# Patient Record
Sex: MALE | Race: BLACK OR AFRICAN AMERICAN | NOT HISPANIC OR LATINO | Employment: OTHER | URBAN - METROPOLITAN AREA
[De-identification: names, ages, dates, MRNs, and addresses within clinical notes are randomized per-mention and may not be internally consistent; named-entity substitution may affect disease eponyms.]

---

## 2018-12-18 ENCOUNTER — HOSPITAL ENCOUNTER (INPATIENT)
Facility: HOSPITAL | Age: 68
LOS: 2 days | Discharge: HOME HEALTH | End: 2018-12-20
Attending: EMERGENCY MEDICINE | Admitting: FAMILY MEDICINE

## 2018-12-18 ENCOUNTER — APPOINTMENT (OUTPATIENT)
Dept: MRI IMAGING | Facility: HOSPITAL | Age: 68
End: 2018-12-18

## 2018-12-18 ENCOUNTER — APPOINTMENT (OUTPATIENT)
Dept: GENERAL RADIOLOGY | Facility: HOSPITAL | Age: 68
End: 2018-12-18

## 2018-12-18 DIAGNOSIS — Z74.09 IMPAIRED MOBILITY AND ADLS: ICD-10-CM

## 2018-12-18 DIAGNOSIS — Z78.9 IMPAIRED MOBILITY AND ADLS: ICD-10-CM

## 2018-12-18 DIAGNOSIS — I63.9 ACUTE ISCHEMIC STROKE (HCC): Primary | ICD-10-CM

## 2018-12-18 DIAGNOSIS — R41.841 COGNITIVE COMMUNICATION DEFICIT: ICD-10-CM

## 2018-12-18 DIAGNOSIS — I63.9 ACUTE ISCHEMIC STROKE (HCC): ICD-10-CM

## 2018-12-18 PROBLEM — F10.10 ALCOHOL ABUSE: Chronic | Status: ACTIVE | Noted: 2018-12-18

## 2018-12-18 PROBLEM — N28.9 RENAL INSUFFICIENCY: Status: ACTIVE | Noted: 2018-12-18

## 2018-12-18 PROBLEM — I10 HYPERTENSION: Chronic | Status: ACTIVE | Noted: 2018-12-18

## 2018-12-18 PROBLEM — M10.9 ACUTE GOUT OF RIGHT FOOT: Status: ACTIVE | Noted: 2018-12-18

## 2018-12-18 LAB
ALBUMIN SERPL-MCNC: 4.13 G/DL (ref 3.2–4.8)
ALBUMIN/GLOB SERPL: 1.4 G/DL (ref 1.5–2.5)
ALP SERPL-CCNC: 76 U/L (ref 25–100)
ALT SERPL W P-5'-P-CCNC: 22 U/L (ref 7–40)
ANION GAP SERPL CALCULATED.3IONS-SCNC: 7 MMOL/L (ref 3–11)
AST SERPL-CCNC: 24 U/L (ref 0–33)
BASOPHILS # BLD AUTO: 0.03 10*3/MM3 (ref 0–0.2)
BASOPHILS NFR BLD AUTO: 0.5 % (ref 0–1)
BILIRUB SERPL-MCNC: 0.2 MG/DL (ref 0.3–1.2)
BUN BLD-MCNC: 26 MG/DL (ref 9–23)
BUN/CREAT SERPL: 16.3 (ref 7–25)
CALCIUM SPEC-SCNC: 9.4 MG/DL (ref 8.7–10.4)
CHLORIDE SERPL-SCNC: 102 MMOL/L (ref 99–109)
CO2 SERPL-SCNC: 26 MMOL/L (ref 20–31)
CREAT BLD-MCNC: 1.6 MG/DL (ref 0.6–1.3)
DEPRECATED RDW RBC AUTO: 45.1 FL (ref 37–54)
EOSINOPHIL # BLD AUTO: 0.15 10*3/MM3 (ref 0–0.3)
EOSINOPHIL NFR BLD AUTO: 2.4 % (ref 0–3)
ERYTHROCYTE [DISTWIDTH] IN BLOOD BY AUTOMATED COUNT: 14.1 % (ref 11.3–14.5)
GFR SERPL CREATININE-BSD FRML MDRD: 44 ML/MIN/1.73
GFR SERPL CREATININE-BSD FRML MDRD: 53 ML/MIN/1.73
GLOBULIN UR ELPH-MCNC: 2.9 GM/DL
GLUCOSE BLD-MCNC: 143 MG/DL (ref 70–100)
HCT VFR BLD AUTO: 38.3 % (ref 38.9–50.9)
HGB BLD-MCNC: 12.3 G/DL (ref 13.1–17.5)
HOLD SPECIMEN: NORMAL
HOLD SPECIMEN: NORMAL
IMM GRANULOCYTES # BLD: 0.02 10*3/MM3 (ref 0–0.03)
IMM GRANULOCYTES NFR BLD: 0.3 % (ref 0–0.6)
LYMPHOCYTES # BLD AUTO: 1.29 10*3/MM3 (ref 0.6–4.8)
LYMPHOCYTES NFR BLD AUTO: 20.9 % (ref 24–44)
MAGNESIUM SERPL-MCNC: 2.1 MG/DL (ref 1.3–2.7)
MCH RBC QN AUTO: 28.1 PG (ref 27–31)
MCHC RBC AUTO-ENTMCNC: 32.1 G/DL (ref 32–36)
MCV RBC AUTO: 87.6 FL (ref 80–99)
MONOCYTES # BLD AUTO: 0.34 10*3/MM3 (ref 0–1)
MONOCYTES NFR BLD AUTO: 5.5 % (ref 0–12)
NEUTROPHILS # BLD AUTO: 4.37 10*3/MM3 (ref 1.5–8.3)
NEUTROPHILS NFR BLD AUTO: 70.7 % (ref 41–71)
PLATELET # BLD AUTO: 327 10*3/MM3 (ref 150–450)
PMV BLD AUTO: 10.6 FL (ref 6–12)
POTASSIUM BLD-SCNC: 4.4 MMOL/L (ref 3.5–5.5)
PROT SERPL-MCNC: 7 G/DL (ref 5.7–8.2)
RBC # BLD AUTO: 4.37 10*6/MM3 (ref 4.2–5.76)
SODIUM BLD-SCNC: 135 MMOL/L (ref 132–146)
TROPONIN I SERPL-MCNC: 0 NG/ML (ref 0–0.07)
URATE SERPL-MCNC: 8.6 MG/DL (ref 3.7–9.2)
WBC NRBC COR # BLD: 6.18 10*3/MM3 (ref 3.5–10.8)
WHOLE BLOOD HOLD SPECIMEN: NORMAL
WHOLE BLOOD HOLD SPECIMEN: NORMAL

## 2018-12-18 PROCEDURE — 80053 COMPREHEN METABOLIC PANEL: CPT | Performed by: EMERGENCY MEDICINE

## 2018-12-18 PROCEDURE — 84484 ASSAY OF TROPONIN QUANT: CPT

## 2018-12-18 PROCEDURE — 70547 MR ANGIOGRAPHY NECK W/O DYE: CPT

## 2018-12-18 PROCEDURE — 70551 MRI BRAIN STEM W/O DYE: CPT

## 2018-12-18 PROCEDURE — 93005 ELECTROCARDIOGRAM TRACING: CPT | Performed by: EMERGENCY MEDICINE

## 2018-12-18 PROCEDURE — 99285 EMERGENCY DEPT VISIT HI MDM: CPT

## 2018-12-18 PROCEDURE — 84550 ASSAY OF BLOOD/URIC ACID: CPT | Performed by: FAMILY MEDICINE

## 2018-12-18 PROCEDURE — 85025 COMPLETE CBC W/AUTO DIFF WBC: CPT | Performed by: EMERGENCY MEDICINE

## 2018-12-18 PROCEDURE — 83735 ASSAY OF MAGNESIUM: CPT | Performed by: EMERGENCY MEDICINE

## 2018-12-18 PROCEDURE — 71045 X-RAY EXAM CHEST 1 VIEW: CPT

## 2018-12-18 PROCEDURE — 70544 MR ANGIOGRAPHY HEAD W/O DYE: CPT

## 2018-12-18 PROCEDURE — 99223 1ST HOSP IP/OBS HIGH 75: CPT | Performed by: FAMILY MEDICINE

## 2018-12-18 RX ORDER — ASPIRIN 81 MG/1
81 TABLET, CHEWABLE ORAL DAILY
Status: DISCONTINUED | OUTPATIENT
Start: 2018-12-18 | End: 2018-12-20 | Stop reason: HOSPADM

## 2018-12-18 RX ORDER — AMLODIPINE BESYLATE 10 MG/1
10 TABLET ORAL DAILY
COMMUNITY
End: 2019-02-06 | Stop reason: SDUPTHER

## 2018-12-18 RX ORDER — ATORVASTATIN CALCIUM 40 MG/1
80 TABLET, FILM COATED ORAL NIGHTLY
Status: DISCONTINUED | OUTPATIENT
Start: 2018-12-18 | End: 2018-12-20 | Stop reason: HOSPADM

## 2018-12-18 RX ORDER — ASPIRIN 300 MG/1
300 SUPPOSITORY RECTAL DAILY
Status: DISCONTINUED | OUTPATIENT
Start: 2018-12-18 | End: 2018-12-20 | Stop reason: HOSPADM

## 2018-12-18 RX ORDER — DIPHENOXYLATE HYDROCHLORIDE AND ATROPINE SULFATE 2.5; .025 MG/1; MG/1
1 TABLET ORAL DAILY
Status: DISCONTINUED | OUTPATIENT
Start: 2018-12-18 | End: 2018-12-20 | Stop reason: HOSPADM

## 2018-12-18 RX ORDER — AMLODIPINE BESYLATE 10 MG/1
10 TABLET ORAL DAILY
Status: DISCONTINUED | OUTPATIENT
Start: 2018-12-18 | End: 2018-12-20 | Stop reason: HOSPADM

## 2018-12-18 RX ORDER — MECLIZINE HYDROCHLORIDE 25 MG/1
25 TABLET ORAL 4 TIMES DAILY PRN
COMMUNITY
End: 2020-02-24

## 2018-12-18 RX ORDER — BISACODYL 10 MG
10 SUPPOSITORY, RECTAL RECTAL DAILY PRN
Status: DISCONTINUED | OUTPATIENT
Start: 2018-12-18 | End: 2018-12-20 | Stop reason: HOSPADM

## 2018-12-18 RX ORDER — BISACODYL 5 MG/1
5 TABLET, DELAYED RELEASE ORAL DAILY PRN
Status: DISCONTINUED | OUTPATIENT
Start: 2018-12-18 | End: 2018-12-20 | Stop reason: HOSPADM

## 2018-12-18 RX ORDER — MIRTAZAPINE 15 MG/1
15 TABLET, FILM COATED ORAL NIGHTLY
COMMUNITY
End: 2019-02-06 | Stop reason: SDUPTHER

## 2018-12-18 RX ORDER — HYDROCHLOROTHIAZIDE 12.5 MG/1
12.5 CAPSULE, GELATIN COATED ORAL DAILY
COMMUNITY
End: 2018-12-20 | Stop reason: HOSPADM

## 2018-12-18 RX ORDER — SODIUM CHLORIDE 0.9 % (FLUSH) 0.9 %
10 SYRINGE (ML) INJECTION AS NEEDED
Status: DISCONTINUED | OUTPATIENT
Start: 2018-12-18 | End: 2018-12-20 | Stop reason: HOSPADM

## 2018-12-18 RX ORDER — SODIUM CHLORIDE 0.9 % (FLUSH) 0.9 %
3 SYRINGE (ML) INJECTION EVERY 12 HOURS SCHEDULED
Status: DISCONTINUED | OUTPATIENT
Start: 2018-12-18 | End: 2018-12-20 | Stop reason: HOSPADM

## 2018-12-18 RX ORDER — NICOTINE 21 MG/24HR
1 PATCH, TRANSDERMAL 24 HOURS TRANSDERMAL
Status: DISCONTINUED | OUTPATIENT
Start: 2018-12-18 | End: 2018-12-20 | Stop reason: HOSPADM

## 2018-12-18 RX ORDER — PREDNISONE 20 MG/1
40 TABLET ORAL
Status: DISCONTINUED | OUTPATIENT
Start: 2018-12-19 | End: 2018-12-20 | Stop reason: HOSPADM

## 2018-12-18 RX ORDER — SODIUM CHLORIDE 0.9 % (FLUSH) 0.9 %
3-10 SYRINGE (ML) INJECTION AS NEEDED
Status: DISCONTINUED | OUTPATIENT
Start: 2018-12-18 | End: 2018-12-20 | Stop reason: HOSPADM

## 2018-12-18 RX ORDER — UBIDECARENONE 75 MG
100 CAPSULE ORAL DAILY
Status: DISCONTINUED | OUTPATIENT
Start: 2018-12-18 | End: 2018-12-20 | Stop reason: HOSPADM

## 2018-12-18 RX ORDER — ACETAMINOPHEN 325 MG/1
650 TABLET ORAL EVERY 6 HOURS PRN
Status: DISCONTINUED | OUTPATIENT
Start: 2018-12-18 | End: 2018-12-20 | Stop reason: HOSPADM

## 2018-12-18 RX ADMIN — SODIUM CHLORIDE, PRESERVATIVE FREE 3 ML: 5 INJECTION INTRAVENOUS at 20:37

## 2018-12-18 NOTE — ED PROVIDER NOTES
Subjective   Eyal Solano is a 67 y.o.male who presents to the ED with his family with c/o neurologic symptoms with onset 9 days ago that has been waxing and waning. He states that he felt light-headed 9 days ago so he checked his blood pressure noting it to be elevated. He felt off balance and weak but waited to see his PCP until 7 days ago. He developed right sided weakness and has not been able to ambulate due to his weakness. He states that he has been seen multiple times in [place], [place] and at [facility]. He states that his right upper and lower extremity weakness is waxing and waning. Per his family, when his weakness is at its worse he is unable to lift his arm or leg. Today, he is able to lift his right arm but states his weakness has been worsening since they drove to [facility] ED. He denies any headache, numbness or visual changes. He states that his left knee causes him difficulties with ambulating and states this is a chronic issue. He has a h/o HTN, HLD but denies DM.        History provided by:  Patient  Neurologic Problem   The patient's primary symptoms include focal weakness, a loss of balance and weakness. This is a recurrent problem. The current episode started 1 to 4 weeks ago. The neurological problem developed gradually. The last time the patient was known to be well was 12/9/2018 1:25 PM.  The problem has been waxing and waning since onset. There was right-sided, upper extremity and lower extremity focality noted. Associated symptoms include dizziness. Past treatments include bed rest. The treatment provided no relief. There is no history of seizures. (HTN)       Review of Systems   Neurological: Positive for dizziness, focal weakness, weakness and loss of balance.       Past Medical History:   Diagnosis Date   • Conversion reaction    • Dizziness    • Hypertension        No Known Allergies    Past Surgical History:   Procedure Laterality Date   • NASAL SINUS SURGERY         Family History    Problem Relation Age of Onset   • Hyperlipidemia Mother    • Stroke Father        Social History     Socioeconomic History   • Marital status: Single     Spouse name: Not on file   • Number of children: Not on file   • Years of education: Not on file   • Highest education level: Not on file   Tobacco Use   • Smoking status: Never Smoker   • Smokeless tobacco: Current User     Types: Chew   Substance and Sexual Activity   • Alcohol use: Yes     Comment: 6 beers and 1/2 pint liquir daily, last drink 12/9/18   • Drug use: No   • Sexual activity: Defer         Objective   Physical Exam   Constitutional: He is oriented to person, place, and time. He appears well-developed and well-nourished. No distress.   HENT:   Head: Normocephalic and atraumatic.   Right Ear: External ear normal.   Left Ear: External ear normal.   Nose: Nose normal.   Eyes: Conjunctivae are normal. No scleral icterus.   Neck: Normal range of motion. Neck supple. Carotid bruit is not present.   Cardiovascular: Normal rate, regular rhythm and normal heart sounds. Exam reveals no friction rub.   No murmur heard.  Pulmonary/Chest: Effort normal and breath sounds normal. No respiratory distress. He has no wheezes. He has no rales.   Abdominal: Soft. Bowel sounds are normal. He exhibits no distension. There is no tenderness.   Musculoskeletal: He exhibits no edema or tenderness.   Neurological: He is alert and oriented to person, place, and time. No cranial nerve deficit. Coordination normal.   Slight right arm and leg weakness, they drift but don't hit bed.  Speech fluent and articulate.  No facial weakness or droop.   Skin: Skin is warm and dry. He is not diaphoretic.   Psychiatric: He has a normal mood and affect. His behavior is normal.   Nursing note and vitals reviewed.      Procedures         ED Course     NIHSS = 2.  Last normal several days ago.  I reviewed notes from [facility] where pt had negative CT scan and was diagnosed with conversion  disorder.  MRI positive for stroke.  Patient stable on serial rechecks.  Discussed exam findings, test results so far and concerns in detail at the bedside.  Discussed need for admission for further evaluation and treatment.                  MDM  Number of Diagnoses or Management Options  Acute ischemic stroke (CMS/HCC):      Amount and/or Complexity of Data Reviewed  Clinical lab tests: reviewed and ordered  Tests in the radiology section of CPT®: reviewed and ordered  Decide to obtain previous medical records or to obtain history from someone other than the patient: yes  Review and summarize past medical records: yes  Discuss the patient with other providers: yes  Independent visualization of images, tracings, or specimens: yes        Final diagnoses:   Acute ischemic stroke (CMS/[facility])       Documentation assistance provided by corie Chawla.  Information recorded by the scribe was done at my direction and has been verified and validated by me.     Chris Chawla  12/18/18 0753       Damián Uribe MD  12/18/18 5874

## 2018-12-18 NOTE — PLAN OF CARE
Problem: Patient Care Overview  Goal: Plan of Care Review  Outcome: Ongoing (interventions implemented as appropriate)   12/18/18 0145   Coping/Psychosocial   Plan of Care Reviewed With patient   Coping/Psychosocial   Patient Agreement with Plan of Care agrees   Plan of Care Review   Progress no change   OTHER   Outcome Summary Pt received from ED around 1700. Mild R sided weakness, lower extremity > upper extremity. Severe L knee pain r/t presumed gout flare up per hospitalist note. W/U in progress. Pt is alert and oriented, NIHSS: 5, see chart for details. VSS, no major complaints aside from L knee pain and NPO status. Patient failed RN bedside dysphagia screen d/t facial droop.      Goal: Individualization and Mutuality  Outcome: Ongoing (interventions implemented as appropriate)    Goal: Discharge Needs Assessment  Outcome: Ongoing (interventions implemented as appropriate)      Problem: Stroke (Ischemic) (Adult)  Goal: Signs and Symptoms of Listed Potential Problems Will be Absent, Minimized or Managed (Stroke)  Outcome: Ongoing (interventions implemented as appropriate)

## 2018-12-18 NOTE — H&P
[facility] Medicine Services  HISTORY AND PHYSICAL    Patient Name: Eyal Solano  : 1950  MRN: [id number]  Primary Care Physician: Provider, No Known  Date of admission: 2018      Subjective   Subjective     Chief Complaint:  Weakness and staggering      HPI:  Eyal Solano is a 67 y.o. male with PMH significant for gout and recently diagnosed HTN.  He has had a 9-10 day history of waxing and waning right sided symptoms.  He complains of staggering gait, weakness of his right arm and leg.  Family present in the room states that he has intermittent issues with word finding and pronunciation at times also.  The patient states that he knows what he wants to say but cant get the right words out.  The patient initially sought care because he was feeling the way described above and chest his blood pressure.  It was as high as 190's/100's.  On , he was placed on BP medication.  On 12/15 he went to [facility] and his BP medications were adjusted.  On  he returned because his motor symptoms were progressive.  He had a negative CT scan and was discharged to home.  Today the patient saw his PCP who recommended he be evaluated in the ER for possibility of stroke.    The patient also complains of right knee pain and swelling that began last night.  He does have a history of gout and was recently placed on HCTZ.     Here in the ER, MRI shows small infarcts left hemisphere (official report pending). Creatinine 1.6 (data deficit, baseline unknown).    Review of Systems   Constitutional: Positive for activity change.   HENT: Negative.    Eyes: Negative.  Negative for visual disturbance.   Respiratory: Negative.    Cardiovascular: Negative.  Negative for palpitations.   Gastrointestinal: Negative.    Endocrine: Negative.    Genitourinary: Negative.    Musculoskeletal: Positive for gait problem.   Skin: Negative.    Allergic/Immunologic: Negative.    Neurological: Positive for  facial asymmetry and weakness.   Hematological: Negative.    Psychiatric/Behavioral: Negative.           Otherwise 10-system ROS reviewed and is negative except as mentioned in the HPI.    Personal History     Past Medical History:   Diagnosis Date   • Conversion reaction    • Dizziness    • Hypertension        Past Surgical History:   Procedure Laterality Date   • NASAL SINUS SURGERY         Family History: family history includes Hyperlipidemia in his mother; Stroke in his father. Otherwise pertinent FHx was reviewed and unremarkable. Father stroke.  Mother HLD.    Social History:  reports that  has never smoked. His smokeless tobacco use includes chew. He reports that he drinks alcohol. He reports that he does not use drugs.  Social History     Social History Narrative   • Not on file       Medications:    Available home medication information reviewed.  Medications Prior to Admission   Medication Sig Dispense Refill Last Dose   • amLODIPine (NORVASC) 10 MG tablet Take 10 mg by mouth Daily.      • hydrochlorothiazide (MICROZIDE) 12.5 MG capsule Take 12.5 mg by mouth Daily.      • meclizine (ANTIVERT) 25 MG tablet Take 25 mg by mouth 4 (Four) Times a Day As Needed for dizziness.      • mirtazapine (REMERON) 15 MG tablet Take 15 mg by mouth Every Night.          No Known Allergies    Objective   Objective     Vital Signs:   Temp:  [98.4 °F (36.9 °C)] 98.4 °F (36.9 °C)  Heart Rate:  [76-80] 77  Resp:  [16] 16  BP: (145-177)/() 177/98   Total (NIH Stroke Scale): 5    Physical Exam   Constitutional: No acute distress, awake, alert  Eyes: PERRLA, sclerae anicteric, no conjunctival injection  HENT: NCAT, mucous membranes moist  Neck: Supple, no thyromegaly, no lymphadenopathy, trachea midline  Respiratory: Clear to auscultation bilaterally, nonlabored respirations   Cardiovascular: RRR, no murmurs, rubs, or gallops, palpable pedal pulses bilaterally  Gastrointestinal: Positive bowel sounds, soft, nontender,  nondistended  Musculoskeletal: Right knee with effusion, red, warm and tender. No bilateral ankle edema, no clubbing or cyanosis to extremities  Psychiatric: Appropriate affect, cooperative  Neurologic: Oriented x 3, LLE weaker than left, perhaps mild weakness in RUE but not marked.  Right facial droop, speech clear presently  Skin: No rashes      Results Reviewed:  I have personally reviewed current lab, radiology, and data and agree.    Results from last 7 days   Lab Units  12/18/18   1241   WBC 10*3/mm3  6.18   HEMOGLOBIN g/dL  12.3*   HEMATOCRIT %  38.3*   PLATELETS 10*3/mm3  327     Results from last 7 days   Lab Units  12/18/18   1242   SODIUM mmol/L  135   POTASSIUM mmol/L  4.4   CHLORIDE mmol/L  102   CO2 mmol/L  26.0   BUN mg/dL  26*   CREATININE mg/dL  1.60*   GLUCOSE mg/dL  143*   CALCIUM mg/dL  9.4   ALT (SGPT) U/L  22   AST (SGOT) U/L  24     Estimated Creatinine Clearance: 49.3 mL/min (A) (by C-G formula based on SCr of 1.6 mg/dL (H)).  Brief Urine Lab Results     None        No results found for: BNP  Imaging Results (last 24 hours)     Procedure Component Value Units Date/Time    XR Chest 1 View [[id number]] Collected:  12/18/18 1457     Updated:  12/18/18 1533    Narrative:       EXAMINATION: XR CHEST 1 VW- 12/18/2018     INDICATION: Weak/Dizzy/AMS triage protocol      COMPARISON: NONE     FINDINGS: The heart size is normal. The bronchovascular markings are  accentuated by a poor inspiratory effort and a portable technique. There  is no consolidation, mass or effusion.           Impression:       No active disease.     D:  12/18/2018  E:  12/18/2018     This report was finalized on 12/18/2018 3:31 PM by Dr. Joo Box MD.       MRI Brain Without Contrast [[id number]] Updated:  12/18/18 1418             Assessment/Plan   Assessment / Plan     Active Hospital Problems    Diagnosis Date Noted   • **Stroke (CMS/HCC) [I63.9] 12/18/2018   • Alcohol abuse [F10.10] 12/18/2018   • Hypertension [I10]  12/18/2018   • Acute gout of right knee [M10.9] 12/18/2018   • Renal insufficiency [N28.9] 12/18/2018     Mr. Solano is a 67 year old gentleman with HTN and left sided stroke (possibly embolic)    1.  Stroke:  -- MRA head and neck  -- ASA  --High intensity statin  --ECHO  --Neurology consultation  --PT/OT/ST/CM    2.  Acute gout right knee:  --red, hot, swollen tender knee in the setting of recent HCTZ initiation  --Will use prednisone to treat  --Avoid HCTZ and loop diuretics for this patient  --Checking uric acid level, may need allopurinol after acute flare has subsided.    3. Renal insufficiency, data deficit  -- Recheck in AM  -- avoid nephrotoxins    4.  HTN:  -- continue amlodipine for now  -- Will not treat aggressively until carotid imaging complete    5.  alcohol abuse:  -- Last drink 12/9/2018  -- Vitamins    DVT prophylaxis:  Mechanical    CODE STATUS:    There are no questions and answers to display.       INPATIENT status due to the need for care which can only be reasonably provided in an hospital setting such as possible need for aggressive/expedited ancillary services and/or consultation services, IV medications, close physician monitoring, and/or procedures.  In such, I feel patient’s risk for adverse outcomes and need for care warrant INPATIENT evaluation and predict the patient’s care encounter to likely last beyond 2 midnights.        Electronically signed by Lexi Rabago MD, 12/18/18, 5:40 PM.

## 2018-12-19 ENCOUNTER — APPOINTMENT (OUTPATIENT)
Dept: CARDIOLOGY | Facility: HOSPITAL | Age: 68
End: 2018-12-19
Attending: FAMILY MEDICINE

## 2018-12-19 PROBLEM — E78.5 DYSLIPIDEMIA: Status: ACTIVE | Noted: 2018-12-19

## 2018-12-19 PROBLEM — N17.9 AKI (ACUTE KIDNEY INJURY): Status: ACTIVE | Noted: 2018-12-19

## 2018-12-19 LAB
ANION GAP SERPL CALCULATED.3IONS-SCNC: 7 MMOL/L (ref 3–11)
ARTICHOKE IGE QN: 158 MG/DL (ref 0–130)
BH CV ECHO MEAS - AO ROOT AREA (BSA CORRECTED): 1.6
BH CV ECHO MEAS - AO ROOT AREA: 7.1 CM^2
BH CV ECHO MEAS - AO ROOT DIAM: 3 CM
BH CV ECHO MEAS - BSA(HAYCOCK): 1.9 M^2
BH CV ECHO MEAS - BSA: 1.9 M^2
BH CV ECHO MEAS - BZI_BMI: 24 KILOGRAMS/M^2
BH CV ECHO MEAS - BZI_METRIC_HEIGHT: 177.8 CM
BH CV ECHO MEAS - BZI_METRIC_WEIGHT: 75.8 KG
BH CV ECHO MEAS - EDV(CUBED): 69.3 ML
BH CV ECHO MEAS - EDV(TEICH): 74.5 ML
BH CV ECHO MEAS - EF(CUBED): 71.6 %
BH CV ECHO MEAS - EF(TEICH): 63.8 %
BH CV ECHO MEAS - ESV(CUBED): 19.6 ML
BH CV ECHO MEAS - ESV(TEICH): 27 ML
BH CV ECHO MEAS - FS: 34.3 %
BH CV ECHO MEAS - IVS/LVPW: 0.97
BH CV ECHO MEAS - IVSD: 1 CM
BH CV ECHO MEAS - LA DIMENSION: 3.5 CM
BH CV ECHO MEAS - LA/AO: 1.2
BH CV ECHO MEAS - LAD MAJOR: 4.1 CM
BH CV ECHO MEAS - LAT PEAK E' VEL: 9 CM/SEC
BH CV ECHO MEAS - LATERAL E/E' RATIO: 7.5
BH CV ECHO MEAS - LV MASS(C)D: 143.3 GRAMS
BH CV ECHO MEAS - LV MASS(C)DI: 74.1 GRAMS/M^2
BH CV ECHO MEAS - LVIDD: 4.1 CM
BH CV ECHO MEAS - LVIDS: 2.7 CM
BH CV ECHO MEAS - LVPWD: 1.1 CM
BH CV ECHO MEAS - MED PEAK E' VEL: 6 CM/SEC
BH CV ECHO MEAS - MEDIAL E/E' RATIO: 11.1
BH CV ECHO MEAS - MV A MAX VEL: 91.8 CM/SEC
BH CV ECHO MEAS - MV DEC SLOPE: 236.3 CM/SEC^2
BH CV ECHO MEAS - MV DEC TIME: 0.23 SEC
BH CV ECHO MEAS - MV E MAX VEL: 68.6 CM/SEC
BH CV ECHO MEAS - MV E/A: 0.75
BH CV ECHO MEAS - MV P1/2T MAX VEL: 63.8 CM/SEC
BH CV ECHO MEAS - MV P1/2T: 79.1 MSEC
BH CV ECHO MEAS - MVA P1/2T LCG: 3.4 CM^2
BH CV ECHO MEAS - MVA(P1/2T): 2.8 CM^2
BH CV ECHO MEAS - PA ACC SLOPE: 834.3 CM/SEC^2
BH CV ECHO MEAS - PA ACC TIME: 0.11 SEC
BH CV ECHO MEAS - PA PR(ACCEL): 30.3 MMHG
BH CV ECHO MEAS - RV MAX PG: 1.6 MMHG
BH CV ECHO MEAS - RV V1 MAX: 63.7 CM/SEC
BH CV ECHO MEAS - SI(CUBED): 25.7 ML/M^2
BH CV ECHO MEAS - SI(TEICH): 24.6 ML/M^2
BH CV ECHO MEAS - SV(CUBED): 49.6 ML
BH CV ECHO MEAS - SV(TEICH): 47.5 ML
BH CV ECHO MEAS - TAPSE (>1.6): 2.1 CM2
BH CV ECHO MEASUREMENTS AVERAGE E/E' RATIO: 9.15
BH CV XLRA - RV BASE: 3.1 CM
BH CV XLRA - RV LENGTH: 6.9 CM
BH CV XLRA - RV MID: 2.7 CM
BH CV XLRA - TDI S': 11.6 CM/SEC
BUN BLD-MCNC: 25 MG/DL (ref 9–23)
BUN/CREAT SERPL: 16.9 (ref 7–25)
CALCIUM SPEC-SCNC: 9.2 MG/DL (ref 8.7–10.4)
CHLORIDE SERPL-SCNC: 103 MMOL/L (ref 99–109)
CHOLEST SERPL-MCNC: 224 MG/DL (ref 0–200)
CO2 SERPL-SCNC: 26 MMOL/L (ref 20–31)
CREAT BLD-MCNC: 1.48 MG/DL (ref 0.6–1.3)
DEPRECATED RDW RBC AUTO: 45.8 FL (ref 37–54)
ERYTHROCYTE [DISTWIDTH] IN BLOOD BY AUTOMATED COUNT: 14.2 % (ref 11.3–14.5)
GFR SERPL CREATININE-BSD FRML MDRD: 58 ML/MIN/1.73
GLUCOSE BLD-MCNC: 105 MG/DL (ref 70–100)
HBA1C MFR BLD: 5.5 % (ref 4.8–5.6)
HCT VFR BLD AUTO: 37.2 % (ref 38.9–50.9)
HDLC SERPL-MCNC: 48 MG/DL (ref 40–60)
HGB BLD-MCNC: 11.8 G/DL (ref 13.1–17.5)
LEFT ATRIUM VOLUME INDEX: 19.1 ML/M^2
LEFT ATRIUM VOLUME: 37 ML
LV EF 2D ECHO EST: 65 %
MAXIMAL PREDICTED HEART RATE: 155 BPM
MCH RBC QN AUTO: 27.8 PG (ref 27–31)
MCHC RBC AUTO-ENTMCNC: 31.7 G/DL (ref 32–36)
MCV RBC AUTO: 87.5 FL (ref 80–99)
PLATELET # BLD AUTO: 334 10*3/MM3 (ref 150–450)
PMV BLD AUTO: 10.4 FL (ref 6–12)
POTASSIUM BLD-SCNC: 4.4 MMOL/L (ref 3.5–5.5)
RBC # BLD AUTO: 4.25 10*6/MM3 (ref 4.2–5.76)
SODIUM BLD-SCNC: 136 MMOL/L (ref 132–146)
STRESS TARGET HR: 132 BPM
TRIGL SERPL-MCNC: 124 MG/DL (ref 0–150)
WBC NRBC COR # BLD: 5.7 10*3/MM3 (ref 3.5–10.8)

## 2018-12-19 PROCEDURE — 99223 1ST HOSP IP/OBS HIGH 75: CPT | Performed by: PSYCHIATRY & NEUROLOGY

## 2018-12-19 PROCEDURE — 97535 SELF CARE MNGMENT TRAINING: CPT | Performed by: OCCUPATIONAL THERAPIST

## 2018-12-19 PROCEDURE — 83036 HEMOGLOBIN GLYCOSYLATED A1C: CPT | Performed by: FAMILY MEDICINE

## 2018-12-19 PROCEDURE — 85027 COMPLETE CBC AUTOMATED: CPT | Performed by: FAMILY MEDICINE

## 2018-12-19 PROCEDURE — 97162 PT EVAL MOD COMPLEX 30 MIN: CPT

## 2018-12-19 PROCEDURE — 93306 TTE W/DOPPLER COMPLETE: CPT

## 2018-12-19 PROCEDURE — 92523 SPEECH SOUND LANG COMPREHEN: CPT

## 2018-12-19 PROCEDURE — 63710000001 PREDNISONE PER 1 MG: Performed by: FAMILY MEDICINE

## 2018-12-19 PROCEDURE — 80048 BASIC METABOLIC PNL TOTAL CA: CPT | Performed by: FAMILY MEDICINE

## 2018-12-19 PROCEDURE — 97166 OT EVAL MOD COMPLEX 45 MIN: CPT | Performed by: OCCUPATIONAL THERAPIST

## 2018-12-19 PROCEDURE — 92610 EVALUATE SWALLOWING FUNCTION: CPT

## 2018-12-19 PROCEDURE — 99233 SBSQ HOSP IP/OBS HIGH 50: CPT | Performed by: FAMILY MEDICINE

## 2018-12-19 PROCEDURE — 80061 LIPID PANEL: CPT | Performed by: FAMILY MEDICINE

## 2018-12-19 PROCEDURE — 93306 TTE W/DOPPLER COMPLETE: CPT | Performed by: INTERNAL MEDICINE

## 2018-12-19 RX ORDER — THIAMINE MONONITRATE (VIT B1) 100 MG
100 TABLET ORAL DAILY
Status: DISCONTINUED | OUTPATIENT
Start: 2018-12-19 | End: 2018-12-20 | Stop reason: HOSPADM

## 2018-12-19 RX ORDER — COLCHICINE 0.6 MG/1
0.6 TABLET ORAL EVERY 12 HOURS SCHEDULED
Status: DISCONTINUED | OUTPATIENT
Start: 2018-12-19 | End: 2018-12-19

## 2018-12-19 RX ORDER — CLOPIDOGREL BISULFATE 75 MG/1
75 TABLET ORAL DAILY
Status: DISCONTINUED | OUTPATIENT
Start: 2018-12-19 | End: 2018-12-20 | Stop reason: HOSPADM

## 2018-12-19 RX ORDER — FOLIC ACID 1 MG/1
1 TABLET ORAL DAILY
Status: DISCONTINUED | OUTPATIENT
Start: 2018-12-19 | End: 2018-12-20 | Stop reason: HOSPADM

## 2018-12-19 RX ADMIN — CLOPIDOGREL BISULFATE 75 MG: 75 TABLET ORAL at 13:20

## 2018-12-19 RX ADMIN — FOLIC ACID 1 MG: 1 TABLET ORAL at 13:20

## 2018-12-19 RX ADMIN — ATORVASTATIN CALCIUM 80 MG: 40 TABLET, FILM COATED ORAL at 20:44

## 2018-12-19 RX ADMIN — AMLODIPINE BESYLATE 10 MG: 10 TABLET ORAL at 13:21

## 2018-12-19 RX ADMIN — COLCHICINE 0.6 MG: 0.6 TABLET, FILM COATED ORAL at 13:22

## 2018-12-19 RX ADMIN — ASPIRIN 81 MG 81 MG: 81 TABLET ORAL at 13:21

## 2018-12-19 RX ADMIN — Medication 1 TABLET: at 13:20

## 2018-12-19 RX ADMIN — PREDNISONE 40 MG: 20 TABLET ORAL at 13:21

## 2018-12-19 RX ADMIN — SODIUM CHLORIDE, PRESERVATIVE FREE 3 ML: 5 INJECTION INTRAVENOUS at 20:44

## 2018-12-19 RX ADMIN — Medication 100 MG: at 13:21

## 2018-12-19 RX ADMIN — VITAM B12 100 MCG: 100 TAB at 13:21

## 2018-12-19 RX ADMIN — SODIUM CHLORIDE, PRESERVATIVE FREE 3 ML: 5 INJECTION INTRAVENOUS at 13:22

## 2018-12-19 NOTE — PROGRESS NOTES
Discharge Planning Assessment  [facility]     Patient Name: Eyal Solano  MRN: [id number]  Today's Date: 12/19/2018    Admit Date: 12/18/2018    Discharge Needs Assessment     Row Name 12/19/18 1137       Living Environment    Lives With  significant other    Current Living Arrangements  home/apartment/condo    Primary Care Provided by  self    Provides Primary Care For  no one    Family Caregiver if Needed  significant other    Quality of Family Relationships  helpful;involved;supportive    Able to Return to Prior Arrangements  yes       Resource/Environmental Concerns    Resource/Environmental Concerns  none       Transition Planning    Patient/Family Anticipates Transition to  home with family;home with help/services;inpatient rehabilitation facility    Patient/Family Anticipated Services at Transition  ;rehabilitation services;outpatient care    Transportation Anticipated  family or friend will provide       Discharge Needs Assessment    Readmission Within the Last 30 Days  no previous admission in last 30 days    Concerns to be Addressed  discharge planning    Equipment Currently Used at Home  none Patient has a standard walker available    Equipment Needed After Discharge  -- TBD    Discharge Facility/Level of Care Needs  rehabilitation facility;outpatient therapy;home with home health        Discharge Plan     Row Name 12/19/18 1138       Plan    Plan  TBD    Patient/Family in Agreement with Plan  yes    Plan Comments  Met with patient and his s.o., Nikky, in the room to initiate discharge planning. Patient lives with Nikky in a home in [facility]. He is normally independent with ADLs and mobility. Patient still works. Patient would prefer to go home at discharge but is open to the recommendations of therapy and MDs. PT/OT/SLP consults are pending. Of note, patient does not have prescription drug coverage and has to pay for his medications OOP. Patient is also not followed by a PCP. He will try  to get the name of a PCP in [facility]. Family will provide his ride at discharge. CM will continue to follow.         Destination      No service coordination in this encounter.      Durable Medical Equipment      No service coordination in this encounter.      Dialysis/Infusion      No service coordination in this encounter.      Home Medical Care      No service coordination in this encounter.      Community Resources      No service coordination in this encounter.        Expected Discharge Date and Time     Expected Discharge Date Expected Discharge Time    Dec 22, 2018         Demographic Summary     Row Name 12/19/18 1135       General Information    Admission Type  inpatient    Referral Source  admission list    Reason for Consult  discharge planning    General Information Comments  Needs a PCP       Contact Information    Permission Granted to Share Info With  family/designee; s.o., Nikky Bentley; daughters, Gudelia De Leon or Melia Escalante        Functional Status     Row Name 12/19/18 1136       Functional Status    Usual Activity Tolerance  good       Functional Status, IADL    Medications  independent    Meal Preparation  independent    Housekeeping  independent    Laundry  independent    Shopping  independent       Employment/    Employment/ Comments  Confirmed with patient that he has medical coverage through Medicare A & B. He does not have prescription drug coverage.         Psychosocial    No documentation.       Abuse/Neglect    No documentation.       Legal    No documentation.       Substance Abuse    No documentation.       Patient Forms    No documentation.           Steffanie Meek

## 2018-12-19 NOTE — THERAPY EVALUATION
Acute Care - Physical Therapy Initial Evaluation  [facility]     Patient Name: Eyal Solano  : 1950  MRN: [id number]  Today's Date: 2018   Onset of Illness/Injury or Date of Surgery: 18  Date of Referral to PT: 18  Referring Physician: MD VANCE      Admit Date: 2018    Visit Dx:     ICD-10-CM ICD-9-CM   1. Acute ischemic stroke (CMS/HCC) I63.9 434.91   2. Cognitive communication deficit R41.841 799.52   3. Impaired mobility and ADLs Z74.09 799.89     Patient Active Problem List   Diagnosis   • Alcohol abuse   • Hypertension   • Acute gout of right knee   • Acute left frontal embolic stroke (CMS/HCC)   • SAMINA (acute kidney injury) (CMS/HCC)   • Dyslipidemia     Past Medical History:   Diagnosis Date   • Conversion reaction    • Dizziness    • Hypertension      Past Surgical History:   Procedure Laterality Date   • NASAL SINUS SURGERY          PT ASSESSMENT (last 12 hours)      Physical Therapy Evaluation     Row Name 18 0843          PT Evaluation Time/Intention    Subjective Information  complains of;pain R knee and L ankle pain, thinks its gout. Hungry.   -CD     Patient Effort  good  -CD     Symptoms Noted During/After Treatment  increased pain;fatigue  -CD     Comment  PT AWAITING SLP EVAL FOR SWALLOW.   -CD     Row Name 18 0843          General Information    Patient Profile Reviewed?  yes  -CD     Onset of Illness/Injury or Date of Surgery  18  -CD     Referring Physician  MD VANCE  -CD     Patient Observations  alert;cooperative;agree to therapy  -CD     General Observations of Patient  PT SUPINE UPON ARRIVAL ON RA.   -CD     Prior Level of Function  all household mobility;community mobility;ADL's  -CD     Equipment Currently Used at Home  none  -CD     Pertinent History of Current Functional Problem  PT TO ED WITH 9-10 DAY H/O WAXING/WANING R SIDED WEAKNESS, UNSTEADY GAIT, SPEECH CHANGES AND ELEVATED BP. WENT TO OSH WITH ELEVATED BP, CT NEG AND WAS D/C HOME  12/16. TO ED FROM PCP OFFICE ON 12/18 DUE TO CONTINUED SYMPTOMS. PT ALSO HAS NEW ONSET OF L KNEE PAIN AND R ANKLE PAIN WHICH MD IS CONTRIBUTING TO POSSIBLE GOUT. MRI REVEALED SMALL INFARCTS L HEMISPHERE.   -CD     Existing Precautions/Restrictions  fall PAINFUL L KNEE, R ANKLE, UNSTEADY GAIT.   -CD     Risks Reviewed  patient:;LOB;increased discomfort;change in vital signs  -CD     Benefits Reviewed  patient:;improve function;increase independence;increase strength;increase balance;increase knowledge;decrease pain  -CD     Row Name 12/19/18 0843          Relationship/Environment    Lives With  alone  -CD     Row Name 12/19/18 0843          Cognitive Assessment/Intervention- PT/OT    Orientation Status (Cognition)  oriented x 3  -CD     Follows Commands (Cognition)  follows one step commands;over 90% accuracy;verbal cues/prompting required  -CD     Safety Deficit (Cognitive)  awareness of need for assistance;insight into deficits/self awareness;safety precautions awareness;safety precautions follow-through/compliance  -CD     Row Name 12/19/18 0843          Safety Issues, Functional Mobility    Safety Issues Affecting Function (Mobility)  awareness of need for assistance;insight into deficits/self awareness;safety precaution awareness  -CD     Impairments Affecting Function (Mobility)  balance;pain;strength;endurance/activity tolerance;coordination  -CD     Row Name 12/19/18 0843          Bed Mobility Assessment/Treatment    Bed Mobility Assessment/Treatment  supine-sit  -CD     Supine-Sit [place] (Bed Mobility)  contact guard  -CD     Assistive Device (Bed Mobility)  bed rails  -CD     Comment (Bed Mobility)  INCREASED TIME AND EFFORT.   -CD     Row Name 12/19/18 0843          Transfer Assessment/Treatment    Transfer Assessment/Treatment  sit-stand transfer;stand-sit transfer  -CD     Comment (Transfers)  CUES FOR HAND PLACEMENT.   -CD     Sit-Stand [place] (Transfers)  moderate assist (50% patient  effort);verbal cues  -CD     Stand-Sit [place] (Transfers)  minimum assist (75% patient effort);verbal cues  -CD     Row Name 12/19/18 0843          Sit-Stand Transfer    Assistive Device (Sit-Stand Transfers)  walker, front-wheeled  -CD     Row Name 12/19/18 0843          Stand-Sit Transfer    Assistive Device (Stand-Sit Transfers)  walker, front-wheeled  -CD     Row Name 12/19/18 0843          Gait/Stairs Assessment/Training    Gait/Stairs Assessment/Training  gait/ambulation independence;gait/ambulation assistive device;distance ambulated  -CD     [place] Level (Gait)  maximum assist (25% patient effort)  -CD     Assistive Device (Gait)  walker, front-wheeled  -CD     Distance in Feet (Gait)  20  -CD     Deviations/Abnormal Patterns (Gait)  base of support, wide;paris decreased;stride length decreased  -CD     Bilateral Gait Deviations  forward flexed posture;heel strike decreased;knee buckling, bilateral;weight shift ability decreased C/O PAIN L KNEE, R ANKLE WITH GAIT.   -CD     Comment (Gait/Stairs)  RECOMMEND FOLLOWING WITH CHAIR NEXT VISIT  (Significant)   -CD     Row Name 12/19/18 0843          General ROM    GENERAL ROM COMMENTS  IMPAIRED R KNEE EXTENSION AND L ANKLE DF DUE TO PAIN, OTHERWISE WFL'S B LE.   -CD     Row Name 12/19/18 0843          MMT (Manual Muscle Testing)    General MMT Comments  GROSSLY 5/5 L LE, 4/5 R LE. PAIN AT R KNEE, L ANKLE.   -CD     Row Name 12/19/18 0843          Vision Assessment/Intervention    Vision Assessment Comment  DID NOT FORMALLY ASSESS.   -CD     Row Name 12/19/18 0843          Pain Scale: FACES Pre/Post-Treatment    Pain: FACES Scale, Pretreatment  2-->hurts little bit 6/10 WITH MOBILITY.   -CD     Pain: FACES Scale, Post-Treatment  2-->hurts little bit  -CD     Row Name 12/19/18 0843          Plan of Care Review    Plan of Care Reviewed With  patient  -CD     Row Name 12/19/18 0843          Physical Therapy Clinical Impression    Date of Referral to PT   12/18/18  -CD     PT Diagnosis (PT Clinical Impression)  IMPAIRED FUNCTIONAL MOBILITY.,   -CD     Patient/Family Goals Statement (PT Clinical Impression)  DID NOT STATE.   -CD     Criteria for Skilled Interventions Met (PT Clinical Impression)  yes  -CD     Rehab Potential (PT Clinical Summary)  good, to achieve stated therapy goals  -CD     Care Plan Review (PT)  evaluation/treatment results reviewed;care plan/treatment goals reviewed;risks/benefits reviewed;current/potential barriers reviewed;patient/other agree to care plan  -CD     Row Name 12/19/18 0843          Vital Signs    Pre Systolic BP Rehab  143  -CD     Pre Treatment Diastolic BP  88  -CD     Post Systolic BP Rehab  119  -CD     Post Treatment Diastolic BP  104  -CD     Pretreatment Heart Rate (beats/min)  76  -CD     Posttreatment Heart Rate (beats/min)  90  -CD     Row Name 12/19/18 0843          Physical Therapy Goals    Bed Mobility Goal Selection (PT)  bed mobility, PT goal 1  -CD     Transfer Goal Selection (PT)  transfer, PT goal 1  -CD     Gait Training Goal Selection (PT)  gait training, PT goal 1  -CD     Row Name 12/19/18 0843          Bed Mobility Goal 1 (PT)    Activity/Assistive Device (Bed Mobility Goal 1, PT)  bed mobility activities, all  -CD     [place] Level/Cues Needed (Bed Mobility Goal 1, PT)  independent  -CD     Time Frame (Bed Mobility Goal 1, PT)  long term goal (LTG);2 weeks  -CD     Row Name 12/19/18 0843          Transfer Goal 1 (PT)    Activity/Assistive Device (Transfer Goal 1, PT)  transfers, all;walker, rolling  -CD     [place] Level/Cues Needed (Transfer Goal 1, PT)  independent  -CD     Time Frame (Transfer Goal 1, PT)  long term goal (LTG);2 weeks  -CD     Row Name 12/19/18 0843          Gait Training Goal 1 (PT)    Activity/Assistive Device (Gait Training Goal 1, PT)  gait (walking locomotion);assistive device use;walker, rolling  -CD     [place] Level (Gait Training Goal 1, PT)  independent  -CD      Distance (Gait Goal 1, PT)  450  -CD     Time Frame (Gait Training Goal 1, PT)  long term goal (LTG);2 weeks  -CD     Row Name 12/19/18 0843          Positioning and Restraints    Pre-Treatment Position  in bed  -CD     Post Treatment Position  wheelchair  -CD     In Chair  with other staff GOING FOR A TEST.   -CD     Row Name 12/19/18 0843          Living Environment    Home Accessibility  tub/shower is not walk in  -CD       User Key  (r) = Recorded By, (t) = Taken By, (c) = Cosigned By    Initials Name Provider Type    CD Kym Ruiz PT Physical Therapist        Physical Therapy Education     Title: PT OT SLP Therapies (In Progress)     Topic: Physical Therapy (Done)     Point: Mobility training (Done)     Learning Progress Summary           Patient Acceptance, E, VU,NR by CD at 12/19/2018  4:24 PM    Comment:  BENEFITS OF OOB ACTIVITY, SAFETY WITH MOBILITY, PROGRESSION OF POC,                   Point: Home exercise program (Done)     Learning Progress Summary           Patient Acceptance, E, VU,NR by CD at 12/19/2018  4:24 PM    Comment:  BENEFITS OF OOB ACTIVITY, SAFETY WITH MOBILITY, PROGRESSION OF POC,                   Point: Body mechanics (Done)     Learning Progress Summary           Patient Acceptance, E, VU,NR by CD at 12/19/2018  4:24 PM    Comment:  BENEFITS OF OOB ACTIVITY, SAFETY WITH MOBILITY, PROGRESSION OF POC,                   Point: Precautions (Done)     Learning Progress Summary           Patient Acceptance, E, VU,NR by CD at 12/19/2018  4:24 PM    Comment:  BENEFITS OF OOB ACTIVITY, SAFETY WITH MOBILITY, PROGRESSION OF POC,                               User Key     Initials Effective Dates Name Provider Type Discipline     06/19/15 -  Kym Ruiz PT Physical Therapist PT              PT Recommendation and Plan  Anticipated Discharge Disposition (PT): inpatient rehabilitation facility  Planned Therapy Interventions (PT Eval): balance training, bed mobility training, gait training,  home exercise program, patient/family education, transfer training, strengthening  Therapy Frequency (PT Clinical Impression): daily  Outcome Summary/Treatment Plan (PT)  Anticipated Discharge Disposition (PT): inpatient rehabilitation facility  Plan of Care Reviewed With: patient  Outcome Summary: PT PRESENTS WITH EVOLVING SYMPTOM TO INCLUDE L SIDED WEAKNESS/ DECREASED COORDINATION, IMPAIRED BALANCE, R KNEE/L ANKLE PAIN AND DECLINE IN FUNCTIONAL MOBILITY. PT REQUIRED MAX ASSIST TO AMBULATE 20 FEET WITH R WALKER. RECOMMEND IRF AT D/C.   Outcome Measures     Row Name 12/19/18 0843             How much help from another person do you currently need...    Turning from your back to your side while in flat bed without using bedrails?  4  -CD      Moving from lying on back to sitting on the side of a flat bed without bedrails?  4  -CD      Moving to and from a bed to a chair (including a wheelchair)?  2  -CD      Standing up from a chair using your arms (e.g., wheelchair, bedside chair)?  3  -CD      Climbing 3-5 steps with a railing?  1  -CD      To walk in hospital room?  2  -CD      AM-PAC 6 Clicks Score  16  -CD         Modified [place] Scale    Modified [place] Scale  4 - Moderately severe disability.  Unable to walk without assistance, and unable to attend to own bodily needs without assistance.  -CD         Functional Assessment    Outcome Measure Options  AM-PAC 6 Clicks Basic Mobility (PT);Modified [place]  -CD        User Key  (r) = Recorded By, (t) = Taken By, (c) = Cosigned By    Initials Name Provider Type    Kym Lloyd, PT Physical Therapist         Time Calculation:   PT Charges     Row Name 12/19/18 1628             Time Calculation    Start Time  0843  -CD      PT Received On  12/19/18  -      PT Goal Re-Cert Due Date  12/29/18  -CD        User Key  (r) = Recorded By, (t) = Taken By, (c) = Cosigned By    Initials Name Provider Type    Kym Lloyd, PT Physical Therapist        Therapy Suggested  Charges     Code   Minutes Charges    None           Therapy Charges for Today     Code Description Service Date Service Provider Modifiers Qty    87241575399 HC PT EVAL MOD COMPLEXITY 4 12/19/2018 Kym Ruiz, PT GP 1          PT G-Codes  Outcome Measure Options: AM-PAC 6 Clicks Basic Mobility (PT), Modified [place]  AM-PAC 6 Clicks Score: 16  Modified [place] Scale: 4 - Moderately severe disability.  Unable to walk without assistance, and unable to attend to own bodily needs without assistance.      Kym Ruiz, PT  12/19/2018

## 2018-12-19 NOTE — PLAN OF CARE
Problem: Patient Care Overview  Goal: Plan of Care Review  Outcome: Ongoing (interventions implemented as appropriate)   12/19/18 1600   Coping/Psychosocial   Plan of Care Reviewed With patient;family   Coping/Psychosocial   Patient Agreement with Plan of Care agrees   OTHER   Outcome Summary Pt presents with decreased balance, decreased strength, decreased coordination and requires assist with ADLS and mobillity. Recommend IPR, pt desires DC home. If he returns home, recommend BSC, tub bench, RW, HHOT and 24/7 assist from SO.        Problem: Stroke (Ischemic) (Adult)  Goal: Signs and Symptoms of Listed Potential Problems Will be Absent, Minimized or Managed (Stroke)  Outcome: Ongoing (interventions implemented as appropriate)   12/19/18 1400   Goal/Outcome Evaluation   Problems Assessed (Stroke (Ischemic)) motor/sensory impairment   Problems Assessed (Stroke (Ischemic)) motor/sensory impairment

## 2018-12-19 NOTE — CONSULTS
Neurology    Referring Provider: Dr. Rabago    Reason for Consultation: stroke      Chief complaint: right hemiparesis and speech problems    Subjective .     History of present illness:  Mr. Solano is a pleasant 67-year-old male with a past medical history significant for hypertension and gout who is admitted to the hospitalist service for sudden onset weakness and mild speech issues.  He reports that his symptoms began about 10 days or so prior to presentation and were waxing and waning.  His symptoms involved weakness and clumsiness in the right arm and leg as well as a staggering gait.  Additionally he complained of some expressive difficulties stating that he had trouble getting the right words out.  On arrival to the outside hospital ER he was found to be quite hypertensive and has been on blood pressure medication recently which was adjusted about a week or so ago.  In the ER CT head was unremarkable.  He was transferred here for further evaluation and MRI brain showed 2 small infarcts in the left centrum semiovale.    Review of Systems: Positive for weakness and gait difficulty.Otherwise complete review of systems was discussed with the patient and found to be negative except for that mentioned in history of present illness.    History  Past Medical History:   Diagnosis Date   • Conversion reaction    • Dizziness    • Hypertension    ,   Past Surgical History:   Procedure Laterality Date   • NASAL SINUS SURGERY     ,   Family History   Problem Relation Age of Onset   • Hyperlipidemia Mother    • Stroke Father    ,   Social History     Tobacco Use   • Smoking status: Never Smoker   • Smokeless tobacco: Current User     Types: Chew   Substance Use Topics   • Alcohol use: Yes     Comment: 6 beers and 1/2 pint liquir daily, last drink 12/9/18   • Drug use: No   ,   Medications Prior to Admission   Medication Sig Dispense Refill Last Dose   • amLODIPine (NORVASC) 10 MG tablet Take 10 mg by mouth Daily.      •  "hydrochlorothiazide (MICROZIDE) 12.5 MG capsule Take 12.5 mg by mouth Daily.      • meclizine (ANTIVERT) 25 MG tablet Take 25 mg by mouth 4 (Four) Times a Day As Needed for dizziness.      • mirtazapine (REMERON) 15 MG tablet Take 15 mg by mouth Every Night.       and Allergies:  Patient has no known allergies.    Objective     Vital Signs   Blood pressure 143/88, pulse 82, temperature 98 °F (36.7 °C), temperature source Oral, resp. rate 16, height 177.8 cm (70\"), weight 75.8 kg (167 lb), SpO2 99 %.    Physical Exam:      Gen: Sitting in chair with eyes open. In NAD. Appears stated age   Eyes: PERRL, conjuntivae/lids normal   ENT: External canals normal bilaterally. Oropharynx normal.    Neck: Supple. No thyroid enlargement noted   Respiratory: CTA bilaterally. Respirations unlabored   CV: RRR, S1 and S2 nml. Radial pulses 2+ bilaterally.    Skin: No rashes noted on exposed skin. Normal turgor.   MSK: Normal bulk and tone.  Slightly decreased range of motion in the left knee due to pain from acute gout flare.  The left knee is also warm to touch     Neurologic:   Mental status: Awake, alert, oriented x4. Follows commands. Speech fluent.    CN: PERRL, EOM intact, sensation intact in upper/mid/lower face bilaterally, facial movements symmetric, hearing intact to finger rub bilaterally, palate elevates symmetrically, tongue movements and SCMs strong bilaterally    Motor: Mild weakness with right  compared to left.  Strength in the right upper extremity appears full.  Full strength in the left upper extremity.  Mild weakness in the left lower extremity due to pain and mild weakness in the right lower extremity about 4+/5    Reflexes: 2+ throughout   Sensory: Intact to LT throughout   Coordination: No dysmetria noted   Gait: Not tested        Results Reviewed:     Labs reviewed.  A1c 5.5,   MRI brain personally reviewed.  There are 2 punctate areas of restricted diffusion in the left centrum semiovale  MRA " head and neck reports reviewed  EKG report reviewed                 Assessment/Plan     1.  Acute ischemic stroke = 2 punctate areas of restricted diffusion seen on MRI in the left centrum semiovale.  Mechanism possibly due to small vessel disease versus intracranial atherosclerosis.  His MRA neck is significant for multifocal areas of intracranial vascular atherosclerotic disease.  MRA neck showed no hemodynamically significant stenosis of the cervical carotids.  Recommend Plavix in addition to aspirin and continue atorvastatin.  Rehabilitation eval    2.  Acute gouty arthritis = trial of colchicine 0.6 mg twice a day for 5 days.  He may be a good candidate for allopurinol once his acute flare has resolved.    3.  Hyperlipidemia = .  On atorvastatin    4.  Hypertension = continue meds.  Goal systolic BP less than 140    5.  Alcohol abuse = recommend cessation.  Recommend thiamine and folate supplementation        Antonia Lechuga MD  12/19/18  10:47 AM

## 2018-12-19 NOTE — PLAN OF CARE
Problem: Patient Care Overview  Goal: Plan of Care Review  Outcome: Ongoing (interventions implemented as appropriate)   12/19/18 3308   Coping/Psychosocial   Plan of Care Reviewed With patient   Plan of Care Review   Progress improving   OTHER   Outcome Summary Pt's NIH slightly improved, facial droop resolving, BP stable. Very anxious to hopefully be able to eat this morning       Problem: Stroke (Ischemic) (Adult)  Goal: Signs and Symptoms of Listed Potential Problems Will be Absent, Minimized or Managed (Stroke)  Outcome: Ongoing (interventions implemented as appropriate)

## 2018-12-19 NOTE — THERAPY EVALUATION
Acute Care - Occupational Therapy Initial Evaluation  [facility]     Patient Name: Eyal Solano  : 1950  MRN: [id number]  Today's Date: 2018  Onset of Illness/Injury or Date of Surgery: 18  Date of Referral to OT: 18  Referring Physician: Dr. Rabago    Admit Date: 2018       ICD-10-CM ICD-9-CM   1. Acute ischemic stroke (CMS/HCC) I63.9 434.91   2. Cognitive communication deficit R41.841 799.52   3. Impaired mobility and ADLs Z74.09 799.89     Patient Active Problem List   Diagnosis   • Alcohol abuse   • Hypertension   • Acute gout of right knee   • Acute left frontal embolic stroke (CMS/HCC)   • SAMINA (acute kidney injury) (CMS/HCC)   • Dyslipidemia     Past Medical History:   Diagnosis Date   • Conversion reaction    • Dizziness    • Hypertension      Past Surgical History:   Procedure Laterality Date   • NASAL SINUS SURGERY            OT ASSESSMENT FLOWSHEET (last 72 hours)      Occupational Therapy Evaluation     Row Name 18 1600                   OT Evaluation Time/Intention    Subjective Information  no complaints  -AR        Document Type  evaluation  -AR        Mode of Treatment  occupational therapy  -AR        Patient Effort  good  -AR        Symptoms Noted During/After Treatment  none  -AR           General Information    Patient Profile Reviewed?  yes  -AR        Onset of Illness/Injury or Date of Surgery  18  -AR        Referring Physician  Dr. Rabago  -AR        Patient Observations  alert;cooperative;agree to therapy  -AR        Patient/Family Observations  pt supine, family at bedside  -AR        Prior Level of Function  independent:;all household mobility;community mobility;gait;transfer;ADL's reports h/o recent fall  -AR        Equipment Currently Used at Home  walker, standard was not using AD PTA  -AR        Pertinent History of Current Functional Problem  Pt is a 67 yom who presented to ED with 9 day h/o right-sided weakness, unsteady gait and speech  changes. Pt went to OSH with elevated BP, CT (-) and Pt DC home. Pt presented from PCP office and imaging revealed small infarcts left hemisphere. Pt with new-onset left knee and right ankle pain.   -AR        Existing Precautions/Restrictions  fall painful L knee and R ankle  -AR        Equipment Issued to Patient  feeding equipment  -AR        Risks Reviewed  patient and family:;LOB;nausea/vomiting;dizziness;increased discomfort;change in vital signs;lines disloged  -AR        Benefits Reviewed  patient and family:;improve function;increase independence;increase strength;increase balance;decrease pain;decrease risk of DVT;increase knowledge  -AR        Barriers to Rehab  none identified  -AR           Relationship/Environment    Primary Source of Support/Comfort  friend  -AR        Lives With  significant other  -AR           Resource/Environmental Concerns    Current Living Arrangements  home/apartment/condo  -AR        Resource/Environmental Concerns  none  -AR           Cognitive Assessment/Interventions    Additional Documentation  Cognitive Assessment/Intervention (Group)  -AR           Cognitive Assessment/Intervention- PT/OT    Affect/Mental Status (Cognitive)  WNL  -AR        Orientation Status (Cognition)  oriented x 4  -AR        Follows Commands (Cognition)  WNL  -AR        Safety Deficit (Cognitive)  mild deficit;awareness of need for assistance;judgment;problem solving  -AR        Personal Safety Interventions  fall prevention program maintained  -AR           Safety Issues, Functional Mobility    Safety Issues Affecting Function (Mobility)  judgment;problem solving;insight into deficits/self awareness  -AR        Impairments Affecting Function (Mobility)  balance;pain;strength  -AR           Bed Mobility Assessment/Treatment    Bed Mobility Assessment/Treatment  scooting/bridging;supine-sit;sit-supine  -AR        Scooting/Bridging [place] (Bed Mobility)  contact guard;verbal cues  -AR         Supine-Sit [place] (Bed Mobility)  contact guard;verbal cues  -AR        Sit-Supine [place] (Bed Mobility)  contact guard;verbal cues  -AR        Assistive Device (Bed Mobility)  bed rails;head of bed elevated  -AR           Functional Mobility    Functional Mobility- Ind. Level  2 person assist required;moderate assist (50% patient effort)  -AR        Functional Mobility- Device  other (see comments) Formerly [facility], 1928–1983  -AR        Functional Mobility-Distance (Feet)  15  -AR           Transfer Assessment/Treatment    Transfer Assessment/Treatment  sit-stand transfer;stand-sit transfer  -AR           Sit-Stand Transfer    Sit-Stand [place] (Transfers)  moderate assist (50% patient effort);2 person assist;verbal cues  -AR        Assistive Device (Sit-Stand Transfers)  other (see comments) Formerly [facility], 1928–1983  -AR           Stand-Sit Transfer    Stand-Sit [place] (Transfers)  minimum assist (75% patient effort);2 person assist;verbal cues  -AR        Assistive Device (Stand-Sit Transfers)  other (see comments) Formerly [facility], 1928–1983  -AR           ADL Assessment/Intervention    35457 - OT Self Care/Mgmt Minutes  9  -AR        BADL Assessment/Intervention  lower body dressing;feeding  -AR           Lower Body Dressing Assessment/Training    Lower Body Dressing [place] Level  doff;don;socks;contact guard assist  -AR        Lower Body Dressing Position  edge of bed sitting  -AR           Self-Feeding Assessment/Training    Comment (Feeding)  Pt reports difficulty manipulating standard utensils. Issued good  utensil and adaptive cup, pt completed hand-to-mouth pattern with good  utensil with no issues  -AR           BADL Safety/Performance    Impairments, BADL Safety/Performance  balance;pain;range of motion;strength;coordination  -AR        Skilled BADL Treatment/Intervention  adaptive equipment training;BADL process/adaptation training;liu/one-hand technique  -AR        Progress in BADL Status  improvement noted  -AR            General ROM    GENERAL ROM COMMENTS  WFL BUE  -AR           MMT (Manual Muscle Testing)    General MMT Comments  RUE 3+/5, LUE 5/5  -AR           Motor Assessment/Interventions    Additional Documentation  Balance (Group);Balance Interventions (Group);Fine Motor Testing & Training (Group);Gross Motor Coordination (Group)  -AR           Gross Motor Coordination    Gross Motor Impairments  finger to nose intact bilat  -AR           Balance    Balance  static sitting balance;static standing balance  -AR           Static Sitting Balance    Level of [place] (Unsupported Sitting, Static Balance)  supervision  -AR        Sitting Position (Unsupported Sitting, Static Balance)  sitting on edge of bed  -AR        Time Able to Maintain Position (Unsupported Sitting, Static Balance)  more than 5 minutes  -AR           Static Standing Balance    Level of [place] (Supported Standing, Static Balance)  minimal assist, 75% patient effort x2  -AR        Time Able to Maintain Position (Supported Standing, Static Balance)  1 to 2 minutes  -AR        Assistive Device Utilized (Supported Standing, Static Balance)  -- BUE HHA  -AR           Fine Motor Testing & Training    Comment, Fine Motor Coordination  B opposition intact, in-hand manipulation impaired   -AR           Sensory Assessment/Intervention    Sensory General Assessment  light touch sensation deficits identified  -AR        Additional Documentation  Vision Assessment/Intervention (Group)  -AR           Light Touch Sensation Assessment    Left Upper Extremity: Light Touch Sensation Assessment  intact  -AR        Right Upper Extremity: Light Touch Sensation Assessment  mild impairment, 75% or more correct responses  -AR           Vision Assessment/Intervention    Visual Impairment/Limitations  WNL  -AR           Positioning and Restraints    Pre-Treatment Position  in bed  -AR        Post Treatment Position  bed  -AR        In Bed  supine;call light within  reach;encouraged to call for assist;exit alarm on;with family/caregiver  -AR           Pain Scale: FACES Pre/Post-Treatment    Pain: FACES Scale, Pretreatment  0-->no hurt  -AR        Pain: FACES Scale, Post-Treatment  0-->no hurt  -AR           Plan of Care Review    Plan of Care Reviewed With  patient;family  -AR           Clinical Impression (OT)    Date of Referral to OT  12/18/18  -AR        OT Diagnosis  decreased independence with ADLS  -AR        Patient/Family Goals Statement (OT Eval)  return home  -AR        Criteria for Skilled Therapeutic Interventions Met (OT Eval)  yes;treatment indicated  -AR        Rehab Potential (OT Eval)  good, to achieve stated therapy goals  -AR        Therapy Frequency (OT Eval)  daily  -AR        Anticipated Discharge Disposition (OT)  inpatient rehabilitation facility pt desires DC home w/HH OT  -AR           Vital Signs    Pre Systolic BP Rehab  148  -AR        Pre Treatment Diastolic BP  84  -AR        Post Systolic BP Rehab  149  -AR        Post Treatment Diastolic BP  86  -AR        Pretreatment Heart Rate (beats/min)  86  -AR        Posttreatment Heart Rate (beats/min)  80  -AR        Pre Patient Position  Supine  -AR        Intra Patient Position  Sitting  -AR        Post Patient Position  Supine  -AR           OT Goals    Transfer Goal Selection (OT)  transfer, OT goal 1  -AR        Toileting Goal Selection (OT)  toileting, OT goal 1  -AR        Self-Feeding Goal Selection (OT)  self feeding, OT goal 1  -AR        ROM Goal Selection (OT)  ROM, OT goal 1  -AR        Additional Documentation  ROM Goal Selection (OT) (Row);Self-Feeding Goal Selection (OT) (Row)  -AR           Transfer Goal 1 (OT)    Activity/Assistive Device (Transfer Goal 1, OT)  sit-to-stand/stand-to-sit;toilet;commode, 3-in-1;walker, rolling  -AR        [place] Level/Cues Needed (Transfer Goal 1, OT)  verbal cues required;contact guard assist  -AR        Time Frame (Transfer Goal 1, OT)  short  term goal (STG);5 days  -AR        Progress/Outcome (Transfer Goal 1, OT)  goal ongoing  -AR           Toileting Goal 1 (OT)    Activity/Device (Toileting Goal 1, OT)  toileting skills, all;commode, 3-in-1  -AR        [place] Level/Cues Needed (Toileting Goal 1, OT)  verbal cues required;contact guard assist  -AR        Time Frame (Toileting Goal 1, OT)  short term goal (STG);5 days  -AR        Progress/Outcome (Toileting Goal 1, OT)  goal ongoing  -AR           Self-Feeding Goal 1 (OT)    Activity/Assistive Device (Self-Feeding Goal 1, OT)  self-feeding skills, all;built-up handle utensils;adapted cup  -AR        [place] Level/Cues Needed (Self-Feeding Goal 1, OT)  supervision required;verbal cues required  -AR        Time Frame (Self-Feeding Goal 1, OT)  short term goal (STG);5 days  -AR        Progress/Outcomes (Self-Feeding Goal 1, OT)  goal ongoing  -AR           ROM Goal 1 (OT)    ROM Goal 1 (OT)  Pt will complete RUE theraband HEP with supervision to support ADL function  -AR        Time Frame (ROM Goal 1, OT)  short term goal (STG);5 days  -AR        Progress/Outcome (ROM Goal 1, OT)  goal ongoing  -AR          User Key  (r) = Recorded By, (t) = Taken By, (c) = Cosigned By    Initials Name Effective Dates    GABBY Rodriguez Connieyasmeen Garcia, OT 06/22/15 -          Occupational Therapy Education     Title: PT OT SLP Therapies (In Progress)     Topic: Occupational Therapy (In Progress)     Point: ADL training (Done)     Description: Instruct learner(s) on proper safety adaptation and remediation techniques during self care or transfers.   Instruct in proper use of assistive devices.    Learning Progress Summary           Patient STEPHANIE Sanders,FARZAD,REJI, VU,NR by AR at 12/19/2018  4:00 PM   Family STEPHANIE Sanders,FARZAD,REJI, KAYE,NR by AR at 12/19/2018  4:00 PM                   Point: Precautions (Done)     Description: Instruct learner(s) on prescribed precautions during self-care and functional transfers.    Learning Progress  Summary           Patient Eager, E,TB,D, VU,NR by AR at 12/19/2018  4:00 PM   Family Eager, E,TB,D, VU,NR by AR at 12/19/2018  4:00 PM                   Point: Body mechanics (Done)     Description: Instruct learner(s) on proper positioning and spine alignment during self-care, functional mobility activities and/or exercises.    Learning Progress Summary           Patient Eager, E,TB,D, VU,NR by AR at 12/19/2018  4:00 PM   Family Eager, E,TB,D, VU,NR by AR at 12/19/2018  4:00 PM                               User Key     Initials Effective Dates Name Provider Type Discipline    AR 06/22/15 -  Connie Rodriguez, OT Occupational Therapist OT                  OT Recommendation and Plan  Outcome Summary/Treatment Plan (OT)  Anticipated Discharge Disposition (OT): inpatient rehabilitation facility(pt desires DC home w/HH OT)  Therapy Frequency (OT Eval): daily  Plan of Care Review  Plan of Care Reviewed With: patient, family  Plan of Care Reviewed With: patient, family  Outcome Summary: Pt presents with decreased balance, decreased strength, decreased coordination and requires assist with ADLS and mobillity. Recommend IPR, pt desires DC home. If he returns home, recommend BSC, tub bench, RW, HHOT and 24/7 assist from SO.     Outcome Measures     Row Name 12/19/18 1600 12/19/18 0843          How much help from another person do you currently need...    Turning from your back to your side while in flat bed without using bedrails?  --  4  -CD     Moving from lying on back to sitting on the side of a flat bed without bedrails?  --  4  -CD     Moving to and from a bed to a chair (including a wheelchair)?  --  2  -CD     Standing up from a chair using your arms (e.g., wheelchair, bedside chair)?  --  3  -CD     Climbing 3-5 steps with a railing?  --  1  -CD     To walk in hospital room?  --  2  -CD     AM-PAC 6 Clicks Score  --  16  -CD        How much help from another is currently needed...    Putting on and taking off  regular lower body clothing?  3  -AR  --     Bathing (including washing, rinsing, and drying)  2  -AR  --     Toileting (which includes using toilet bed pan or urinal)  2  -AR  --     Putting on and taking off regular upper body clothing  3  -AR  --     Taking care of personal grooming (such as brushing teeth)  3  -AR  --     Eating meals  3  -AR  --     Score  16  -AR  --        Modified [place] Scale    Modified [place] Scale  4 - Moderately severe disability.  Unable to walk without assistance, and unable to attend to own bodily needs without assistance.  -AR  4 - Moderately severe disability.  Unable to walk without assistance, and unable to attend to own bodily needs without assistance.  -CD        Functional Assessment    Outcome Measure Options  AM-PAC 6 Clicks Daily Activity (OT)  -AR  AM-PAC 6 Clicks Basic Mobility (PT);Modified [place]  -CD       User Key  (r) = Recorded By, (t) = Taken By, (c) = Cosigned By    Initials Name Provider Type    CD Kym Ruiz, PT Physical Therapist    Connie Avina, OT Occupational Therapist          Time Calculation:   Time Calculation- OT     Row Name 12/19/18 1600             Time Calculation- OT    OT Start Time  1600  -AR      Total Timed Code Minutes- OT  9 minute(s)  -AR      OT Received On  12/19/18  -AR      OT Goal Re-Cert Due Date  12/29/18  -AR         Timed Charges    67358 - OT Self Care/Mgmt Minutes  9  -AR        User Key  (r) = Recorded By, (t) = Taken By, (c) = Cosigned By    Initials Name Provider Type    Connie Avina, OT Occupational Therapist        Therapy Suggested Charges     Code   Minutes Charges    26374 (CPT®) Hc Ot Neuromusc Re Education Ea 15 Min      16371 (CPT®) Hc Ot Ther Proc Ea 15 Min      74283 (CPT®) Hc Ot Therapeutic Act Ea 15 Min      11576 (CPT®) Hc Ot Manual Therapy Ea 15 Min      08759 (CPT®) Hc Ot Iontophoresis Ea 15 Min      02816 (CPT®) Hc Ot Elec Stim Ea-Per 15 Min      84166 (CPT®) Hc Ot Ultrasound Ea 15 Min       55626 (CPT®) Hc Ot Self Care/Mgmt/Train Ea 15 Min 9 1    Total  9 1        Therapy Charges for Today     Code Description Service Date Service Provider Modifiers Qty    75935039646 HC OT SELF CARE/MGMT/TRAIN EA 15 MIN 12/19/2018 Connie Rodriguez OT GO 1    49869729280 HC OT EVAL MOD COMPLEXITY 4 12/19/2018 Connie Rodriguez, OT GO 1    16170202717 HC OT THER SUPP EA 15 MIN 12/19/2018 Connie Rodriguez OT GO 3               Connie Rodriguez OT  12/19/2018

## 2018-12-19 NOTE — PLAN OF CARE
Problem: Patient Care Overview  Goal: Plan of Care Review  Outcome: Ongoing (interventions implemented as appropriate)   12/19/18 5168   Coping/Psychosocial   Plan of Care Reviewed With patient   OTHER   Outcome Summary PT PRESENTS WITH EVOLVING SYMPTOM TO INCLUDE L SIDED WEAKNESS/ DECREASED COORDINATION, IMPAIRED BALANCE, R KNEE/L ANKLE PAIN AND DECLINE IN FUNCTIONAL MOBILITY. PT REQUIRED MAX ASSIST TO AMBULATE 20 FEET WITH R WALKER. RECOMMEND IRF AT D/C AT THIS TIME.        Problem: Stroke (Ischemic) (Adult)  Goal: Signs and Symptoms of Listed Potential Problems Will be Absent, Minimized or Managed (Stroke)  Outcome: Ongoing (interventions implemented as appropriate)

## 2018-12-19 NOTE — PLAN OF CARE
Problem: Patient Care Overview  Goal: Plan of Care Review  Outcome: Ongoing (interventions implemented as appropriate)   12/19/18 1345   Coping/Psychosocial   Plan of Care Reviewed With patient;family   SLP evaluation completed. Will continue to address diet tolerance and cognitive-communication skills through treatment. Please see note for further details and recommendations.      Problem: Stroke (Ischemic) (Adult)  Goal: Signs and Symptoms of Listed Potential Problems Will be Absent, Minimized or Managed (Stroke)  Outcome: Ongoing (interventions implemented as appropriate)   12/19/18 1345   Goal/Outcome Evaluation   Problems Assessed (Stroke (Ischemic)) communication impairment;eating/swallowing impairment;cognitive impairment   Problems Assessed (Stroke (Ischemic)) communication impairment;eating/swallowing impairment;cognitive impairment

## 2018-12-19 NOTE — PROGRESS NOTES
[facility] Medicine Services  PROGRESS NOTE    Patient Name: Eyal Solano  : 1950  MRN: [id number]    Date of Admission: 2018  Length of Stay: 1  Primary Care Physician: Provider, No Known    Subjective   Subjective     CC:  Acute embolic stroke (CMS/HCC)    HPI:  Gouty left knee and right ankle limiting mobility. No focal deficits. Family at bedside.    ROS:  Otherwise ROS is negative except as mentioned in the HPI.    Objective   Objective     Vital Signs:   Temp:  [97.7 °F (36.5 °C)-99.8 °F (37.7 °C)] 97.7 °F (36.5 °C)  Heart Rate:  [76-82] 82  Resp:  [16-18] 16  BP: (125-177)/() 148/84  Total (NIH Stroke Scale): 4     Physical Exam:  Constitutional: No acute distress, awake, alert, nontoxic, normal body habitus  Respiratory: Clear to auscultation bilaterally, good effort, nonlabored respirations   Cardiovascular: RRR, no murmur  Gastrointestinal: Positive bowel sounds, soft, nontender, nondistended  Musculoskeletal: No peripheral edema, normal muscle tone for age, warm tender right knee and posterior left achilles/heel  Psychiatric: Appropriate affect, good insight and judgement, cooperative  Neurologic: Oriented x 3, movements symmetric BUE and BLE, Cranial Nerves grossly intact to confrontation, speech clear and fluent  Skin: No rashes, no jaundice, no petechiae, no mottling      Results Reviewed:  I have personally reviewed current lab, radiology, and data and agree.    Results from last 7 days   Lab Units  18   0513  18   1241   WBC 10*3/mm3  5.70  6.18   HEMOGLOBIN g/dL  11.8*  12.3*   HEMATOCRIT %  37.2*  38.3*   PLATELETS 10*3/mm3  334  327     Results from last 7 days   Lab Units  18   0513  18   1242   SODIUM mmol/L  136  135   POTASSIUM mmol/L  4.4  4.4   CHLORIDE mmol/L  103  102   CO2 mmol/L  26.0  26.0   BUN mg/dL  25*  26*   CREATININE mg/dL  1.48*  1.60*   GLUCOSE mg/dL  105*  143*   CALCIUM mg/dL  9.2  9.4   ALT (SGPT) U/L    --   22   AST (SGOT) U/L   --   24     Estimated Creatinine Clearance: 53.4 mL/min (A) (by C-G formula based on SCr of 1.48 mg/dL (H)).  No results found for: BNP    Microbiology Results Abnormal     None          Imaging Results (last 24 hours)     Procedure Component Value Units Date/Time    MRI Brain Without Contrast [[id number]] Collected:  12/19/18 0847     Updated:  12/19/18 0850    Narrative:       EXAMINATION: MRI BRAIN WO CONTRAST- 12/18/2018     INDICATION: R side weakness         TECHNIQUE: Sagittal and axial images of the brain are displayed. No  intravenous contrast was utilized.      COMPARISON: NONE     FINDINGS: There are extensive periventricular white matter changes  typical of aging. There is no intracranial mass, hemorrhage, midline  shift or extra-axial fluid collection. There is no cerebellopontine  angle mass.  The pituitary gland is normal. There are 2, small, punctate  areas of abnormal signal with restricted diffusion in the left  parasagittal frontal lobe.       Impression:       There are 2 punctate areas of abnormal signal showing  restricted diffusion in the  parasagittal region of the left frontal  lobe. These are consistent with tiny presumably embolic infarcts without  hemorrhage.         D:  12/18/2018  E:  12/19/2018     This report was finalized on 12/19/2018 8:48 AM by Dr. Joo Box MD.       MRI Angiogram Head Without Contrast [[id number]] Collected:  12/18/18 1910     Updated:  12/18/18 2210    Addenda:        THIS REPORT CONTAINS FINDINGS THAT MAY BE CRITICAL TO PATIENT CARE. The   findings were verbally communicated via telephone conference with Dr. Wilkins   at 10:09 PM EST on 12/18/2018. The findings were acknowledged and   understood.    THIS DOCUMENT HAS BEEN ELECTRONICALLY SIGNED BY MATTI JARRETT MD  Signed:  12/18/18 2210 by Matti Jarrett MD    Narrative:       EXAM:    MR Angiogram Head Without Contrast, Arteries     EXAM DATE/TIME:    12/18/2018 7:10 PM      CLINICAL HISTORY:    67 years old, male; Cerebral infarction, unspecified; Signs and symptoms;   Weakness; Patient HX: 9-10 day HX of waxing and waning right sided symptoms. He   complains of staggering gait, weakness of his right arm and leg. The patient   states that he knows what he wants to say but cant get the right words out. No   HX of surgery to head or neck; Additional info: Stroke     TECHNIQUE:    MR angiogram head without contrast. Exam focused on the arteries.     COMPARISON:    MRI BRAIN WO CONTRAST 12/18/2018 1:48 PM     FINDINGS:    Right internal carotid artery:  There is stenosis of the cavernous right   internal carotid artery. The degree of stenosis is approximately 50-60%.    Right anterior cerebral artery: No occlusion or significant stenosis. No   aneurysm.    Right middle cerebral artery:  No occlusion or significant stenosis. No   aneurysm.    Right posterior cerebral artery: No occlusion or significant stenosis. No   aneurysm.    Right vertebral artery: No occlusion or significant stenosis, as visualized.   No aneurysm. There is limited visualization of the vertebral artery.     Left internal carotid artery:  There is stenosis of the cavernous left   internal carotid artery. The degree of stenosis is approximately 35%.    Left anterior cerebral artery:  There is occlusion of the A2 segment of the   left anterior cerebral artery, with partial reconstitution.    Left middle cerebral artery:  There is occlusion of the M1 segment of the left   middle cerebral artery distally. There is reconstitution at the level of the M2   segments. Severe stenosis of a left M2 segment.    Left posterior cerebral artery: No occlusion or significant stenosis. No   aneurysm.    Left vertebral artery: No occlusion or significant stenosis, as visualized. No   aneurysm. There is limited visualization of the vertebral artery.   Basilar artery: No occlusion or significant stenosis. No aneurysm.      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 Complex mucous retention   cysts or polyps are visualized within the right maxillary sinus. Refer to the   MRI brain from the same day for further discussion of intracranial findings.      Impression:       1. There is occlusion of the M1 segment of the left middle cerebral artery   distally. There is reconstitution at the level of the M2 segments. Severe   stenosis of a left M2 segment.   2. There is occlusion of the A2 segment of the left anterior cerebral artery,   with partial reconstitution.   3. There is stenosis of the cavernous right internal carotid artery. The degree   of stenosis is approximately 50-60%.   4. There is stenosis of the cavernous left internal carotid artery. The degree   of stenosis is approximately 35%.   5. Additional findings described above.    THIS DOCUMENT HAS BEEN ELECTRONICALLY SIGNED BY DEV JARRETT MD    MRI Angiogram Neck Without Contrast [[id number]] Collected:  12/18/18 1908     Updated:  12/18/18 2034    Narrative:       EXAM:    MR Angiography Neck Without Intravenous Contrast    EXAM DATE/TIME:    12/18/2018 7:08 PM    CLINICAL HISTORY:    The patient age is 67 years old and is male; Cerebral infarction,   unspecified; Signs and symptoms; Weakness; Patient HX: 9-10 day HX of waxing   and waning right sided symptoms. He complains of staggering gait, weakness of   his right arm and leg. The patient states that he knows what he wants to say   but cant get the right words out. No HX of surgery to head or neck; Additional   info: Stroke No typed up report on mri brain from 12/18/2018 to fax to [facility]    TECHNIQUE:    Magnetic resonance angiography images of the neck without intravenous   contrast.    COMPARISON:    MRI ANGIOGRAM HEAD WO CONTRAST 12/18/2018 7:02 PM    FINDINGS:    Right common carotid artery:  Mild stenosis of the distal right common   carotid artery. The degree of stenosis is approximately 30%.  Artifact limits   evaluation of  the proximal right common carotid artery. Additional stenosis   cannot be excluded.  No occlusion.    Right internal carotid artery:  Mild stenosis of the proximal right internal   carotid artery. The degree of stenosis is approximately 15%.  The distal   extracranial bilateral internal carotid arteries are out of the field-of-view   of the study.  No occlusion, as visualized.    Right external carotid artery:  No significant stenosis or occlusion.    Right vertebral artery:  There is loss of signal within the proximal   bilateral vertebral arteries, likely due to technical limitations.   Flow-limiting pathology cannot be excluded.  The distal V3/proximal V4 segments   of the vertebral arteries are out of the field of view of this study.      Left common carotid artery:  A bovine aortic arch is visualized, with common   origin of the brachiocephalic artery and left common carotid artery.  No   significant stenosis or occlusion.    Left internal carotid artery:  Mild luminal narrowing of the proximal left   internal carotid artery, without hemodynamically significant stenosis. See   above.    Left external carotid artery:  No significant stenosis or occlusion.    Left vertebral artery:  See above.       CAROTID STENOSIS REFERENCE USING NASCET CRITERIA:    % ICA stenosis = (1 - narrowest ICA diameter/diameter of distal cervical ICA)   x 100.    Mild - <50% stenosis.    Moderate - 50-69% stenosis.    Severe - 70-94% stenosis.    Near occlusion - 95-99% stenosis.    Occluded - 100% stenosis.      Impression:       1.  Mild stenosis of the distal right common carotid artery. The degree of   stenosis is approximately 30%.  2.  Mild stenosis of the proximal right internal carotid artery. The degree of   stenosis is approximately 15%.  3.  Mild luminal narrowing of the proximal left internal carotid artery,   without hemodynamically significant stenosis.  4.  There is loss of signal within the proximal bilateral vertebral  arteries,   likely due to technical limitations. Flow-limiting pathology cannot be   excluded.  This can be further evaluated with CTA.  5.  Additional findings described above.    THIS DOCUMENT HAS BEEN ELECTRONICALLY SIGNED BY DEV JARRETT MD    XR Chest 1 View [[id number]] Collected:  12/18/18 1457     Updated:  12/18/18 1533    Narrative:       EXAMINATION: XR CHEST 1 VW- 12/18/2018     INDICATION: Weak/Dizzy/AMS triage protocol      COMPARISON: NONE     FINDINGS: The heart size is normal. The bronchovascular markings are  accentuated by a poor inspiratory effort and a portable technique. There  is no consolidation, mass or effusion.           Impression:       No active disease.     D:  12/18/2018  E:  12/18/2018     This report was finalized on 12/18/2018 3:31 PM by Dr. Joo Box MD.           Results for orders placed during the hospital encounter of 12/18/18   Adult Transthoracic Echo Complete W/ Cont if Necessary Per Protocol (With Agitated Saline)    Narrative · Estimated EF = 65%.  · Left ventricular systolic function is normal.  · Saline contrast study is negative for PFO          I have reviewed the medications.    Current Facility-Administered Medications:   •  acetaminophen (TYLENOL) tablet 650 mg, 650 mg, Oral, Q6H PRN, Lexi Rabago MD  •  amLODIPine (NORVASC) tablet 10 mg, 10 mg, Oral, Daily, Lexi Rabago MD, Stopped at 12/19/18 0909  •  aspirin chewable tablet 81 mg, 81 mg, Oral, Daily, Stopped at 12/19/18 0909 **OR** aspirin suppository 300 mg, 300 mg, Rectal, Daily, Lexi Rabago MD  •  atorvastatin (LIPITOR) tablet 80 mg, 80 mg, Oral, Nightly, Lexi Rabago MD  •  bisacodyl (DULCOLAX) EC tablet 5 mg, 5 mg, Oral, Daily PRN, Lexi Rabago MD  •  bisacodyl (DULCOLAX) suppository 10 mg, 10 mg, Rectal, Daily PRN, Lexi Rabago MD  •  clopidogrel (PLAVIX) tablet 75 mg, 75 mg, Oral, Daily, Antonia Lechuga MD  •  multivitamin (THERAGRAN) tablet 1 tablet, 1 tablet, Oral,  Daily, Lexi Rabago MD, Stopped at 12/19/18 0909  •  nicotine (NICODERM CQ) 21 MG/24HR patch 1 patch, 1 patch, Transdermal, Q24H, Lexi Rabago MD, Stopped at 12/19/18 1027  •  predniSONE (DELTASONE) tablet 40 mg, 40 mg, Oral, Daily With Breakfast, Lexi Rabago MD, Stopped at 12/19/18 0908  •  sodium chloride 0.9 % flush 10 mL, 10 mL, Intravenous, PRN, Damián Uribe MD  •  sodium chloride 0.9 % flush 3 mL, 3 mL, Intravenous, Q12H, Lexi Rabago MD, Stopped at 12/19/18 1027  •  sodium chloride 0.9 % flush 3-10 mL, 3-10 mL, Intravenous, PRN, Lexi Rabago MD  •  vitamin B-12 (CYANOCOBALAMIN) tablet 100 mcg, 100 mcg, Oral, Daily, Lexi Rabago MD, Stopped at 12/19/18 0909    Assessment/Plan   Assessment / Plan     Active Hospital Problems    Diagnosis Date Noted   • **Acute left frontal embolic stroke (CMS/HCC) [I63.9] 12/18/2018   • SAMINA (acute kidney injury) (CMS/formerly [facility]) [N17.9] 12/19/2018   • Dyslipidemia [E78.5] 12/19/2018   • Alcohol abuse [F10.10] 12/18/2018   • Hypertension [I10] 12/18/2018   • Acute gout of right knee [M10.9] 12/18/2018            Brief Hospital Course to date:  Eyal Solano is a 67 y.o. male with PMH significant for gout and recently diagnosed HTN.  He has had a 9-10 day history of waxing and waning right sided weakness symptoms. The patient initially sought on 12/11 and was found to have BP's 190's/110's, he was placed on BP medication.  On 12/15 he went to [facility] and his BP medications were adjusted.  On 12/16 he returned because his motor symptoms were progressive.  He had a negative CT scan and was discharged to home.  On date of admission the patient saw his PCP and continued to complain of symptoms so PCP again recommended he be evaluated in the ER for possibility of undiagnosed stroke.  In ED, a MRI showed small infarcts left hemisphere consistent with likely embolic CVA and creatinine 1.6 (data deficit, baseline unknown).  He has also developed  gouty flare since being placed on HCTZ recently.     - Embolic CVA with evidence of multivessel atherosclerotic disease by imaging -->  On ASA/Plavix/HD statin for now, awaiting Neuro eval  - ECHO normal with no evidence of PFO  - BP's good on Norvasc (stopped home HCTZ due to SAMINA and likely instigated gout)  - prednisone for gouty flare since want to avoid NSAIDs (colchicine) given SAMINA -->  At d/c will send home with 5 days prednisone 20mg   - attempt to ambulate, encouraged patient that the steroid course will help him improve over next 3-5 days from gout.  He does not need to be pain free to go home, but does need to be able to reliably ambulate to restroom etc.   - repeat am BMP to follow creatinine (uclear baseline)      DVT Prophylaxis:  Mechanical, ambulate    Disposition: I expect the patient to be discharged home in am.    CODE STATUS:   Code Status and Medical Interventions:   Ordered at: 12/18/18 1828     Code Status:    CPR     Medical Interventions (Level of Support Prior to Arrest):    Full           Electronically signed by Mari Warren MD, 12/19/18, 11:43 AM.

## 2018-12-19 NOTE — THERAPY EVALUATION
Acute Care - Speech Language Pathology Initial Evaluation  [facility]   Clinical Swallow Evaluation  Cognitive-Communication Evaluation       Patient Name: Eyal Solano  : 1950  MRN: [id number]  Today's Date: 2018               Admit Date: 2018     Visit Dx:    ICD-10-CM ICD-9-CM   1. Acute ischemic stroke (CMS/HCC) I63.9 434.91   2. Cognitive communication deficit R41.841 799.52     Patient Active Problem List   Diagnosis   • Alcohol abuse   • Hypertension   • Acute gout of right knee   • Acute left frontal embolic stroke (CMS/HCC)   • SAMINA (acute kidney injury) (CMS/HCC)   • Dyslipidemia     Past Medical History:   Diagnosis Date   • Conversion reaction    • Dizziness    • Hypertension      Past Surgical History:   Procedure Laterality Date   • NASAL SINUS SURGERY          SLP EVALUATION (last 72 hours)      SLP SLC Evaluation     Row Name 18 1300                   Communication Assessment/Intervention    Document Type  evaluation  -VO        Subjective Information  no complaints  -VO        Patient Observations  alert;cooperative  -VO        Patient/Family Observations  family present  -VO        Patient Effort  good  -VO           General Information    Patient Profile Reviewed  yes  -VO        Precautions/Limitations, Vision  WFL;for purposes of eval  -VO        Precautions/Limitations, Hearing  WFL;for purposes of eval  -VO        Prior Level of Function-Communication  WFL  -VO        Plans/Goals Discussed with  patient;family;agreed upon  -VO        Barriers to Rehab  none identified  -VO           Pain Scale: FACES Pre/Post-Treatment    Pain: FACES Scale, Pretreatment  0-->no hurt  -VO        Pain: FACES Scale, Post-Treatment  0-->no hurt  -VO           Comprehension Assessment/Intervention    Comprehension Assessment/Intervention  Auditory Comprehension  -VO           Auditory Comprehension Assessment/Intervention    Auditory Comprehension (Communication)  WFL  -VO        Able  to Identify Objects/Pictures (Communication)  WFL  -VO        Answers Questions (Communication)  yes/no;wh questions;personal;WFL  -VO        Able to Follow Commands (Communication)  2-step;WFL  -VO        Narrative Discourse  conversational level;WFL  -VO           Expression Assessment/Intervention    Expression Assessment/Intervention  verbal expression  -VO           Verbal Expression Assessment/Intervention    Verbal Expression  mild impairment  -VO        Automatic Speech (Communication)  WFL  -VO        Repetition  WFL  -VO        Phrase Completion  WFL  -VO        Responsive Naming  WFL  -VO        Confrontational Naming  WFL  -VO        Spontaneous/Functional Words  mild impairment  -VO        Sentence Formulation  mild impairment  -VO        Conversational Discourse/Fluency  mild impairment  -VO        Verbal Expression, Comment  Intermittent word retrieval difficulties   -VO           Oral Motor Structure and Function    Oral Motor Structure and Function  WFL  -VO        Dentition Assessment  natural, present and adequate  -VO        Mucosal Quality  moist, healthy  -VO           Oral Musculature and Cranial Nerve Assessment    Oral Motor General Assessment  WFL  -VO           Motor Speech Assessment/Intervention    Motor Speech Function  WFL  -VO        Initiation of Phonation (Communication)  WFL  -VO        Automatic Speech (Communication)  WFL  -VO        Verbal Repetition (Communication)  WFL  -VO        Phase Completion  WFL  -VO        Conversational Speech (Communication)  WFL  -VO           Cognitive Assessment Intervention- SLP    Cognitive Function (Cognition)  WFL  -VO        Orientation Status (Cognition)  WFL  -VO        Memory (Cognitive)  WFL  -VO        Attention (Cognitive)  WFL  -VO        Thought Organization (Cognitive)  concrete divergent;drawing conclusions;mental manipulation;mild impairment;moderate impairment  -VO        Reasoning (Cognitive)  mild impairment;moderate  impairment;mod-complex  -VO        Problem Solving (Cognitive)  multifactorial;temporal;mild impairment  -VO        Functional Math (Cognitive)  simple;mild impairment  -VO        Executive Function (Cognition)  WFL  -VO        Pragmatics (Communication)  WFL  -VO        Right Hemisphere Function  WFL  -VO           SLP Clinical Impressions    SLP Diagnosis  Mild-moderate cognitive-linguistic deficits. Continue diagnostic treatment to assess reading/writing.  -VO        Rehab Potential/Prognosis  good  -VO        SLC Criteria for Skilled Therapy Interventions Met  yes  -VO        Functional Impact  functional impact in social situations;functional impact in ADLs  -VO           Recommendations    Therapy Frequency (SLP SLC)  5 days per week  -VO        Predicted Duration Therapy Intervention (Days)  until discharge  -VO        Anticipated Dischage Disposition  unknown;anticipate therapy at next level of care  -VO          User Key  (r) = Recorded By, (t) = Taken By, (c) = Cosigned By    Initials Name Effective Dates    VO Santa Beauchamp MA,CCC-SLP 10/24/18 -              EDUCATION  The patient has been educated in the following areas:     Cognitive Impairment Communication Impairment.    SLP Recommendation and Plan    SLP Diagnosis: Mild-moderate cognitive-linguistic deficits. Continue diagnostic treatment to assess reading/writing.    Rehab Potential/Prognosis: good    SLC Criteria for Skilled Therapy Interventions Met: yes    Anticipated Dischage Disposition: unknown, anticipate therapy at next level of care         Therapy Frequency (Swallow): PRN    Predicted Duration Therapy Intervention (Days): until discharge          Plan of Care Review  Plan of Care Reviewed With: patient, family  Plan of Care Reviewed With: patient, family        SLP GOALS     Row Name 12/19/18 1300 12/19/18 1230          Oral Nutrition/Hydration Goal 1 (SLP)    Oral Nutrition/Hydration Goal 1, SLP  --  LTG: Pt will tolerate regular  diet, thin liquids w/ no overt s/sxs aspiration or difficulty on 100% of opportunities.  -VO     Time Frame (Oral Nutrition/Hydration Goal 1, SLP)  --  by discharge  -VO     Progress/Outcomes (Oral Nutrition/Hydration Goal 1, SLP)  --  goal ongoing  -VO        Swallow Management Recall Goal 1 (SLP)    Activity (Swallow Management Recall Goal 1, SLP)  --  independent recall of;recall of;safe diet/liquid level;safe diet level/texture  -VO     [place]/Accuracy (Swallow Management Recall Goal 1, SLP)  --  independently (over 90% accuracy)  -VO     Time Frame (Swallow Management Recall Goal 1, SLP)  --  short term goal (STG)  -VO     Progress/Outcomes (Swallow Management Recall Goal 1, SLP)  --  goal ongoing  -VO        Word Retrieval Skills Goal 1 (SLP)    Improve Word Retrieval Skills By Goal 1 (SLP)  completing a divergent task;completing functional word finding tasks;90%;independently (over 90% accuracy)  -VO  --     Time Frame (Word Retrieval Goal 1, SLP)  short term goal (STG)  -VO  --     Progress/Outcomes (Word Retrieval Goal 1, SLP)  goal ongoing  -VO  --        Reasoning Goal 1 (SLP)    Improve Reasoning Through Goal 1 (SLP)  complete high level reasoning task;complete mental flexibility task;80%;independently (over 90% accuracy)  -VO  --     Time Frame (Reasoning Goal 1, SLP)  short term goal (STG)  -VO  --     Progress/Outcomes (Reasoning Goal 1, SLP)  goal ongoing  -VO  --        Functional Problem Solving Skills Goal 1 (SLP)    Improve Problem Solving Through Goal 1 (SLP)  determine solutions to multifactorial problems;determine solutions to complex problems;90%;independently (over 90% accuracy)  -VO  --     Time Frame (Problem Solving Goal 1, SLP)  by discharge  -VO  --     Progress/Outcomes (Problem Solving Goal 1, SLP)  goal ongoing  -VO  --        Additional Goal 1 (SLP)    Additional Goal 1, SLP  LTG: Patient will improve cognitive-linguistic skills in order allow optimal participation in care and  ADLs on 80% of opportunities.  -VO  --     Time Frame (Additional Goal 1, SLP)  by discharge  -VO  --     Progress/Outcomes (Additional Goal 1, SLP)  goal ongoing  -VO  --       User Key  (r) = Recorded By, (t) = Taken By, (c) = Cosigned By    Initials Name Provider Type    VO Santa Beauchamp MA,CCC-SLP Speech and Language Pathologist                  Time Calculation:     Time Calculation- SLP     Row Name 18 1346             Time Calculation- SLP    SLP Start Time  1230  -VO      SLP Received On  18  -VO        User Key  (r) = Recorded By, (t) = Taken By, (c) = Cosigned By    Initials Name Provider Type    Santa Mauro MA,CCC-SLP Speech and Language Pathologist          Therapy Charges for Today     Code Description Service Date Service Provider Modifiers Qty    78491184112 HC ST EVAL ORAL PHARYNG SWALLOW 3 2018 Santa Beauchamp MA,CCC-SLP GN 1    [id number] HC ST EVAL SPEECH AND PROD W LANG  2 2018 Santa Beauchamp MA,CCC-SLP GN 1                     Santa Beauchamp MA,CCC-SLP  2018 and Acute Care - Speech Language Pathology   Swallow Initial Evaluation [facility]     Patient Name: Eyal Solano  : 1950  MRN: [id number]  Today's Date: 2018               Admit Date: 2018    Visit Dx:     ICD-10-CM ICD-9-CM   1. Acute ischemic stroke (CMS/HCC) I63.9 434.91   2. Cognitive communication deficit R41.841 799.52     Patient Active Problem List   Diagnosis   • Alcohol abuse   • Hypertension   • Acute gout of right knee   • Acute left frontal embolic stroke (CMS/HCC)   • SAMINA (acute kidney injury) (CMS/[facility])   • Dyslipidemia     Past Medical History:   Diagnosis Date   • Conversion reaction    • Dizziness    • Hypertension      Past Surgical History:   Procedure Laterality Date   • NASAL SINUS SURGERY          SWALLOW EVALUATION (last 72 hours)      SLP Adult Swallow Evaluation     Row Name 18 1230                   Rehab Evaluation     Document Type  evaluation  -VO        Subjective Information  no complaints  -VO        Patient Observations  alert;cooperative  -VO        Patient/Family Observations  family present  -VO        Patient Effort  good  -VO           General Information    Patient Profile Reviewed  yes  -VO        Pertinent History Of Current Problem  Pt adm w/ L frontal infarcts. CXR clear. Failed dysphagia screen. On CVA pathway.  -VO        Current Method of Nutrition  NPO  -VO        Precautions/Limitations, Vision  WFL;for purposes of eval  -VO        Precautions/Limitations, Hearing  WFL;for purposes of eval  -VO        Prior Level of Function-Communication  WFL  -VO        Prior Level of Function-Swallowing  no diet consistency restrictions  -VO        Plans/Goals Discussed with  patient;family;agreed upon  -VO        Barriers to Rehab  none identified  -VO        Patient's Goals for Discharge  return to PO diet  -VO        Family Goals for Discharge  patient able to return to PO diet  -VO           Pain Assessment    Additional Documentation  Pain Scale: FACES Pre/Post-Treatment (Group)  -VO           Pain Scale: FACES Pre/Post-Treatment    Pain: FACES Scale, Pretreatment  0-->no hurt  -VO        Pain: FACES Scale, Post-Treatment  0-->no hurt  -VO           Oral Motor and Function    Dentition Assessment  natural, present and adequate  -VO        Secretion Management  WNL/WFL  -VO        Mucosal Quality  moist, healthy  -VO        Volitional Swallow  WFL  -VO        Volitional Cough  WFL  -VO           Oral Musculature and Cranial Nerve Assessment    Oral Motor General Assessment  WFL  -VO           General Eating/Swallowing Observations    Respiratory Support Currently in Use  room air  -VO        Eating/Swallowing Skills  self-fed;appropriate self-feeding skills observed  -VO        Positioning During Eating  upright 90 degree;upright in bed  -VO        Utensils Used  spoon;cup;straw  -VO        Consistencies Trialed   regular textures;pureed;thin liquids  -VO           Respiratory    Respiratory Status  WFL  -VO           Clinical Swallow Eval    Oral Prep Phase  WFL  -VO        Oral Transit  WFL  -VO        Oral Residue  WFL  -VO        Pharyngeal Phase  no overt signs/symptoms of pharyngeal impairment  -VO        Esophageal Phase  unremarkable  -VO        Clinical Swallow Evaluation Summary  Pt w/ no overt s/sxs aspiration w/ any consistencies trialed or w/ 3 oz water test. Oral phase WFL. Recommend regular diet, thin liquids w/ basic aspiration precautions. SLP to f/u for diet tolerance before signing off.   -VO           Clinical Impression    SLP Swallowing Diagnosis  functional oral phase;functional pharyngeal phase  -VO        Functional Impact  no impact on function  -VO        Rehab Potential/Prognosis, Swallowing  good, to achieve stated therapy goals  -VO        Swallow Criteria for Skilled Therapeutic Interventions Met  demonstrates skilled criteria;other (see comments) diet tolerance  -VO           Recommendations    Therapy Frequency (Swallow)  PRN  -VO        Predicted Duration Therapy Intervention (Days)  until discharge  -VO        SLP Diet Recommendation  regular textures;thin liquids  -VO        Recommended Precautions and Strategies  small bites of food and sips of liquid  -VO        SLP Rec. for Method of Medication Administration  meds whole;with thin liquids  -VO        Monitor for Signs of Aspiration  yes;notify SLP if any concerns  -VO        Anticipated Dischage Disposition  unknown  -VO          User Key  (r) = Recorded By, (t) = Taken By, (c) = Cosigned By    Initials Name Effective Dates    VO Santa Beauchamp MA,CCC-SLP 10/24/18 -           EDUCATION  The patient has been educated in the following areas:   Dysphagia (Swallowing Impairment) Oral Care/Hydration Modified Diet Instruction.    SLP Recommendation and Plan  SLP Swallowing Diagnosis: functional oral phase, functional pharyngeal  phase  SLP Diet Recommendation: regular textures, thin liquids  Recommended Precautions and Strategies: small bites of food and sips of liquid     Monitor for Signs of Aspiration: yes, notify SLP if any concerns     Swallow Criteria for Skilled Therapeutic Interventions Met: demonstrates skilled criteria, other (see comments)(diet tolerance)  Anticipated Dischage Disposition: unknown, anticipate therapy at next level of care  Rehab Potential/Prognosis, Swallowing: good, to achieve stated therapy goals  Therapy Frequency (Swallow): PRN  Predicted Duration Therapy Intervention (Days): until discharge       Plan of Care Reviewed With: patient, family  Plan of Care Review  Plan of Care Reviewed With: patient, family    SLP GOALS     Row Name 12/19/18 1300 12/19/18 1230          Oral Nutrition/Hydration Goal 1 (SLP)    Oral Nutrition/Hydration Goal 1, SLP  --  LTG: Pt will tolerate regular diet, thin liquids w/ no overt s/sxs aspiration or difficulty on 100% of opportunities.  -VO     Time Frame (Oral Nutrition/Hydration Goal 1, SLP)  --  by discharge  -VO     Progress/Outcomes (Oral Nutrition/Hydration Goal 1, SLP)  --  goal ongoing  -VO        Swallow Management Recall Goal 1 (SLP)    Activity (Swallow Management Recall Goal 1, SLP)  --  independent recall of;recall of;safe diet/liquid level;safe diet level/texture  -VO     [place]/Accuracy (Swallow Management Recall Goal 1, SLP)  --  independently (over 90% accuracy)  -VO     Time Frame (Swallow Management Recall Goal 1, SLP)  --  short term goal (STG)  -VO     Progress/Outcomes (Swallow Management Recall Goal 1, SLP)  --  goal ongoing  -VO        Word Retrieval Skills Goal 1 (SLP)    Improve Word Retrieval Skills By Goal 1 (SLP)  completing a divergent task;completing functional word finding tasks;90%;independently (over 90% accuracy)  -VO  --     Time Frame (Word Retrieval Goal 1, SLP)  short term goal (STG)  -VO  --     Progress/Outcomes (Word Retrieval Goal  1, SLP)  goal ongoing  -VO  --        Reasoning Goal 1 (SLP)    Improve Reasoning Through Goal 1 (SLP)  complete high level reasoning task;complete mental flexibility task;80%;independently (over 90% accuracy)  -VO  --     Time Frame (Reasoning Goal 1, SLP)  short term goal (STG)  -VO  --     Progress/Outcomes (Reasoning Goal 1, SLP)  goal ongoing  -VO  --        Functional Problem Solving Skills Goal 1 (SLP)    Improve Problem Solving Through Goal 1 (SLP)  determine solutions to multifactorial problems;determine solutions to complex problems;90%;independently (over 90% accuracy)  -VO  --     Time Frame (Problem Solving Goal 1, SLP)  by discharge  -VO  --     Progress/Outcomes (Problem Solving Goal 1, SLP)  goal ongoing  -VO  --        Additional Goal 1 (SLP)    Additional Goal 1, SLP  LTG: Patient will improve cognitive-linguistic skills in order allow optimal participation in care and ADLs on 80% of opportunities.  -VO  --     Time Frame (Additional Goal 1, SLP)  by discharge  -VO  --     Progress/Outcomes (Additional Goal 1, SLP)  goal ongoing  -VO  --       User Key  (r) = Recorded By, (t) = Taken By, (c) = Cosigned By    Initials Name Provider Type    Santa Mauro MA,CCC-SLP Speech and Language Pathologist             Time Calculation:   Time Calculation- SLP     Row Name 12/19/18 1346             Time Calculation- SLP    SLP Start Time  1230  -VO      SLP Received On  12/19/18  -VO        User Key  (r) = Recorded By, (t) = Taken By, (c) = Cosigned By    Initials Name Provider Type    Santa Mauro MA,CCC-SLP Speech and Language Pathologist          Therapy Charges for Today     Code Description Service Date Service Provider Modifiers Qty    96026292015 HC ST EVAL ORAL PHARYNG SWALLOW 3 12/19/2018 Santa Beauchamp MA,CCC-SLP GN 1    [id number] HC ST EVAL SPEECH AND PROD W LANG  2 12/19/2018 Santa Beauchamp MA,CCC-SLP GN 1               Santa Beauchamp MA,CCC-SLP  12/19/2018

## 2018-12-19 NOTE — PLAN OF CARE
Problem: Patient Care Overview  Goal: Plan of Care Review  Outcome: Ongoing (interventions implemented as appropriate)   12/19/18 3721   Coping/Psychosocial   Plan of Care Reviewed With patient   Plan of Care Review   Progress improving   OTHER   Outcome Summary VSS. SLP evaluation today. Regular diet ordered. Pt tolerating well. No s/s of aspiration. Pt complained of left knee pain and right ankle pain r/t gout. Doctor aware. Pt is mostly compliant, refusing SCUDs. Pt is pleasant.      Goal: Individualization and Mutuality  Outcome: Ongoing (interventions implemented as appropriate)    Goal: Discharge Needs Assessment  Outcome: Ongoing (interventions implemented as appropriate)    Goal: Interprofessional Rounds/Family Conf  Outcome: Ongoing (interventions implemented as appropriate)      Problem: Stroke (Ischemic) (Adult)  Goal: Signs and Symptoms of Listed Potential Problems Will be Absent, Minimized or Managed (Stroke)  Outcome: Ongoing (interventions implemented as appropriate)      Problem: Fall Risk (Adult)  Goal: Identify Related Risk Factors and Signs and Symptoms  Outcome: Ongoing (interventions implemented as appropriate)    Goal: Absence of Fall  Outcome: Ongoing (interventions implemented as appropriate)

## 2018-12-20 VITALS
OXYGEN SATURATION: 96 % | TEMPERATURE: 97.9 F | HEART RATE: 66 BPM | SYSTOLIC BLOOD PRESSURE: 138 MMHG | BODY MASS INDEX: 23.91 KG/M2 | DIASTOLIC BLOOD PRESSURE: 82 MMHG | RESPIRATION RATE: 16 BRPM | HEIGHT: 70 IN | WEIGHT: 167 LBS

## 2018-12-20 PROBLEM — E78.5 DYSLIPIDEMIA: Chronic | Status: ACTIVE | Noted: 2018-12-19

## 2018-12-20 LAB
ANION GAP SERPL CALCULATED.3IONS-SCNC: 9 MMOL/L (ref 3–11)
BUN BLD-MCNC: 34 MG/DL (ref 9–23)
BUN/CREAT SERPL: 22.2 (ref 7–25)
CALCIUM SPEC-SCNC: 8.9 MG/DL (ref 8.7–10.4)
CHLORIDE SERPL-SCNC: 103 MMOL/L (ref 99–109)
CO2 SERPL-SCNC: 23 MMOL/L (ref 20–31)
CREAT BLD-MCNC: 1.53 MG/DL (ref 0.6–1.3)
GFR SERPL CREATININE-BSD FRML MDRD: 56 ML/MIN/1.73
GLUCOSE BLD-MCNC: 108 MG/DL (ref 70–100)
POTASSIUM BLD-SCNC: 4.5 MMOL/L (ref 3.5–5.5)
SODIUM BLD-SCNC: 135 MMOL/L (ref 132–146)

## 2018-12-20 PROCEDURE — 97535 SELF CARE MNGMENT TRAINING: CPT

## 2018-12-20 PROCEDURE — 63710000001 PREDNISONE PER 1 MG: Performed by: FAMILY MEDICINE

## 2018-12-20 PROCEDURE — 80048 BASIC METABOLIC PNL TOTAL CA: CPT | Performed by: FAMILY MEDICINE

## 2018-12-20 PROCEDURE — 99239 HOSP IP/OBS DSCHRG MGMT >30: CPT | Performed by: FAMILY MEDICINE

## 2018-12-20 PROCEDURE — 99232 SBSQ HOSP IP/OBS MODERATE 35: CPT | Performed by: PSYCHIATRY & NEUROLOGY

## 2018-12-20 RX ORDER — CLOPIDOGREL BISULFATE 75 MG/1
75 TABLET ORAL DAILY
Qty: 30 TABLET | Refills: 1 | Status: SHIPPED | OUTPATIENT
Start: 2018-12-21 | End: 2019-02-06 | Stop reason: SDUPTHER

## 2018-12-20 RX ORDER — ATORVASTATIN CALCIUM 80 MG/1
80 TABLET, FILM COATED ORAL NIGHTLY
Qty: 30 TABLET | Refills: 1 | Status: SHIPPED | OUTPATIENT
Start: 2018-12-20 | End: 2019-02-06 | Stop reason: SDUPTHER

## 2018-12-20 RX ORDER — ASPIRIN 81 MG/1
81 TABLET, CHEWABLE ORAL DAILY
Qty: 30 TABLET | Refills: 1 | Status: SHIPPED | OUTPATIENT
Start: 2018-12-21 | End: 2020-02-24 | Stop reason: SDUPTHER

## 2018-12-20 RX ORDER — PREDNISONE 20 MG/1
20 TABLET ORAL
Qty: 5 TABLET | Refills: 0 | Status: SHIPPED | OUTPATIENT
Start: 2018-12-21 | End: 2018-12-26

## 2018-12-20 RX ADMIN — PREDNISONE 40 MG: 20 TABLET ORAL at 08:54

## 2018-12-20 RX ADMIN — Medication 1 TABLET: at 08:54

## 2018-12-20 RX ADMIN — AMLODIPINE BESYLATE 10 MG: 10 TABLET ORAL at 08:54

## 2018-12-20 RX ADMIN — Medication 100 MG: at 08:54

## 2018-12-20 RX ADMIN — FOLIC ACID 1 MG: 1 TABLET ORAL at 08:54

## 2018-12-20 RX ADMIN — SODIUM CHLORIDE, PRESERVATIVE FREE 3 ML: 5 INJECTION INTRAVENOUS at 08:54

## 2018-12-20 RX ADMIN — VITAM B12 100 MCG: 100 TAB at 08:54

## 2018-12-20 RX ADMIN — ASPIRIN 81 MG 81 MG: 81 TABLET ORAL at 08:54

## 2018-12-20 RX ADMIN — CLOPIDOGREL BISULFATE 75 MG: 75 TABLET ORAL at 08:54

## 2018-12-20 NOTE — PROGRESS NOTES
Case Management Discharge Note    Final Note: Met with patient in the room to discuss the discharge plan. PT and OT have recommended inpatient rehab, but patient has declined. He would like home health and has selected [facility] in [facility]. Referral called to Ruthann with [facility]. [phone]. She states they can accept patient for PT and OT and will f/u with him next week. HH order and DC summary faxed to [phone]. Patient states he has a walker at home that he can use and does feel he needs other medical equipment. He has a new PCP appointment with a [facility] provider, MARCEL Burnett, at [place], and the appointment information is listed in his AVS. Patient's family will provide his ride. No other discharge need identified/voiced.     Destination      No service has been selected for the patient.      Durable Medical Equipment      No service has been selected for the patient.      Dialysis/Infusion      No service has been selected for the patient.      Home Medical Care - Selection Complete      Service Provider Request Status Selected Services Address Phone Number Fax Number    [facility] [address] [place] [address] [phone]      Community Resources      No service has been selected for the patient.             Final Discharge Disposition Code: 06 - home with home health care

## 2018-12-20 NOTE — DISCHARGE SUMMARY
[facility] Medicine Services  DISCHARGE SUMMARY    Patient Name: Eyal Solano  : 1950  MRN: [id number]    Date of Admission: 2018  Date of Discharge:  18    Primary Care Physician: Provider, No Known    Consults     Date and Time Order Name Status Description    2018 0030 Inpatient Neurology Consult Stroke Completed           Hospital Course     Presenting Problem:   Acute ischemic stroke (CMS/[facility]) [I63.9]    Active Hospital Problems    Diagnosis Date Noted   • **Acute left frontal embolic stroke (CMS/HCC) [I63.9] 2018   • SAMINA (acute kidney injury) (CMS/HCC) [N17.9] 2018   • Dyslipidemia [E78.5] 2018   • Alcohol abuse [F10.10] 2018   • Hypertension [I10] 2018   • Acute gout of right knee [M10.9] 2018      Resolved Hospital Problems   No resolved problems to display.          Hospital Course:  Eyal Solano is a 67 y.o. male with PMH significant for gout and recently diagnosed HTN.  He has had a 9-10 day history of waxing and waning right sided weakness symptoms. The patient initially sought on  and was found to have BP's 190's/110's, he was placed on BP medication.  On 12/15 he went to [facility] at [facility] and his BP medications were adjusted.  On  he returned because his motor symptoms were progressive.  He had a negative CT scan and was discharged to home.  On date of admission the patient saw his PCP and continued to complain of symptoms so PCP again recommended he be evaluated in the ER for possibility of undiagnosed stroke.  In ED, a MRI showed small infarcts left hemisphere consistent with likely embolic CVA and creatinine 1.6 (data deficit, baseline unknown).  He has also developed gouty flare since being placed on HCTZ recently.     The acute ischemic stroke has 2 punctate areas of restricted diffusion seen on MRI in the left centrum semiovale, mechanism possibly due to small vessel disease versus intracranial  atherosclerosis.  His MRA neck is significant for multifocal areas of intracranal vascular atherosclerotic disease.  MRA neck showed no hemodynamically significant stenosis of the cervical carotids. Plan to treat maximally with ASA, plavix, and high dose atorvastatin -->  F/u with [facility] in 1 month for re-eval.      Due to SAMINA, he could not tolerate colchicine for his gout flare but has responded very well to low dose prednisone.  He will see his PCP in 1-2 weeks and can consider chronic allopurinol.        Day of Discharge     HPI:   Left knee and right ankle gout improved. No new focal deficits.     ROS:  Otherwise ROS is negative except as mentioned in the HPI.    Vital Signs:   Temp:  [97.1 °F (36.2 °C)-97.9 °F (36.6 °C)] 97.9 °F (36.6 °C)  Heart Rate:  [66-78] 66  Resp:  [16-18] 16  BP: (138-155)/(74-93) 138/82     Physical Exam:  Constitutional: No acute distress, awake, alert, nontoxic, normal body habitus  Respiratory: Clear to auscultation bilaterally, good effort, nonlabored respirations   Cardiovascular: RRR, no murmur  Gastrointestinal: Positive bowel sounds, soft, nontender, nondistended  Musculoskeletal: No peripheral edema, normal muscle tone for age, warm tender right knee and posterior left achilles/heel  Psychiatric: Appropriate affect, good insight and judgement, cooperative  Neurologic: Oriented x 3, movements symmetric BUE and BLE, Cranial Nerves grossly intact to confrontation, speech clear and fluent  Skin: No rashes, no jaundice, no petechiae, no mottling      Pertinent  and/or Most Recent Results     Results from last 7 days   Lab Units  12/20/18   0720  12/19/18   0513  12/18/18   1242  12/18/18   1241   WBC 10*3/mm3   --   5.70   --   6.18   HEMOGLOBIN g/dL   --   11.8*   --   12.3*   HEMATOCRIT %   --   37.2*   --   38.3*   PLATELETS 10*3/mm3   --   334   --   327   SODIUM mmol/L  135  136  135   --    POTASSIUM mmol/L  4.5  4.4  4.4   --    CHLORIDE mmol/L  103  103  102   --     CO2 mmol/L  23.0  26.0  26.0   --    BUN mg/dL  34*  25*  26*   --    CREATININE mg/dL  1.53*  1.48*  1.60*   --    GLUCOSE mg/dL  108*  105*  143*   --    CALCIUM mg/dL  8.9  9.2  9.4   --      Results from last 7 days   Lab Units  12/18/18   1242   BILIRUBIN mg/dL  0.2*   ALK PHOS U/L  76   ALT (SGPT) U/L  22   AST (SGOT) U/L  24     Results from last 7 days   Lab Units  12/19/18   0513   CHOLESTEROL mg/dL  224*   TRIGLYCERIDES mg/dL  124   HDL CHOL mg/dL  48     Results from last 7 days   Lab Units  12/19/18   0513   HEMOGLOBIN A1C %  5.50     Brief Urine Lab Results     None          Microbiology Results Abnormal     None          Imaging Results (all)     Procedure Component Value Units Date/Time    MRI Brain Without Contrast [[id number]] Collected:  12/19/18 0847     Updated:  12/19/18 0850    Narrative:       EXAMINATION: MRI BRAIN WO CONTRAST- 12/18/2018     INDICATION: R side weakness         TECHNIQUE: Sagittal and axial images of the brain are displayed. No  intravenous contrast was utilized.      COMPARISON: NONE     FINDINGS: There are extensive periventricular white matter changes  typical of aging. There is no intracranial mass, hemorrhage, midline  shift or extra-axial fluid collection. There is no cerebellopontine  angle mass.  The pituitary gland is normal. There are 2, small, punctate  areas of abnormal signal with restricted diffusion in the left  parasagittal frontal lobe.       Impression:       There are 2 punctate areas of abnormal signal showing  restricted diffusion in the  parasagittal region of the left frontal  lobe. These are consistent with tiny presumably embolic infarcts without  hemorrhage.         D:  12/18/2018  E:  12/19/2018     This report was finalized on 12/19/2018 8:48 AM by Dr. Joo Box MD.       MRI Angiogram Head Without Contrast [[id number]] Collected:  12/18/18 1910     Updated:  12/18/18 2210    Addenda:        THIS REPORT CONTAINS FINDINGS THAT MAY BE CRITICAL TO  PATIENT CARE. The   findings were verbally communicated via telephone conference with Dr. Wilkins   at 10:09 PM EST on 12/18/2018. The findings were acknowledged and   understood.    THIS DOCUMENT HAS BEEN ELECTRONICALLY SIGNED BY MATTI JARRETT MD  Signed:  12/18/18 2210 by Matti Jarrett MD    Narrative:       EXAM:    MR Angiogram Head Without Contrast, Arteries     EXAM DATE/TIME:    12/18/2018 7:10 PM     CLINICAL HISTORY:    67 years old, male; Cerebral infarction, unspecified; Signs and symptoms;   Weakness; Patient HX: 9-10 day HX of waxing and waning right sided symptoms. He   complains of staggering gait, weakness of his right arm and leg. The patient   states that he knows what he wants to say but cant get the right words out. No   HX of surgery to head or neck; Additional info: Stroke     TECHNIQUE:    MR angiogram head without contrast. Exam focused on the arteries.     COMPARISON:    MRI BRAIN WO CONTRAST 12/18/2018 1:48 PM     FINDINGS:    Right internal carotid artery:  There is stenosis of the cavernous right   internal carotid artery. The degree of stenosis is approximately 50-60%.    Right anterior cerebral artery: No occlusion or significant stenosis. No   aneurysm.    Right middle cerebral artery:  No occlusion or significant stenosis. No   aneurysm.    Right posterior cerebral artery: No occlusion or significant stenosis. No   aneurysm.    Right vertebral artery: No occlusion or significant stenosis, as visualized.   No aneurysm. There is limited visualization of the vertebral artery.     Left internal carotid artery:  There is stenosis of the cavernous left   internal carotid artery. The degree of stenosis is approximately 35%.    Left anterior cerebral artery:  There is occlusion of the A2 segment of the   left anterior cerebral artery, with partial reconstitution.    Left middle cerebral artery:  There is occlusion of the M1 segment of the left   middle cerebral artery distally.  There is reconstitution at the level of the M2   segments. Severe stenosis of a left M2 segment.    Left posterior cerebral artery: No occlusion or significant stenosis. No   aneurysm.    Left vertebral artery: No occlusion or significant stenosis, as visualized. No   aneurysm. There is limited visualization of the vertebral artery.   Basilar artery: No occlusion or significant stenosis. No aneurysm.      Other: Cavum septum pellucidum at vergae variant. Complex mucous retention   cysts or polyps are visualized within the right maxillary sinus. Refer to the   MRI brain from the same day for further discussion of intracranial findings.      Impression:       1. There is occlusion of the M1 segment of the left middle cerebral artery   distally. There is reconstitution at the level of the M2 segments. Severe   stenosis of a left M2 segment.   2. There is occlusion of the A2 segment of the left anterior cerebral artery,   with partial reconstitution.   3. There is stenosis of the cavernous right internal carotid artery. The degree   of stenosis is approximately 50-60%.   4. There is stenosis of the cavernous left internal carotid artery. The degree   of stenosis is approximately 35%.   5. Additional findings described above.    THIS DOCUMENT HAS BEEN ELECTRONICALLY SIGNED BY DEV JARRETT MD    MRI Angiogram Neck Without Contrast [[id number]] Collected:  12/18/18 1908     Updated:  12/18/18 2034    Narrative:       EXAM:    MR Angiography Neck Without Intravenous Contrast    EXAM DATE/TIME:    12/18/2018 7:08 PM    CLINICAL HISTORY:    The patient age is 67 years old and is male; Cerebral infarction,   unspecified; Signs and symptoms; Weakness; Patient HX: 9-10 day HX of waxing   and waning right sided symptoms. He complains of staggering gait, weakness of   his right arm and leg. The patient states that he knows what he wants to say   but cant get the right words out. No HX of surgery to head or neck; Additional    info: Stroke No typed up report on mri brain from 12/18/2018 to fax to [facility]    TECHNIQUE:    Magnetic resonance angiography images of the neck without intravenous   contrast.    COMPARISON:    MRI ANGIOGRAM HEAD WO CONTRAST 12/18/2018 7:02 PM    FINDINGS:    Right common carotid artery:  Mild stenosis of the distal right common   carotid artery. The degree of stenosis is approximately 30%.  Artifact limits   evaluation of the proximal right common carotid artery. Additional stenosis   cannot be excluded.  No occlusion.    Right internal carotid artery:  Mild stenosis of the proximal right internal   carotid artery. The degree of stenosis is approximately 15%.  The distal   extracranial bilateral internal carotid arteries are out of the field-of-view   of the study.  No occlusion, as visualized.    Right external carotid artery:  No significant stenosis or occlusion.    Right vertebral artery:  There is loss of signal within the proximal   bilateral vertebral arteries, likely due to technical limitations.   Flow-limiting pathology cannot be excluded.  The distal V3/proximal V4 segments   of the vertebral arteries are out of the field of view of this study.      Left common carotid artery:  A bovine aortic arch is visualized, with common   origin of the brachiocephalic artery and left common carotid artery.  No   significant stenosis or occlusion.    Left internal carotid artery:  Mild luminal narrowing of the proximal left   internal carotid artery, without hemodynamically significant stenosis. See   above.    Left external carotid artery:  No significant stenosis or occlusion.    Left vertebral artery:  See above.       CAROTID STENOSIS REFERENCE USING NASCET CRITERIA:    % ICA stenosis = (1 - narrowest ICA diameter/diameter of distal cervical ICA)   x 100.    Mild - <50% stenosis.    Moderate - 50-69% stenosis.    Severe - 70-94% stenosis.    Near occlusion - 95-99% stenosis.    Occluded - 100% stenosis.       Impression:       1.  Mild stenosis of the distal right common carotid artery. The degree of   stenosis is approximately 30%.  2.  Mild stenosis of the proximal right internal carotid artery. The degree of   stenosis is approximately 15%.  3.  Mild luminal narrowing of the proximal left internal carotid artery,   without hemodynamically significant stenosis.  4.  There is loss of signal within the proximal bilateral vertebral arteries,   likely due to technical limitations. Flow-limiting pathology cannot be   excluded.  This can be further evaluated with CTA.  5.  Additional findings described above.    THIS DOCUMENT HAS BEEN ELECTRONICALLY SIGNED BY DEV JARRETT MD    XR Chest 1 View [[id number]] Collected:  12/18/18 1457     Updated:  12/18/18 1533    Narrative:       EXAMINATION: XR CHEST 1 VW- 12/18/2018     INDICATION: Weak/Dizzy/AMS triage protocol      COMPARISON: NONE     FINDINGS: The heart size is normal. The bronchovascular markings are  accentuated by a poor inspiratory effort and a portable technique. There  is no consolidation, mass or effusion.           Impression:       No active disease.     D:  12/18/2018  E:  12/18/2018     This report was finalized on 12/18/2018 3:31 PM by Dr. Joo Box MD.             Results for orders placed during the hospital encounter of 12/18/18   Adult Transthoracic Echo Complete W/ Cont if Necessary Per Protocol (With Agitated Saline)    Narrative · Estimated EF = 65%.  · Left ventricular systolic function is normal.  · Saline contrast study is negative for PFO            Discharge Details        Discharge Medications      New Medications      Instructions Start Date   aspirin 81 MG chewable tablet   81 mg, Oral, Daily      atorvastatin 80 MG tablet  Commonly known as:  LIPITOR   80 mg, Oral, Nightly      clopidogrel 75 MG tablet  Commonly known as:  PLAVIX   75 mg, Oral, Daily      predniSONE 20 MG tablet  Commonly known as:  DELTASONE   20 mg, Oral, Daily With  Breakfast         Continue These Medications      Instructions Start Date   amLODIPine 10 MG tablet  Commonly known as:  NORVASC   10 mg, Oral, Daily      meclizine 25 MG tablet  Commonly known as:  ANTIVERT   25 mg, Oral, 4 Times Daily PRN      mirtazapine 15 MG tablet  Commonly known as:  REMERON   15 mg, Oral, Nightly         Stop These Medications    hydrochlorothiazide 12.5 MG capsule  Commonly known as:  MICROZIDE            No Known Allergies      Discharge Disposition:  Home or Self Care    Discharge Diet:  Dietary Orders (From admission, onward)    Start     Ordered    12/19/18 1317  Diet Regular  Diet Effective Now     Question:  Diet Texture / Consistency  Answer:  Regular    12/19/18 1317          Discharge Activity:   Activity Instructions     Activity as Tolerated            CODE STATUS:    Code Status and Medical Interventions:   Ordered at: 12/18/18 1828     Code Status:    CPR     Medical Interventions (Level of Support Prior to Arrest):    Full       Future Appointments   Date Time Provider Department Center   12/26/2018 11:30 AM Esther Espinoza APRN [facility]  [facility] None       Additional Instructions for the Follow-ups that You Need to Schedule     Ambulatory Referral to Home Health   As directed      Face to Face Visit Date:  12/20/2018    Follow-up Provider for Plan of Care?:  I treated the patient in an acute care facility and will not continue treatment after discharge.    Follow-up Provider:  ESTHER ESPINOZA [[id number]]    Reason/Clinical Findings:  Acute left frontal embolic stroke; right-sided weakness    Describe mobility limitations that make leaving home difficult:  Impaired functional mobility, balance, gait, and endurance    Nursing/Therapeutic Services Requested:  Physical Therapy Occupational Therapy    PT orders:  Therapeutic exercise Gait Training Transfer training Strengthening Home safety assessment    Weight Bearing Status:  As Tolerated    Occupational orders:  Activities of daily living  Energy conservation Strengthening    Frequency:  1 Week 1         Discharge Follow-up with PCP   As directed       Currently Documented PCP:    Provider, No Known    PCP Phone Number:    [phone]     Follow Up Details:  within 2 weeks of d/c for Transition of Care Visit         Discharge Follow-up with Specified Provider: [facility] post-stroke f/u; 1 Month   As directed      To:  [facility] post-stroke f/u    Follow Up:  1 Month                 Time Spent on Discharge:  35 minutes    Electronically signed by Mari Warren MD, 12/20/18, 1:13 PM.

## 2018-12-20 NOTE — PLAN OF CARE
Problem: Patient Care Overview  Goal: Plan of Care Review  Outcome: Ongoing (interventions implemented as appropriate)   12/20/18 1400   Coping/Psychosocial   Plan of Care Reviewed With patient   OTHER   Outcome Summary Pt continues to need supervision and cueing for safety with transfers and ADL's. Provided education and treatment for R UE this date. OT continues to recommend IRF, but family and pt have decided on home with home health. Recommended 24 hr supv with home, use of DME/AE for ADL's and mobilty.

## 2018-12-20 NOTE — DISCHARGE PLACEMENT REQUEST
"Eyal Solano (67 y.o. Male)     From [place],   [phone]      Date of Birth Social Security Number Address Home Phone MRN    1950  [address]  [facility] [address] [phone] [id number]    Holiness Marital Status          None Single       Admission Date Admission Type Admitting Provider Attending Provider Department, Room/Bed    18 Emergency Mari Warren MD Hall, [place], MD [facility] 3E, S338/1    Discharge Date Discharge Disposition Discharge Destination         Home or Self Care              Attending Provider:  Mari Warren MD    Allergies:  No Known Allergies    Isolation:  None   Infection:  None   Code Status:  CPR    Ht:  177.8 cm (70\")   Wt:  75.8 kg (167 lb)    Admission Cmt:  None   Principal Problem:  Acute left frontal embolic stroke (CMS/HCC) [I63.9]                 Active Insurance as of 2018     Primary Coverage     Payor Plan Insurance Group Employer/Plan Group    MEDICARE MEDICARE A & B      Payor Plan Address Payor Plan Phone Number Payor Plan Fax Number Effective Dates    [address] [phone]  2017 - None Entered    [place] [address]       Subscriber Name Subscriber Birth Date Member ID       EYAL SOLANO 1950 [id number]                 Emergency Contacts      (Rel.) Home Phone Work Phone Mobile Phone    MARELY ARMSTRONG (Daughter) -- -- [phone]    GERARDO PIRES (Daughter) -- -- [phone]    LINUS WESTON (Significant Other) -- -- [phone]        [facility] [address]E  [address] [place] [address]  Phone:  [phone]  Fax:  [phone] Date: Dec 20, 2018      Ambulatory Referral to Home Health     Patient:  Eyal Solano MRN:  [id number]   [address]  [facility] [address] :  1950  SSN:    Phone: [phone] Sex:  M      INSURANCE PAYOR PLAN GROUP # SUBSCRIBER ID   Primary:    MEDICARE [id number]   [id number]      Referring " Provider Information:  YOVANNY WARREN Phone: [phone] Fax: [phone]      Referral Information:   # Visits:  1 Referral Type: Home Health [42]   Urgency:  Routine Referral Reason: Specialty Services Required   Start Date: Dec 20, 2018 End Date:  To be determined by Insurer   Diagnosis: Acute ischemic stroke (CMS/HCC) (I63.9 [ICD-10-CM] 434.91 [ICD-9-CM])  Impaired mobility and ADLs (Z74.09 [ICD-10-CM] 799.89 [ICD-9-CM])  Acute ischemic stroke (CMS/HCC) (I63.9 [ICD-10-CM] 434.91 [ICD-9-CM])      Refer to Dept:   Refer to Provider:   Refer to Facility:       Face to Face Visit Date: 12/20/2018  Follow-up Provider for Plan of Care? I treated the patient in an acute care facility and will not continue treatment after discharge.  Follow-up Provider: FEDERICO MAX [[id number]]  Reason/Clinical Findings: Acute left frontal embolic stroke; right-sided weakness  Describe mobility limitations that make leaving home difficult: Impaired functional mobility, balance, gait, and endurance  Nursing/Therapeutic Services Requested: Physical Therapy  Nursing/Therapeutic Services Requested: Occupational Therapy  PT orders: Therapeutic exercise  PT orders: Gait Training  PT orders: Transfer training  PT orders: Strengthening  PT orders: Home safety assessment  Weight Bearing Status: As Tolerated  Occupational orders: Activities of daily living  Occupational orders: Energy conservation  Occupational orders: Strengthening  Frequency: 1 Week 1     This document serves as a request of services and does not constitute Insurance authorization or approval of services.  To determine eligibility, please contact the members Insurance carrier to verify and review coverage.     If you have medical questions regarding this request for services. Please contact [address] at [phone] between the hours of 8:00am - 5:00pm (Mon-Fri).       Verbal Order Mode: Verbal with readback   Authorizing Provider: Yovanny Warren MD  Authorizing  Provider's NPI: [id number]     Order Entered By: Steffanie Meek 2018 11:44 AM     Electronically signed by: Mari Warren MD 2018  1:08 PM                   Discharge Summary      Mari Warren MD at 2018  1:13 PM              [facility] Medicine Services  DISCHARGE SUMMARY    Patient Name: Eyal Solano  : 1950  MRN: [id number]    Date of Admission: 2018  Date of Discharge:  18    Primary Care Physician: Provider, No Known    Consults     Date and Time Order Name Status Description    2018 0030 Inpatient Neurology Consult Stroke Completed           Hospital Course     Presenting Problem:   Acute ischemic stroke (CMS/[facility]) [I63.9]    Active Hospital Problems    Diagnosis Date Noted   • **Acute left frontal embolic stroke (CMS/HCC) [I63.9] 2018   • SAMINA (acute kidney injury) (CMS/HCC) [N17.9] 2018   • Dyslipidemia [E78.5] 2018   • Alcohol abuse [F10.10] 2018   • Hypertension [I10] 2018   • Acute gout of right knee [M10.9] 2018      Resolved Hospital Problems   No resolved problems to display.          Hospital Course:  Eyal Solano is a 67 y.o. male with PMH significant for gout and recently diagnosed HTN.  He has had a 9-10 day history of waxing and waning right sided weakness symptoms. The patient initially sought on  and was found to have BP's 190's/110's, he was placed on BP medication.  On 12/15 he went to [facility] and his BP medications were adjusted.  On  he returned because his motor symptoms were progressive.  He had a negative CT scan and was discharged to home.  On date of admission the patient saw his PCP and continued to complain of symptoms so PCP again recommended he be evaluated in the ER for possibility of undiagnosed stroke.  In ED, a MRI showed small infarcts left hemisphere consistent with likely embolic CVA and creatinine 1.6 (data deficit, baseline unknown).  He has also  developed gouty flare since being placed on HCTZ recently.     The acute ischemic stroke has 2 punctate areas of restricted diffusion seen on MRI in the left centrum semiovale, mechanism possibly due to small vessel disease versus intracranial atherosclerosis.  His MRA neck is significant for multifocal areas of intracranal vascular atherosclerotic disease.  MRA neck showed no hemodynamically significant stenosis of the cervical carotids.  Plan to treat maximally with ASA, plavix, and  high dose atorvastatin -->  F/u with [facility] in 1 month for re-eval.      Due to SAMINA, he could not tolerate colchicine for his gout flare but has responded very well to low dose prednisone.  He will see his PCP in 1-2 weeks and can consider chronic allopurinol.        Day of Discharge     HPI:   Left knee and right ankle gout improved. No new focal deficits.     ROS:  Otherwise ROS is negative except as mentioned in the HPI.    Vital Signs:   Temp:  [97.1 °F (36.2 °C)-97.9 °F (36.6 °C)] 97.9 °F (36.6 °C)  Heart Rate:  [66-78] 66  Resp:  [16-18] 16  BP: (138-155)/(74-93) 138/82     Physical Exam:  Constitutional: No acute distress, awake, alert, nontoxic, normal body habitus  Respiratory: Clear to auscultation bilaterally, good effort, nonlabored respirations   Cardiovascular: RRR, no murmur  Gastrointestinal: Positive bowel sounds, soft, nontender, nondistended  Musculoskeletal: No peripheral edema, normal muscle tone for age, warm tender right knee and posterior left achilles/heel  Psychiatric: Appropriate affect, good insight and judgement, cooperative  Neurologic: Oriented x 3, movements symmetric BUE and BLE, Cranial Nerves grossly intact to confrontation, speech clear and fluent  Skin: No rashes, no jaundice, no petechiae, no mottling      Pertinent  and/or Most Recent Results     Results from last 7 days   Lab Units  12/20/18   0720  12/19/18   0513  12/18/18   1242  12/18/18   1241   WBC 10*3/mm3   --   5.70   --    6.18   HEMOGLOBIN g/dL   --   11.8*   --   12.3*   HEMATOCRIT %   --   37.2*   --   38.3*   PLATELETS 10*3/mm3   --   334   --   327   SODIUM mmol/L  135  136  135   --    POTASSIUM mmol/L  4.5  4.4  4.4   --    CHLORIDE mmol/L  103  103  102   --    CO2 mmol/L  23.0  26.0  26.0   --    BUN mg/dL  34*  25*  26*   --    CREATININE mg/dL  1.53*  1.48*  1.60*   --    GLUCOSE mg/dL  108*  105*  143*   --    CALCIUM mg/dL  8.9  9.2  9.4   --      Results from last 7 days   Lab Units  12/18/18   1242   BILIRUBIN mg/dL  0.2*   ALK PHOS U/L  76   ALT (SGPT) U/L  22   AST (SGOT) U/L  24     Results from last 7 days   Lab Units  12/19/18   0513   CHOLESTEROL mg/dL  224*   TRIGLYCERIDES mg/dL  124   HDL CHOL mg/dL  48     Results from last 7 days   Lab Units  12/19/18   0513   HEMOGLOBIN A1C %  5.50     Brief Urine Lab Results     None          Microbiology Results Abnormal     None          Imaging Results (all)     Procedure Component Value Units Date/Time    MRI Brain Without Contrast [[id number]] Collected:  12/19/18 0847     Updated:  12/19/18 0850    Narrative:       EXAMINATION: MRI BRAIN WO CONTRAST- 12/18/2018     INDICATION: R side weakness         TECHNIQUE: Sagittal and axial images of the brain are displayed. No  intravenous contrast was utilized.      COMPARISON: NONE     FINDINGS: There are extensive periventricular white matter changes  typical of aging. There is no intracranial mass, hemorrhage, midline  shift or extra-axial fluid collection. There is no cerebellopontine  angle mass.  The pituitary gland is normal. There are 2, small, punctate  areas of abnormal signal with restricted diffusion in the left  parasagittal frontal lobe.       Impression:       There are 2 punctate areas of abnormal signal showing  restricted diffusion in the  parasagittal region of the left frontal  lobe. These are consistent with tiny presumably embolic infarcts without  hemorrhage.         D:  12/18/2018  E:  12/19/2018     This  report was finalized on 12/19/2018 8:48 AM by Dr. Joo Box MD.       MRI Angiogram Head Without Contrast [[id number]] Collected:  12/18/18 1910     Updated:  12/18/18 2210    Addenda:        THIS REPORT CONTAINS FINDINGS THAT MAY BE CRITICAL TO PATIENT CARE. The   findings were verbally communicated via telephone conference with Dr. Wilkins   at 10:09 PM EST on 12/18/2018. The findings were acknowledged and   understood.    THIS DOCUMENT HAS BEEN ELECTRONICALLY SIGNED BY MATTI JARRETT MD  Signed:  12/18/18 2210 by Matti Jarrett MD    Narrative:       EXAM:    MR Angiogram Head Without Contrast, Arteries     EXAM DATE/TIME:    12/18/2018 7:10 PM     CLINICAL HISTORY:    67 years old, male; Cerebral infarction, unspecified; Signs and symptoms;   Weakness; Patient HX: 9-10 day HX of waxing and waning right sided symptoms. He   complains of staggering gait, weakness of his right arm and leg. The patient   states that he knows what he wants to say but cant get the right words out. No   HX of surgery to head or neck; Additional info: Stroke     TECHNIQUE:    MR angiogram head without contrast. Exam focused on the arteries.     COMPARISON:    MRI BRAIN WO CONTRAST 12/18/2018 1:48 PM     FINDINGS:    Right internal carotid artery:  There is stenosis of the cavernous right   internal carotid artery. The degree of stenosis is approximately 50-60%.    Right anterior cerebral artery: No occlusion or significant stenosis. No   aneurysm.    Right middle cerebral artery:  No occlusion or significant stenosis. No   aneurysm.    Right posterior cerebral artery: No occlusion or significant stenosis. No   aneurysm.    Right vertebral artery: No occlusion or significant stenosis, as visualized.   No aneurysm. There is limited visualization of the vertebral artery.     Left internal carotid artery:  There is stenosis of the cavernous left   internal carotid artery. The degree of stenosis is approximately 35%.    Left  anterior cerebral artery:  There is occlusion of the A2 segment of the   left anterior cerebral artery, with partial reconstitution.    Left middle cerebral artery:  There is occlusion of the M1 segment of the left   middle cerebral artery distally. There is reconstitution at the level of the M2   segments. Severe stenosis of a left M2 segment.    Left posterior cerebral artery: No occlusion or significant stenosis. No   aneurysm.    Left vertebral artery: No occlusion or significant stenosis, as visualized. No   aneurysm. There is limited visualization of the vertebral artery.   Basilar artery: No occlusion or significant stenosis. No aneurysm.      Other: Cavum septum pellucidum at vergae variant. Complex mucous retention   cysts or polyps are visualized within the right maxillary sinus. Refer to the   MRI brain from the same day for further discussion of intracranial findings.      Impression:       1. There is occlusion of the M1 segment of the left middle cerebral artery   distally. There is reconstitution at the level of the M2 segments. Severe   stenosis of a left M2 segment.   2. There is occlusion of the A2 segment of the left anterior cerebral artery,   with partial reconstitution.   3. There is stenosis of the cavernous right internal carotid artery. The degree   of stenosis is approximately 50-60%.   4. There is stenosis of the cavernous left internal carotid artery. The degree   of stenosis is approximately 35%.   5. Additional findings described above.    THIS DOCUMENT HAS BEEN ELECTRONICALLY SIGNED BY DEV JARRETT MD    MRI Angiogram Neck Without Contrast [[id number]] Collected:  12/18/18 1908     Updated:  12/18/18 2034    Narrative:       EXAM:    MR Angiography Neck Without Intravenous Contrast    EXAM DATE/TIME:    12/18/2018 7:08 PM    CLINICAL HISTORY:    The patient age is 67 years old and is male; Cerebral infarction,   unspecified; Signs and symptoms; Weakness; Patient HX: 9-10 day HX of  waxing   and waning right sided symptoms. He complains of staggering gait, weakness of   his right arm and leg. The patient states that he knows what he wants to say   but cant get the right words out. No HX of surgery to head or neck; Additional   info: Stroke No typed up report on mri brain from 12/18/2018 to fax to [facility]    TECHNIQUE:    Magnetic resonance angiography images of the neck without intravenous   contrast.    COMPARISON:    MRI ANGIOGRAM HEAD WO CONTRAST 12/18/2018 7:02 PM    FINDINGS:    Right common carotid artery:  Mild stenosis of the distal right common   carotid artery. The degree of stenosis is approximately 30%.  Artifact limits   evaluation of the proximal right common carotid artery. Additional stenosis   cannot be excluded.  No occlusion.    Right internal carotid artery:  Mild stenosis of the proximal right internal   carotid artery. The degree of stenosis is approximately 15%.  The distal   extracranial bilateral internal carotid arteries are out of the field-of-view   of the study.  No occlusion, as visualized.    Right external carotid artery:  No significant stenosis or occlusion.    Right vertebral artery:  There is loss of signal within the proximal   bilateral vertebral arteries, likely due to technical limitations.   Flow-limiting pathology cannot be excluded.  The distal V3/proximal V4 segments   of the vertebral arteries are out of the field of view of this study.      Left common carotid artery:  A bovine aortic arch is visualized, with common   origin of the brachiocephalic artery and left common carotid artery.  No   significant stenosis or occlusion.    Left internal carotid artery:  Mild luminal narrowing of the proximal left   internal carotid artery, without hemodynamically significant stenosis. See   above.    Left external carotid artery:  No significant stenosis or occlusion.    Left vertebral artery:  See above.       CAROTID STENOSIS REFERENCE USING NASCET CRITERIA:     % ICA stenosis = (1 - narrowest ICA diameter/diameter of distal cervical ICA)   x 100.    Mild - <50% stenosis.    Moderate - 50-69% stenosis.    Severe - 70-94% stenosis.    Near occlusion - 95-99% stenosis.    Occluded - 100% stenosis.      Impression:       1.  Mild stenosis of the distal right common carotid artery. The degree of   stenosis is approximately 30%.  2.  Mild stenosis of the proximal right internal carotid artery. The degree of   stenosis is approximately 15%.  3.  Mild luminal narrowing of the proximal left internal carotid artery,   without hemodynamically significant stenosis.  4.  There is loss of signal within the proximal bilateral vertebral arteries,   likely due to technical limitations. Flow-limiting pathology cannot be   excluded.  This can be further evaluated with CTA.  5.  Additional findings described above.    THIS DOCUMENT HAS BEEN ELECTRONICALLY SIGNED BY DEV JARRETT MD    XR Chest 1 View [[id number]] Collected:  12/18/18 1457     Updated:  12/18/18 1533    Narrative:       EXAMINATION: XR CHEST 1 VW- 12/18/2018     INDICATION: Weak/Dizzy/AMS triage protocol      COMPARISON: NONE     FINDINGS: The heart size is normal. The bronchovascular markings are  accentuated by a poor inspiratory effort and a portable technique. There  is no consolidation, mass or effusion.           Impression:       No active disease.     D:  12/18/2018  E:  12/18/2018     This report was finalized on 12/18/2018 3:31 PM by Dr. Joo Box MD.             Results for orders placed during the hospital encounter of 12/18/18   Adult Transthoracic Echo Complete W/ Cont if Necessary Per Protocol (With Agitated Saline)    Narrative · Estimated EF = 65%.  · Left ventricular systolic function is normal.  · Saline contrast study is negative for PFO            Discharge Details        Discharge Medications      New Medications      Instructions Start Date   aspirin 81 MG chewable tablet   81 mg, Oral, Daily       atorvastatin 80 MG tablet  Commonly known as:  LIPITOR   80 mg, Oral, Nightly      clopidogrel 75 MG tablet  Commonly known as:  PLAVIX   75 mg, Oral, Daily      predniSONE 20 MG tablet  Commonly known as:  DELTASONE   20 mg, Oral, Daily With Breakfast         Continue These Medications      Instructions Start Date   amLODIPine 10 MG tablet  Commonly known as:  NORVASC   10 mg, Oral, Daily      meclizine 25 MG tablet  Commonly known as:  ANTIVERT   25 mg, Oral, 4 Times Daily PRN      mirtazapine 15 MG tablet  Commonly known as:  REMERON   15 mg, Oral, Nightly         Stop These Medications    hydrochlorothiazide 12.5 MG capsule  Commonly known as:  MICROZIDE            No Known Allergies      Discharge Disposition:  Home or Self Care    Discharge Diet:  Dietary Orders (From admission, onward)    Start     Ordered    12/19/18 1317  Diet Regular  Diet Effective Now     Question:  Diet Texture / Consistency  Answer:  Regular    12/19/18 1317          Discharge Activity:   Activity Instructions     Activity as Tolerated            CODE STATUS:    Code Status and Medical Interventions:   Ordered at: 12/18/18 1828     Code Status:    CPR     Medical Interventions (Level of Support Prior to Arrest):    Full       Future Appointments   Date Time Provider Department Center   12/26/2018 11:30 AM Esther Espinoza APRN [facility] None       Additional Instructions for the Follow-ups that You Need to Schedule     Ambulatory Referral to Home Health   As directed      Face to Face Visit Date:  12/20/2018    Follow-up Provider for Plan of Care?:  I treated the patient in an acute care facility and will not continue treatment after discharge.    Follow-up Provider:  ESTHER ESPINOZA [[id number]]    Reason/Clinical Findings:  Acute left frontal embolic stroke; right-sided weakness    Describe mobility limitations that make leaving home difficult:  Impaired functional mobility, balance, gait, and endurance    Nursing/Therapeutic Services  Requested:  Physical Therapy Occupational Therapy    PT orders:  Therapeutic exercise Gait Training Transfer training Strengthening Home safety assessment    Weight Bearing Status:  As Tolerated    Occupational orders:  Activities of daily living Energy conservation Strengthening    Frequency:  1 Week 1         Discharge Follow-up with PCP   As directed       Currently Documented PCP:    Provider, No Known    PCP Phone Number:    [phone]     Follow Up Details:  within 2 weeks of d/c for Transition of Care Visit         Discharge Follow-up with Specified Provider: [facility] post-stroke f/u; 1 Month   As directed      To:  [facility] post-stroke f/u    Follow Up:  1 Month                 Time Spent on Discharge:  35 minutes    Electronically signed by Yovanny Warren MD, 12/20/18, 1:13 PM.      Electronically signed by Yovanny Warrne MD at 12/20/2018  1:18 PM       Discharge Order (From admission, onward)    Start     Ordered    12/20/18 1310  Discharge patient  Once     Expected Discharge Date:  12/20/18    Discharge Disposition:  Home or Self Care    Physician of Record for Attribution - Please select from Treatment Team:  YOVANYN WARREN [[id number]]    Review needed by CMO to determine Physician of Record:  No       Question Answer Comment   Physician of Record for Attribution - Please select from Treatment Team YOVANNY WARREN    Review needed by CMO to determine Physician of Record No        12/20/18 3810

## 2018-12-20 NOTE — PROGRESS NOTES
Daily Progress Note  Neurology     LOS: 2 days     Subjective     Chief Complaint: Right hemiparesis and speech problems    Interval History:  No acute events overnight    ROS: Negative for fever    Objective     Vital signs in last 24 hours:  Temp:  [97.1 °F (36.2 °C)-97.9 °F (36.6 °C)] 97.1 °F (36.2 °C)  Heart Rate:  [66-78] 66  Resp:  [16-18] 18  BP: (144-155)/(74-93) 151/84      Physical Exam:   General: Sitting in chair with eyes open. In NAD.     Respiratory: Respirations unlabored   CV: RRR       Neurologic Exam:   Mental status: Awake, alert, Follows commands.    CN: II-XII intact                     Results from last 7 days   Lab Units  12/19/18   0513   WBC 10*3/mm3  5.70   HEMOGLOBIN g/dL  11.8*   HEMATOCRIT %  37.2*   PLATELETS 10*3/mm3  334           Results Review:    Labs reviewed    Assessment/Plan       Acute left frontal embolic stroke (CMS/HCC)    Alcohol abuse    Hypertension    Acute gout of right knee    SAMINA (acute kidney injury) (CMS/HCC)    Dyslipidemia        1.  Acute ischemic stroke = 2 punctate areas of restricted diffusion seen on MRI in the left centrum semiovale.  Mechanism possibly due to small vessel disease versus intracranial atherosclerosis.  His MRA neck is significant for multifocal areas of intracranial vascular atherosclerotic disease.  MRA neck showed no hemodynamically significant stenosis of the cervical carotids. On ASA, plavix, and atorvastatin.       2.  Acute gouty arthritis = agree with prednisone. Avoiding colchicine due to his SAMINA     3.  Hyperlipidemia = .  On atorvastatin     4.  Hypertension = continue meds.  Goal systolic BP less than 140     5.  Alcohol abuse = recommend cessation.  Recommend thiamine and folate supplementation    Okay from neuro standpoint for discharge when okay with primary team.  Follow-up in neurology clinic, first available      Antonia Lechuga MD  12/20/18  10:54 AM

## 2018-12-20 NOTE — THERAPY TREATMENT NOTE
Acute Care - Occupational Therapy Treatment Note  [facility]     Patient Name: Eyal Solano  : 1950  MRN: [id number]  Today's Date: 2018  Onset of Illness/Injury or Date of Surgery: 18  Date of Referral to OT: 18  Referring Physician: Dr. Rabago    Admit Date: 2018       ICD-10-CM ICD-9-CM   1. Acute ischemic stroke (CMS/HCC) I63.9 434.91   2. Cognitive communication deficit R41.841 799.52   3. Impaired mobility and ADLs Z74.09 799.89   4. Acute left frontal embolic stroke (CMS/HCC) I63.9 434.91     Patient Active Problem List   Diagnosis   • Alcohol abuse   • Hypertension   • Acute gout of right knee   • Acute left frontal embolic stroke (CMS/HCC)   • SAMINA (acute kidney injury) (CMS/[facility])   • Dyslipidemia     Past Medical History:   Diagnosis Date   • Conversion reaction    • Dizziness    • Hypertension      Past Surgical History:   Procedure Laterality Date   • NASAL SINUS SURGERY         Therapy Treatment    Rehabilitation Treatment Summary     Row Name 18 1400             Treatment Time/Intention    Discipline  occupational therapist  -AN      Document Type  therapy note (daily note)  -AN      Subjective Information  no complaints  -AN      Mode of Treatment  occupational therapy  -AN      Patient/Family Observations  pt sitting EOB, family present  -AN      Care Plan Review  care plan/treatment goals reviewed;risks/benefits reviewed  -AN      Patient Effort  good  -AN      Comment  Pt reports he is being discharged home this date  -AN      Existing Precautions/Restrictions  fall  -AN      Recorded by [AN] Cherelle Billingsley OT 18 1416      Row Name 18 1400             Cognitive Assessment/Intervention- PT/OT    Affect/Mental Status (Cognitive)  anxious  -AN      Orientation Status (Cognition)  oriented x 4  -AN      Safety Deficit (Cognitive)  moderate deficit;mild deficit;awareness of need for assistance;insight into deficits/self awareness;judgment  -AN       Personal Safety Interventions  fall prevention program maintained  -AN      Recorded by [AN] Cherelle Billingsley, OT 12/20/18 1416      Row Name 12/20/18 1400             Sit-Stand Transfer    Sit-Stand [place] (Transfers)  supervision;verbal cues  -AN      Recorded by [AN] Cherelle Billingsley, OT 12/20/18 1432      Row Name 12/20/18 1400             Stand-Sit Transfer    Stand-Sit [place] (Transfers)  supervision;verbal cues  -AN      Recorded by [AN] Cherelle Billingsley, OT 12/20/18 1432      Row Name 12/20/18 1400             Self-Feeding Assessment/Training    Comment (Feeding)  simulated set up with built up utensils, however pt will drop whatever object is in his hand if he is not concentrating   -AN      Recorded by [AN] Cherelle Billingsley, OT 12/20/18 1432      Row Name 12/20/18 1400             Motor Skills Assessment/Interventions    Additional Documentation  Motor Coordination Assessment/Training (Group);Therapeutic Exercise (Group)  -AN      Recorded by [AN] Cherelle Billingsley, OT 12/20/18 1432      Row Name 12/20/18 1400             Therapeutic Exercise    Upper Extremity Range of Motion (Therapeutic Exercise)  shoulder flexion/extension, bilateral;shoulder horizontal abduction/adduction, bilateral;shoulder internal/external rotation, bilateral;forearm supination/pronation, bilateral;wrist flexion/extension, bilateral  -AN      Comment (Therapeutic Exercise)  UE HEP  -AN      Recorded by [AN] Cherelle Billingsley, OT 12/20/18 1432      Row Name 12/20/18 1400             Motor Coordination Assessment/Training    Comment, Impairments Impacting Performance (Gross Motor Skills)  Worked on GM grasp and release with utensils, ADL items and foam block.   -AN      Recorded by [AN] Cherelle Billingsley, OT 12/20/18 1432      Row Name 12/20/18 1400             Outcome Summary/Treatment Plan (OT)    Daily Summary of Progress (OT)  progress toward functional goals as expected  -AN      Anticipated Discharge Disposition (OT)  other (see  comments) recommended IRF, pt has refused and has committed to HH  -AN      Patient/Family Concerns, Anticipated Discharge Disposition (OT)  have decided on home with home health  -AN      Recorded by [AMANDA] Cherelle Billingsley OT 12/20/18 1432        User Key  (r) = Recorded By, (t) = Taken By, (c) = Cosigned By    Initials Name Effective Dates Discipline    AN Cherelle Billingsley OT 06/22/15 -  OT             Occupational Therapy Education     Title: PT OT SLP Therapies (In Progress)     Topic: Occupational Therapy (Done)     Point: ADL training (Done)     Description: Instruct learner(s) on proper safety adaptation and remediation techniques during self care or transfers.   Instruct in proper use of assistive devices.    Learning Progress Summary           Patient Acceptance, E, VU by AMANDA at 12/20/2018  2:32 PM    Comment:  Educated on home safety and need for assist with ADL's and mobility. Discussed AE, HEP and strategies for addressing R UE coordination.    STEPHANIE Sanders,FARZAD,D, VU,NR by AR at 12/19/2018  4:00 PM   Family Acceptance, E, VU by AN at 12/20/2018  2:32 PM    Comment:  Educated on home safety and need for assist with ADL's and mobility. Discussed AE, HEP and strategies for addressing R UE coordination.    STEPHANIE Sanders,FARZAD,D, VU,NR by AR at 12/19/2018  4:00 PM                   Point: Home exercise program (Done)     Description: Instruct learner(s) on appropriate technique for monitoring, assisting and/or progressing therapeutic exercises/activities.    Learning Progress Summary           Patient Acceptance, E, VU by AN at 12/20/2018  2:32 PM    Comment:  Educated on home safety and need for assist with ADL's and mobility. Discussed AE, HEP and strategies for addressing R UE coordination.   Family Acceptance, E, VU by AN at 12/20/2018  2:32 PM    Comment:  Educated on home safety and need for assist with ADL's and mobility. Discussed AE, HEP and strategies for addressing R UE coordination.                   Point:  Precautions (Done)     Description: Instruct learner(s) on prescribed precautions during self-care and functional transfers.    Learning Progress Summary           Patient Eager, E,TB,D, VU,NR by AR at 12/19/2018  4:00 PM   Family Eager, E,TB,D, VU,NR by AR at 12/19/2018  4:00 PM                   Point: Body mechanics (Done)     Description: Instruct learner(s) on proper positioning and spine alignment during self-care, functional mobility activities and/or exercises.    Learning Progress Summary           Patient Eager, E,TB,D, VU,NR by AR at 12/19/2018  4:00 PM   Family Eager, E,TB,D, VU,NR by AR at 12/19/2018  4:00 PM                               User Key     Initials Effective Dates Name Provider Type Discipline     06/22/15 -  Cherelle Billingsley, OT Occupational Therapist OT    AR 06/22/15 -  Connie Rodriguez, OT Occupational Therapist OT                OT Recommendation and Plan  Outcome Summary/Treatment Plan (OT)  Daily Summary of Progress (OT): progress toward functional goals as expected  Anticipated Discharge Disposition (OT): other (see comments)(recommended IRF, pt has refused and has committed to HH)  Patient/Family Concerns, Anticipated Discharge Disposition (OT): have decided on home with home health  Daily Summary of Progress (OT): progress toward functional goals as expected  Plan of Care Review  Plan of Care Reviewed With: patient  Plan of Care Reviewed With: patient  Outcome Summary: Pt continues to need supervision and cueing for safety with transfers and ADL's. Provided education and treatment for R UE this date. OT continues to recommend IRF, but family and pt have decided on home with home health. Recommended 24 hr supv with home, use of DME/AE for ADL's and mobilty.   Outcome Measures     Row Name 12/20/18 1400 12/19/18 1600 12/19/18 0843       How much help from another person do you currently need...    Turning from your back to your side while in flat bed without using bedrails?  --   --  4  -CD    Moving from lying on back to sitting on the side of a flat bed without bedrails?  --  --  4  -CD    Moving to and from a bed to a chair (including a wheelchair)?  --  --  2  -CD    Standing up from a chair using your arms (e.g., wheelchair, bedside chair)?  --  --  3  -CD    Climbing 3-5 steps with a railing?  --  --  1  -CD    To walk in hospital room?  --  --  2  -CD    AM-PAC 6 Clicks Score  --  --  16  -CD       How much help from another is currently needed...    Putting on and taking off regular lower body clothing?  3  -AN  3  -AR  --    Bathing (including washing, rinsing, and drying)  2  -AN  2  -AR  --    Toileting (which includes using toilet bed pan or urinal)  2  -AN  2  -AR  --    Putting on and taking off regular upper body clothing  3  -AN  3  -AR  --    Taking care of personal grooming (such as brushing teeth)  3  -AN  3  -AR  --    Eating meals  3  -AN  3  -AR  --    Score  16  -AN  16  -AR  --       Modified Kana Scale    Modified Elgin Scale  4 - Moderately severe disability.  Unable to walk without assistance, and unable to attend to own bodily needs without assistance.  -AN  4 - Moderately severe disability.  Unable to walk without assistance, and unable to attend to own bodily needs without assistance.  -AR  4 - Moderately severe disability.  Unable to walk without assistance, and unable to attend to own bodily needs without assistance.  -CD       Functional Assessment    Outcome Measure Options  --  AM-PAC 6 Clicks Daily Activity (OT)  -AR  AM-PAC 6 Clicks Basic Mobility (PT);Modified Elgin  -CD      User Key  (r) = Recorded By, (t) = Taken By, (c) = Cosigned By    Initials Name Provider Type    CD Kym Ruiz, PT Physical Therapist    Cherelle Siegel, OT Occupational Therapist    Connie Avina OT Occupational Therapist           Time Calculation:   Time Calculation- OT     Row Name 12/20/18 7434             Time Calculation- OT    OT Start Time  1400  -AN       Total Timed Code Minutes- OT  9 minute(s)  -AN      OT Received On  12/20/18  -AN      OT Goal Re-Cert Due Date  12/29/18  -AN        User Key  (r) = Recorded By, (t) = Taken By, (c) = Cosigned By    Initials Name Provider Type    Cherelle Siegel OT Occupational Therapist           Therapy Suggested Charges     Code   Minutes Charges    98464 (CPT®) Hc Ot Neuromusc Re Education Ea 15 Min      34914 (CPT®) Hc Ot Ther Proc Ea 15 Min      37278 (CPT®) Hc Ot Therapeutic Act Ea 15 Min      05324 (CPT®) Hc Ot Manual Therapy Ea 15 Min      44858 (CPT®) Hc Ot Iontophoresis Ea 15 Min      30537 (CPT®) Hc Ot Elec Stim Ea-Per 15 Min      98073 (CPT®) Hc Ot Ultrasound Ea 15 Min      57340 (CPT®) Hc Ot Self Care/Mgmt/Train Ea 15 Min 9 1    Total  9 1        Therapy Charges for Today     Code Description Service Date Service Provider Modifiers Qty    [id number] HC OT SELF CARE/MGMT/TRAIN EA 15 MIN 12/20/2018 Cherelle Billingsley OT GO 1               Cherelle Billingsley OT  12/20/2018

## 2018-12-21 ENCOUNTER — READMISSION MANAGEMENT (OUTPATIENT)
Dept: CALL CENTER | Facility: HOSPITAL | Age: 68
End: 2018-12-21

## 2018-12-21 ENCOUNTER — TRANSITIONAL CARE MANAGEMENT TELEPHONE ENCOUNTER (OUTPATIENT)
Dept: INTERNAL MEDICINE | Facility: CLINIC | Age: 68
End: 2018-12-21

## 2018-12-21 NOTE — OUTREACH NOTE
ENRIQUE call completed.  Please refer to TCM call flowsheet for call documentation.    The patient states he is doing good today and says each day he has been feeling a little better. He denies any fever, chills, n/v, chest pain, or SOB. He picked up new prescriptions and taking as prescribed. Pt states HH is present at his home at time of call. New PCP appt confirmed. The pt denies any questions, concerns, or needs at time of call.

## 2018-12-21 NOTE — OUTREACH NOTE
Prep Survey      Responses   Facility patient discharged from?  [facility]   Is patient eligible?  Yes   Discharge diagnosis  Acute left frontal embolic stroke    Does the patient have one of the following disease processes/diagnoses(primary or secondary)?  Stroke (TIA)   Does the patient have Home health ordered?  Yes   What is the Home health agency?   [facility]   Is there a DME ordered?  No   Prep survey completed?  Yes          Oneida Lopez RN

## 2018-12-24 ENCOUNTER — READMISSION MANAGEMENT (OUTPATIENT)
Dept: CALL CENTER | Facility: HOSPITAL | Age: 68
End: 2018-12-24

## 2018-12-24 NOTE — OUTREACH NOTE
Stroke Week 1 Survey      Responses   Facility patient discharged from?  [facility]   Does the patient have one of the following disease processes/diagnoses(primary or secondary)?  Stroke (TIA)   Is there a successful TCM telephone encounter documented?  Yes          Mary Moreno RN

## 2018-12-26 ENCOUNTER — OFFICE VISIT (OUTPATIENT)
Dept: INTERNAL MEDICINE | Facility: CLINIC | Age: 68
End: 2018-12-26

## 2018-12-26 VITALS
SYSTOLIC BLOOD PRESSURE: 148 MMHG | BODY MASS INDEX: 24.79 KG/M2 | DIASTOLIC BLOOD PRESSURE: 74 MMHG | WEIGHT: 173.2 LBS | HEART RATE: 65 BPM | HEIGHT: 70 IN | TEMPERATURE: 98 F | OXYGEN SATURATION: 82 %

## 2018-12-26 DIAGNOSIS — M10.261 ACUTE DRUG-INDUCED GOUT OF RIGHT KNEE: ICD-10-CM

## 2018-12-26 DIAGNOSIS — E78.2 MIXED HYPERLIPIDEMIA: ICD-10-CM

## 2018-12-26 DIAGNOSIS — I63.9 ACUTE EMBOLIC STROKE (HCC): ICD-10-CM

## 2018-12-26 DIAGNOSIS — I10 ESSENTIAL HYPERTENSION: ICD-10-CM

## 2018-12-26 DIAGNOSIS — N17.9 AKI (ACUTE KIDNEY INJURY) (HCC): Primary | ICD-10-CM

## 2018-12-26 LAB
ALBUMIN SERPL-MCNC: 3.82 G/DL (ref 3.2–4.8)
ALBUMIN/GLOB SERPL: 1.5 G/DL (ref 1.5–2.5)
ALP SERPL-CCNC: 58 U/L (ref 25–100)
ALT SERPL W P-5'-P-CCNC: 34 U/L (ref 7–40)
ANION GAP SERPL CALCULATED.3IONS-SCNC: 8 MMOL/L (ref 3–11)
AST SERPL-CCNC: 20 U/L (ref 0–33)
BILIRUB SERPL-MCNC: 0.2 MG/DL (ref 0.3–1.2)
BUN BLD-MCNC: 34 MG/DL (ref 9–23)
BUN/CREAT SERPL: 23.8 (ref 7–25)
CALCIUM SPEC-SCNC: 9.1 MG/DL (ref 8.7–10.4)
CHLORIDE SERPL-SCNC: 102 MMOL/L (ref 99–109)
CO2 SERPL-SCNC: 28 MMOL/L (ref 20–31)
CREAT BLD-MCNC: 1.43 MG/DL (ref 0.6–1.3)
GFR SERPL CREATININE-BSD FRML MDRD: 60 ML/MIN/1.73
GLOBULIN UR ELPH-MCNC: 2.5 GM/DL
GLUCOSE BLD-MCNC: 89 MG/DL (ref 70–100)
POTASSIUM BLD-SCNC: 4.1 MMOL/L (ref 3.5–5.5)
PROT SERPL-MCNC: 6.3 G/DL (ref 5.7–8.2)
SODIUM BLD-SCNC: 138 MMOL/L (ref 132–146)

## 2018-12-26 PROCEDURE — 99214 OFFICE O/P EST MOD 30 MIN: CPT | Performed by: NURSE PRACTITIONER

## 2018-12-26 PROCEDURE — 80053 COMPREHEN METABOLIC PANEL: CPT | Performed by: NURSE PRACTITIONER

## 2018-12-26 NOTE — PATIENT INSTRUCTIONS
Patient has follow-up with neurology in 1 month.  He has home health to include physical therapy.  We will draw a CMP today.  Cont  same medications (norvasc or hypertension, Lipitor for hyperlipidemia, Remeron to help with sleep, Plavix and aspirin for stroke).  He may stop the meclizine.  He has completed the prednisone..  He is to monitor his blood pressure each week.  Our goal is to get him less than 130/80 preferably around 120/70.  He is to adhere to a low-fat low-cholesterol diet.  Avoid known food triggers of gout to include smoked products, aged cheeses, and beth.  We will hold off on colchicine until we see how his renal functions are doing.  Stopped using tobacco and alcohol.  Follow-up appointment in 1 month.  Stressed the importance of returning to the emergency department if he has new or worsening of symptoms. Pt verbalizes understanding and agreement with plan of care.

## 2018-12-26 NOTE — PROGRESS NOTES
Subjective   Eyal Solano is a 67 y.o. male.   Chief Complaint   Patient presents with   • Establish Care     pt had two mini strokes at [facility]      History of Present Illness  I have reviewed his records from [facility].  He apparently was admitted with hypertension, acute gout of right knee, acute kidney injury, dyslipidemia, alcohol abuse and  acute left frontal embolic stroke.  He is accompanied by sister.  Feels good now.  Sometimes, will loose  of right hand.  Right leg is back normal.  Is scheduled to begin home PT today.  Patient denies fever chills, headache, visual changes drooling, paresthesias in his face, trouble speaking, ear pain, sore throat, shortness of air, cough, chest pain, abdominal pain, nausea, vomiting, diarrhea, dysuria, blood in stool or urine.  Mood is good.  Eating and drinking as usual.  No unexplained weight loss or gain.  Did not go through DTs while in the hospital.  He has not drank alcohol since his discharge.  He continues to chew tobacco.  His right knee pain from gout is improved.    Social: He is a .  He to use tobacco and uses alcohol.  No recreational drugs.  He lives with his significant other    The following portions of the patient's history were reviewed and updated as appropriate: allergies, current medications, past family history, past medical history, past social history, past surgical history and problem list.    Current Outpatient Medications:   •  amLODIPine (NORVASC) 10 MG tablet, Take 10 mg by mouth Daily., Disp: , Rfl:   •  aspirin 81 MG chewable tablet, Chew 1 tablet Daily., Disp: 30 tablet, Rfl: 1  •  atorvastatin (LIPITOR) 80 MG tablet, Take 1 tablet by mouth Every Night., Disp: 30 tablet, Rfl: 1  •  clopidogrel (PLAVIX) 75 MG tablet, Take 1 tablet by mouth Daily., Disp: 30 tablet, Rfl: 1  •  meclizine (ANTIVERT) 25 MG tablet, Take 25 mg by mouth 4 (Four) Times a Day As Needed for dizziness., Disp: , Rfl:   •  mirtazapine (REMERON) 15 MG  "tablet, Take 15 mg by mouth Every Night., Disp: , Rfl:   •  predniSONE (DELTASONE) 20 MG tablet, Take 1 tablet by mouth Daily With Breakfast for 5 days., Disp: 5 tablet, Rfl: 0    Review of Systems Consitutional, HEENT, Respiratory, CV, GI, , Skin, Musculoskeletal, Neuro-mental, Endocrinological, Hematological were reviewed.  Positives were discussed in the HPI, otherwise ROS was negative   /74   Pulse 65   Temp 98 °F (36.7 °C)   Ht 177.8 cm (70\")   Wt 78.6 kg (173 lb 3.2 oz)   SpO2 (!) 82%   BMI 24.85 kg/m²     Objective   No Known Allergies    Physical Exam   Constitutional: He is oriented to person, place, and time. He appears well-developed and well-nourished. No distress.   Appears stated age and is appropriately dressed   HENT:   Head: Normocephalic.   Right Ear: External ear normal.   Left Ear: External ear normal.   Nose: Nose normal.   Mouth/Throat: Oropharynx is clear and moist.   Speech is clear.  No facial drooping.  Sensation intact.   Eyes: Pupils are equal, round, and reactive to light. Right eye exhibits no discharge. Left eye exhibits no discharge. No scleral icterus.   Neck: Neck supple. No thyromegaly present.   No carotid bruit bilat   Cardiovascular: Normal rate, regular rhythm, normal heart sounds and intact distal pulses. Exam reveals no gallop and no friction rub.   No murmur heard.  Pulmonary/Chest: Effort normal and breath sounds normal. No stridor. No respiratory distress. He has no wheezes. He has no rales.   Abdominal: Soft. Bowel sounds are normal. He exhibits no mass. There is no tenderness. There is no guarding.   No HSM   Musculoskeletal:   MINA well.  Gait upright and steady    Lymphadenopathy:     He has no cervical adenopathy.   Neurological: He is alert and oriented to person, place, and time.   Speech is normal.  There is no drooling.  Finger to nose intact.   are 5 over 5 bilaterally.  Pedal foot pushes 5 over 5 bilaterally.  There is no pronator drift of arms " or legs.   Skin: Skin is warm and dry. Capillary refill takes less than 2 seconds.   Is pink, no rash    Psychiatric: He has a normal mood and affect. His behavior is normal. Judgment and thought content normal.   Nursing note and vitals reviewed.      Procedures    L    Assessment/Plan   Eyal was seen today for establish care.    Diagnoses and all orders for this visit:    SAMINA (acute kidney injury) (CMS/[facility])  -     Comprehensive Metabolic Panel    Essential hypertension  -     Comprehensive Metabolic Panel    Acute embolic stroke (CMS/[facility])    Acute drug-induced gout of right knee    Mixed hyperlipidemia        Patient Instructions   Patient has follow-up with neurology in 1 month.  He has home health to include physical therapy.  We will draw a CMP today.  Cont  same medications (norvasc or hypertension, Lipitor for hyperlipidemia, Remeron to help with sleep, Plavix and aspirin for stroke).  He may stop the meclizine.  He has completed the prednisone..  He is to monitor his blood pressure each week.  Our goal is to get him less than 130/80 preferably around 120/70.  He is to adhere to a low-fat low-cholesterol diet.  Avoid known food triggers of gout to include smoked products, aged cheeses, and beth.  We will hold off on colchicine until we see how his renal functions are doing.  Stopped using tobacco and alcohol.  Follow-up appointment in 1 month.  Stressed the importance of returning to the emergency department if he has new or worsening of symptoms. Pt verbalizes understanding and agreement with plan of care.       EMR Dragon/transcription disclaimer:  Please note that portions of this note were completed with a voice recognition program.  Electronic transcription of the voice recognition program may permit erroneous words or phrases to be inadvertently transcribed.  Although I have reviewed the note for such errors, some may still exist in this documentation   MARCEL Bess

## 2018-12-28 ENCOUNTER — TELEPHONE (OUTPATIENT)
Dept: INTERNAL MEDICINE | Facility: CLINIC | Age: 68
End: 2018-12-28

## 2018-12-28 DIAGNOSIS — M10.271 ACUTE DRUG-INDUCED GOUT OF RIGHT ANKLE: Primary | ICD-10-CM

## 2018-12-28 RX ORDER — METHYLPREDNISOLONE 4 MG/1
TABLET ORAL
Qty: 1 EACH | Refills: 0 | Status: SHIPPED | OUTPATIENT
Start: 2018-12-28 | End: 2019-02-06

## 2018-12-28 NOTE — TELEPHONE ENCOUNTER
PATIENT HAD BEEN DOING WELL, HAD HOMELINE THERE YESTERDAY, THEY WERE BRAGGING ON HOW WELL PT IS DOING. TODAY PT IS HAVING PROBLEMS WALKING. COULD SOMEONE CALL HER BACK.  [phone]

## 2019-01-09 ENCOUNTER — OUTSIDE FACILITY SERVICE (OUTPATIENT)
Dept: HOSPITALIST | Facility: HOSPITAL | Age: 69
End: 2019-01-09

## 2019-01-09 PROCEDURE — G0180 MD CERTIFICATION HHA PATIENT: HCPCS | Performed by: FAMILY MEDICINE

## 2019-02-06 ENCOUNTER — OFFICE VISIT (OUTPATIENT)
Dept: NEUROLOGY | Facility: CLINIC | Age: 69
End: 2019-02-06

## 2019-02-06 ENCOUNTER — OFFICE VISIT (OUTPATIENT)
Dept: INTERNAL MEDICINE | Facility: CLINIC | Age: 69
End: 2019-02-06

## 2019-02-06 VITALS
DIASTOLIC BLOOD PRESSURE: 92 MMHG | SYSTOLIC BLOOD PRESSURE: 180 MMHG | OXYGEN SATURATION: 94 % | BODY MASS INDEX: 24.77 KG/M2 | HEART RATE: 70 BPM | HEIGHT: 70 IN | WEIGHT: 173 LBS

## 2019-02-06 VITALS
HEART RATE: 74 BPM | SYSTOLIC BLOOD PRESSURE: 140 MMHG | WEIGHT: 175 LBS | OXYGEN SATURATION: 95 % | BODY MASS INDEX: 25.05 KG/M2 | DIASTOLIC BLOOD PRESSURE: 68 MMHG | HEIGHT: 70 IN

## 2019-02-06 DIAGNOSIS — I10 ESSENTIAL HYPERTENSION: Primary | ICD-10-CM

## 2019-02-06 DIAGNOSIS — E78.5 DYSLIPIDEMIA: ICD-10-CM

## 2019-02-06 DIAGNOSIS — G47.00 INSOMNIA, UNSPECIFIED TYPE: ICD-10-CM

## 2019-02-06 DIAGNOSIS — I63.9 ACUTE ISCHEMIC STROKE (HCC): Primary | ICD-10-CM

## 2019-02-06 PROBLEM — F51.01 PRIMARY INSOMNIA: Status: ACTIVE | Noted: 2019-02-06

## 2019-02-06 LAB
ALBUMIN SERPL-MCNC: 3.73 G/DL (ref 3.2–4.8)
ALBUMIN/GLOB SERPL: 1.5 G/DL (ref 1.5–2.5)
ALP SERPL-CCNC: 68 U/L (ref 25–100)
ALT SERPL W P-5'-P-CCNC: 24 U/L (ref 7–40)
ANION GAP SERPL CALCULATED.3IONS-SCNC: 5 MMOL/L (ref 3–11)
AST SERPL-CCNC: 31 U/L (ref 0–33)
BILIRUB SERPL-MCNC: 0.3 MG/DL (ref 0.3–1.2)
BUN BLD-MCNC: 18 MG/DL (ref 9–23)
BUN/CREAT SERPL: 12.7 (ref 7–25)
CALCIUM SPEC-SCNC: 8.7 MG/DL (ref 8.7–10.4)
CHLORIDE SERPL-SCNC: 106 MMOL/L (ref 99–109)
CO2 SERPL-SCNC: 27 MMOL/L (ref 20–31)
CREAT BLD-MCNC: 1.42 MG/DL (ref 0.6–1.3)
GFR SERPL CREATININE-BSD FRML MDRD: 60 ML/MIN/1.73
GLOBULIN UR ELPH-MCNC: 2.5 GM/DL
GLUCOSE BLD-MCNC: 95 MG/DL (ref 70–100)
POTASSIUM BLD-SCNC: 4.2 MMOL/L (ref 3.5–5.5)
PROT SERPL-MCNC: 6.2 G/DL (ref 5.7–8.2)
SODIUM BLD-SCNC: 138 MMOL/L (ref 132–146)

## 2019-02-06 PROCEDURE — 80053 COMPREHEN METABOLIC PANEL: CPT | Performed by: NURSE PRACTITIONER

## 2019-02-06 PROCEDURE — 99215 OFFICE O/P EST HI 40 MIN: CPT | Performed by: PSYCHIATRY & NEUROLOGY

## 2019-02-06 PROCEDURE — 99213 OFFICE O/P EST LOW 20 MIN: CPT | Performed by: NURSE PRACTITIONER

## 2019-02-06 RX ORDER — CLOPIDOGREL BISULFATE 75 MG/1
75 TABLET ORAL DAILY
Qty: 30 TABLET | Refills: 5 | Status: CANCELLED | OUTPATIENT
Start: 2019-02-06

## 2019-02-06 RX ORDER — ATORVASTATIN CALCIUM 80 MG/1
80 TABLET, FILM COATED ORAL NIGHTLY
Qty: 30 TABLET | Refills: 5 | Status: CANCELLED | OUTPATIENT
Start: 2019-02-06

## 2019-02-06 RX ORDER — HYDROCHLOROTHIAZIDE 25 MG/1
25 TABLET ORAL DAILY
Qty: 30 TABLET | Refills: 2 | Status: SHIPPED | OUTPATIENT
Start: 2019-02-06 | End: 2020-10-21

## 2019-02-06 RX ORDER — CLOPIDOGREL BISULFATE 75 MG/1
75 TABLET ORAL DAILY
Qty: 30 TABLET | Refills: 5 | Status: SHIPPED | OUTPATIENT
Start: 2019-02-06 | End: 2020-02-24 | Stop reason: SDUPTHER

## 2019-02-06 RX ORDER — AMLODIPINE BESYLATE 10 MG/1
10 TABLET ORAL DAILY
Qty: 30 TABLET | Refills: 2 | Status: SHIPPED | OUTPATIENT
Start: 2019-02-06 | End: 2020-02-24 | Stop reason: SDUPTHER

## 2019-02-06 RX ORDER — ATORVASTATIN CALCIUM 80 MG/1
80 TABLET, FILM COATED ORAL NIGHTLY
Qty: 30 TABLET | Refills: 5 | Status: SHIPPED | OUTPATIENT
Start: 2019-02-06 | End: 2020-02-24 | Stop reason: SDUPTHER

## 2019-02-06 RX ORDER — MIRTAZAPINE 15 MG/1
15 TABLET, FILM COATED ORAL NIGHTLY
Qty: 30 TABLET | Refills: 2 | Status: SHIPPED | OUTPATIENT
Start: 2019-02-06 | End: 2020-02-24

## 2019-02-06 NOTE — PATIENT INSTRUCTIONS
Neurology refilled the Plavix and atorvastatin.  I have added hydrochlorothiazide to his regimen to help better control his blood pressure.  He is to continue his other meds per MAR.  Monitor blood pressure regularly at home.  I am proud of you for staying off alcohol.  Pt verbalizes understanding and agreement with plan of care.

## 2019-02-06 NOTE — PROGRESS NOTES
Subjective:    CC: Eyal Solano is seen today in consultation at the [facility] for Stroke       HPI:  68-year-old male with a history of gout, hypertension, SAMINA , alcohol abuse presents as a hospital follow-up for stroke.  As per patient he had symptoms of right arm and leg weakness in mid December as well as word finding difficulties.  He initially sought care and was found to have a blood pressure in the 190s/110s.  He was started on blood pressure medications but continued to have the symptoms.  Hence he went to the ER again at [facility], discharged and then returned to the [facility] ER after a few days when his symptoms persisted..  In the hospital he had an MRI brain that showed 2 lacunar infarcts in the left centrum semiovale.  He also had an MRA of the neck that did not show any significant carotid stenosis and a MRA of the head that showed multifocal areas of stenosis including left M1 occlusion with reconstitution of flow in the M2, occlusion of the left A2 and about 50-60% stenosis of the cavernous right ICA.  Patient was started on both aspirin and Plavix as well as atorvastatin 80 mg.  His lipid panel was as follows-, , , HDL 48, A1c 5.5. Echocardiogram showed a normal EF of 65% with trace mitral valve regurgitation but no PFO.  Patient also had a history of chronic alcohol abuse but has stopped drinking since the stroke.  Since discharge his weakness has resolved however his blood pressure is again very high today-180/92.  He ran out of his blood pressure medications a few days ago and did not call his PCP to refill them.  We gave him clonidine 0.1 mg in clinic today and rechecked his blood pressure after 30 minutes which was 170/92.    Of note-I personally reviewed his MRI brain, MRA head and neck images, Dr. Lechuga's note from the ER and his ER discharge summary.      The following portions of the patient's history were reviewed today and updated as of 02/06/2019  :  allergies, current medications, past family history, past medical history, past social history, past surgical history and problem list  These document will be scanned to patient's chart.      Current Outpatient Medications:   •  aspirin 81 MG chewable tablet, Chew 1 tablet Daily., Disp: 30 tablet, Rfl: 1  •  atorvastatin (LIPITOR) 80 MG tablet, Take 1 tablet by mouth Every Night., Disp: 30 tablet, Rfl: 5  •  clopidogrel (PLAVIX) 75 MG tablet, Take 1 tablet by mouth Daily., Disp: 30 tablet, Rfl: 5  •  amLODIPine (NORVASC) 10 MG tablet, Take 10 mg by mouth Daily., Disp: , Rfl:   •  meclizine (ANTIVERT) 25 MG tablet, Take 25 mg by mouth 4 (Four) Times a Day As Needed for dizziness., Disp: , Rfl:   •  MethylPREDNISolone (MEDROL, OCTAVIO,) 4 MG tablet, Take as directed on package instructions., Disp: 1 each, Rfl: 0  •  mirtazapine (REMERON) 15 MG tablet, Take 15 mg by mouth Every Night., Disp: , Rfl:    Past Medical History:   Diagnosis Date   • Conversion reaction    • Dizziness    • Gout    • Hyperlipidemia    • Hypertension    • Stroke (CMS/HCC)       Past Surgical History:   Procedure Laterality Date   • NASAL SINUS SURGERY        Family History   Problem Relation Age of Onset   • Hyperlipidemia Mother    • Stroke Father       Social History     Socioeconomic History   • Marital status: Single     Spouse name: Not on file   • Number of children: Not on file   • Years of education: Not on file   • Highest education level: Not on file   Social Needs   • Financial resource strain: Not on file   • Food insecurity - worry: Not on file   • Food insecurity - inability: Not on file   • Transportation needs - medical: Not on file   • Transportation needs - non-medical: Not on file   Occupational History   • Not on file   Tobacco Use   • Smoking status: Never Smoker   • Smokeless tobacco: Current User     Types: Chew   Substance and Sexual Activity   • Alcohol use: Yes     Comment: 6 beers and 1/2 pint liquir daily, last drink  "12/9/18   • Drug use: No   • Sexual activity: Defer   Other Topics Concern   • Not on file   Social History Narrative   • Not on file     Review of Systems   Constitutional: Positive for activity change, appetite change and fatigue.   Respiratory: Positive for shortness of breath.    Neurological: Positive for dizziness and weakness.   All other systems reviewed and are negative.      Objective:    /92 (BP Location: Right arm, Patient Position: Sitting, Cuff Size: Adult)   Pulse 70   Ht 177.8 cm (70\")   Wt 78.5 kg (173 lb)   SpO2 94%   BMI 24.82 kg/m²     Neurology Exam:    General apperance: NAD.     Mental status: Alert, awake and oriented to time place and person.    Recent and Remote memory: Intact.    Attention span and Concentration: Normal.     Language and Speech: Intact- No dysarthria.    Fluency, Naming , Repitition and Comprehension:  Intact    Cranial Nerves:   CN II: Visual fields are full. Intact. Fundi - Normal, No papillederma, Pupils - DANIE  CN III, IV and VI: Extraocular movements are intact. Normal saccades.   CN V: Facial sensation is intact.   CN VII: Muscles of facial expression reveal no asymmetry. Intact.   CN VIII: Hearing is intact. Whispered voice intact.   CN IX and X: Palate elevates symmetrically. Intact  CN XI: Shoulder shrug is intact.   CN XII: Tongue is midline without evidence of atrophy or fasciculation.     Ophthalmoscopic exam of optic disc-normal    Motor:  Right UE muscle strength 5/5. Normal tone.     Left UE muscle strength 5/5. Normal tone.      Right LE muscle strength5/5. Normal tone.     Left LE muscle strength 5/5. Normal tone.      Sensory: Normal light touch, vibration and pinprick sensation bilaterally.    DTRs: 2+ bilaterally in upper and lower extremities.    Babinski: Negative bilaterally.    Co-ordination: Normal finger-to-nose, heel to shin B/L.    Rhomberg: Negative.    Gait: Normal.    Cardiovascular: Regular rate and rhythm without murmur, gallop " or rub.    Assessment and Plan:  1. Acute left frontal embolic stroke (CMS/HCC)  He had 2 small left centrum semiovale strokes which could be due to intracranial stenosis or small vessel disease  I have asked him to continue both aspirin and Plavix for now as well as atorvastatin 80 mg for goal LDL of S than 100.  His LDL was 158  Maintain blood pressure less than 130/90.  He is seeing his PCP today and I have told him to talk to her about getting refills of Norvasc.  I have also asked him to keep a blood pressure log and to call his PCP in 2 weeks if the blood pressure continues to be high  He should continue alcohol cessation    Return in about 2 months (around 4/6/2019).     I spent over 45 minutes with the patient face to face out of which over 50% (25 minutes) was spent in management, instructions and education regarding secondary stroke risk factors.     Mallory Lee MD

## 2019-02-06 NOTE — PROGRESS NOTES
"Subjective   Eyal Solano is a 68 y.o. male.   Chief Complaint   Patient presents with   • SAMINA   • Hypertension   • Hyperlipidemia   • Med Refill      History of Present Illness     The following portions of the patient's history were reviewed and updated as appropriate: allergies, current medications, past family history, past medical history, past social history, past surgical history and problem list.    Current Outpatient Medications:   •  amLODIPine (NORVASC) 10 MG tablet, Take 10 mg by mouth Daily., Disp: , Rfl:   •  aspirin 81 MG chewable tablet, Chew 1 tablet Daily., Disp: 30 tablet, Rfl: 1  •  atorvastatin (LIPITOR) 80 MG tablet, Take 1 tablet by mouth Every Night., Disp: 30 tablet, Rfl: 5  •  clopidogrel (PLAVIX) 75 MG tablet, Take 1 tablet by mouth Daily., Disp: 30 tablet, Rfl: 5  •  mirtazapine (REMERON) 15 MG tablet, Take 15 mg by mouth Every Night., Disp: , Rfl:   •  meclizine (ANTIVERT) 25 MG tablet, Take 25 mg by mouth 4 (Four) Times a Day As Needed for dizziness., Disp: , Rfl:     Review of Systems  /68   Pulse 74   Ht 177.8 cm (70\")   Wt 79.4 kg (175 lb)   SpO2 95%   BMI 25.11 kg/m²     Objective   No Known Allergies    Physical Exam    Procedures    LABS  Results for orders placed or performed in visit on 12/26/18   Comprehensive Metabolic Panel   Result Value Ref Range    Glucose 89 70 - 100 mg/dL    BUN 34 (H) 9 - 23 mg/dL    Creatinine 1.43 (H) 0.60 - 1.30 mg/dL    Sodium 138 132 - 146 mmol/L    Potassium 4.1 3.5 - 5.5 mmol/L    Chloride 102 99 - 109 mmol/L    CO2 28.0 20.0 - 31.0 mmol/L    Calcium 9.1 8.7 - 10.4 mg/dL    Total Protein 6.3 5.7 - 8.2 g/dL    Albumin 3.82 3.20 - 4.80 g/dL    ALT (SGPT) 34 7 - 40 U/L    AST (SGOT) 20 0 - 33 U/L    Alkaline Phosphatase 58 25 - 100 U/L    Total Bilirubin 0.2 (L) 0.3 - 1.2 mg/dL    eGFR  African Amer 60 (L) >60 mL/min/1.73    Globulin 2.5 gm/dL    A/G Ratio 1.5 1.5 - 2.5 g/dL    BUN/Creatinine Ratio 23.8 7.0 - 25.0    Anion Gap 8.0 3.0 - " 11.0 mmol/L       Assessment/Plan   There are no diagnoses linked to this encounter.          Esther Espinoza, APRN

## 2019-02-06 NOTE — PROGRESS NOTES
"Subjective   Eyal Solano is a 68 y.o. male.   Chief Complaint   Patient presents with   • SAMINA   • Hypertension   • Hyperlipidemia   • Med Refill      History of Present Illness patient reports since his last visit he has been seen by neurology.  He says he actually just left there and his blood pressure was high.  I have reviewed the note patient has had no further stroke symptoms.  He is been monitoring his blood pressure at home.  He says it is staying about 140/70.  He denies daily headaches, ear pain, sore throat, shortness of air, chest pain, abdominal pain, nausea vomiting or diarrhea.  He is not using alcohol.  He says he is feeling well    The following portions of the patient's history were reviewed and updated as appropriate: allergies, current medications, past family history, past medical history, past social history, past surgical history and problem list.    Current Outpatient Medications:   •  amLODIPine (NORVASC) 10 MG tablet, Take 1 tablet by mouth Daily., Disp: 30 tablet, Rfl: 2  •  aspirin 81 MG chewable tablet, Chew 1 tablet Daily., Disp: 30 tablet, Rfl: 1  •  atorvastatin (LIPITOR) 80 MG tablet, Take 1 tablet by mouth Every Night., Disp: 30 tablet, Rfl: 5  •  clopidogrel (PLAVIX) 75 MG tablet, Take 1 tablet by mouth Daily., Disp: 30 tablet, Rfl: 5  •  mirtazapine (REMERON) 15 MG tablet, Take 1 tablet by mouth Every Night., Disp: 30 tablet, Rfl: 2  •  hydrochlorothiazide (HYDRODIURIL) 25 MG tablet, Take 1 tablet by mouth Daily., Disp: 30 tablet, Rfl: 2  •  meclizine (ANTIVERT) 25 MG tablet, Take 25 mg by mouth 4 (Four) Times a Day As Needed for dizziness., Disp: , Rfl:     Review of Systems Consitutional, HEENT, Respiratory, CV, GI, , Skin, Musculoskeletal, Neuro-mental, Endocrinological, Hematological were reviewed.  Positives were discussed in the HPI, otherwise ROS was negative   /68   Pulse 74   Ht 177.8 cm (70\")   Wt 79.4 kg (175 lb)   SpO2 95%   BMI 25.11 kg/m²     Objective "   No Known Allergies    Physical Exam   Constitutional: He is oriented to person, place, and time. He appears well-developed and well-nourished. No distress.   HENT:   Head: Normocephalic.   Eyes: Pupils are equal, round, and reactive to light. Right eye exhibits no discharge. Left eye exhibits no discharge. No scleral icterus.   Neck: Neck supple. No thyromegaly present.   No carotid bruit bilat   Cardiovascular: Normal rate, regular rhythm, normal heart sounds and intact distal pulses. Exam reveals no gallop and no friction rub.   No murmur heard.  Pulmonary/Chest: Effort normal and breath sounds normal. No stridor. No respiratory distress. He has no wheezes. He has no rales.   Abdominal: Soft. There is no tenderness.   Musculoskeletal:   MINA well.  Gait upright and steady    Lymphadenopathy:     He has no cervical adenopathy.   Neurological: He is alert and oriented to person, place, and time.   Skin: Skin is warm and dry. Capillary refill takes less than 2 seconds.   Is pink, no rash    Nursing note and vitals reviewed.      Procedures    LABS  Results for orders placed or performed in visit on 12/26/18   Comprehensive Metabolic Panel   Result Value Ref Range    Glucose 89 70 - 100 mg/dL    BUN 34 (H) 9 - 23 mg/dL    Creatinine 1.43 (H) 0.60 - 1.30 mg/dL    Sodium 138 132 - 146 mmol/L    Potassium 4.1 3.5 - 5.5 mmol/L    Chloride 102 99 - 109 mmol/L    CO2 28.0 20.0 - 31.0 mmol/L    Calcium 9.1 8.7 - 10.4 mg/dL    Total Protein 6.3 5.7 - 8.2 g/dL    Albumin 3.82 3.20 - 4.80 g/dL    ALT (SGPT) 34 7 - 40 U/L    AST (SGOT) 20 0 - 33 U/L    Alkaline Phosphatase 58 25 - 100 U/L    Total Bilirubin 0.2 (L) 0.3 - 1.2 mg/dL    eGFR  African Amer 60 (L) >60 mL/min/1.73    Globulin 2.5 gm/dL    A/G Ratio 1.5 1.5 - 2.5 g/dL    BUN/Creatinine Ratio 23.8 7.0 - 25.0    Anion Gap 8.0 3.0 - 11.0 mmol/L       Assessment/Plan   Eyal was seen today for torsten, hypertension, hyperlipidemia and med refill.    Diagnoses and all orders  for this visit:    Essential hypertension  -     amLODIPine (NORVASC) 10 MG tablet; Take 1 tablet by mouth Daily.  -     hydrochlorothiazide (HYDRODIURIL) 25 MG tablet; Take 1 tablet by mouth Daily.  -     Comprehensive Metabolic Panel    Dyslipidemia    Insomnia, unspecified type  -     mirtazapine (REMERON) 15 MG tablet; Take 1 tablet by mouth Every Night.    Other orders  -     Cancel: atorvastatin (LIPITOR) 80 MG tablet; Take 1 tablet by mouth Every Night.  -     Cancel: clopidogrel (PLAVIX) 75 MG tablet; Take 1 tablet by mouth Daily.        Patient Instructions   Neurology refilled the Plavix and atorvastatin.  I have added hydrochlorothiazide to his regimen to help better control his blood pressure.  He is to continue his other meds per MAR.  Monitor blood pressure regularly at home.  I am proud of you for staying off alcohol.  Pt verbalizes understanding and agreement with plan of care.       EMR Dragon/transcription disclaimer:  Please note that portions of this note were completed with a voice recognition program.  Electronic transcription of the voice recognition program may permit erroneous words or phrases to be inadvertently transcribed.  Although I have reviewed the note for such errors, some may still exist in this documentation   MARCEL Bess

## 2019-02-07 ENCOUNTER — OUTSIDE FACILITY SERVICE (OUTPATIENT)
Dept: HOSPITALIST | Facility: HOSPITAL | Age: 69
End: 2019-02-07

## 2019-02-07 PROCEDURE — G0180 MD CERTIFICATION HHA PATIENT: HCPCS | Performed by: FAMILY MEDICINE

## 2019-04-11 ENCOUNTER — OFFICE VISIT (OUTPATIENT)
Dept: NEUROLOGY | Facility: CLINIC | Age: 69
End: 2019-04-11

## 2019-04-11 VITALS
DIASTOLIC BLOOD PRESSURE: 90 MMHG | HEART RATE: 76 BPM | BODY MASS INDEX: 25.05 KG/M2 | OXYGEN SATURATION: 98 % | SYSTOLIC BLOOD PRESSURE: 180 MMHG | WEIGHT: 175 LBS | HEIGHT: 70 IN

## 2019-04-11 DIAGNOSIS — I63.9 ACUTE ISCHEMIC STROKE (HCC): Primary | ICD-10-CM

## 2019-04-11 PROCEDURE — 99214 OFFICE O/P EST MOD 30 MIN: CPT | Performed by: PSYCHIATRY & NEUROLOGY

## 2019-04-11 NOTE — PROGRESS NOTES
Subjective:    CC: Eyal Solano is seen today  for Stroke       HPI:  Current Visit-since his last visit he has not had any more symptoms of a stroke however his blood pressure continues to fluctuate and is very high at times despite medication adjustment by his PCP.  He also continues to take both aspirin and Plavix along with Lipitor 80 mg daily.  He has now quit drinking alcohol but does continue to chew tobacco.     Initial ihnhe-87-ckcy-old male with a history of gout, hypertension, SAMINA , alcohol abuse presents as a hospital follow-up for stroke.  As per patient he had symptoms of right arm and leg weakness in mid December as well as word finding difficulties.  He initially sought care and was found to have a blood pressure in the 190s/110s.  He was started on blood pressure medications but continued to have the symptoms.  Hence he went to the ER again at [facility], discharged and then returned to the [facility] ER after a few days when his symptoms persisted..  In the hospital he had an MRI brain that showed 2 lacunar infarcts in the left centrum semiovale.  He also had an MRA of the neck that did not show any significant carotid stenosis and a MRA of the head that showed multifocal areas of stenosis including left M1 occlusion with reconstitution of flow in the M2, occlusion of the left A2 and about 50-60% stenosis of the cavernous right ICA.  Patient was started on both aspirin and Plavix as well as atorvastatin 80 mg.  His lipid panel was as follows-, , , HDL 48, A1c 5.5. Echocardiogram showed a normal EF of 65% with trace mitral valve regurgitation but no PFO.  Patient also had a history of chronic alcohol abuse but has stopped drinking since the stroke.  Since discharge his weakness has resolved however his blood pressure is again very high today-180/92.  He ran out of his blood pressure medications a few days ago and did not call his PCP to refill them.  We gave him clonidine 0.1 mg in  clinic today and rechecked his blood pressure after 30 minutes which was 170/92.    The following portions of the patient's history were reviewed today and updated as of 04/11/2019  : allergies, current medications, past family history, past medical history, past social history, past surgical history and problem list  These document will be scanned to patient's chart.      Current Outpatient Medications:   •  amLODIPine (NORVASC) 10 MG tablet, Take 1 tablet by mouth Daily., Disp: 30 tablet, Rfl: 2  •  aspirin 81 MG chewable tablet, Chew 1 tablet Daily., Disp: 30 tablet, Rfl: 1  •  atorvastatin (LIPITOR) 80 MG tablet, Take 1 tablet by mouth Every Night., Disp: 30 tablet, Rfl: 5  •  clopidogrel (PLAVIX) 75 MG tablet, Take 1 tablet by mouth Daily., Disp: 30 tablet, Rfl: 5  •  hydrochlorothiazide (HYDRODIURIL) 25 MG tablet, Take 1 tablet by mouth Daily., Disp: 30 tablet, Rfl: 2  •  meclizine (ANTIVERT) 25 MG tablet, Take 25 mg by mouth 4 (Four) Times a Day As Needed for dizziness., Disp: , Rfl:   •  mirtazapine (REMERON) 15 MG tablet, Take 1 tablet by mouth Every Night., Disp: 30 tablet, Rfl: 2   Past Medical History:   Diagnosis Date   • Conversion reaction    • Dizziness    • Gout    • Hyperlipidemia    • Hypertension    • Stroke (CMS/HCC)       Past Surgical History:   Procedure Laterality Date   • NASAL SINUS SURGERY        Family History   Problem Relation Age of Onset   • Hyperlipidemia Mother    • Stroke Father       Social History     Socioeconomic History   • Marital status: Single     Spouse name: Not on file   • Number of children: Not on file   • Years of education: Not on file   • Highest education level: Not on file   Tobacco Use   • Smoking status: Never Smoker   • Smokeless tobacco: Current User     Types: Chew   Substance and Sexual Activity   • Alcohol use: Yes     Comment: 6 beers and 1/2 pint liquir daily, last drink 12/9/18   • Drug use: No   • Sexual activity: Defer     Review of Systems  "  Neurological: Positive for dizziness, weakness, light-headedness and numbness.   All other systems reviewed and are negative.      Objective:    /90 (BP Location: Right arm, Patient Position: Sitting, Cuff Size: Adult)   Pulse 76   Ht 177.8 cm (70\")   Wt 79.4 kg (175 lb)   SpO2 98%   BMI 25.11 kg/m²     Neurology Exam:    General apperance: NAD.     Mental status: Alert, awake and oriented to time place and person.    Recent and Remote memory: Intact.    Attention span and Concentration: Normal.     Language and Speech: Intact- No dysarthria.    Fluency, Naming , Repitition and Comprehension:  Intact    Cranial Nerves:   CN II: Visual fields are full. Intact. Fundi - Normal, No papillederma, Pupils - DANIE  CN III, IV and VI: Extraocular movements are intact. Normal saccades.   CN V: Facial sensation is intact.   CN VII: Muscles of facial expression reveal no asymmetry. Intact.   CN VIII: Hearing is intact. Whispered voice intact.   CN IX and X: Palate elevates symmetrically. Intact  CN XI: Shoulder shrug is intact.   CN XII: Tongue is midline without evidence of atrophy or fasciculation.     Ophthalmoscopic exam of optic disc-normal    Motor:  Right UE muscle strength 5/5. Normal tone.     Left UE muscle strength 5/5. Normal tone.      Right LE muscle strength5/5. Normal tone.     Left LE muscle strength 5/5. Normal tone.      Sensory: Normal light touch, vibration and pinprick sensation bilaterally.    DTRs: 2+ bilaterally in upper and lower extremities.    Babinski: Negative bilaterally.    Co-ordination: Normal finger-to-nose, heel to shin B/L.    Rhomberg: Negative.    Gait: Normal.    Cardiovascular: Regular rate and rhythm without murmur, gallop or rub.    Assessment and Plan:  1. Acute left frontal embolic stroke (CMS/HCC)  He had 2 small left centrum semiovale strokes which could be due to intracranial stenosis or small vessel disease  I have asked him to continue both aspirin and Plavix for " now as well as atorvastatin 80 mg for goal LDL of S than 100.  His LDL was 158.  At his next visit I may stop Plavix and increase dose of aspirin.  Maintain blood pressure less than 130/90.  His blood pressure still continues to fluctuate and is extremely high today.  I have told him to monitor his blood pressure every day and to call me back in 3-4 weeks.  At that time if his readings are still high then I may refer him to a nephrologist as it is also impacting his kidney function.       Return in about 3 months (around 7/11/2019).     I spent over 25  minutes with the patient face to face out of which over 50% (14  minutes) was spent in management, instructions and education regarding stroke risk factors including his blood pressure.     Mallory Lee MD

## 2020-02-19 ENCOUNTER — TELEPHONE (OUTPATIENT)
Dept: INTERNAL MEDICINE | Facility: CLINIC | Age: 70
End: 2020-02-19

## 2020-02-19 NOTE — TELEPHONE ENCOUNTER
FORMER FEDER[facility] MAX PT  CALLED TO [facility] WITH A NEW PROVIDER, PT STATED THAT HE WILL CALL BACK TO SCHEDULE A NEW PT APPOINTMENT

## 2020-02-24 ENCOUNTER — OFFICE VISIT (OUTPATIENT)
Dept: INTERNAL MEDICINE | Facility: CLINIC | Age: 70
End: 2020-02-24

## 2020-02-24 ENCOUNTER — LAB (OUTPATIENT)
Dept: LAB | Facility: HOSPITAL | Age: 70
End: 2020-02-24

## 2020-02-24 VITALS
DIASTOLIC BLOOD PRESSURE: 122 MMHG | BODY MASS INDEX: 24.71 KG/M2 | SYSTOLIC BLOOD PRESSURE: 222 MMHG | TEMPERATURE: 99.1 F | RESPIRATION RATE: 16 BRPM | OXYGEN SATURATION: 97 % | WEIGHT: 172.6 LBS | HEART RATE: 100 BPM | HEIGHT: 70 IN

## 2020-02-24 DIAGNOSIS — M1A.39X0 CHRONIC GOUT DUE TO RENAL IMPAIRMENT OF MULTIPLE SITES WITHOUT TOPHUS: ICD-10-CM

## 2020-02-24 DIAGNOSIS — E78.2 MIXED HYPERLIPIDEMIA: ICD-10-CM

## 2020-02-24 DIAGNOSIS — R21 RASH: ICD-10-CM

## 2020-02-24 DIAGNOSIS — Z11.59 ENCOUNTER FOR HEPATITIS C SCREENING TEST FOR LOW RISK PATIENT: ICD-10-CM

## 2020-02-24 DIAGNOSIS — N18.30 STAGE 3 CHRONIC KIDNEY DISEASE (HCC): ICD-10-CM

## 2020-02-24 DIAGNOSIS — I10 ESSENTIAL HYPERTENSION: Primary | ICD-10-CM

## 2020-02-24 DIAGNOSIS — I10 ESSENTIAL HYPERTENSION: ICD-10-CM

## 2020-02-24 DIAGNOSIS — I63.9 CEREBROVASCULAR ACCIDENT (CVA), UNSPECIFIED MECHANISM (HCC): ICD-10-CM

## 2020-02-24 PROCEDURE — 82306 VITAMIN D 25 HYDROXY: CPT | Performed by: INTERNAL MEDICINE

## 2020-02-24 PROCEDURE — 83721 ASSAY OF BLOOD LIPOPROTEIN: CPT

## 2020-02-24 PROCEDURE — 86803 HEPATITIS C AB TEST: CPT | Performed by: INTERNAL MEDICINE

## 2020-02-24 PROCEDURE — 84156 ASSAY OF PROTEIN URINE: CPT | Performed by: INTERNAL MEDICINE

## 2020-02-24 PROCEDURE — 84550 ASSAY OF BLOOD/URIC ACID: CPT | Performed by: INTERNAL MEDICINE

## 2020-02-24 PROCEDURE — 85027 COMPLETE CBC AUTOMATED: CPT | Performed by: INTERNAL MEDICINE

## 2020-02-24 PROCEDURE — 80053 COMPREHEN METABOLIC PANEL: CPT | Performed by: INTERNAL MEDICINE

## 2020-02-24 PROCEDURE — 99214 OFFICE O/P EST MOD 30 MIN: CPT | Performed by: INTERNAL MEDICINE

## 2020-02-24 PROCEDURE — 80061 LIPID PANEL: CPT | Performed by: INTERNAL MEDICINE

## 2020-02-24 PROCEDURE — 82570 ASSAY OF URINE CREATININE: CPT | Performed by: INTERNAL MEDICINE

## 2020-02-24 RX ORDER — ATORVASTATIN CALCIUM 80 MG/1
80 TABLET, FILM COATED ORAL NIGHTLY
Qty: 30 TABLET | Refills: 5 | Status: SHIPPED | OUTPATIENT
Start: 2020-02-24 | End: 2020-10-21 | Stop reason: SDUPTHER

## 2020-02-24 RX ORDER — CLOPIDOGREL BISULFATE 75 MG/1
75 TABLET ORAL DAILY
Qty: 30 TABLET | Refills: 5 | Status: SHIPPED | OUTPATIENT
Start: 2020-02-24 | End: 2020-10-21 | Stop reason: SDUPTHER

## 2020-02-24 RX ORDER — ASPIRIN 81 MG/1
81 TABLET, CHEWABLE ORAL DAILY
Qty: 30 TABLET | Refills: 5 | Status: SHIPPED | OUTPATIENT
Start: 2020-02-24 | End: 2021-07-19 | Stop reason: SDUPTHER

## 2020-02-24 RX ORDER — CARVEDILOL 6.25 MG/1
6.25 TABLET ORAL 2 TIMES DAILY WITH MEALS
Qty: 60 TABLET | Refills: 5 | Status: SHIPPED | OUTPATIENT
Start: 2020-02-24 | End: 2020-10-21 | Stop reason: SDUPTHER

## 2020-02-24 RX ORDER — AMLODIPINE BESYLATE 10 MG/1
10 TABLET ORAL DAILY
Qty: 30 TABLET | Refills: 2 | Status: SHIPPED | OUTPATIENT
Start: 2020-02-24 | End: 2020-10-21 | Stop reason: SDUPTHER

## 2020-02-24 NOTE — PROGRESS NOTES
Internal Medicine New Patient  Eyal Solano is a 69 y.o. male who presents today to establish care and with concerns as outlined below.    Chief Complaint  Chief Complaint   Patient presents with   • Establish Care     [facility]   • Hypertension        HPI  Mr. Solano comes in today to establish care.    He noticed rash all over his body starting shortly after his last appointment in April. He was started on HCTZ at that time for better control of his HTN. He felt that his medications were causing the rash so he stopped them all around June. The rash may have gotten lighter but has not resolved. The rash was initially somewhat itchy but is not currently itchy. It is not raised.    He had a stroke 12/2018. He was prescribed ASA and plavix, lipitor 80mg daily. He has not been taking these.    He has gout in his big toes, his knees, thumbs. He had flare a little over a week or two ago in his left knee and treated it with ibuprofen at home. He has never been on allopurinol.    He had been on amlodipine 10mg daily and was started on HCTZ 25 mg daily for HTN. He is taking none of these currently. Despite very elevated BP in office today he denies headache, vision changes, chest pain, palpitations, edema, SOA.       Review of Systems  Review of Systems   Constitutional: Negative.    Eyes: Negative.    Respiratory: Negative.    Cardiovascular: Negative.    Gastrointestinal: Negative.    Genitourinary: Negative for decreased urine volume, difficulty urinating and hematuria.   Skin: Positive for rash.   Neurological: Negative for weakness and headache.   Psychiatric/Behavioral: Negative for depressed mood.        Past Medical History  Past Medical History:   Diagnosis Date   • Conversion reaction    • Dizziness    • Gout    • Hyperlipidemia    • Hypertension    • Stroke (CMS/HCC)         Surgical History  Past Surgical History:   Procedure Laterality Date   • NASAL SEPTUM SURGERY      10 years ago   • NASAL SINUS SURGERY       "    Family History  Family History   Problem Relation Age of Onset   • Hyperlipidemia Mother    • Stroke Father    • Hypertension Brother    • Heart disease Maternal Grandmother    • Heart disease Maternal Grandfather    • Heart disease Paternal Grandmother    • Heart disease Paternal Grandfather         Social History  Social History     Socioeconomic History   • Marital status: Single     Spouse name: Not on file   • Number of children: Not on file   • Years of education: Not on file   • Highest education level: Not on file   Tobacco Use   • Smoking status: Never Smoker   • Smokeless tobacco: Current User     Types: Chew   Substance and Sexual Activity   • Alcohol use: Yes     Comment: 6 beers and 1/2 pint liquir daily, last drink 12/9/18   • Drug use: No   • Sexual activity: Defer        Current Medications  Current Outpatient Medications on File Prior to Visit   Medication Sig Dispense Refill   • amLODIPine (NORVASC) 10 MG tablet Take 1 tablet by mouth Daily. 30 tablet 2   • aspirin 81 MG chewable tablet Chew 1 tablet Daily. 30 tablet 1   • atorvastatin (LIPITOR) 80 MG tablet Take 1 tablet by mouth Every Night. 30 tablet 5   • clopidogrel (PLAVIX) 75 MG tablet Take 1 tablet by mouth Daily. 30 tablet 5   • hydrochlorothiazide (HYDRODIURIL) 25 MG tablet Take 1 tablet by mouth Daily. 30 tablet 2   • meclizine (ANTIVERT) 25 MG tablet Take 25 mg by mouth 4 (Four) Times a Day As Needed for dizziness.     • mirtazapine (REMERON) 15 MG tablet Take 1 tablet by mouth Every Night. 30 tablet 2     No current facility-administered medications on file prior to visit.        Allergies  No Known Allergies     Objective  Visit Vitals  BP (!) 220/122   Pulse 100   Temp 99.1 °F (37.3 °C)   Resp 16   Ht 177.8 cm (70\")   Wt 78.3 kg (172 lb 9.6 oz)   SpO2 97%   BMI 24.77 kg/m²        Physical Exam  Physical Exam   Constitutional: He is oriented to person, place, and time. He appears well-developed and well-nourished. No distress. "   HENT:   Head: Normocephalic and atraumatic.   Right Ear: External ear normal.   Left Ear: External ear normal.   Nose: Nose normal.   Mouth/Throat: Oropharynx is clear and moist. No oropharyngeal exudate.   Eyes: Pupils are equal, round, and reactive to light. Conjunctivae and EOM are normal. No scleral icterus.   Neck: Neck supple.   Cardiovascular: Normal rate, regular rhythm, normal heart sounds and intact distal pulses.   No murmur heard.  Pulmonary/Chest: Effort normal and breath sounds normal. No respiratory distress.   Abdominal: Soft. Bowel sounds are normal. He exhibits no distension. There is no tenderness.   Musculoskeletal: He exhibits no edema or deformity.   Lymphadenopathy:     He has no cervical adenopathy.   Neurological: He is alert and oriented to person, place, and time.   Skin: Skin is warm and dry. Rash (hyperpigmented macules over arms, legs, abdomen. Not raised.) noted. He is not diaphoretic.   Psychiatric: He has a normal mood and affect. His behavior is normal. Judgment and thought content normal.   Nursing note and vitals reviewed.       Results  Results for orders placed or performed in visit on 02/06/19   Comprehensive Metabolic Panel   Result Value Ref Range    Glucose 95 70 - 100 mg/dL    BUN 18 9 - 23 mg/dL    Creatinine 1.42 (H) 0.60 - 1.30 mg/dL    Sodium 138 132 - 146 mmol/L    Potassium 4.2 3.5 - 5.5 mmol/L    Chloride 106 99 - 109 mmol/L    CO2 27.0 20.0 - 31.0 mmol/L    Calcium 8.7 8.7 - 10.4 mg/dL    Total Protein 6.2 5.7 - 8.2 g/dL    Albumin 3.73 3.20 - 4.80 g/dL    ALT (SGPT) 24 7 - 40 U/L    AST (SGOT) 31 0 - 33 U/L    Alkaline Phosphatase 68 25 - 100 U/L    Total Bilirubin 0.3 0.3 - 1.2 mg/dL    eGFR  African Amer 60 (L) >60 mL/min/1.73    Globulin 2.5 gm/dL    A/G Ratio 1.5 1.5 - 2.5 g/dL    BUN/Creatinine Ratio 12.7 7.0 - 25.0    Anion Gap 5.0 3.0 - 11.0 mmol/L        Assessment and Plan  Eyal was seen today for establish care and hypertension.    Diagnoses and all  orders for this visit:    Essential hypertension-  - Uncontrolled, >200/120 in office after stopping all medications. Despite this he denies chest pain, headache, vision change, SOA and there is no overt sign of hypertensive emergency or end organ damage  - will resume norvasc 10mg daily and start carvedilol 6.25mg BID  - Avoiding HCTZ as this was started just prior to rash first appearing and it will likely worsen gout. Avoid ACE/ARB temporarily while determining stability of CKD. CMP ordered.  - Return in 4 weeks with BP log    Mixed hyperlipidemia  - Has been off of Lipitor 80mg daily, advised to resume due to hx of CVA  - Lipid panel ordered    Chronic gout due to renal impairment of multiple sites without tophus  - Flares involve knees, feet, hands. Treats with PRN NSAIDs.  - Will check uric acid and if elevated start low dose allopurinol with goal uric acid less than 6    Stage 3 chronic kidney disease (CMS/HCC)  - Baseline creatinine 1.4-1.5  - Will check CMP, urine protein creatinine ratio, vitamin D, CBC  - No urinary complaints today    Rash  - Uncertain etiology, atypical for drug eruption since he has not been using this drug recently and rash remisns. She will follow up shortly with dermatology.    Cerebrovascular accident (CVA), unspecified mechanism (CMS/HCC)  - Occurred 12/2018, no residual deficit  - Recommended he resume ASA, plavix, lipitor 80 and these were refilled today    Encounter for hepatitis C screening test for low risk patient  - Born 1950, HCV screening ordered.                                                                                                             Health Maintenance   Topic Date Due   • TDAP/TD VACCINES (1 - Tdap) 12/29/1963   • ZOSTER VACCINE (1 of 2) 12/29/2002   • Pneumococcal Vaccine Once at 65 Years Old  12/29/2017   • HEPATITIS C SCREENING  12/21/2018   • MEDICARE ANNUAL WELLNESS  12/21/2018   • COLONOSCOPY  12/21/2018   • INFLUENZA VACCINE  08/01/2019   •  LIPID PANEL  12/19/2019     Health Maintenance  - Colonoscopy: Start screening at age 50.  - Immunizations: Declined flu and pneumovax. Discuss shingrix at next visit.  - Depression screening: negative 2/2020    Return in about 4 weeks (around 3/23/2020) for Follow up HTN, Labs today.

## 2020-02-25 DIAGNOSIS — E55.9 VITAMIN D DEFICIENCY: ICD-10-CM

## 2020-02-25 DIAGNOSIS — M1A.39X0 CHRONIC GOUT DUE TO RENAL IMPAIRMENT OF MULTIPLE SITES WITHOUT TOPHUS: Primary | ICD-10-CM

## 2020-02-25 DIAGNOSIS — I12.9 HYPERTENSIVE NEPHROPATHY: ICD-10-CM

## 2020-02-25 DIAGNOSIS — N18.30 STAGE 3 CHRONIC KIDNEY DISEASE (HCC): ICD-10-CM

## 2020-02-25 DIAGNOSIS — R80.9 NEPHROTIC RANGE PROTEINURIA: ICD-10-CM

## 2020-02-25 LAB
25(OH)D3 SERPL-MCNC: 6.5 NG/ML (ref 30–100)
ALBUMIN SERPL-MCNC: 3.8 G/DL (ref 3.5–5.2)
ALBUMIN/GLOB SERPL: 1.3 G/DL
ALP SERPL-CCNC: 58 U/L (ref 39–117)
ALT SERPL W P-5'-P-CCNC: 15 U/L (ref 1–41)
ANION GAP SERPL CALCULATED.3IONS-SCNC: 14.7 MMOL/L (ref 5–15)
ARTICHOKE IGE QN: 114 MG/DL (ref 0–100)
AST SERPL-CCNC: 24 U/L (ref 1–40)
BILIRUB SERPL-MCNC: 0.4 MG/DL (ref 0.2–1.2)
BUN BLD-MCNC: 38 MG/DL (ref 8–23)
BUN/CREAT SERPL: 16 (ref 7–25)
CALCIUM SPEC-SCNC: 9.1 MG/DL (ref 8.6–10.5)
CHLORIDE SERPL-SCNC: 99 MMOL/L (ref 98–107)
CHOLEST SERPL-MCNC: 313 MG/DL (ref 0–200)
CO2 SERPL-SCNC: 21.3 MMOL/L (ref 22–29)
CREAT BLD-MCNC: 2.38 MG/DL (ref 0.76–1.27)
CREAT UR-MCNC: 275.2 MG/DL
DEPRECATED RDW RBC AUTO: 43.3 FL (ref 37–54)
ERYTHROCYTE [DISTWIDTH] IN BLOOD BY AUTOMATED COUNT: 14.3 % (ref 12.3–15.4)
GFR SERPL CREATININE-BSD FRML MDRD: 33 ML/MIN/1.73
GLOBULIN UR ELPH-MCNC: 3 GM/DL
GLUCOSE BLD-MCNC: 103 MG/DL (ref 65–99)
HCT VFR BLD AUTO: 38.1 % (ref 37.5–51)
HCV AB SER DONR QL: NORMAL
HDLC SERPL-MCNC: 37 MG/DL (ref 40–60)
HGB BLD-MCNC: 12.9 G/DL (ref 13–17.7)
LDLC SERPL CALC-MCNC: ABNORMAL MG/DL
LDLC/HDLC SERPL: ABNORMAL {RATIO}
MCH RBC QN AUTO: 28.2 PG (ref 26.6–33)
MCHC RBC AUTO-ENTMCNC: 33.9 G/DL (ref 31.5–35.7)
MCV RBC AUTO: 83.2 FL (ref 79–97)
PLATELET # BLD AUTO: 216 10*3/MM3 (ref 140–450)
PMV BLD AUTO: 11.7 FL (ref 6–12)
POTASSIUM BLD-SCNC: 4.3 MMOL/L (ref 3.5–5.2)
PROT SERPL-MCNC: 6.8 G/DL (ref 6–8.5)
PROT UR-MCNC: 347 MG/DL
PROT/CREAT UR: 1260.9 MG/G CREA (ref 0–200)
RBC # BLD AUTO: 4.58 10*6/MM3 (ref 4.14–5.8)
SODIUM BLD-SCNC: 135 MMOL/L (ref 136–145)
TRIGL SERPL-MCNC: 764 MG/DL (ref 0–150)
URATE SERPL-MCNC: 11.4 MG/DL (ref 3.4–7)
VLDLC SERPL-MCNC: ABNORMAL MG/DL
WBC NRBC COR # BLD: 6 10*3/MM3 (ref 3.4–10.8)

## 2020-02-25 RX ORDER — ERGOCALCIFEROL 1.25 MG/1
50000 CAPSULE ORAL WEEKLY
Qty: 12 CAPSULE | Refills: 0 | Status: SHIPPED | OUTPATIENT
Start: 2020-02-25 | End: 2020-05-18 | Stop reason: SDUPTHER

## 2020-02-25 RX ORDER — ALLOPURINOL 100 MG/1
50 TABLET ORAL DAILY
Qty: 15 TABLET | Refills: 2 | Status: SHIPPED | OUTPATIENT
Start: 2020-02-25 | End: 2020-03-24 | Stop reason: SDUPTHER

## 2020-03-23 ENCOUNTER — OFFICE VISIT (OUTPATIENT)
Dept: INTERNAL MEDICINE | Facility: CLINIC | Age: 70
End: 2020-03-23

## 2020-03-23 ENCOUNTER — LAB (OUTPATIENT)
Dept: LAB | Facility: HOSPITAL | Age: 70
End: 2020-03-23

## 2020-03-23 VITALS
BODY MASS INDEX: 24.88 KG/M2 | SYSTOLIC BLOOD PRESSURE: 152 MMHG | RESPIRATION RATE: 16 BRPM | TEMPERATURE: 97.9 F | WEIGHT: 173.8 LBS | DIASTOLIC BLOOD PRESSURE: 80 MMHG | HEART RATE: 75 BPM | OXYGEN SATURATION: 99 % | HEIGHT: 70 IN

## 2020-03-23 DIAGNOSIS — I10 ESSENTIAL HYPERTENSION: Primary | ICD-10-CM

## 2020-03-23 DIAGNOSIS — N18.30 STAGE 3 CHRONIC KIDNEY DISEASE (HCC): ICD-10-CM

## 2020-03-23 DIAGNOSIS — R21 RASH: ICD-10-CM

## 2020-03-23 DIAGNOSIS — M1A.39X0 CHRONIC GOUT DUE TO RENAL IMPAIRMENT OF MULTIPLE SITES WITHOUT TOPHUS: ICD-10-CM

## 2020-03-23 DIAGNOSIS — I10 ESSENTIAL HYPERTENSION: ICD-10-CM

## 2020-03-23 DIAGNOSIS — R80.9 NEPHROTIC RANGE PROTEINURIA: ICD-10-CM

## 2020-03-23 PROBLEM — Z86.73 HISTORY OF STROKE: Status: ACTIVE | Noted: 2018-12-18

## 2020-03-23 PROBLEM — M10.9 ACUTE GOUT OF RIGHT KNEE: Status: RESOLVED | Noted: 2018-12-18 | Resolved: 2020-03-23

## 2020-03-23 LAB
ANION GAP SERPL CALCULATED.3IONS-SCNC: 13.3 MMOL/L (ref 5–15)
BUN BLD-MCNC: 35 MG/DL (ref 8–23)
BUN/CREAT SERPL: 22.4 (ref 7–25)
CALCIUM SPEC-SCNC: 9.2 MG/DL (ref 8.6–10.5)
CHLORIDE SERPL-SCNC: 98 MMOL/L (ref 98–107)
CO2 SERPL-SCNC: 24.7 MMOL/L (ref 22–29)
CREAT BLD-MCNC: 1.56 MG/DL (ref 0.76–1.27)
GFR SERPL CREATININE-BSD FRML MDRD: 54 ML/MIN/1.73
GLUCOSE BLD-MCNC: 87 MG/DL (ref 65–99)
POTASSIUM BLD-SCNC: 4.5 MMOL/L (ref 3.5–5.2)
SODIUM BLD-SCNC: 136 MMOL/L (ref 136–145)
URATE SERPL-MCNC: 7.2 MG/DL (ref 3.4–7)

## 2020-03-23 PROCEDURE — 99214 OFFICE O/P EST MOD 30 MIN: CPT | Performed by: INTERNAL MEDICINE

## 2020-03-23 PROCEDURE — 84550 ASSAY OF BLOOD/URIC ACID: CPT | Performed by: INTERNAL MEDICINE

## 2020-03-23 PROCEDURE — 80048 BASIC METABOLIC PNL TOTAL CA: CPT | Performed by: INTERNAL MEDICINE

## 2020-03-23 RX ORDER — LISINOPRIL 5 MG/1
5 TABLET ORAL DAILY
Qty: 30 TABLET | Refills: 2 | Status: SHIPPED | OUTPATIENT
Start: 2020-03-23 | End: 2020-10-21 | Stop reason: SDUPTHER

## 2020-03-23 NOTE — PROGRESS NOTES
Internal Medicine Follow Up    Chief Complaint  Eyal Solano is a 69 y.o. male who presents today for follow up of chronic medical conditions outlined below.    Chief Complaint   Patient presents with   • Hypertension     1 month follow up        HPI  Mr. Solano is here for scheduled follow up of HTN. He is doing well overall. He notes compliance with all medications started after his last appointment with no issues. He has been monitoring his blood pressure in the morning and brings a log. He notes that it may run higher in the early morning before taking his medications but will come down a few hours after he takes his medication.     He has seen the dermatologist and had a skin biopsy. He was told that the results were not consistent with cancer but was not given a diagnosis or any treatment which has irritated him. The rash is stable, no itching or pain. He worries that it may spread to his face. He still thinks that it is due to a medication. He has follow up with dermatology on 4/6.    He has not yet seen his nephrologist. His appointment would have been today but he has rescheduled for 4/10.    He denies gout flare.       Review of Systems  Review of Systems   Constitutional: Negative.    Respiratory: Negative.    Cardiovascular: Negative.    Genitourinary: Negative.    Musculoskeletal: Negative for arthralgias.   Skin: Positive for rash.        Current Medications  Current Outpatient Medications on File Prior to Visit   Medication Sig Dispense Refill   • allopurinol (ZYLOPRIM) 100 MG tablet Take 0.5 tablets by mouth Daily. 15 tablet 2   • amLODIPine (NORVASC) 10 MG tablet Take 1 tablet by mouth Daily. 30 tablet 2   • aspirin 81 MG chewable tablet Chew 1 tablet Daily. 30 tablet 5   • atorvastatin (LIPITOR) 80 MG tablet Take 1 tablet by mouth Every Night. 30 tablet 5   • carvedilol (COREG) 6.25 MG tablet Take 1 tablet by mouth 2 (Two) Times a Day With Meals. 60 tablet 5   • clopidogrel (PLAVIX) 75 MG tablet  "Take 1 tablet by mouth Daily. 30 tablet 5   • hydrochlorothiazide (HYDRODIURIL) 25 MG tablet Take 1 tablet by mouth Daily. 30 tablet 2   • vitamin D (ERGOCALCIFEROL) 1.25 MG (60728 UT) capsule capsule Take 1 capsule by mouth 1 (One) Time Per Week. 12 capsule 0     No current facility-administered medications on file prior to visit.        Allergies  No Known Allergies    Objective  Visit Vitals  /80   Pulse 75   Temp 97.9 °F (36.6 °C)   Resp 16   Ht 177.8 cm (70\")   Wt 78.8 kg (173 lb 12.8 oz)   SpO2 99%   BMI 24.94 kg/m²        Physical Exam  Physical Exam   Constitutional: He is oriented to person, place, and time. He appears well-developed and well-nourished. No distress.   HENT:   Head: Normocephalic and atraumatic.   Eyes: Conjunctivae are normal.   Cardiovascular: Normal rate, regular rhythm and normal heart sounds.   Pulmonary/Chest: Effort normal and breath sounds normal. No respiratory distress.   Musculoskeletal: He exhibits no edema.   Neurological: He is alert and oriented to person, place, and time.   Skin: Skin is warm and dry. Rash (dark macules on extremities.) noted.   Psychiatric: He has a normal mood and affect.   Nursing note and vitals reviewed.      Results  Results for orders placed or performed in visit on 02/24/20   Comprehensive Metabolic Panel   Result Value Ref Range    Glucose 103 (H) 65 - 99 mg/dL    BUN 38 (H) 8 - 23 mg/dL    Creatinine 2.38 (H) 0.76 - 1.27 mg/dL    Sodium 135 (L) 136 - 145 mmol/L    Potassium 4.3 3.5 - 5.2 mmol/L    Chloride 99 98 - 107 mmol/L    CO2 21.3 (L) 22.0 - 29.0 mmol/L    Calcium 9.1 8.6 - 10.5 mg/dL    Total Protein 6.8 6.0 - 8.5 g/dL    Albumin 3.80 3.50 - 5.20 g/dL    ALT (SGPT) 15 1 - 41 U/L    AST (SGOT) 24 1 - 40 U/L    Alkaline Phosphatase 58 39 - 117 U/L    Total Bilirubin 0.4 0.2 - 1.2 mg/dL    eGFR  African Amer 33 (L) >60 mL/min/1.73    Globulin 3.0 gm/dL    A/G Ratio 1.3 g/dL    BUN/Creatinine Ratio 16.0 7.0 - 25.0    Anion Gap 14.7 5.0 - " 15.0 mmol/L   CBC (No Diff)   Result Value Ref Range    WBC 6.00 3.40 - 10.80 10*3/mm3    RBC 4.58 4.14 - 5.80 10*6/mm3    Hemoglobin 12.9 (L) 13.0 - 17.7 g/dL    Hematocrit 38.1 37.5 - 51.0 %    MCV 83.2 79.0 - 97.0 fL    MCH 28.2 26.6 - 33.0 pg    MCHC 33.9 31.5 - 35.7 g/dL    RDW 14.3 12.3 - 15.4 %    RDW-SD 43.3 37.0 - 54.0 fl    MPV 11.7 6.0 - 12.0 fL    Platelets 216 140 - 450 10*3/mm3   Lipid Panel   Result Value Ref Range    Total Cholesterol 313 (H) 0 - 200 mg/dL    Triglycerides 764 (H) 0 - 150 mg/dL    HDL Cholesterol 37 (L) 40 - 60 mg/dL    LDL Cholesterol       VLDL Cholesterol      LDL/HDL Ratio     Uric acid   Result Value Ref Range    Uric Acid 11.4 (H) 3.4 - 7.0 mg/dL   Vitamin D 25 Hydroxy   Result Value Ref Range    25 Hydroxy, Vitamin D 6.5 (L) 30.0 - 100.0 ng/ml   Protein / Creatinine Ratio, Urine - Urine, Clean Catch   Result Value Ref Range    Protein/Creatinine Ratio, Urine 1,260.9 (H) 0.0 - 200.0 mg/G Crea    Creatinine, Urine 275.2 mg/dL    Total Protein, Urine 347.0 mg/dL   Hepatitis C Antibody   Result Value Ref Range    Hepatitis C Ab Non-Reactive Non-Reactive   LDL Cholesterol, Direct   Result Value Ref Range    LDL Cholesterol  114 (H) 0 - 100 mg/dL        Assessment and Plan  Eyal was seen today for hypertension.    Diagnoses and all orders for this visit:    Essential hypertension  - BP much improved on coreg 6.25mg BID, norvasc 10mg daily, HCTZ 25mg daily. Will add lisinopril 5mg daily to get him closer to goal of 130/80 and to hopefully help reduce proteinuria.  - BMP today and advised him that it is important that he also have BMP repeated in 7-14 days to monitor renal function and potassium.  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 Treats with PRN NSAIDs.  - Uric acid 11.4  - Started allopurinol 50mg daily, will repeat uric acid level today and adjust accordingly with goal of less than 6    Stage 3 chronic kidney disease (CMS/HCC)  - GFR decreased to 33 from baseline last year of 60, likely due to poorly controlled HTN  - Associated with proteinuria as discussed above  - Working on BP as above  - Referred to nephrology with appt 4/10  - BMP today    Rash  - Has had skin biopsy with benign results per patient. Will request records.    Health Maintenance  - Colonoscopy: Start screening at age 50.  - Immunizations: Declined flu and pneumovax. Discuss shingrix at next visit.  - Depression screening: negative 2/2020     Return in about 2 months (around 5/23/2020) for Follow up, Labs today.

## 2020-03-24 DIAGNOSIS — M1A.39X0 CHRONIC GOUT DUE TO RENAL IMPAIRMENT OF MULTIPLE SITES WITHOUT TOPHUS: ICD-10-CM

## 2020-03-24 DIAGNOSIS — I10 ESSENTIAL HYPERTENSION: Primary | ICD-10-CM

## 2020-03-24 RX ORDER — ALLOPURINOL 100 MG/1
100 TABLET ORAL DAILY
Qty: 90 TABLET | Refills: 1 | Status: SHIPPED | OUTPATIENT
Start: 2020-03-24 | End: 2020-05-19 | Stop reason: SDUPTHER

## 2020-05-18 ENCOUNTER — OFFICE VISIT (OUTPATIENT)
Dept: INTERNAL MEDICINE | Facility: CLINIC | Age: 70
End: 2020-05-18

## 2020-05-18 ENCOUNTER — LAB (OUTPATIENT)
Dept: LAB | Facility: HOSPITAL | Age: 70
End: 2020-05-18

## 2020-05-18 VITALS
HEIGHT: 70 IN | BODY MASS INDEX: 24.79 KG/M2 | WEIGHT: 173.2 LBS | SYSTOLIC BLOOD PRESSURE: 150 MMHG | OXYGEN SATURATION: 98 % | DIASTOLIC BLOOD PRESSURE: 82 MMHG | HEART RATE: 76 BPM

## 2020-05-18 DIAGNOSIS — R53.83 FATIGUE, UNSPECIFIED TYPE: ICD-10-CM

## 2020-05-18 DIAGNOSIS — E55.9 VITAMIN D DEFICIENCY: ICD-10-CM

## 2020-05-18 DIAGNOSIS — N18.30 STAGE 3 CHRONIC KIDNEY DISEASE (HCC): ICD-10-CM

## 2020-05-18 DIAGNOSIS — M1A.39X0 CHRONIC GOUT DUE TO RENAL IMPAIRMENT OF MULTIPLE SITES WITHOUT TOPHUS: Primary | ICD-10-CM

## 2020-05-18 DIAGNOSIS — I10 ESSENTIAL HYPERTENSION: ICD-10-CM

## 2020-05-18 DIAGNOSIS — R80.9 NEPHROTIC RANGE PROTEINURIA: ICD-10-CM

## 2020-05-18 DIAGNOSIS — M1A.39X0 CHRONIC GOUT DUE TO RENAL IMPAIRMENT OF MULTIPLE SITES WITHOUT TOPHUS: ICD-10-CM

## 2020-05-18 PROCEDURE — 85027 COMPLETE CBC AUTOMATED: CPT | Performed by: INTERNAL MEDICINE

## 2020-05-18 PROCEDURE — 84550 ASSAY OF BLOOD/URIC ACID: CPT | Performed by: INTERNAL MEDICINE

## 2020-05-18 PROCEDURE — 80048 BASIC METABOLIC PNL TOTAL CA: CPT | Performed by: INTERNAL MEDICINE

## 2020-05-18 PROCEDURE — 99214 OFFICE O/P EST MOD 30 MIN: CPT | Performed by: INTERNAL MEDICINE

## 2020-05-18 PROCEDURE — 82306 VITAMIN D 25 HYDROXY: CPT | Performed by: INTERNAL MEDICINE

## 2020-05-18 PROCEDURE — 84443 ASSAY THYROID STIM HORMONE: CPT | Performed by: INTERNAL MEDICINE

## 2020-05-18 RX ORDER — ERGOCALCIFEROL 1.25 MG/1
50000 CAPSULE ORAL WEEKLY
Qty: 12 CAPSULE | Refills: 2 | Status: SHIPPED | OUTPATIENT
Start: 2020-05-18 | End: 2021-04-26

## 2020-05-18 NOTE — PROGRESS NOTES
Internal Medicine Follow Up    Chief Complaint  Eyal Solano is a 69 y.o. male who presents today for follow up of chronic medical conditions outlined below.    Chief Complaint   Patient presents with   • Hypertension        HPI  Mr. Solano comes in today for follow up. He has felt more fatigued intermittently. No fevers or weight changes. No chest pain. He has otherwise felt fine. Has not been monitoring BP at home. Has been compliant with prescribed medications without noted side effects. Did not see a nephrologist due to COVID19. No urinary symptoms, increase in edema, or SOA.       Review of Systems  Review of Systems   Constitutional: Positive for fatigue. Negative for diaphoresis, fever, unexpected weight gain and unexpected weight loss.   Respiratory: Negative.    Cardiovascular: Negative.    Genitourinary: Negative.    Skin: Positive for rash (chronic).   Allergic/Immunologic: Positive for environmental allergies.        Current Medications  Current Outpatient Medications on File Prior to Visit   Medication Sig Dispense Refill   • allopurinol (ZYLOPRIM) 100 MG tablet Take 1 tablet by mouth Daily. 90 tablet 1   • amLODIPine (NORVASC) 10 MG tablet Take 1 tablet by mouth Daily. 30 tablet 2   • aspirin 81 MG chewable tablet Chew 1 tablet Daily. 30 tablet 5   • atorvastatin (LIPITOR) 80 MG tablet Take 1 tablet by mouth Every Night. 30 tablet 5   • carvedilol (COREG) 6.25 MG tablet Take 1 tablet by mouth 2 (Two) Times a Day With Meals. 60 tablet 5   • clopidogrel (PLAVIX) 75 MG tablet Take 1 tablet by mouth Daily. 30 tablet 5   • hydrochlorothiazide (HYDRODIURIL) 25 MG tablet Take 1 tablet by mouth Daily. 30 tablet 2   • lisinopril (PRINIVIL,ZESTRIL) 5 MG tablet Take 1 tablet by mouth Daily. 30 tablet 2   • [DISCONTINUED] vitamin D (ERGOCALCIFEROL) 1.25 MG (39001 UT) capsule capsule Take 1 capsule by mouth 1 (One) Time Per Week. 12 capsule 0     No current facility-administered medications on file prior to visit.   "      Allergies  No Known Allergies    Objective  Visit Vitals  /82   Pulse 76   Ht 177.8 cm (70\")   Wt 78.6 kg (173 lb 3.2 oz)   SpO2 98%   BMI 24.85 kg/m²        Physical Exam  Physical Exam   Constitutional: He is oriented to person, place, and time. He appears well-developed and well-nourished. No distress.   HENT:   Head: Normocephalic and atraumatic.   Right Ear: External ear normal.   Left Ear: External ear normal.   Nose: Nose normal.   Eyes: Conjunctivae are normal.   Cardiovascular: Normal rate, regular rhythm and normal heart sounds.   Pulmonary/Chest: Effort normal and breath sounds normal. No respiratory distress.   Musculoskeletal: He exhibits no edema.   Neurological: He is alert and oriented to person, place, and time.   Skin: Skin is warm and dry. Rash (hyperpigmented rash visible on extremities) noted.   Psychiatric: He has a normal mood and affect. His behavior is normal.   Nursing note and vitals reviewed.      Results  Results for orders placed or performed in visit on 03/23/20   Uric acid   Result Value Ref Range    Uric Acid 7.2 (H) 3.4 - 7.0 mg/dL   Basic metabolic panel   Result Value Ref Range    Glucose 87 65 - 99 mg/dL    BUN 35 (H) 8 - 23 mg/dL    Creatinine 1.56 (H) 0.76 - 1.27 mg/dL    Sodium 136 136 - 145 mmol/L    Potassium 4.5 3.5 - 5.2 mmol/L    Chloride 98 98 - 107 mmol/L    CO2 24.7 22.0 - 29.0 mmol/L    Calcium 9.2 8.6 - 10.5 mg/dL    eGFR  African Amer 54 (L) >60 mL/min/1.73    BUN/Creatinine Ratio 22.4 7.0 - 25.0    Anion Gap 13.3 5.0 - 15.0 mmol/L        Assessment and Plan  Eyal was seen today for hypertension.    Diagnoses and all orders for this visit:    Chronic gout due to renal impairment of multiple sites without tophus  - Flares involve knees, feet, hands. No current flare.  - On allopurinol which was increased to 100mg daily due to uric acid still above goal of 6, will repeat uric acid level today.    Vitamin D deficiency  - Level previously 6, has completed " 12 weeks of ergocalciferol. Will repeat level today to guide therapy.    Essential hypertension  - BP stable at 150/80 after addition of lisinopril 5mg daily to norvasc 10mg daily, coreg 6.25mg BID, and HCTZ 25mg daily.  - He has not been monitoring BP at home and did not get BMP after addition of ACEI as instructed. Deferred adjustment of medications today and requested he keep BP log and bring it to next appt.  - Repeat BMP today    Stage 3 chronic kidney disease with Nephrotic range proteinuria  - Creatinine improved to 1.56 with gfr 54 after control of BP. Protein creatinine ratio of 1260.9.  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  - Will obtain CBC to evaluate anemia, BMP for renal function, TSH to screen thyroid. Could be medication side effect as well but not overtly hypotensive or bradycardic so will not discontinue any meds at this time.    Health Maintenance  - Colonoscopy: Start screening at age 50.  - Immunizations: Declined pneumovax. Discuss shingrix at next visit.  - Depression screening: negative 2/2020      Return in about 2 months (around 7/18/2020) for Follow up HTN, Labs today.

## 2020-05-19 DIAGNOSIS — M1A.39X0 CHRONIC GOUT DUE TO RENAL IMPAIRMENT OF MULTIPLE SITES WITHOUT TOPHUS: ICD-10-CM

## 2020-05-19 LAB
25(OH)D3 SERPL-MCNC: 32.1 NG/ML (ref 30–100)
ANION GAP SERPL CALCULATED.3IONS-SCNC: 11 MMOL/L (ref 5–15)
BUN BLD-MCNC: 29 MG/DL (ref 8–23)
BUN/CREAT SERPL: 15.8 (ref 7–25)
CALCIUM SPEC-SCNC: 8.9 MG/DL (ref 8.6–10.5)
CHLORIDE SERPL-SCNC: 102 MMOL/L (ref 98–107)
CO2 SERPL-SCNC: 25 MMOL/L (ref 22–29)
CREAT BLD-MCNC: 1.84 MG/DL (ref 0.76–1.27)
DEPRECATED RDW RBC AUTO: 46 FL (ref 37–54)
ERYTHROCYTE [DISTWIDTH] IN BLOOD BY AUTOMATED COUNT: 14.9 % (ref 12.3–15.4)
GFR SERPL CREATININE-BSD FRML MDRD: 45 ML/MIN/1.73
GLUCOSE BLD-MCNC: 98 MG/DL (ref 65–99)
HCT VFR BLD AUTO: 34.6 % (ref 37.5–51)
HGB BLD-MCNC: 11.4 G/DL (ref 13–17.7)
MCH RBC QN AUTO: 28.2 PG (ref 26.6–33)
MCHC RBC AUTO-ENTMCNC: 32.9 G/DL (ref 31.5–35.7)
MCV RBC AUTO: 85.6 FL (ref 79–97)
PLATELET # BLD AUTO: 194 10*3/MM3 (ref 140–450)
PMV BLD AUTO: 10.9 FL (ref 6–12)
POTASSIUM BLD-SCNC: 4.2 MMOL/L (ref 3.5–5.2)
RBC # BLD AUTO: 4.04 10*6/MM3 (ref 4.14–5.8)
SODIUM BLD-SCNC: 138 MMOL/L (ref 136–145)
TSH SERPL DL<=0.05 MIU/L-ACNC: 2.09 UIU/ML (ref 0.27–4.2)
URATE SERPL-MCNC: 7.4 MG/DL (ref 3.4–7)
WBC NRBC COR # BLD: 4.34 10*3/MM3 (ref 3.4–10.8)

## 2020-05-19 RX ORDER — ALLOPURINOL 100 MG/1
150 TABLET ORAL DAILY
Qty: 135 TABLET | Refills: 1 | Status: SHIPPED | OUTPATIENT
Start: 2020-05-19 | End: 2020-10-21 | Stop reason: SDUPTHER

## 2020-07-20 ENCOUNTER — OFFICE VISIT (OUTPATIENT)
Dept: INTERNAL MEDICINE | Facility: CLINIC | Age: 70
End: 2020-07-20

## 2020-07-20 VITALS
WEIGHT: 173.4 LBS | DIASTOLIC BLOOD PRESSURE: 82 MMHG | SYSTOLIC BLOOD PRESSURE: 152 MMHG | BODY MASS INDEX: 24.82 KG/M2 | TEMPERATURE: 97.7 F | HEART RATE: 71 BPM | RESPIRATION RATE: 16 BRPM | HEIGHT: 70 IN | OXYGEN SATURATION: 99 %

## 2020-07-20 DIAGNOSIS — I10 ESSENTIAL HYPERTENSION: Chronic | ICD-10-CM

## 2020-07-20 DIAGNOSIS — N18.30 STAGE 3 CHRONIC KIDNEY DISEASE (HCC): ICD-10-CM

## 2020-07-20 DIAGNOSIS — R42 DIZZINESS: Primary | ICD-10-CM

## 2020-07-20 DIAGNOSIS — M1A.39X0 CHRONIC GOUT DUE TO RENAL IMPAIRMENT OF MULTIPLE SITES WITHOUT TOPHUS: ICD-10-CM

## 2020-07-20 DIAGNOSIS — R80.9 NEPHROTIC RANGE PROTEINURIA: ICD-10-CM

## 2020-07-20 PROCEDURE — 99214 OFFICE O/P EST MOD 30 MIN: CPT | Performed by: INTERNAL MEDICINE

## 2020-07-20 NOTE — PROGRESS NOTES
Internal Medicine Follow Up    Chief Complaint  Eyal Solano is a 69 y.o. male who presents today for follow up of chronic medical conditions outlined below.    Chief Complaint   Patient presents with   • Follow-up     dizziness when standing or walking        HPI  Mr. Solano comes in today for follow up. He has been experiencing dizziness when standing. He reports that he will take his medications around 7-7:30 daily and then go to work. Becomes dizzy around 9-10am each day. Some days he has to go home. Notes that the days when his BP is high he feels fine. Brings log showing /73 with dizziness but 171/100 he felt well. He has not had chest pain, SOA, numbness or weakness. He has nephrology appointment today at 3:30. He has not had any urinary symptoms, no edema. He has not had any gout flares, on allopurinol 150mg daily.       Review of Systems  Review of Systems   Constitutional: Negative for fatigue and fever.   Respiratory: Negative.    Cardiovascular: Negative.    Genitourinary: Negative for decreased urine volume, difficulty urinating, dysuria, frequency, hematuria and urgency.   Musculoskeletal: Negative for arthralgias and joint swelling.   Neurological: Positive for dizziness. Negative for syncope, weakness and numbness.        Current Medications  Current Outpatient Medications on File Prior to Visit   Medication Sig Dispense Refill   • allopurinol (ZYLOPRIM) 100 MG tablet Take 1.5 tablets by mouth Daily. 135 tablet 1   • amLODIPine (NORVASC) 10 MG tablet Take 1 tablet by mouth Daily. 30 tablet 2   • aspirin 81 MG chewable tablet Chew 1 tablet Daily. 30 tablet 5   • atorvastatin (LIPITOR) 80 MG tablet Take 1 tablet by mouth Every Night. 30 tablet 5   • carvedilol (COREG) 6.25 MG tablet Take 1 tablet by mouth 2 (Two) Times a Day With Meals. 60 tablet 5   • clopidogrel (PLAVIX) 75 MG tablet Take 1 tablet by mouth Daily. 30 tablet 5   • hydrochlorothiazide (HYDRODIURIL) 25 MG tablet Take 1 tablet by  "mouth Daily. 30 tablet 2   • lisinopril (PRINIVIL,ZESTRIL) 5 MG tablet Take 1 tablet by mouth Daily. 30 tablet 2   • vitamin D (ERGOCALCIFEROL) 1.25 MG (91150 UT) capsule capsule Take 1 capsule by mouth 1 (One) Time Per Week. 12 capsule 2     No current facility-administered medications on file prior to visit.        Allergies  No Known Allergies    Objective  Visit Vitals  /82   Pulse 71   Temp 97.7 °F (36.5 °C)   Resp 16   Ht 177.8 cm (70\")   Wt 78.7 kg (173 lb 6.4 oz)   SpO2 99%   BMI 24.88 kg/m²        Physical Exam  Physical Exam   Constitutional: He is oriented to person, place, and time. He appears well-developed and well-nourished. No distress.   HENT:   Head: Normocephalic and atraumatic.   Eyes: Conjunctivae are normal.   Cardiovascular: Normal rate, regular rhythm and normal heart sounds.   Pulmonary/Chest: Effort normal and breath sounds normal. No respiratory distress.   Musculoskeletal: He exhibits no edema.   Neurological: He is alert and oriented to person, place, and time.   Skin: Skin is warm and dry.   Psychiatric: He has a normal mood and affect.   Nursing note and vitals reviewed.      Results  Results for orders placed or performed in visit on 05/18/20   Basic metabolic panel   Result Value Ref Range    Glucose 98 65 - 99 mg/dL    BUN 29 (H) 8 - 23 mg/dL    Creatinine 1.84 (H) 0.76 - 1.27 mg/dL    Sodium 138 136 - 145 mmol/L    Potassium 4.2 3.5 - 5.2 mmol/L    Chloride 102 98 - 107 mmol/L    CO2 25.0 22.0 - 29.0 mmol/L    Calcium 8.9 8.6 - 10.5 mg/dL    eGFR  African Amer 45 (L) >60 mL/min/1.73    BUN/Creatinine Ratio 15.8 7.0 - 25.0    Anion Gap 11.0 5.0 - 15.0 mmol/L   Vitamin D 25 Hydroxy   Result Value Ref Range    25 Hydroxy, Vitamin D 32.1 30.0 - 100.0 ng/ml   Uric acid   Result Value Ref Range    Uric Acid 7.4 (H) 3.4 - 7.0 mg/dL   CBC (No Diff)   Result Value Ref Range    WBC 4.34 3.40 - 10.80 10*3/mm3    RBC 4.04 (L) 4.14 - 5.80 10*6/mm3    Hemoglobin 11.4 (L) 13.0 - 17.7 g/dL "    Hematocrit 34.6 (L) 37.5 - 51.0 %    MCV 85.6 79.0 - 97.0 fL    MCH 28.2 26.6 - 33.0 pg    MCHC 32.9 31.5 - 35.7 g/dL    RDW 14.9 12.3 - 15.4 %    RDW-SD 46.0 37.0 - 54.0 fl    MPV 10.9 6.0 - 12.0 fL    Platelets 194 140 - 450 10*3/mm3   TSH Rfx On Abnormal To Free T4   Result Value Ref Range    TSH 2.090 0.270 - 4.200 uIU/mL        Assessment and Plan  Eyal was seen today for follow-up.    Diagnoses and all orders for this visit:    Dizziness  - Suspect side effect of several antihypertensives all of which he is taking in the AM. Also concerned that he may have progressive carotid stenosis. Had approx 30% stenosis of distal R common carotid and 15% stenosis of proximal R ICA on MRA neck 2018. Will reevaluate degree of stenosis with carotid duplex.  - Advised he take lisinopril, norvasc before bed to limit cumulative medication effect of taking all antihypertensives in the AM.    Essential hypertension  - BP ranging from 120//100 on home log of 5 readings brought to office today. Notes dizziness with lower readings.  - On lisinopril 5mg daily, norvasc 10mg daily, coreg 6.25mg BID, and HCTZ 25mg daily. Advised he take lisinopril, norvasc before bed to limit cumulative medication effect of taking all antihypertensives in the AM.    Stage 3 chronic kidney disease (CMS/[facility]) with Nephrotic range proteinuria  - Creatinine 1.5-1.8, gfr 45-55. Protein creatinine ratio of 1260.9.   - Most likely due to hypertensive nephrosclerosis, treating HTN as above including ACEI which will help proteinuria  - Will establish with nephrology today, defer repeat labs so that he can make his appointment.    Chronic gout due to renal impairment of multiple sites without tophus  - Flares involve knees, feet, hands. No current flare.  - On allopurinol 150mg daily due to uric acid still above goal of 6, will repeat uric acid level with next visit, deferred today so that he could make it to his nephrology appointment.    [facility]  Maintenance  - Colonoscopy: Declined  - Immunizations: Declined pneumovax. Discuss shingrix at next visit.  - Depression screening: negative 2/2020       Return in about 3 months (around 10/20/2020) for Follow up.

## 2020-08-20 ENCOUNTER — HOSPITAL ENCOUNTER (OUTPATIENT)
Dept: CARDIOLOGY | Facility: HOSPITAL | Age: 70
Discharge: HOME | End: 2020-08-20
Admitting: INTERNAL MEDICINE

## 2020-08-20 VITALS
BODY MASS INDEX: 24.77 KG/M2 | SYSTOLIC BLOOD PRESSURE: 197 MMHG | HEIGHT: 70 IN | DIASTOLIC BLOOD PRESSURE: 116 MMHG | WEIGHT: 173 LBS

## 2020-08-20 DIAGNOSIS — R42 DIZZINESS: ICD-10-CM

## 2020-08-20 LAB
BH CV ECHO MEAS - BSA(HAYCOCK): 2 M^2
BH CV ECHO MEAS - BSA: 2 M^2
BH CV ECHO MEAS - BZI_BMI: 24.8 KILOGRAMS/M^2
BH CV ECHO MEAS - BZI_METRIC_HEIGHT: 177.8 CM
BH CV ECHO MEAS - BZI_METRIC_WEIGHT: 78.5 KG
BH CV XLRA MEAS LEFT CCA RATIO VEL: 65.6 CM/SEC
BH CV XLRA MEAS LEFT DIST CCA EDV: 15.4 CM/SEC
BH CV XLRA MEAS LEFT DIST CCA PSV: 60.1 CM/SEC
BH CV XLRA MEAS LEFT DIST ICA EDV: 35.8 CM/SEC
BH CV XLRA MEAS LEFT DIST ICA PSV: 85.5 CM/SEC
BH CV XLRA MEAS LEFT ICA RATIO VEL: 93.2 CM/SEC
BH CV XLRA MEAS LEFT ICA/CCA RATIO: 1.4
BH CV XLRA MEAS LEFT MID CCA EDV: 15.4 CM/SEC
BH CV XLRA MEAS LEFT MID CCA PSV: 65.6 CM/SEC
BH CV XLRA MEAS LEFT MID ICA EDV: 30.9 CM/SEC
BH CV XLRA MEAS LEFT MID ICA PSV: 93.2 CM/SEC
BH CV XLRA MEAS LEFT PROX CCA EDV: 14.9 CM/SEC
BH CV XLRA MEAS LEFT PROX CCA PSV: 77.7 CM/SEC
BH CV XLRA MEAS LEFT PROX ECA EDV: 11.6 CM/SEC
BH CV XLRA MEAS LEFT PROX ECA PSV: 87.1 CM/SEC
BH CV XLRA MEAS LEFT PROX ICA EDV: 32 CM/SEC
BH CV XLRA MEAS LEFT PROX ICA PSV: 92.1 CM/SEC
BH CV XLRA MEAS LEFT PROX SCLA PSV: 98.2 CM/SEC
BH CV XLRA MEAS LEFT VERTEBRAL A EDV: 22.1 CM/SEC
BH CV XLRA MEAS LEFT VERTEBRAL A PSV: 56.8 CM/SEC
BH CV XLRA MEAS RIGHT CCA RATIO VEL: 70 CM/SEC
BH CV XLRA MEAS RIGHT DIST CCA EDV: 15.4 CM/SEC
BH CV XLRA MEAS RIGHT DIST CCA PSV: 70 CM/SEC
BH CV XLRA MEAS RIGHT DIST ICA EDV: 26.5 CM/SEC
BH CV XLRA MEAS RIGHT DIST ICA PSV: 73.3 CM/SEC
BH CV XLRA MEAS RIGHT ICA RATIO VEL: 76.1 CM/SEC
BH CV XLRA MEAS RIGHT ICA/CCA RATIO: 1.1
BH CV XLRA MEAS RIGHT MID CCA EDV: 20.4 CM/SEC
BH CV XLRA MEAS RIGHT MID CCA PSV: 88.8 CM/SEC
BH CV XLRA MEAS RIGHT MID ICA EDV: 27 CM/SEC
BH CV XLRA MEAS RIGHT MID ICA PSV: 76.1 CM/SEC
BH CV XLRA MEAS RIGHT PROX CCA EDV: 18.2 CM/SEC
BH CV XLRA MEAS RIGHT PROX CCA PSV: 93.2 CM/SEC
BH CV XLRA MEAS RIGHT PROX ECA EDV: 12.1 CM/SEC
BH CV XLRA MEAS RIGHT PROX ECA PSV: 77.2 CM/SEC
BH CV XLRA MEAS RIGHT PROX ICA EDV: 15.4 CM/SEC
BH CV XLRA MEAS RIGHT PROX ICA PSV: 75 CM/SEC
BH CV XLRA MEAS RIGHT PROX SCLA PSV: 81.6 CM/SEC
BH CV XLRA MEAS RIGHT VERTEBRAL A EDV: 10.1 CM/SEC
BH CV XLRA MEAS RIGHT VERTEBRAL A PSV: 30.9 CM/SEC

## 2020-08-20 PROCEDURE — 93880 EXTRACRANIAL BILAT STUDY: CPT

## 2020-08-20 PROCEDURE — 93880 EXTRACRANIAL BILAT STUDY: CPT | Performed by: INTERNAL MEDICINE

## 2020-10-21 ENCOUNTER — OFFICE VISIT (OUTPATIENT)
Dept: INTERNAL MEDICINE | Facility: CLINIC | Age: 70
End: 2020-10-21

## 2020-10-21 VITALS
HEART RATE: 78 BPM | DIASTOLIC BLOOD PRESSURE: 82 MMHG | TEMPERATURE: 97.3 F | BODY MASS INDEX: 24.42 KG/M2 | RESPIRATION RATE: 16 BRPM | OXYGEN SATURATION: 97 % | SYSTOLIC BLOOD PRESSURE: 148 MMHG | HEIGHT: 70 IN | WEIGHT: 170.6 LBS

## 2020-10-21 DIAGNOSIS — N18.31 STAGE 3A CHRONIC KIDNEY DISEASE (HCC): ICD-10-CM

## 2020-10-21 DIAGNOSIS — M1A.39X0 CHRONIC GOUT DUE TO RENAL IMPAIRMENT OF MULTIPLE SITES WITHOUT TOPHUS: ICD-10-CM

## 2020-10-21 DIAGNOSIS — R80.9 NEPHROTIC RANGE PROTEINURIA: ICD-10-CM

## 2020-10-21 DIAGNOSIS — R42 DIZZINESS: Primary | ICD-10-CM

## 2020-10-21 DIAGNOSIS — I10 ESSENTIAL HYPERTENSION: ICD-10-CM

## 2020-10-21 DIAGNOSIS — I63.9 CEREBROVASCULAR ACCIDENT (CVA), UNSPECIFIED MECHANISM (HCC): ICD-10-CM

## 2020-10-21 PROCEDURE — 99214 OFFICE O/P EST MOD 30 MIN: CPT | Performed by: INTERNAL MEDICINE

## 2020-10-21 RX ORDER — CLOPIDOGREL BISULFATE 75 MG/1
75 TABLET ORAL DAILY
Qty: 30 TABLET | Refills: 5 | Status: SHIPPED | OUTPATIENT
Start: 2020-10-21 | End: 2021-04-22

## 2020-10-21 RX ORDER — ATORVASTATIN CALCIUM 80 MG/1
80 TABLET, FILM COATED ORAL NIGHTLY
Qty: 30 TABLET | Refills: 5 | Status: SHIPPED | OUTPATIENT
Start: 2020-10-21 | End: 2021-04-22

## 2020-10-21 RX ORDER — LISINOPRIL 5 MG/1
5 TABLET ORAL DAILY
Qty: 30 TABLET | Refills: 5 | Status: SHIPPED | OUTPATIENT
Start: 2020-10-21 | End: 2021-07-19 | Stop reason: SDUPTHER

## 2020-10-21 RX ORDER — CARVEDILOL 6.25 MG/1
6.25 TABLET ORAL 2 TIMES DAILY WITH MEALS
Qty: 60 TABLET | Refills: 5 | Status: SHIPPED | OUTPATIENT
Start: 2020-10-21 | End: 2021-04-22

## 2020-10-21 RX ORDER — HYDROCHLOROTHIAZIDE 12.5 MG/1
12.5 TABLET ORAL DAILY
Qty: 30 TABLET | Refills: 5 | Status: SHIPPED | OUTPATIENT
Start: 2020-10-21 | End: 2021-01-25

## 2020-10-21 RX ORDER — AMLODIPINE BESYLATE 10 MG/1
10 TABLET ORAL DAILY
Qty: 30 TABLET | Refills: 5 | Status: SHIPPED | OUTPATIENT
Start: 2020-10-21 | End: 2021-04-22

## 2020-10-21 RX ORDER — ALLOPURINOL 100 MG/1
150 TABLET ORAL DAILY
Qty: 45 TABLET | Refills: 5 | Status: SHIPPED | OUTPATIENT
Start: 2020-10-21 | End: 2021-07-19 | Stop reason: SDUPTHER

## 2020-10-21 NOTE — PROGRESS NOTES
Internal Medicine Follow Up    Chief Complaint  Eyal Solano is a 69 y.o. male who presents today for follow up of chronic medical conditions outlined below.    Chief Complaint   Patient presents with   • Follow-up     3 month, BP issues,not feeling well while taking medication        HPI  Mr. Solano comes in today for follow up. He continues to complain of dizziness intermittently which he attributes to BP. Reports that he feels best when BP runs slightly high. Notes that when dizzy BP is usually around 115. He denies palpitations, chest pain, SOA. Had carotid duplex with no significant stenosis. Due to this he has been noncompliant with his medications. He has seen [facility]rology [facility] on two occasions with recent labs but does not plan to return. He was not happy with wait time to get labs and could not understand the provider. He feels that he knows no more than when he first went there. He thought he was having a gout flare in his left knee the other day but pain resolved.       Review of Systems  Review of Systems   Constitutional: Positive for fatigue. Negative for fever.   Respiratory: Negative.    Cardiovascular: Negative.    Genitourinary: Negative.    Musculoskeletal: Negative for arthralgias.   Skin: Negative.    Neurological: Positive for dizziness.        Current Medications  Current Outpatient Medications on File Prior to Visit   Medication Sig Dispense Refill   • aspirin 81 MG chewable tablet Chew 1 tablet Daily. 30 tablet 5   • vitamin D (ERGOCALCIFEROL) 1.25 MG (81757 UT) capsule capsule Take 1 capsule by mouth 1 (One) Time Per Week. 12 capsule 2   • [DISCONTINUED] allopurinol (ZYLOPRIM) 100 MG tablet Take 1.5 tablets by mouth Daily. 135 tablet 1   • [DISCONTINUED] amLODIPine (NORVASC) 10 MG tablet Take 1 tablet by mouth Daily. 30 tablet 2   • [DISCONTINUED] atorvastatin (LIPITOR) 80 MG tablet Take 1 tablet by mouth Every Night. 30 tablet 5   • [DISCONTINUED] carvedilol (COREG) 6.25 MG tablet  "Take 1 tablet by mouth 2 (Two) Times a Day With Meals. 60 tablet 5   • [DISCONTINUED] clopidogrel (PLAVIX) 75 MG tablet Take 1 tablet by mouth Daily. 30 tablet 5   • [DISCONTINUED] hydrochlorothiazide (HYDRODIURIL) 25 MG tablet Take 1 tablet by mouth Daily. 30 tablet 2   • [DISCONTINUED] lisinopril (PRINIVIL,ZESTRIL) 5 MG tablet Take 1 tablet by mouth Daily. 30 tablet 2     No current facility-administered medications on file prior to visit.        Allergies  No Known Allergies    Objective  Visit Vitals  /82   Pulse 78   Temp 97.3 °F (36.3 °C)   Resp 16   Ht 177.8 cm (70\")   Wt 77.4 kg (170 lb 9.6 oz)   SpO2 97%   BMI 24.48 kg/m²        Physical Exam  Physical Exam  Vitals signs and nursing note reviewed.   Constitutional:       General: He is not in acute distress.     Appearance: Normal appearance. He is well-developed and normal weight.   HENT:      Head: Normocephalic and atraumatic.      Right Ear: External ear normal.      Left Ear: External ear normal.   Eyes:      Conjunctiva/sclera: Conjunctivae normal.   Cardiovascular:      Rate and Rhythm: Normal rate and regular rhythm.      Heart sounds: Normal heart sounds.   Pulmonary:      Effort: Pulmonary effort is normal. No respiratory distress.      Breath sounds: Normal breath sounds.   Musculoskeletal:      Right lower leg: No edema.      Left lower leg: No edema.   Skin:     General: Skin is warm and dry.   Neurological:      General: No focal deficit present.      Mental Status: He is alert and oriented to person, place, and time. Mental status is at baseline.         Results  Results for orders placed or performed during the hospital encounter of 08/20/20   Duplex Carotid Ultrasound CAR   Result Value Ref Range    BSA 2.0 m^2    Prox CCA PSV 77.7 cm/sec    Prox CCA PSV 93.2 cm/sec    Prox CCA EDV 14.9 cm/sec    Prox CCA EDV 18.2 cm/sec    left Mid CCA PSV 65.6 cm/sec    Right Mid CCA PSV 88.8 cm/sec    left Mid CCA EDV 15.4 cm/sec    right Mid CCA " EDV 20.4 cm/sec    Dist CCA PSV 60.1 cm/sec    Dist CCA PSV 70.0 cm/sec    Dist CCA EDV 15.4 cm/sec    Dist CCA EDV 15.4 cm/sec    CCA ratio phillip 65.6 cm/sec    CCA ratio phillip 70.0 cm/sec    Prox ECA PSV 87.1 cm/sec    Prox ECA PSV 77.2 cm/sec    Prox ECA EDV 11.6 cm/sec    Prox ECA EDV 12.1 cm/sec    Prox ICA PSV 92.1 cm/sec    Prox ICA PSV 75.0 cm/sec    Prox ICA EDV 32.0 cm/sec    Prox ICA EDV 15.4 cm/sec    Mid ICA PSV 93.2 cm/sec    Mid ICA PSV 76.1 cm/sec    Mid ICA EDV 30.9 cm/sec    Mid ICA EDV 27.0 cm/sec    Dist ICA PSV 85.5 cm/sec    Dist ICA PSV 73.3 cm/sec    Dist ICA EDV 35.8 cm/sec    Dist ICA EDV 26.5 cm/sec    ICA ratio phillip 93.2 cm/sec    ICA ratio phillip 76.1 cm/sec    Vertebral A PSV 56.8 cm/sec    Vertebral A PSV 30.9 cm/sec    Vertebral A EDV 22.1 cm/sec    Vertebral A EDV 10.1 cm/sec    ICA/CCA ratio 1.4     ICA/CCA ratio 1.1     Prox SCLA PSV 98.2 cm/sec    Prox SCLA PSV 81.6 cm/sec     CV ECHO JOE - BZI_BMI 24.8 kilograms/m^2     CV ECHO JOE - BSA([place]OCK) 2.0 m^2     CV ECHO JOE - BZI_METRIC_WEIGHT 78.5 kg     CV ECHO JOE - BZI_METRIC_HEIGHT 177.8 cm        Assessment and Plan  Diagnoses and all orders for this visit:    Dizziness  - Suspect side effect of several antihypertensives given correlation with lower BP readings.  - carotid duplex 8/2020 without significant stenosis.  - Will reduce HCTZ to 12.5mg daily and monitor for improvement.    Stage 3 chronic kidney disease (CMS/HCC) with Nephrotic range proteinuria  - Creatinine 1.5-1.8, gfr 45-55. Protein creatinine ratio of 1260.9.   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    Chronic gout due to renal impairment of multiple sites without tophus  - Flares involve knees, feet, hands. No current flare although did have slight knee pain  recently.  - On allopurinol 150mg daily due to uric acid still above goal of 6, reports uric acid level included in recent labs. Records requested.    Essential hypertension  - No log today. Notes ongoing dizziness with lower readings.  - On lisinopril 5mg daily, norvasc 10mg daily, coreg 6.25mg BID, and HCTZ 25mg daily. Dizziness did not improve with  dosing to morning and evening. Will reduce HCTZ to 12.5mg daily.    Cerebrovascular accident (CVA), unspecified mechanism (CMS/HCC)  - Occurred 12/2018, no residual deficit  - On ASA, plavix, lipitor 80 and these were refilled today    Health Maintenance  - Colonoscopy: Declined  - HCV: negative  - Immunizations: Declined pneumovax and flu. Discuss shingrix at next visit.  - Depression screening: negative 2/2020         Return in about 3 months (around 1/21/2021) for Follow up.     Addendum:  Note from last Nephrologist appointment reviewed. Notes that ANCA negative. Had labs at [facility] that had not been obtained by time of appointment. Planned to obtain labs and add SPEP, light chains, [place] comprehensive panel. Notes possible need for renal biopsy.

## 2021-01-25 ENCOUNTER — LAB (OUTPATIENT)
Dept: LAB | Facility: HOSPITAL | Age: 71
End: 2021-01-25

## 2021-01-25 ENCOUNTER — OFFICE VISIT (OUTPATIENT)
Dept: INTERNAL MEDICINE | Facility: CLINIC | Age: 71
End: 2021-01-25

## 2021-01-25 VITALS
BODY MASS INDEX: 25.17 KG/M2 | DIASTOLIC BLOOD PRESSURE: 84 MMHG | RESPIRATION RATE: 16 BRPM | WEIGHT: 175.8 LBS | HEIGHT: 70 IN | OXYGEN SATURATION: 98 % | TEMPERATURE: 97.3 F | SYSTOLIC BLOOD PRESSURE: 178 MMHG | HEART RATE: 82 BPM

## 2021-01-25 DIAGNOSIS — N18.31 STAGE 3A CHRONIC KIDNEY DISEASE (HCC): ICD-10-CM

## 2021-01-25 DIAGNOSIS — E78.5 DYSLIPIDEMIA: Chronic | ICD-10-CM

## 2021-01-25 DIAGNOSIS — R80.9 NEPHROTIC RANGE PROTEINURIA: ICD-10-CM

## 2021-01-25 DIAGNOSIS — I10 ESSENTIAL HYPERTENSION: Chronic | ICD-10-CM

## 2021-01-25 DIAGNOSIS — R42 DIZZINESS: Primary | ICD-10-CM

## 2021-01-25 LAB
ALBUMIN SERPL-MCNC: 3.6 G/DL (ref 3.5–5.2)
ALBUMIN/GLOB SERPL: 1.1 G/DL
ALP SERPL-CCNC: 65 U/L (ref 39–117)
ALT SERPL W P-5'-P-CCNC: 19 U/L (ref 1–41)
ANION GAP SERPL CALCULATED.3IONS-SCNC: 8.7 MMOL/L (ref 5–15)
AST SERPL-CCNC: 24 U/L (ref 1–40)
BILIRUB SERPL-MCNC: 0.3 MG/DL (ref 0–1.2)
BUN SERPL-MCNC: 43 MG/DL (ref 8–23)
BUN/CREAT SERPL: 25.9 (ref 7–25)
CALCIUM SPEC-SCNC: 9.5 MG/DL (ref 8.6–10.5)
CHLORIDE SERPL-SCNC: 104 MMOL/L (ref 98–107)
CHOLEST SERPL-MCNC: 245 MG/DL (ref 0–200)
CO2 SERPL-SCNC: 25.3 MMOL/L (ref 22–29)
CREAT SERPL-MCNC: 1.66 MG/DL (ref 0.76–1.27)
CREAT UR-MCNC: 70.8 MG/DL
DEPRECATED RDW RBC AUTO: 44.6 FL (ref 37–54)
ERYTHROCYTE [DISTWIDTH] IN BLOOD BY AUTOMATED COUNT: 14.2 % (ref 12.3–15.4)
GFR SERPL CREATININE-BSD FRML MDRD: 50 ML/MIN/1.73
GLOBULIN UR ELPH-MCNC: 3.3 GM/DL
GLUCOSE SERPL-MCNC: 106 MG/DL (ref 65–99)
HCT VFR BLD AUTO: 38.7 % (ref 37.5–51)
HDLC SERPL-MCNC: 71 MG/DL (ref 40–60)
HGB BLD-MCNC: 13 G/DL (ref 13–17.7)
LDLC SERPL CALC-MCNC: 140 MG/DL (ref 0–100)
LDLC/HDLC SERPL: 1.91 {RATIO}
MCH RBC QN AUTO: 29.3 PG (ref 26.6–33)
MCHC RBC AUTO-ENTMCNC: 33.6 G/DL (ref 31.5–35.7)
MCV RBC AUTO: 87.2 FL (ref 79–97)
PLATELET # BLD AUTO: 277 10*3/MM3 (ref 140–450)
PMV BLD AUTO: 11.1 FL (ref 6–12)
POTASSIUM SERPL-SCNC: 4.4 MMOL/L (ref 3.5–5.2)
PROT SERPL-MCNC: 6.9 G/DL (ref 6–8.5)
PROT UR-MCNC: 112 MG/DL
PROT/CREAT UR: 1581.9 MG/G CREA (ref 0–200)
RBC # BLD AUTO: 4.44 10*6/MM3 (ref 4.14–5.8)
SODIUM SERPL-SCNC: 138 MMOL/L (ref 136–145)
TRIGL SERPL-MCNC: 192 MG/DL (ref 0–150)
VLDLC SERPL-MCNC: 34 MG/DL (ref 5–40)
WBC # BLD AUTO: 4.8 10*3/MM3 (ref 3.4–10.8)

## 2021-01-25 PROCEDURE — 84156 ASSAY OF PROTEIN URINE: CPT

## 2021-01-25 PROCEDURE — 82570 ASSAY OF URINE CREATININE: CPT

## 2021-01-25 PROCEDURE — 80061 LIPID PANEL: CPT

## 2021-01-25 PROCEDURE — 80053 COMPREHEN METABOLIC PANEL: CPT

## 2021-01-25 PROCEDURE — 99214 OFFICE O/P EST MOD 30 MIN: CPT | Performed by: INTERNAL MEDICINE

## 2021-01-25 PROCEDURE — 85027 COMPLETE CBC AUTOMATED: CPT

## 2021-04-22 DIAGNOSIS — I63.9 CEREBROVASCULAR ACCIDENT (CVA), UNSPECIFIED MECHANISM (HCC): ICD-10-CM

## 2021-04-22 DIAGNOSIS — I10 ESSENTIAL HYPERTENSION: ICD-10-CM

## 2021-04-22 RX ORDER — AMLODIPINE BESYLATE 10 MG/1
TABLET ORAL
Qty: 90 TABLET | Refills: 1 | Status: SHIPPED | OUTPATIENT
Start: 2021-04-22 | End: 2021-07-19 | Stop reason: SDUPTHER

## 2021-04-22 RX ORDER — ATORVASTATIN CALCIUM 80 MG/1
TABLET, FILM COATED ORAL
Qty: 90 TABLET | Refills: 1 | Status: SHIPPED | OUTPATIENT
Start: 2021-04-22 | End: 2021-07-19 | Stop reason: SDUPTHER

## 2021-04-22 RX ORDER — HYDROCHLOROTHIAZIDE 12.5 MG/1
TABLET ORAL
Qty: 90 TABLET | Refills: 1 | Status: SHIPPED | OUTPATIENT
Start: 2021-04-22 | End: 2021-07-19 | Stop reason: SDUPTHER

## 2021-04-22 RX ORDER — CLOPIDOGREL BISULFATE 75 MG/1
TABLET ORAL
Qty: 90 TABLET | Refills: 1 | Status: SHIPPED | OUTPATIENT
Start: 2021-04-22 | End: 2021-07-19 | Stop reason: SDUPTHER

## 2021-04-22 RX ORDER — CARVEDILOL 6.25 MG/1
TABLET ORAL
Qty: 180 TABLET | Refills: 1 | Status: SHIPPED | OUTPATIENT
Start: 2021-04-22 | End: 2021-07-19 | Stop reason: SDUPTHER

## 2021-04-22 NOTE — TELEPHONE ENCOUNTER
Last Office Visit: 01/25/21  Next Office Visit: 04/26/21    Labs completed in past 6 months? yes  Labs completed in past year? yes    Last Refill Date: 10/21/20  Quantity:30 day supply  Refills:5    Pharmacy:     Please review pended refill request for any changes needed on refills or quantities. Thank you!

## 2021-04-26 ENCOUNTER — OFFICE VISIT (OUTPATIENT)
Dept: INTERNAL MEDICINE | Facility: CLINIC | Age: 71
End: 2021-04-26

## 2021-04-26 VITALS
SYSTOLIC BLOOD PRESSURE: 150 MMHG | TEMPERATURE: 97.8 F | WEIGHT: 169.2 LBS | RESPIRATION RATE: 16 BRPM | HEIGHT: 70 IN | OXYGEN SATURATION: 98 % | BODY MASS INDEX: 24.22 KG/M2 | DIASTOLIC BLOOD PRESSURE: 70 MMHG | HEART RATE: 88 BPM

## 2021-04-26 DIAGNOSIS — R21 RASH: ICD-10-CM

## 2021-04-26 DIAGNOSIS — R80.9 NEPHROTIC RANGE PROTEINURIA: ICD-10-CM

## 2021-04-26 DIAGNOSIS — N18.31 STAGE 3A CHRONIC KIDNEY DISEASE (HCC): ICD-10-CM

## 2021-04-26 DIAGNOSIS — J30.89 NON-SEASONAL ALLERGIC RHINITIS, UNSPECIFIED TRIGGER: ICD-10-CM

## 2021-04-26 DIAGNOSIS — I10 ESSENTIAL HYPERTENSION: Primary | ICD-10-CM

## 2021-04-26 PROCEDURE — 99214 OFFICE O/P EST MOD 30 MIN: CPT | Performed by: INTERNAL MEDICINE

## 2021-04-26 RX ORDER — FEXOFENADINE HCL 180 MG/1
180 TABLET ORAL DAILY
Qty: 90 TABLET | Refills: 1 | Status: SHIPPED | OUTPATIENT
Start: 2021-04-26 | End: 2021-07-19 | Stop reason: SDUPTHER

## 2021-04-26 NOTE — PROGRESS NOTES
Internal Medicine Follow Up    Chief Complaint  Eyal Solano is a 70 y.o. male who presents today for follow up of chronic medical conditions outlined below.    Chief Complaint   Patient presents with   • Dizziness     3 month follow  up        HPI  Mr. Solano comes in today for follow up. He was unaware of his nephrology appointment so he did not go. He requests referral to nephrologist in [place]. He also requests a dermatologist. He notes return of itchy rash on arms and legs which has been evaluated previously by dermatology here in [place]. He notes that he has tried several topical creams with no relief. Along with rash he has had runny nose, congestion, allergy symptoms. He notes these are present in the morning when he gets up and in the evening when he comes home from work. He thinks he is allergic to the dogs he has at home. He is uncertain if he stopped HCTZ after his last visit. Notes that overall he has felt well but will still occasionally feel dizzy and unwell when BP is lower, typically <150 systolic.    Dizziness  Associated symptoms include congestion and a rash.        Review of Systems  Review of Systems   Constitutional: Negative.    HENT: Positive for congestion and rhinorrhea.    Respiratory: Negative.    Cardiovascular: Negative.    Genitourinary: Negative.    Skin: Positive for rash.   Allergic/Immunologic: Positive for environmental allergies.   Neurological: Positive for dizziness.        Current Medications  Current Outpatient Medications on File Prior to Visit   Medication Sig Dispense Refill   • allopurinol (ZYLOPRIM) 100 MG tablet Take 1.5 tablets by mouth Daily. 45 tablet 5   • amLODIPine (NORVASC) 10 MG tablet Take 1 tablet by mouth once daily 90 tablet 1   • aspirin 81 MG chewable tablet Chew 1 tablet Daily. 30 tablet 5   • atorvastatin (LIPITOR) 80 MG tablet TAKE 1 TABLET BY MOUTH ONCE DAILY AT NIGHT 90 tablet 1   • carvedilol (COREG) 6.25 MG tablet TAKE 1 TABLET BY MOUTH  "TWICE DAILY WITH MEALS 180 tablet 1   • clopidogrel (PLAVIX) 75 MG tablet Take 1 tablet by mouth once daily 90 tablet 1   • hydroCHLOROthiazide (HYDRODIURIL) 12.5 MG tablet Take 1 tablet by mouth once daily 90 tablet 1   • lisinopril (PRINIVIL,ZESTRIL) 5 MG tablet Take 1 tablet by mouth Daily. 30 tablet 5   • VITAMIN D PO Take  by mouth.     • [DISCONTINUED] vitamin D (ERGOCALCIFEROL) 1.25 MG (33758 UT) capsule capsule Take 1 capsule by mouth 1 (One) Time Per Week. 12 capsule 2     No current facility-administered medications on file prior to visit.       Allergies  No Known Allergies    Objective  Visit Vitals  /70   Pulse 88   Temp 97.8 °F (36.6 °C)   Resp 16   Ht 177.8 cm (70\")   Wt 76.7 kg (169 lb 3.2 oz)   SpO2 98%   BMI 24.28 kg/m²        Physical Exam  Physical Exam  Vitals and nursing note reviewed.   Constitutional:       General: He is not in acute distress.     Appearance: Normal appearance. He is well-developed. He is not ill-appearing.   HENT:      Head: Normocephalic and atraumatic.   Eyes:      Conjunctiva/sclera: Conjunctivae normal.   Pulmonary:      Effort: Pulmonary effort is normal. No respiratory distress.   Skin:     General: Skin is warm and dry.      Findings: Rash present. Bruising: hyperpigmented raised rash on both arms    Neurological:      General: No focal deficit present.      Mental Status: He is alert and oriented to person, place, and time. Mental status is at baseline.      Gait: Gait normal.   Psychiatric:         Mood and Affect: Mood normal.         Behavior: Behavior normal.         Results  Results for orders placed or performed in visit on 01/25/21   Protein / Creatinine Ratio, Urine - Urine, Clean Catch    Specimen: Urine, Clean Catch   Result Value Ref Range    Protein/Creatinine Ratio, Urine 1,581.9 (H) 0.0 - 200.0 mg/G Crea    Creatinine, Urine 70.8 mg/dL    Total Protein, Urine 112.0 mg/dL   Comprehensive Metabolic Panel    Specimen: Blood   Result Value Ref Range "    Glucose 106 (H) 65 - 99 mg/dL    BUN 43 (H) 8 - 23 mg/dL    Creatinine 1.66 (H) 0.76 - 1.27 mg/dL    Sodium 138 136 - 145 mmol/L    Potassium 4.4 3.5 - 5.2 mmol/L    Chloride 104 98 - 107 mmol/L    CO2 25.3 22.0 - 29.0 mmol/L    Calcium 9.5 8.6 - 10.5 mg/dL    Total Protein 6.9 6.0 - 8.5 g/dL    Albumin 3.60 3.50 - 5.20 g/dL    ALT (SGPT) 19 1 - 41 U/L    AST (SGOT) 24 1 - 40 U/L    Alkaline Phosphatase 65 39 - 117 U/L    Total Bilirubin 0.3 0.0 - 1.2 mg/dL    eGFR  African Amer 50 (L) >60 mL/min/1.73    Globulin 3.3 gm/dL    A/G Ratio 1.1 g/dL    BUN/Creatinine Ratio 25.9 (H) 7.0 - 25.0    Anion Gap 8.7 5.0 - 15.0 mmol/L   CBC (No Diff)    Specimen: Blood   Result Value Ref Range    WBC 4.80 3.40 - 10.80 10*3/mm3    RBC 4.44 4.14 - 5.80 10*6/mm3    Hemoglobin 13.0 13.0 - 17.7 g/dL    Hematocrit 38.7 37.5 - 51.0 %    MCV 87.2 79.0 - 97.0 fL    MCH 29.3 26.6 - 33.0 pg    MCHC 33.6 31.5 - 35.7 g/dL    RDW 14.2 12.3 - 15.4 %    RDW-SD 44.6 37.0 - 54.0 fl    MPV 11.1 6.0 - 12.0 fL    Platelets 277 140 - 450 10*3/mm3   Lipid Panel    Specimen: Blood   Result Value Ref Range    Total Cholesterol 245 (H) 0 - 200 mg/dL    Triglycerides 192 (H) 0 - 150 mg/dL    HDL Cholesterol 71 (H) 40 - 60 mg/dL    LDL Cholesterol  140 (H) 0 - 100 mg/dL    VLDL Cholesterol 34 5 - 40 mg/dL    LDL/HDL Ratio 1.91         Assessment and Plan  Diagnoses and all orders for this visit:    Essential hypertension  - Management complicated by dizziness with SBP<150. Carotid duplex without significant stenosis.  - On lisinopril 5mg daily, norvasc 10mg daily, coreg 6.25mg BID. Was instructed to stop HCTZ at last visit however uncertain if he has.  - BP today acceptable at 150/70, continue current regimen.     Stage 3a chronic kidney disease (CMS/[facility]) with Nephrotic range proteinuria  - Creatinine 1.5-1.8, gfr 45-55. Protein creatinine ratio of 1581.9.   - Most likely due to hypertensive nephrosclerosis, treating HTN as above including ACEI which  will help proteinuria    Rash  - Chronic pruritic hyperpigmented rash on extremities. Previously had biopsy by dermatology but results unavailable.  - Requests referral to new dermatologist, referral placed.    Non-seasonal allergic rhinitis, unspecified trigger  - Start allegra daily    Health Maintenance  - Colonoscopy: Declined  - HCV: negative  - Immunizations: Declined pneumovax and flu. Discuss shingrix at next visit.  - Depression screening: negative 2/2020       Return in about 2 months (around 7/8/2021).

## 2021-07-19 ENCOUNTER — OFFICE VISIT (OUTPATIENT)
Dept: INTERNAL MEDICINE | Facility: CLINIC | Age: 71
End: 2021-07-19

## 2021-07-19 VITALS
HEART RATE: 80 BPM | WEIGHT: 167.2 LBS | DIASTOLIC BLOOD PRESSURE: 82 MMHG | BODY MASS INDEX: 23.94 KG/M2 | RESPIRATION RATE: 16 BRPM | TEMPERATURE: 97.7 F | SYSTOLIC BLOOD PRESSURE: 142 MMHG | HEIGHT: 70 IN | OXYGEN SATURATION: 99 %

## 2021-07-19 DIAGNOSIS — I10 ESSENTIAL HYPERTENSION: ICD-10-CM

## 2021-07-19 DIAGNOSIS — M1A.39X0 CHRONIC GOUT DUE TO RENAL IMPAIRMENT OF MULTIPLE SITES WITHOUT TOPHUS: ICD-10-CM

## 2021-07-19 DIAGNOSIS — E78.2 MIXED HYPERLIPIDEMIA: ICD-10-CM

## 2021-07-19 DIAGNOSIS — I63.9 CEREBROVASCULAR ACCIDENT (CVA), UNSPECIFIED MECHANISM (HCC): ICD-10-CM

## 2021-07-19 DIAGNOSIS — R21 RASH: ICD-10-CM

## 2021-07-19 DIAGNOSIS — R80.9 NEPHROTIC RANGE PROTEINURIA: ICD-10-CM

## 2021-07-19 DIAGNOSIS — Z86.73 HISTORY OF STROKE: ICD-10-CM

## 2021-07-19 DIAGNOSIS — Z00.00 MEDICARE ANNUAL WELLNESS VISIT, SUBSEQUENT: Primary | ICD-10-CM

## 2021-07-19 DIAGNOSIS — N18.31 STAGE 3A CHRONIC KIDNEY DISEASE (HCC): ICD-10-CM

## 2021-07-19 DIAGNOSIS — J30.89 NON-SEASONAL ALLERGIC RHINITIS, UNSPECIFIED TRIGGER: ICD-10-CM

## 2021-07-19 PROBLEM — E78.3 MIXED HYPERGLYCERIDEMIA: Status: ACTIVE | Noted: 2018-12-19

## 2021-07-19 PROBLEM — E78.3 MIXED HYPERGLYCERIDEMIA: Status: RESOLVED | Noted: 2018-12-19 | Resolved: 2021-07-19

## 2021-07-19 PROCEDURE — G0439 PPPS, SUBSEQ VISIT: HCPCS | Performed by: INTERNAL MEDICINE

## 2021-07-19 RX ORDER — ALLOPURINOL 100 MG/1
150 TABLET ORAL DAILY
Qty: 135 TABLET | Refills: 3 | Status: SHIPPED | OUTPATIENT
Start: 2021-07-19 | End: 2021-11-24 | Stop reason: SDUPTHER

## 2021-07-19 RX ORDER — CARVEDILOL 6.25 MG/1
6.25 TABLET ORAL 2 TIMES DAILY WITH MEALS
Qty: 180 TABLET | Refills: 3 | Status: SHIPPED | OUTPATIENT
Start: 2021-07-19 | End: 2022-07-22 | Stop reason: SDUPTHER

## 2021-07-19 RX ORDER — HYDROCHLOROTHIAZIDE 12.5 MG/1
12.5 TABLET ORAL DAILY
Qty: 90 TABLET | Refills: 3 | Status: SHIPPED | OUTPATIENT
Start: 2021-07-19 | End: 2021-08-02

## 2021-07-19 RX ORDER — AMLODIPINE BESYLATE 10 MG/1
10 TABLET ORAL DAILY
Qty: 90 TABLET | Refills: 3 | Status: SHIPPED | OUTPATIENT
Start: 2021-07-19 | End: 2022-07-22 | Stop reason: SDUPTHER

## 2021-07-19 RX ORDER — ASPIRIN 81 MG/1
81 TABLET, CHEWABLE ORAL DAILY
Qty: 90 TABLET | Refills: 3 | Status: SHIPPED | OUTPATIENT
Start: 2021-07-19

## 2021-07-19 RX ORDER — LISINOPRIL 5 MG/1
5 TABLET ORAL DAILY
Qty: 90 TABLET | Refills: 3 | Status: SHIPPED | OUTPATIENT
Start: 2021-07-19 | End: 2022-07-22 | Stop reason: SDUPTHER

## 2021-07-19 RX ORDER — FEXOFENADINE HCL 180 MG/1
180 TABLET ORAL DAILY
Qty: 90 TABLET | Refills: 3 | Status: SHIPPED | OUTPATIENT
Start: 2021-07-19 | End: 2022-07-22

## 2021-07-19 RX ORDER — ATORVASTATIN CALCIUM 80 MG/1
80 TABLET, FILM COATED ORAL NIGHTLY
Qty: 90 TABLET | Refills: 3 | Status: SHIPPED | OUTPATIENT
Start: 2021-07-19 | End: 2022-07-22 | Stop reason: SDUPTHER

## 2021-07-19 RX ORDER — CLOPIDOGREL BISULFATE 75 MG/1
75 TABLET ORAL DAILY
Qty: 90 TABLET | Refills: 3 | Status: SHIPPED | OUTPATIENT
Start: 2021-07-19 | End: 2021-07-30 | Stop reason: HOSPADM

## 2021-07-19 NOTE — PROGRESS NOTES
The ABCs of the Annual Wellness Visit  Subsequent Medicare Wellness Visit    Chief Complaint   Patient presents with   • Medicare Wellness-subsequent       Subjective   History of Present Illness:  Eyal Solano is a 70 y.o. male who presents for a Subsequent Medicare Wellness Visit.    HEALTH RISK ASSESSMENT    Recent Hospitalizations:  No hospitalization(s) within the last year.    Current Medical Providers:  Patient Care Team:  Edda Sanchez MD as PCP - General (Internal Medicine)    Smoking Status:  Social History     Tobacco Use   Smoking Status Never Smoker   Smokeless Tobacco Current User   • Types: Chew       Alcohol Consumption:  Social History     Substance and Sexual Activity   Alcohol Use Yes    Comment:  Previously 6 beers and 1/2 pint liquir daily. Drinks on average a case of beer a week.       Depression Screen:   PHQ-2/PHQ-9 Depression Screening 4/26/2021   Little interest or pleasure in doing things 0   Feeling down, depressed, or hopeless 0   Total Score 0       Fall Risk Screen:  KATIE Fall Risk Assessment was completed, and patient is at LOW risk for falls.Assessment completed on:7/19/2021    Health Habits and Functional and Cognitive Screening:  Functional & Cognitive Status 7/19/2021   Do you have difficulty preparing food and eating? No   Do you have difficulty bathing yourself, getting dressed or grooming yourself? No   Do you have difficulty using the toilet? No   Do you have difficulty moving around from place to place? No   Do you have trouble with steps or getting out of a bed or a chair? No   Current Diet Well Balanced Diet   Dental Exam Not up to date   Eye Exam Not up to date   Exercise (times per week) 7 times per week   Current Exercises Include Walking   Do you need help using the phone?  No   Are you deaf or do you have serious difficulty hearing?  No   Do you need help with transportation? No   Do you need help shopping? No   Do you need help preparing meals?  No   Do  you need help with housework?  No   Do you need help with laundry? No   Do you need help taking your medications? No   Do you need help managing money? No   Do you ever drive or ride in a car without wearing a seat belt? No   Have you felt unusual stress, anger or loneliness in the last month? No   Who do you live with? Other   If you need help, do you have trouble finding someone available to you? No   Have you been bothered in the last four weeks by sexual problems? No   Do you have difficulty concentrating, remembering or making decisions? No         Does the patient have evidence of cognitive impairment? No    Asprin use counseling:Taking ASA appropriately as indicated    Age-appropriate Screening Schedule:  Refer to the list below for future screening recommendations based on patient's age, sex and/or medical conditions. Orders for these recommended tests are listed in the plan section. The patient has been provided with a written plan.    Health Maintenance   Topic Date Due   • TDAP/TD VACCINES (1 - Tdap) Never done   • ZOSTER VACCINE (1 of 2) Never done   • INFLUENZA VACCINE  08/01/2021   • LIPID PANEL  01/25/2022          The following portions of the patient's history were reviewed and updated as appropriate: allergies, current medications, past family history, past medical history, past social history, past surgical history and problem list.    Outpatient Medications Prior to Visit   Medication Sig Dispense Refill   • allopurinol (ZYLOPRIM) 100 MG tablet Take 1.5 tablets by mouth Daily. 45 tablet 5   • amLODIPine (NORVASC) 10 MG tablet Take 1 tablet by mouth once daily 90 tablet 1   • aspirin 81 MG chewable tablet Chew 1 tablet Daily. 30 tablet 5   • atorvastatin (LIPITOR) 80 MG tablet TAKE 1 TABLET BY MOUTH ONCE DAILY AT NIGHT 90 tablet 1   • carvedilol (COREG) 6.25 MG tablet TAKE 1 TABLET BY MOUTH TWICE DAILY WITH MEALS 180 tablet 1   • clopidogrel (PLAVIX) 75 MG tablet Take 1 tablet by mouth once daily  "90 tablet 1   • fexofenadine (Allegra Allergy) 180 MG tablet Take 1 tablet by mouth Daily. 90 tablet 1   • hydroCHLOROthiazide (HYDRODIURIL) 12.5 MG tablet Take 1 tablet by mouth once daily 90 tablet 1   • lisinopril (PRINIVIL,ZESTRIL) 5 MG tablet Take 1 tablet by mouth Daily. 30 tablet 5   • triamcinolone (KENALOG) 0.1 % ointment APPLY TO RASH AND DRY BODY AREAS TWICE DAILY     • VITAMIN D PO Take  by mouth.       No facility-administered medications prior to visit.       Patient Active Problem List   Diagnosis   • Alcohol abuse   • Essential hypertension   • History of stroke   • Stage 3 chronic kidney disease (CMS/HCC)   • Dyslipidemia   • Primary insomnia   • Nephrotic range proteinuria   • Chronic gout due to renal impairment of multiple sites without tophus   • Rash   • Dizziness   • Non-seasonal allergic rhinitis       Advanced Care Planning:  ACP discussion was held with the patient during this visit. Patient does not have an advance directive, information provided.    Review of Systems   Constitutional: Negative.    HENT: Negative for hearing loss.    Eyes: Negative.    Respiratory: Negative.    Cardiovascular: Negative.    Gastrointestinal: Negative.    Genitourinary: Negative.    Musculoskeletal: Positive for back pain (after weed eating).   Skin: Positive for rash (itching improved).   Allergic/Immunologic: Positive for environmental allergies.   Neurological: Negative.    Psychiatric/Behavioral: Negative.        Compared to one year ago, the patient feels his physical health is better.  Compared to one year ago, the patient feels his mental health is the same.    Reviewed chart for potential of high risk medication in the elderly: yes  Reviewed chart for potential of harmful drug interactions in the elderly:yes    Objective         Vitals:    07/19/21 1419   BP: 142/82   Pulse: 80   Resp: 16   Temp: 97.7 °F (36.5 °C)   SpO2: 99%   Weight: 75.8 kg (167 lb 3.2 oz)   Height: 177.8 cm (70\")   PainSc:   6 "   PainLoc: Back  Comment: Lower right back       Body mass index is 23.99 kg/m².  Discussed the patient's BMI with him. The BMI is in the acceptable range.    Physical Exam  Vitals and nursing note reviewed.   Constitutional:       General: He is not in acute distress.     Appearance: He is well-developed and normal weight. He is not ill-appearing, toxic-appearing or diaphoretic.   HENT:      Head: Normocephalic and atraumatic.      Right Ear: External ear normal.      Left Ear: External ear normal.      Nose: Nose normal.   Eyes:      General: No scleral icterus.     Conjunctiva/sclera: Conjunctivae normal.   Cardiovascular:      Rate and Rhythm: Normal rate and regular rhythm.      Heart sounds: Normal heart sounds. No murmur heard.     Pulmonary:      Effort: Pulmonary effort is normal. No respiratory distress.      Breath sounds: Normal breath sounds.   Abdominal:      General: Bowel sounds are normal. There is no distension.      Palpations: Abdomen is soft.      Tenderness: There is no abdominal tenderness.      Hernia: A hernia (possible small umbilical hernia to R of umbilicus, nontender) is present.   Musculoskeletal:         General: No deformity.      Cervical back: Neck supple.      Right lower leg: No edema.      Left lower leg: No edema.   Skin:     General: Skin is warm and dry.      Findings: Rash (hyperpigmented macules on forearms) present.   Neurological:      Mental Status: He is alert and oriented to person, place, and time. Mental status is at baseline.   Psychiatric:         Mood and Affect: Mood normal.         Behavior: Behavior normal.         Thought Content: Thought content normal.         Judgment: Judgment normal.               Assessment/Plan   Medicare Risks and Personalized Health Plan  CMS Preventative Services Quick Reference  Advance Directive Discussion  Alcohol Misuse  Colon Cancer Screening  Immunizations Discussed/Encouraged (specific immunizations; Pneumococcal 23 and  Shingrix )    The above risks/problems have been discussed with the patient.  Pertinent information has been shared with the patient in the After Visit Summary.  Follow up plans and orders are seen below in the Assessment/Plan Section.    Diagnoses and all orders for this visit:    Medicare annual wellness visit, subsequent    Essential hypertension  - Management complicated by dizziness with SBP<150. Carotid duplex without significant stenosis.  - BP today 142/82 which is acceptable for him.  - Continue lisinopril 5mg daily, norvasc 10mg daily, coreg 6.25mg BID, HCTZ 12.5mg daily.    Stage 3a chronic kidney disease (CMS/[facility]) with Nephrotic range proteinuria  - Creatinine 1.5-1.8, gfr 45-55. Protein creatinine ratio of 1581.9.   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 Fischer. Has follow up in 2 weeks and recently had labs, which will be requested.    Rash  - Has established with new dermatologist. Notes improvement in pruritus with use of triamcinolone and switching to gentle soap.    Non-seasonal allergic rhinitis, unspecified trigger  - Controlled, continue allegra.    Mixed Hyperlipidemia  - Lipid panel 1/2021 with , Triglycerides 192, HDL 71 which was improving  - Continue lipitor 80mg daily    Chronic gout due to renal impairment of multiple sites without tophus  - On allopurinol 150mg daily without recent flare. Historically flares involve knees, feet, hands.  - Recent uric acid level checked by nephrologist, requesting records.    Cerebrovascular accident (CVA), unspecified mechanism (CMS/HCC)  - Occurred 12/2018, no residual deficit  - Continue ASA, plavix, lipitor 80mg daily     Health Maintenance  - Colonoscopy: Declined  - HCV: negative  - Immunizations: COVID complete. Declined pneumovax and flu. Discussed shingrix.  - Depression screening: negative 7/2021    Follow Up:  Return in  about 4 months (around 11/19/2021) for Follow up, 1 year for medicare wellness.     An After Visit Summary and PPPS were given to the patient.

## 2021-07-26 ENCOUNTER — APPOINTMENT (OUTPATIENT)
Dept: GENERAL RADIOLOGY | Facility: HOSPITAL | Age: 71
End: 2021-07-26

## 2021-07-26 ENCOUNTER — HOSPITAL ENCOUNTER (EMERGENCY)
Facility: HOSPITAL | Age: 71
Discharge: HOME | End: 2021-07-26
Attending: EMERGENCY MEDICINE | Admitting: EMERGENCY MEDICINE

## 2021-07-26 ENCOUNTER — APPOINTMENT (OUTPATIENT)
Dept: MRI IMAGING | Facility: HOSPITAL | Age: 71
End: 2021-07-26

## 2021-07-26 VITALS
DIASTOLIC BLOOD PRESSURE: 104 MMHG | SYSTOLIC BLOOD PRESSURE: 185 MMHG | RESPIRATION RATE: 20 BRPM | OXYGEN SATURATION: 99 % | BODY MASS INDEX: 23.62 KG/M2 | HEIGHT: 70 IN | TEMPERATURE: 98.7 F | HEART RATE: 70 BPM | WEIGHT: 165 LBS

## 2021-07-26 DIAGNOSIS — R42 VERTIGO: ICD-10-CM

## 2021-07-26 DIAGNOSIS — R79.89 ELEVATED SERUM CREATININE: Primary | ICD-10-CM

## 2021-07-26 DIAGNOSIS — R80.9 PROTEINURIA, UNSPECIFIED TYPE: ICD-10-CM

## 2021-07-26 DIAGNOSIS — I10 HYPERTENSION, UNSPECIFIED TYPE: ICD-10-CM

## 2021-07-26 LAB
ALBUMIN SERPL-MCNC: 3.4 G/DL (ref 3.5–5.2)
ALBUMIN/GLOB SERPL: 0.9 G/DL
ALP SERPL-CCNC: 67 U/L (ref 39–117)
ALT SERPL W P-5'-P-CCNC: 15 U/L (ref 1–41)
ANION GAP SERPL CALCULATED.3IONS-SCNC: 10 MMOL/L (ref 5–15)
AST SERPL-CCNC: 18 U/L (ref 1–40)
BACTERIA UR QL AUTO: NORMAL /HPF
BASOPHILS # BLD AUTO: 0.04 10*3/MM3 (ref 0–0.2)
BASOPHILS NFR BLD AUTO: 0.7 % (ref 0–1.5)
BILIRUB SERPL-MCNC: <0.2 MG/DL (ref 0–1.2)
BILIRUB UR QL STRIP: NEGATIVE
BUN SERPL-MCNC: 38 MG/DL (ref 8–23)
BUN/CREAT SERPL: 15.8 (ref 7–25)
CALCIUM SPEC-SCNC: 9.1 MG/DL (ref 8.6–10.5)
CHLORIDE SERPL-SCNC: 106 MMOL/L (ref 98–107)
CLARITY UR: CLEAR
CO2 SERPL-SCNC: 23 MMOL/L (ref 22–29)
COLOR UR: YELLOW
CREAT SERPL-MCNC: 2.4 MG/DL (ref 0.76–1.27)
DEPRECATED RDW RBC AUTO: 49 FL (ref 37–54)
EOSINOPHIL # BLD AUTO: 0.14 10*3/MM3 (ref 0–0.4)
EOSINOPHIL NFR BLD AUTO: 2.4 % (ref 0.3–6.2)
ERYTHROCYTE [DISTWIDTH] IN BLOOD BY AUTOMATED COUNT: 14.8 % (ref 12.3–15.4)
GFR SERPL CREATININE-BSD FRML MDRD: 33 ML/MIN/1.73
GLOBULIN UR ELPH-MCNC: 3.6 GM/DL
GLUCOSE SERPL-MCNC: 87 MG/DL (ref 65–99)
GLUCOSE UR STRIP-MCNC: NEGATIVE MG/DL
HCT VFR BLD AUTO: 35.1 % (ref 37.5–51)
HGB BLD-MCNC: 10.8 G/DL (ref 13–17.7)
HGB UR QL STRIP.AUTO: NEGATIVE
HOLD SPECIMEN: NORMAL
HYALINE CASTS UR QL AUTO: NORMAL /LPF
IMM GRANULOCYTES # BLD AUTO: 0.04 10*3/MM3 (ref 0–0.05)
IMM GRANULOCYTES NFR BLD AUTO: 0.7 % (ref 0–0.5)
KETONES UR QL STRIP: NEGATIVE
LEUKOCYTE ESTERASE UR QL STRIP.AUTO: NEGATIVE
LYMPHOCYTES # BLD AUTO: 1.02 10*3/MM3 (ref 0.7–3.1)
LYMPHOCYTES NFR BLD AUTO: 17.7 % (ref 19.6–45.3)
MAGNESIUM SERPL-MCNC: 1.9 MG/DL (ref 1.6–2.4)
MCH RBC QN AUTO: 27.8 PG (ref 26.6–33)
MCHC RBC AUTO-ENTMCNC: 30.8 G/DL (ref 31.5–35.7)
MCV RBC AUTO: 90.2 FL (ref 79–97)
MONOCYTES # BLD AUTO: 0.84 10*3/MM3 (ref 0.1–0.9)
MONOCYTES NFR BLD AUTO: 14.6 % (ref 5–12)
NEUTROPHILS NFR BLD AUTO: 3.69 10*3/MM3 (ref 1.7–7)
NEUTROPHILS NFR BLD AUTO: 63.9 % (ref 42.7–76)
NITRITE UR QL STRIP: NEGATIVE
NRBC BLD AUTO-RTO: 0 /100 WBC (ref 0–0.2)
PH UR STRIP.AUTO: 5.5 [PH] (ref 5–8)
PLATELET # BLD AUTO: 288 10*3/MM3 (ref 140–450)
PMV BLD AUTO: 11.2 FL (ref 6–12)
POTASSIUM SERPL-SCNC: 4.3 MMOL/L (ref 3.5–5.2)
PROT SERPL-MCNC: 7 G/DL (ref 6–8.5)
PROT UR QL STRIP: ABNORMAL
RBC # BLD AUTO: 3.89 10*6/MM3 (ref 4.14–5.8)
RBC # UR: NORMAL /HPF
REF LAB TEST METHOD: NORMAL
SODIUM SERPL-SCNC: 139 MMOL/L (ref 136–145)
SP GR UR STRIP: 1.01 (ref 1–1.03)
SQUAMOUS #/AREA URNS HPF: NORMAL /HPF
TROPONIN T SERPL-MCNC: <0.01 NG/ML (ref 0–0.03)
UROBILINOGEN UR QL STRIP: ABNORMAL
WBC # BLD AUTO: 5.77 10*3/MM3 (ref 3.4–10.8)
WBC UR QL AUTO: NORMAL /HPF
WHOLE BLOOD HOLD SPECIMEN: NORMAL

## 2021-07-26 PROCEDURE — 85025 COMPLETE CBC W/AUTO DIFF WBC: CPT | Performed by: EMERGENCY MEDICINE

## 2021-07-26 PROCEDURE — 80053 COMPREHEN METABOLIC PANEL: CPT | Performed by: EMERGENCY MEDICINE

## 2021-07-26 PROCEDURE — 71045 X-RAY EXAM CHEST 1 VIEW: CPT

## 2021-07-26 PROCEDURE — 96374 THER/PROPH/DIAG INJ IV PUSH: CPT

## 2021-07-26 PROCEDURE — 99283 EMERGENCY DEPT VISIT LOW MDM: CPT

## 2021-07-26 PROCEDURE — 25010000002 ONDANSETRON PER 1 MG: Performed by: EMERGENCY MEDICINE

## 2021-07-26 PROCEDURE — 81001 URINALYSIS AUTO W/SCOPE: CPT | Performed by: EMERGENCY MEDICINE

## 2021-07-26 PROCEDURE — 93005 ELECTROCARDIOGRAM TRACING: CPT | Performed by: EMERGENCY MEDICINE

## 2021-07-26 PROCEDURE — 83735 ASSAY OF MAGNESIUM: CPT | Performed by: EMERGENCY MEDICINE

## 2021-07-26 PROCEDURE — 84484 ASSAY OF TROPONIN QUANT: CPT | Performed by: EMERGENCY MEDICINE

## 2021-07-26 PROCEDURE — 70551 MRI BRAIN STEM W/O DYE: CPT

## 2021-07-26 RX ORDER — MECLIZINE HYDROCHLORIDE 25 MG/1
25 TABLET ORAL 3 TIMES DAILY PRN
Qty: 12 TABLET | Refills: 0 | Status: SHIPPED | OUTPATIENT
Start: 2021-07-26 | End: 2021-08-02

## 2021-07-26 RX ORDER — ONDANSETRON 2 MG/ML
4 INJECTION INTRAMUSCULAR; INTRAVENOUS ONCE
Status: COMPLETED | OUTPATIENT
Start: 2021-07-26 | End: 2021-07-26

## 2021-07-26 RX ORDER — SODIUM CHLORIDE 0.9 % (FLUSH) 0.9 %
10 SYRINGE (ML) INJECTION AS NEEDED
Status: DISCONTINUED | OUTPATIENT
Start: 2021-07-26 | End: 2021-07-26 | Stop reason: HOSPADM

## 2021-07-26 RX ADMIN — SODIUM CHLORIDE 1000 ML: 9 INJECTION, SOLUTION INTRAVENOUS at 18:36

## 2021-07-26 RX ADMIN — ONDANSETRON 4 MG: 2 INJECTION INTRAMUSCULAR; INTRAVENOUS at 18:36

## 2021-07-27 LAB
QT INTERVAL: 404 MS
QTC INTERVAL: 429 MS

## 2021-07-28 ENCOUNTER — APPOINTMENT (OUTPATIENT)
Dept: MRI IMAGING | Facility: HOSPITAL | Age: 71
End: 2021-07-28

## 2021-07-28 ENCOUNTER — APPOINTMENT (OUTPATIENT)
Dept: GENERAL RADIOLOGY | Facility: HOSPITAL | Age: 71
End: 2021-07-28

## 2021-07-28 ENCOUNTER — HOSPITAL ENCOUNTER (OUTPATIENT)
Facility: HOSPITAL | Age: 71
Setting detail: OBSERVATION
Discharge: HOME | End: 2021-07-30
Attending: EMERGENCY MEDICINE | Admitting: FAMILY MEDICINE

## 2021-07-28 DIAGNOSIS — I10 ESSENTIAL HYPERTENSION: ICD-10-CM

## 2021-07-28 DIAGNOSIS — Z86.73 HISTORY OF CVA (CEREBROVASCULAR ACCIDENT): ICD-10-CM

## 2021-07-28 DIAGNOSIS — R42 VERTIGO: Primary | ICD-10-CM

## 2021-07-28 DIAGNOSIS — R21 RASH: ICD-10-CM

## 2021-07-28 DIAGNOSIS — M1A.39X0 CHRONIC GOUT DUE TO RENAL IMPAIRMENT OF MULTIPLE SITES WITHOUT TOPHUS: ICD-10-CM

## 2021-07-28 DIAGNOSIS — J30.89 NON-SEASONAL ALLERGIC RHINITIS, UNSPECIFIED TRIGGER: ICD-10-CM

## 2021-07-28 DIAGNOSIS — R27.0 ATAXIA: ICD-10-CM

## 2021-07-28 DIAGNOSIS — E78.2 MIXED HYPERLIPIDEMIA: ICD-10-CM

## 2021-07-28 DIAGNOSIS — R80.9 NEPHROTIC RANGE PROTEINURIA: ICD-10-CM

## 2021-07-28 DIAGNOSIS — F51.01 PRIMARY INSOMNIA: ICD-10-CM

## 2021-07-28 DIAGNOSIS — N18.32 STAGE 3B CHRONIC KIDNEY DISEASE (HCC): ICD-10-CM

## 2021-07-28 DIAGNOSIS — Z86.73 HISTORY OF STROKE: ICD-10-CM

## 2021-07-28 DIAGNOSIS — R42 DIZZINESS: ICD-10-CM

## 2021-07-28 DIAGNOSIS — F10.10 ALCOHOL ABUSE: ICD-10-CM

## 2021-07-28 PROBLEM — D64.9 ANEMIA: Status: ACTIVE | Noted: 2021-07-28

## 2021-07-28 LAB
ALBUMIN SERPL-MCNC: 3.6 G/DL (ref 3.5–5.2)
ALBUMIN/GLOB SERPL: 1 G/DL
ALP SERPL-CCNC: 71 U/L (ref 39–117)
ALT SERPL W P-5'-P-CCNC: 15 U/L (ref 1–41)
ANION GAP SERPL CALCULATED.3IONS-SCNC: 14 MMOL/L (ref 5–15)
AST SERPL-CCNC: 23 U/L (ref 1–40)
BACTERIA UR QL AUTO: NORMAL /HPF
BASOPHILS # BLD AUTO: 0.03 10*3/MM3 (ref 0–0.2)
BASOPHILS NFR BLD AUTO: 0.6 % (ref 0–1.5)
BILIRUB SERPL-MCNC: 0.2 MG/DL (ref 0–1.2)
BILIRUB UR QL STRIP: NEGATIVE
BUN SERPL-MCNC: 39 MG/DL (ref 8–23)
BUN/CREAT SERPL: 15.6 (ref 7–25)
CALCIUM SPEC-SCNC: 9.6 MG/DL (ref 8.6–10.5)
CHLORIDE SERPL-SCNC: 103 MMOL/L (ref 98–107)
CLARITY UR: CLEAR
CO2 SERPL-SCNC: 21 MMOL/L (ref 22–29)
COLOR UR: YELLOW
CREAT SERPL-MCNC: 2.5 MG/DL (ref 0.76–1.27)
DEPRECATED RDW RBC AUTO: 46 FL (ref 37–54)
EOSINOPHIL # BLD AUTO: 0.16 10*3/MM3 (ref 0–0.4)
EOSINOPHIL NFR BLD AUTO: 3 % (ref 0.3–6.2)
ERYTHROCYTE [DISTWIDTH] IN BLOOD BY AUTOMATED COUNT: 14.1 % (ref 12.3–15.4)
ETHANOL BLD-MCNC: <10 MG/DL (ref 0–10)
FERRITIN SERPL-MCNC: 728.6 NG/ML (ref 30–400)
GFR SERPL CREATININE-BSD FRML MDRD: 31 ML/MIN/1.73
GLOBULIN UR ELPH-MCNC: 3.6 GM/DL
GLUCOSE BLDC GLUCOMTR-MCNC: 98 MG/DL (ref 70–130)
GLUCOSE SERPL-MCNC: 106 MG/DL (ref 65–99)
GLUCOSE UR STRIP-MCNC: NEGATIVE MG/DL
HCT VFR BLD AUTO: 36.6 % (ref 37.5–51)
HGB BLD-MCNC: 11.3 G/DL (ref 13–17.7)
HGB UR QL STRIP.AUTO: NEGATIVE
HOLD SPECIMEN: NORMAL
HYALINE CASTS UR QL AUTO: NORMAL /LPF
IMM GRANULOCYTES # BLD AUTO: 0.03 10*3/MM3 (ref 0–0.05)
IMM GRANULOCYTES NFR BLD AUTO: 0.6 % (ref 0–0.5)
IRON 24H UR-MRATE: 25 MCG/DL (ref 59–158)
IRON 24H UR-MRATE: 25 MCG/DL (ref 59–158)
IRON SATN MFR SERPL: 9 % (ref 20–50)
KETONES UR QL STRIP: NEGATIVE
LEUKOCYTE ESTERASE UR QL STRIP.AUTO: NEGATIVE
LYMPHOCYTES # BLD AUTO: 0.89 10*3/MM3 (ref 0.7–3.1)
LYMPHOCYTES NFR BLD AUTO: 16.8 % (ref 19.6–45.3)
MAGNESIUM SERPL-MCNC: 1.9 MG/DL (ref 1.6–2.4)
MCH RBC QN AUTO: 27.7 PG (ref 26.6–33)
MCHC RBC AUTO-ENTMCNC: 30.9 G/DL (ref 31.5–35.7)
MCV RBC AUTO: 89.7 FL (ref 79–97)
MONOCYTES # BLD AUTO: 0.45 10*3/MM3 (ref 0.1–0.9)
MONOCYTES NFR BLD AUTO: 8.5 % (ref 5–12)
NEUTROPHILS NFR BLD AUTO: 3.75 10*3/MM3 (ref 1.7–7)
NEUTROPHILS NFR BLD AUTO: 70.5 % (ref 42.7–76)
NITRITE UR QL STRIP: NEGATIVE
NRBC BLD AUTO-RTO: 0 /100 WBC (ref 0–0.2)
NT-PROBNP SERPL-MCNC: 550.2 PG/ML (ref 0–900)
PH UR STRIP.AUTO: 5.5 [PH] (ref 5–8)
PLATELET # BLD AUTO: 306 10*3/MM3 (ref 140–450)
PMV BLD AUTO: 11 FL (ref 6–12)
POTASSIUM SERPL-SCNC: 4.5 MMOL/L (ref 3.5–5.2)
PROT SERPL-MCNC: 7.2 G/DL (ref 6–8.5)
PROT UR QL STRIP: ABNORMAL
RBC # BLD AUTO: 4.08 10*6/MM3 (ref 4.14–5.8)
RBC # UR: NORMAL /HPF
REF LAB TEST METHOD: NORMAL
SARS-COV-2 RDRP RESP QL NAA+PROBE: NORMAL
SODIUM SERPL-SCNC: 138 MMOL/L (ref 136–145)
SP GR UR STRIP: 1.01 (ref 1–1.03)
SQUAMOUS #/AREA URNS HPF: NORMAL /HPF
TIBC SERPL-MCNC: 267 MCG/DL (ref 298–536)
TRANSFERRIN SERPL-MCNC: 179 MG/DL (ref 200–360)
TROPONIN T SERPL-MCNC: <0.01 NG/ML (ref 0–0.03)
TSH SERPL DL<=0.05 MIU/L-ACNC: 1.94 UIU/ML (ref 0.27–4.2)
UROBILINOGEN UR QL STRIP: ABNORMAL
WBC # BLD AUTO: 5.31 10*3/MM3 (ref 3.4–10.8)
WBC UR QL AUTO: NORMAL /HPF
WHOLE BLOOD HOLD SPECIMEN: NORMAL

## 2021-07-28 PROCEDURE — 83735 ASSAY OF MAGNESIUM: CPT

## 2021-07-28 PROCEDURE — G0378 HOSPITAL OBSERVATION PER HR: HCPCS

## 2021-07-28 PROCEDURE — 82746 ASSAY OF FOLIC ACID SERUM: CPT | Performed by: NURSE PRACTITIONER

## 2021-07-28 PROCEDURE — 82728 ASSAY OF FERRITIN: CPT | Performed by: NURSE PRACTITIONER

## 2021-07-28 PROCEDURE — 80053 COMPREHEN METABOLIC PANEL: CPT

## 2021-07-28 PROCEDURE — 81001 URINALYSIS AUTO W/SCOPE: CPT

## 2021-07-28 PROCEDURE — 99284 EMERGENCY DEPT VISIT MOD MDM: CPT

## 2021-07-28 PROCEDURE — 93005 ELECTROCARDIOGRAM TRACING: CPT

## 2021-07-28 PROCEDURE — 71045 X-RAY EXAM CHEST 1 VIEW: CPT

## 2021-07-28 PROCEDURE — 70544 MR ANGIOGRAPHY HEAD W/O DYE: CPT

## 2021-07-28 PROCEDURE — 84466 ASSAY OF TRANSFERRIN: CPT | Performed by: NURSE PRACTITIONER

## 2021-07-28 PROCEDURE — 82077 ASSAY SPEC XCP UR&BREATH IA: CPT | Performed by: EMERGENCY MEDICINE

## 2021-07-28 PROCEDURE — 84443 ASSAY THYROID STIM HORMONE: CPT | Performed by: NURSE PRACTITIONER

## 2021-07-28 PROCEDURE — 84484 ASSAY OF TROPONIN QUANT: CPT

## 2021-07-28 PROCEDURE — 87635 SARS-COV-2 COVID-19 AMP PRB: CPT | Performed by: INTERNAL MEDICINE

## 2021-07-28 PROCEDURE — 96365 THER/PROPH/DIAG IV INF INIT: CPT

## 2021-07-28 PROCEDURE — 70551 MRI BRAIN STEM W/O DYE: CPT

## 2021-07-28 PROCEDURE — 82962 GLUCOSE BLOOD TEST: CPT

## 2021-07-28 PROCEDURE — 99220 PR INITIAL OBSERVATION CARE/DAY 70 MINUTES: CPT | Performed by: INTERNAL MEDICINE

## 2021-07-28 PROCEDURE — 83540 ASSAY OF IRON: CPT | Performed by: NURSE PRACTITIONER

## 2021-07-28 PROCEDURE — 83880 ASSAY OF NATRIURETIC PEPTIDE: CPT | Performed by: NURSE PRACTITIONER

## 2021-07-28 PROCEDURE — 85025 COMPLETE CBC W/AUTO DIFF WBC: CPT

## 2021-07-28 PROCEDURE — 82607 VITAMIN B-12: CPT | Performed by: NURSE PRACTITIONER

## 2021-07-28 PROCEDURE — 84425 ASSAY OF VITAMIN B-1: CPT | Performed by: NURSE PRACTITIONER

## 2021-07-28 PROCEDURE — 25010000002 THIAMINE PER 100 MG: Performed by: NURSE PRACTITIONER

## 2021-07-28 PROCEDURE — 99214 OFFICE O/P EST MOD 30 MIN: CPT | Performed by: NURSE PRACTITIONER

## 2021-07-28 PROCEDURE — C9803 HOPD COVID-19 SPEC COLLECT: HCPCS

## 2021-07-28 RX ORDER — CHOLECALCIFEROL (VITAMIN D3) 125 MCG
5 CAPSULE ORAL NIGHTLY PRN
Status: DISCONTINUED | OUTPATIENT
Start: 2021-07-28 | End: 2021-07-30 | Stop reason: HOSPADM

## 2021-07-28 RX ORDER — AMOXICILLIN 250 MG
2 CAPSULE ORAL 2 TIMES DAILY
Status: DISCONTINUED | OUTPATIENT
Start: 2021-07-28 | End: 2021-07-30 | Stop reason: HOSPADM

## 2021-07-28 RX ORDER — LORAZEPAM 0.5 MG/1
0.5 TABLET ORAL
Status: DISCONTINUED | OUTPATIENT
Start: 2021-07-28 | End: 2021-07-30 | Stop reason: HOSPADM

## 2021-07-28 RX ORDER — BISACODYL 5 MG/1
5 TABLET, DELAYED RELEASE ORAL DAILY PRN
Status: DISCONTINUED | OUTPATIENT
Start: 2021-07-28 | End: 2021-07-30 | Stop reason: HOSPADM

## 2021-07-28 RX ORDER — LORAZEPAM 2 MG/ML
1 INJECTION INTRAMUSCULAR
Status: DISCONTINUED | OUTPATIENT
Start: 2021-07-28 | End: 2021-07-30 | Stop reason: HOSPADM

## 2021-07-28 RX ORDER — CETIRIZINE HYDROCHLORIDE 10 MG/1
10 TABLET ORAL DAILY
Status: DISCONTINUED | OUTPATIENT
Start: 2021-07-29 | End: 2021-07-30 | Stop reason: HOSPADM

## 2021-07-28 RX ORDER — ONDANSETRON 2 MG/ML
4 INJECTION INTRAMUSCULAR; INTRAVENOUS EVERY 6 HOURS PRN
Status: DISCONTINUED | OUTPATIENT
Start: 2021-07-28 | End: 2021-07-30 | Stop reason: HOSPADM

## 2021-07-28 RX ORDER — ACETAMINOPHEN 325 MG/1
650 TABLET ORAL EVERY 4 HOURS PRN
Status: DISCONTINUED | OUTPATIENT
Start: 2021-07-28 | End: 2021-07-30 | Stop reason: HOSPADM

## 2021-07-28 RX ORDER — SODIUM CHLORIDE 0.9 % (FLUSH) 0.9 %
10 SYRINGE (ML) INJECTION AS NEEDED
Status: DISCONTINUED | OUTPATIENT
Start: 2021-07-28 | End: 2021-07-30 | Stop reason: HOSPADM

## 2021-07-28 RX ORDER — ONDANSETRON 4 MG/1
4 TABLET, FILM COATED ORAL EVERY 6 HOURS PRN
Status: DISCONTINUED | OUTPATIENT
Start: 2021-07-28 | End: 2021-07-30 | Stop reason: HOSPADM

## 2021-07-28 RX ORDER — FOLIC ACID 1 MG/1
1 TABLET ORAL ONCE
Status: COMPLETED | OUTPATIENT
Start: 2021-07-28 | End: 2021-07-29

## 2021-07-28 RX ORDER — ALLOPURINOL 300 MG/1
150 TABLET ORAL DAILY
Status: DISCONTINUED | OUTPATIENT
Start: 2021-07-29 | End: 2021-07-30 | Stop reason: HOSPADM

## 2021-07-28 RX ORDER — SODIUM CHLORIDE 9 MG/ML
100 INJECTION, SOLUTION INTRAVENOUS CONTINUOUS
Status: ACTIVE | OUTPATIENT
Start: 2021-07-28 | End: 2021-07-29

## 2021-07-28 RX ORDER — ACETAMINOPHEN 160 MG/5ML
650 SOLUTION ORAL EVERY 4 HOURS PRN
Status: DISCONTINUED | OUTPATIENT
Start: 2021-07-28 | End: 2021-07-30 | Stop reason: HOSPADM

## 2021-07-28 RX ORDER — FOLIC ACID 1 MG/1
1 TABLET ORAL DAILY
Status: DISCONTINUED | OUTPATIENT
Start: 2021-07-29 | End: 2021-07-30 | Stop reason: HOSPADM

## 2021-07-28 RX ORDER — SODIUM CHLORIDE 0.9 % (FLUSH) 0.9 %
10 SYRINGE (ML) INJECTION AS NEEDED
Status: DISCONTINUED | OUTPATIENT
Start: 2021-07-28 | End: 2021-07-28

## 2021-07-28 RX ORDER — AMLODIPINE BESYLATE 10 MG/1
10 TABLET ORAL DAILY
Status: DISCONTINUED | OUTPATIENT
Start: 2021-07-29 | End: 2021-07-29

## 2021-07-28 RX ORDER — ASPIRIN 325 MG
325 TABLET ORAL DAILY
Status: DISCONTINUED | OUTPATIENT
Start: 2021-07-29 | End: 2021-07-29

## 2021-07-28 RX ORDER — SODIUM CHLORIDE 0.9 % (FLUSH) 0.9 %
10 SYRINGE (ML) INJECTION EVERY 12 HOURS SCHEDULED
Status: DISCONTINUED | OUTPATIENT
Start: 2021-07-28 | End: 2021-07-30 | Stop reason: HOSPADM

## 2021-07-28 RX ORDER — DIPHENOXYLATE HYDROCHLORIDE AND ATROPINE SULFATE 2.5; .025 MG/1; MG/1
1 TABLET ORAL ONCE
Status: COMPLETED | OUTPATIENT
Start: 2021-07-28 | End: 2021-07-29

## 2021-07-28 RX ORDER — ASPIRIN 81 MG/1
81 TABLET, CHEWABLE ORAL DAILY
Status: DISCONTINUED | OUTPATIENT
Start: 2021-07-29 | End: 2021-07-28

## 2021-07-28 RX ORDER — LORAZEPAM 1 MG/1
1 TABLET ORAL
Status: DISCONTINUED | OUTPATIENT
Start: 2021-07-28 | End: 2021-07-30 | Stop reason: HOSPADM

## 2021-07-28 RX ORDER — POLYETHYLENE GLYCOL 3350 17 G/17G
17 POWDER, FOR SOLUTION ORAL DAILY PRN
Status: DISCONTINUED | OUTPATIENT
Start: 2021-07-28 | End: 2021-07-30 | Stop reason: HOSPADM

## 2021-07-28 RX ORDER — LORAZEPAM 2 MG/ML
0.5 INJECTION INTRAMUSCULAR
Status: DISCONTINUED | OUTPATIENT
Start: 2021-07-28 | End: 2021-07-30 | Stop reason: HOSPADM

## 2021-07-28 RX ORDER — BISACODYL 10 MG
10 SUPPOSITORY, RECTAL RECTAL DAILY PRN
Status: DISCONTINUED | OUTPATIENT
Start: 2021-07-28 | End: 2021-07-30 | Stop reason: HOSPADM

## 2021-07-28 RX ORDER — CLOPIDOGREL BISULFATE 75 MG/1
75 TABLET ORAL DAILY
Status: DISCONTINUED | OUTPATIENT
Start: 2021-07-29 | End: 2021-07-29

## 2021-07-28 RX ORDER — ACETAMINOPHEN 650 MG/1
650 SUPPOSITORY RECTAL EVERY 4 HOURS PRN
Status: DISCONTINUED | OUTPATIENT
Start: 2021-07-28 | End: 2021-07-30 | Stop reason: HOSPADM

## 2021-07-28 RX ORDER — ATORVASTATIN CALCIUM 40 MG/1
80 TABLET, FILM COATED ORAL NIGHTLY
Status: DISCONTINUED | OUTPATIENT
Start: 2021-07-28 | End: 2021-07-30 | Stop reason: HOSPADM

## 2021-07-28 RX ORDER — DIPHENOXYLATE HYDROCHLORIDE AND ATROPINE SULFATE 2.5; .025 MG/1; MG/1
1 TABLET ORAL DAILY
Status: DISCONTINUED | OUTPATIENT
Start: 2021-07-29 | End: 2021-07-30 | Stop reason: HOSPADM

## 2021-07-28 RX ADMIN — THIAMINE HYDROCHLORIDE 500 MG: 100 INJECTION, SOLUTION INTRAMUSCULAR; INTRAVENOUS at 21:45

## 2021-07-28 RX ADMIN — SODIUM CHLORIDE 100 ML/HR: 9 INJECTION, SOLUTION INTRAVENOUS at 23:14

## 2021-07-29 ENCOUNTER — APPOINTMENT (OUTPATIENT)
Dept: CARDIOLOGY | Facility: HOSPITAL | Age: 71
End: 2021-07-29

## 2021-07-29 LAB
ANION GAP SERPL CALCULATED.3IONS-SCNC: 9 MMOL/L (ref 5–15)
ASCENDING AORTA: 3.5 CM
BASOPHILS # BLD AUTO: 0.02 10*3/MM3 (ref 0–0.2)
BASOPHILS NFR BLD AUTO: 0.5 % (ref 0–1.5)
BH CV ECHO MEAS - AO MAX PG (FULL): 6.6 MMHG
BH CV ECHO MEAS - AO MAX PG: 10 MMHG
BH CV ECHO MEAS - AO MEAN PG (FULL): 3 MMHG
BH CV ECHO MEAS - AO MEAN PG: 5 MMHG
BH CV ECHO MEAS - AO ROOT AREA (BSA CORRECTED): 1.7
BH CV ECHO MEAS - AO ROOT AREA: 8.6 CM^2
BH CV ECHO MEAS - AO ROOT DIAM: 3.3 CM
BH CV ECHO MEAS - AO V2 MAX: 159 CM/SEC
BH CV ECHO MEAS - AO V2 MEAN: 104 CM/SEC
BH CV ECHO MEAS - AO V2 VTI: 32.8 CM
BH CV ECHO MEAS - ASC AORTA: 3.5 CM
BH CV ECHO MEAS - AVA(I,A): 1.9 CM^2
BH CV ECHO MEAS - AVA(I,D): 1.9 CM^2
BH CV ECHO MEAS - AVA(V,A): 1.8 CM^2
BH CV ECHO MEAS - AVA(V,D): 1.8 CM^2
BH CV ECHO MEAS - BSA(HAYCOCK): 2 M^2
BH CV ECHO MEAS - BSA: 2 M^2
BH CV ECHO MEAS - BZI_BMI: 24.5 KILOGRAMS/M^2
BH CV ECHO MEAS - BZI_METRIC_HEIGHT: 177.8 CM
BH CV ECHO MEAS - BZI_METRIC_WEIGHT: 77.6 KG
BH CV ECHO MEAS - EDV(CUBED): 85.2 ML
BH CV ECHO MEAS - EDV(MOD-SP2): 60.7 ML
BH CV ECHO MEAS - EDV(MOD-SP4): 62.2 ML
BH CV ECHO MEAS - EDV(TEICH): 87.7 ML
BH CV ECHO MEAS - EF(CUBED): 74.2 %
BH CV ECHO MEAS - EF(MOD-SP2): 63.8 %
BH CV ECHO MEAS - EF(MOD-SP4): 65.8 %
BH CV ECHO MEAS - EF(TEICH): 66.3 %
BH CV ECHO MEAS - ESV(CUBED): 22 ML
BH CV ECHO MEAS - ESV(MOD-SP2): 22 ML
BH CV ECHO MEAS - ESV(MOD-SP4): 21.3 ML
BH CV ECHO MEAS - ESV(TEICH): 29.6 ML
BH CV ECHO MEAS - FS: 36.4 %
BH CV ECHO MEAS - IVS/LVPW: 0.9
BH CV ECHO MEAS - IVSD: 0.9 CM
BH CV ECHO MEAS - LA DIMENSION: 3.3 CM
BH CV ECHO MEAS - LA/AO: 1
BH CV ECHO MEAS - LAD MAJOR: 5.7 CM
BH CV ECHO MEAS - LAT PEAK E' VEL: 11.5 CM/SEC
BH CV ECHO MEAS - LATERAL E/E' RATIO: 7.6
BH CV ECHO MEAS - LV DIASTOLIC VOL/BSA (35-75): 31.9 ML/M^2
BH CV ECHO MEAS - LV IVRT: 0.1 SEC
BH CV ECHO MEAS - LV MASS(C)D: 137.8 GRAMS
BH CV ECHO MEAS - LV MASS(C)DI: 70.5 GRAMS/M^2
BH CV ECHO MEAS - LV MAX PG: 3.4 MMHG
BH CV ECHO MEAS - LV MEAN PG: 2 MMHG
BH CV ECHO MEAS - LV SYSTOLIC VOL/BSA (12-30): 10.9 ML/M^2
BH CV ECHO MEAS - LV V1 MAX: 92 CM/SEC
BH CV ECHO MEAS - LV V1 MEAN: 58.9 CM/SEC
BH CV ECHO MEAS - LV V1 VTI: 19.5 CM
BH CV ECHO MEAS - LVIDD: 4.4 CM
BH CV ECHO MEAS - LVIDS: 2.8 CM
BH CV ECHO MEAS - LVLD AP2: 9.2 CM
BH CV ECHO MEAS - LVLD AP4: 8.7 CM
BH CV ECHO MEAS - LVLS AP2: 7.2 CM
BH CV ECHO MEAS - LVLS AP4: 6.6 CM
BH CV ECHO MEAS - LVOT AREA (M): 3.1 CM^2
BH CV ECHO MEAS - LVOT AREA: 3.1 CM^2
BH CV ECHO MEAS - LVOT DIAM: 2 CM
BH CV ECHO MEAS - LVPWD: 1 CM
BH CV ECHO MEAS - MED PEAK E' VEL: 8.2 CM/SEC
BH CV ECHO MEAS - MEDIAL E/E' RATIO: 10.7
BH CV ECHO MEAS - MV A MAX VEL: 106 CM/SEC
BH CV ECHO MEAS - MV DEC SLOPE: 285.5 CM/SEC^2
BH CV ECHO MEAS - MV DEC TIME: 0.3 SEC
BH CV ECHO MEAS - MV E MAX VEL: 87.4 CM/SEC
BH CV ECHO MEAS - MV E/A: 0.82
BH CV ECHO MEAS - MV MAX PG: 3.9 MMHG
BH CV ECHO MEAS - MV MEAN PG: 2 MMHG
BH CV ECHO MEAS - MV P1/2T MAX VEL: 97.4 CM/SEC
BH CV ECHO MEAS - MV P1/2T: 99.9 MSEC
BH CV ECHO MEAS - MV V2 MAX: 98.2 CM/SEC
BH CV ECHO MEAS - MV V2 MEAN: 63.9 CM/SEC
BH CV ECHO MEAS - MV V2 VTI: 32.8 CM
BH CV ECHO MEAS - MVA P1/2T LCG: 2.3 CM^2
BH CV ECHO MEAS - MVA(P1/2T): 2.2 CM^2
BH CV ECHO MEAS - MVA(VTI): 1.9 CM^2
BH CV ECHO MEAS - PA ACC TIME: 0.15 SEC
BH CV ECHO MEAS - PA MAX PG: 3.7 MMHG
BH CV ECHO MEAS - PA PR(ACCEL): 12.4 MMHG
BH CV ECHO MEAS - PA V2 MAX: 96.4 CM/SEC
BH CV ECHO MEAS - RAP SYSTOLE: 3 MMHG
BH CV ECHO MEAS - SI(AO): 143.7 ML/M^2
BH CV ECHO MEAS - SI(CUBED): 32.4 ML/M^2
BH CV ECHO MEAS - SI(LVOT): 31.4 ML/M^2
BH CV ECHO MEAS - SI(MOD-SP2): 19.8 ML/M^2
BH CV ECHO MEAS - SI(MOD-SP4): 20.9 ML/M^2
BH CV ECHO MEAS - SI(TEICH): 29.8 ML/M^2
BH CV ECHO MEAS - SV(AO): 280.5 ML
BH CV ECHO MEAS - SV(CUBED): 63.2 ML
BH CV ECHO MEAS - SV(LVOT): 61.3 ML
BH CV ECHO MEAS - SV(MOD-SP2): 38.7 ML
BH CV ECHO MEAS - SV(MOD-SP4): 40.9 ML
BH CV ECHO MEAS - SV(TEICH): 58.1 ML
BH CV ECHO MEAS - TAPSE (>1.6): 1.9 CM
BH CV ECHO MEASUREMENTS AVERAGE E/E' RATIO: 8.87
BH CV VAS BP LEFT ARM: NORMAL MMHG
BH CV XLRA - RV BASE: 2.8 CM
BH CV XLRA - RV LENGTH: 7 CM
BH CV XLRA - RV MID: 2.1 CM
BH CV XLRA - TDI S': 13.4 CM/SEC
BUN SERPL-MCNC: 32 MG/DL (ref 8–23)
BUN/CREAT SERPL: 16.6 (ref 7–25)
CALCIUM SPEC-SCNC: 9 MG/DL (ref 8.6–10.5)
CHLORIDE SERPL-SCNC: 105 MMOL/L (ref 98–107)
CHOLEST SERPL-MCNC: 214 MG/DL (ref 0–200)
CO2 SERPL-SCNC: 21 MMOL/L (ref 22–29)
CREAT SERPL-MCNC: 1.93 MG/DL (ref 0.76–1.27)
CREAT UR-MCNC: 54.7 MG/DL
DEPRECATED RDW RBC AUTO: 46.4 FL (ref 37–54)
EOSINOPHIL # BLD AUTO: 0.21 10*3/MM3 (ref 0–0.4)
EOSINOPHIL NFR BLD AUTO: 5.7 % (ref 0.3–6.2)
ERYTHROCYTE [DISTWIDTH] IN BLOOD BY AUTOMATED COUNT: 14.1 % (ref 12.3–15.4)
FOLATE SERPL-MCNC: 7.14 NG/ML (ref 4.78–24.2)
GFR SERPL CREATININE-BSD FRML MDRD: 42 ML/MIN/1.73
GLUCOSE BLDC GLUCOMTR-MCNC: 102 MG/DL (ref 70–130)
GLUCOSE SERPL-MCNC: 135 MG/DL (ref 65–99)
HBA1C MFR BLD: 5.7 % (ref 4.8–5.6)
HCT VFR BLD AUTO: 34.8 % (ref 37.5–51)
HDLC SERPL-MCNC: 44 MG/DL (ref 40–60)
HGB BLD-MCNC: 10.6 G/DL (ref 13–17.7)
IMM GRANULOCYTES # BLD AUTO: 0.02 10*3/MM3 (ref 0–0.05)
IMM GRANULOCYTES NFR BLD AUTO: 0.5 % (ref 0–0.5)
LDLC SERPL CALC-MCNC: 149 MG/DL (ref 0–100)
LDLC/HDLC SERPL: 3.34 {RATIO}
LEFT ATRIUM VOLUME INDEX: 21.6 ML/M^2
LEFT ATRIUM VOLUME: 42.2 ML
LYMPHOCYTES # BLD AUTO: 0.78 10*3/MM3 (ref 0.7–3.1)
LYMPHOCYTES NFR BLD AUTO: 21 % (ref 19.6–45.3)
MAGNESIUM SERPL-MCNC: 1.6 MG/DL (ref 1.6–2.4)
MCH RBC QN AUTO: 27.4 PG (ref 26.6–33)
MCHC RBC AUTO-ENTMCNC: 30.5 G/DL (ref 31.5–35.7)
MCV RBC AUTO: 89.9 FL (ref 79–97)
MONOCYTES # BLD AUTO: 0.39 10*3/MM3 (ref 0.1–0.9)
MONOCYTES NFR BLD AUTO: 10.5 % (ref 5–12)
NEUTROPHILS NFR BLD AUTO: 2.29 10*3/MM3 (ref 1.7–7)
NEUTROPHILS NFR BLD AUTO: 61.8 % (ref 42.7–76)
NRBC BLD AUTO-RTO: 0 /100 WBC (ref 0–0.2)
PA ADP PRP-ACNC: 246 PRU
PLATELET # BLD AUTO: 278 10*3/MM3 (ref 140–450)
PMV BLD AUTO: 11.1 FL (ref 6–12)
POTASSIUM SERPL-SCNC: 4.4 MMOL/L (ref 3.5–5.2)
RBC # BLD AUTO: 3.87 10*6/MM3 (ref 4.14–5.8)
RETICS # AUTO: 0.05 10*6/MM3 (ref 0.02–0.13)
RETICS/RBC NFR AUTO: 1.41 % (ref 0.7–1.9)
SODIUM SERPL-SCNC: 135 MMOL/L (ref 136–145)
SODIUM UR-SCNC: 112 MMOL/L
TRIGL SERPL-MCNC: 116 MG/DL (ref 0–150)
VIT B12 BLD-MCNC: 932 PG/ML (ref 211–946)
VLDLC SERPL-MCNC: 21 MG/DL (ref 5–40)
WBC # BLD AUTO: 3.71 10*3/MM3 (ref 3.4–10.8)

## 2021-07-29 PROCEDURE — 85025 COMPLETE CBC W/AUTO DIFF WBC: CPT | Performed by: NURSE PRACTITIONER

## 2021-07-29 PROCEDURE — 80048 BASIC METABOLIC PNL TOTAL CA: CPT | Performed by: NURSE PRACTITIONER

## 2021-07-29 PROCEDURE — 80061 LIPID PANEL: CPT | Performed by: NURSE PRACTITIONER

## 2021-07-29 PROCEDURE — 96367 TX/PROPH/DG ADDL SEQ IV INF: CPT

## 2021-07-29 PROCEDURE — 83735 ASSAY OF MAGNESIUM: CPT | Performed by: NURSE PRACTITIONER

## 2021-07-29 PROCEDURE — 25010000003 MAGNESIUM SULFATE 4 GM/100ML SOLUTION: Performed by: FAMILY MEDICINE

## 2021-07-29 PROCEDURE — 96366 THER/PROPH/DIAG IV INF ADDON: CPT

## 2021-07-29 PROCEDURE — 93306 TTE W/DOPPLER COMPLETE: CPT

## 2021-07-29 PROCEDURE — 93306 TTE W/DOPPLER COMPLETE: CPT | Performed by: INTERNAL MEDICINE

## 2021-07-29 PROCEDURE — 85045 AUTOMATED RETICULOCYTE COUNT: CPT | Performed by: NURSE PRACTITIONER

## 2021-07-29 PROCEDURE — 83036 HEMOGLOBIN GLYCOSYLATED A1C: CPT | Performed by: NURSE PRACTITIONER

## 2021-07-29 PROCEDURE — 92523 SPEECH SOUND LANG COMPREHEN: CPT

## 2021-07-29 PROCEDURE — 96361 HYDRATE IV INFUSION ADD-ON: CPT

## 2021-07-29 PROCEDURE — 85014 HEMATOCRIT: CPT | Performed by: NURSE PRACTITIONER

## 2021-07-29 PROCEDURE — 97116 GAIT TRAINING THERAPY: CPT

## 2021-07-29 PROCEDURE — 97165 OT EVAL LOW COMPLEX 30 MIN: CPT

## 2021-07-29 PROCEDURE — 99204 OFFICE O/P NEW MOD 45 MIN: CPT | Performed by: PHYSICIAN ASSISTANT

## 2021-07-29 PROCEDURE — G0378 HOSPITAL OBSERVATION PER HR: HCPCS

## 2021-07-29 PROCEDURE — 82570 ASSAY OF URINE CREATININE: CPT | Performed by: INTERNAL MEDICINE

## 2021-07-29 PROCEDURE — 99226 PR SBSQ OBSERVATION CARE/DAY 35 MINUTES: CPT | Performed by: FAMILY MEDICINE

## 2021-07-29 PROCEDURE — 84300 ASSAY OF URINE SODIUM: CPT | Performed by: INTERNAL MEDICINE

## 2021-07-29 PROCEDURE — 85576 BLOOD PLATELET AGGREGATION: CPT | Performed by: NURSE PRACTITIONER

## 2021-07-29 PROCEDURE — 82747 ASSAY OF FOLIC ACID RBC: CPT | Performed by: NURSE PRACTITIONER

## 2021-07-29 PROCEDURE — 82962 GLUCOSE BLOOD TEST: CPT

## 2021-07-29 PROCEDURE — 97161 PT EVAL LOW COMPLEX 20 MIN: CPT

## 2021-07-29 RX ORDER — ASPIRIN 81 MG/1
81 TABLET ORAL DAILY
Status: DISCONTINUED | OUTPATIENT
Start: 2021-07-30 | End: 2021-07-30 | Stop reason: HOSPADM

## 2021-07-29 RX ORDER — FERROUS SULFATE 325(65) MG
325 TABLET ORAL 2 TIMES DAILY WITH MEALS
Status: DISCONTINUED | OUTPATIENT
Start: 2021-07-29 | End: 2021-07-30 | Stop reason: HOSPADM

## 2021-07-29 RX ORDER — MAGNESIUM SULFATE HEPTAHYDRATE 40 MG/ML
4 INJECTION, SOLUTION INTRAVENOUS AS NEEDED
Status: DISCONTINUED | OUTPATIENT
Start: 2021-07-29 | End: 2021-07-30 | Stop reason: HOSPADM

## 2021-07-29 RX ORDER — MAGNESIUM SULFATE HEPTAHYDRATE 40 MG/ML
2 INJECTION, SOLUTION INTRAVENOUS AS NEEDED
Status: DISCONTINUED | OUTPATIENT
Start: 2021-07-29 | End: 2021-07-30 | Stop reason: HOSPADM

## 2021-07-29 RX ORDER — AMLODIPINE BESYLATE 10 MG/1
10 TABLET ORAL
Status: DISCONTINUED | OUTPATIENT
Start: 2021-07-29 | End: 2021-07-30 | Stop reason: HOSPADM

## 2021-07-29 RX ADMIN — ASPIRIN 325 MG ORAL TABLET 325 MG: 325 PILL ORAL at 09:00

## 2021-07-29 RX ADMIN — CETIRIZINE HYDROCHLORIDE 10 MG: 10 TABLET, FILM COATED ORAL at 09:00

## 2021-07-29 RX ADMIN — TRIAMCINOLONE ACETONIDE: 1 OINTMENT TOPICAL at 00:20

## 2021-07-29 RX ADMIN — ALLOPURINOL 150 MG: 300 TABLET ORAL at 09:00

## 2021-07-29 RX ADMIN — SODIUM CHLORIDE 100 ML/HR: 9 INJECTION, SOLUTION INTRAVENOUS at 09:12

## 2021-07-29 RX ADMIN — FERROUS SULFATE TAB 325 MG (65 MG ELEMENTAL FE) 325 MG: 325 (65 FE) TAB at 17:17

## 2021-07-29 RX ADMIN — FOLIC ACID 1 MG: 1 TABLET ORAL at 00:18

## 2021-07-29 RX ADMIN — ACETAMINOPHEN 650 MG: 325 TABLET ORAL at 12:16

## 2021-07-29 RX ADMIN — SODIUM CHLORIDE, PRESERVATIVE FREE 10 ML: 5 INJECTION INTRAVENOUS at 22:10

## 2021-07-29 RX ADMIN — ATORVASTATIN CALCIUM 80 MG: 40 TABLET, FILM COATED ORAL at 00:17

## 2021-07-29 RX ADMIN — DOCUSATE SODIUM 50MG AND SENNOSIDES 8.6MG 2 TABLET: 8.6; 5 TABLET, FILM COATED ORAL at 09:00

## 2021-07-29 RX ADMIN — TRIAMCINOLONE ACETONIDE: 1 OINTMENT TOPICAL at 22:11

## 2021-07-29 RX ADMIN — TRIAMCINOLONE ACETONIDE: 1 OINTMENT TOPICAL at 09:06

## 2021-07-29 RX ADMIN — AMLODIPINE BESYLATE 10 MG: 10 TABLET ORAL at 12:16

## 2021-07-29 RX ADMIN — DOCUSATE SODIUM 50MG AND SENNOSIDES 8.6MG 2 TABLET: 8.6; 5 TABLET, FILM COATED ORAL at 00:17

## 2021-07-29 RX ADMIN — ATORVASTATIN CALCIUM 80 MG: 40 TABLET, FILM COATED ORAL at 22:10

## 2021-07-29 RX ADMIN — SODIUM CHLORIDE, PRESERVATIVE FREE 10 ML: 5 INJECTION INTRAVENOUS at 00:18

## 2021-07-29 RX ADMIN — CLOPIDOGREL BISULFATE 75 MG: 75 TABLET ORAL at 09:00

## 2021-07-29 RX ADMIN — TICAGRELOR 90 MG: 90 TABLET ORAL at 22:13

## 2021-07-29 RX ADMIN — MULTIVITAMIN TABLET 1 TABLET: TABLET at 09:00

## 2021-07-29 RX ADMIN — DOCUSATE SODIUM 50MG AND SENNOSIDES 8.6MG 2 TABLET: 8.6; 5 TABLET, FILM COATED ORAL at 22:10

## 2021-07-29 RX ADMIN — MAGNESIUM SULFATE HEPTAHYDRATE 4 G: 40 INJECTION, SOLUTION INTRAVENOUS at 12:16

## 2021-07-29 RX ADMIN — MULTIVITAMIN TABLET 1 TABLET: TABLET at 00:17

## 2021-07-29 RX ADMIN — FOLIC ACID 1 MG: 1 TABLET ORAL at 08:59

## 2021-07-30 ENCOUNTER — TELEPHONE (OUTPATIENT)
Dept: INTERNAL MEDICINE | Facility: CLINIC | Age: 71
End: 2021-07-30

## 2021-07-30 ENCOUNTER — READMISSION MANAGEMENT (OUTPATIENT)
Dept: CALL CENTER | Facility: HOSPITAL | Age: 71
End: 2021-07-30

## 2021-07-30 VITALS
HEART RATE: 88 BPM | WEIGHT: 171 LBS | DIASTOLIC BLOOD PRESSURE: 86 MMHG | SYSTOLIC BLOOD PRESSURE: 157 MMHG | OXYGEN SATURATION: 99 % | HEIGHT: 70 IN | TEMPERATURE: 99 F | BODY MASS INDEX: 24.48 KG/M2 | RESPIRATION RATE: 18 BRPM

## 2021-07-30 LAB
FOLATE BLD-MCNC: 286 NG/ML
FOLATE RBC-MCNC: 891 NG/ML
HCT VFR BLD AUTO: 32.1 % (ref 37.5–51)

## 2021-07-30 PROCEDURE — 25010000002 THIAMINE PER 100 MG: Performed by: NURSE PRACTITIONER

## 2021-07-30 PROCEDURE — G0378 HOSPITAL OBSERVATION PER HR: HCPCS

## 2021-07-30 PROCEDURE — 99217 PR OBSERVATION CARE DISCHARGE MANAGEMENT: CPT | Performed by: FAMILY MEDICINE

## 2021-07-30 PROCEDURE — 99212 OFFICE O/P EST SF 10 MIN: CPT | Performed by: NURSE PRACTITIONER

## 2021-07-30 RX ORDER — FERROUS SULFATE 325(65) MG
325 TABLET ORAL 2 TIMES DAILY WITH MEALS
Qty: 60 TABLET | Refills: 0 | Status: SHIPPED | OUTPATIENT
Start: 2021-07-30

## 2021-07-30 RX ADMIN — SODIUM CHLORIDE, PRESERVATIVE FREE 10 ML: 5 INJECTION INTRAVENOUS at 08:57

## 2021-07-30 RX ADMIN — ACETAMINOPHEN 650 MG: 325 TABLET ORAL at 02:15

## 2021-07-30 RX ADMIN — FOLIC ACID 1 MG: 1 TABLET ORAL at 08:51

## 2021-07-30 RX ADMIN — ALLOPURINOL 150 MG: 300 TABLET ORAL at 08:48

## 2021-07-30 RX ADMIN — MULTIVITAMIN TABLET 1 TABLET: TABLET at 08:50

## 2021-07-30 RX ADMIN — TRIAMCINOLONE ACETONIDE: 1 OINTMENT TOPICAL at 08:58

## 2021-07-30 RX ADMIN — CETIRIZINE HYDROCHLORIDE 10 MG: 10 TABLET, FILM COATED ORAL at 08:51

## 2021-07-30 RX ADMIN — THIAMINE HYDROCHLORIDE 500 MG: 100 INJECTION, SOLUTION INTRAMUSCULAR; INTRAVENOUS at 08:57

## 2021-07-30 RX ADMIN — FERROUS SULFATE TAB 325 MG (65 MG ELEMENTAL FE) 325 MG: 325 (65 FE) TAB at 08:51

## 2021-07-30 RX ADMIN — TICAGRELOR 90 MG: 90 TABLET ORAL at 08:51

## 2021-07-30 RX ADMIN — AMLODIPINE BESYLATE 10 MG: 10 TABLET ORAL at 08:51

## 2021-07-30 RX ADMIN — ASPIRIN 81 MG: 81 TABLET, COATED ORAL at 08:51

## 2021-07-30 NOTE — OUTREACH NOTE
Prep Survey      Responses   [facility] patient discharged from?  [place]   Is LACE score < 7 ?  Yes   Emergency Room discharge w/ pulse ox?  No   Eligibility  [facility]   Date of Admission  07/28/21   Date of Discharge  07/30/21   Discharge Disposition  Home or Self Care   Discharge diagnosis  Ataxia   Does the patient have one of the following disease processes/diagnoses(primary or secondary)?  Other   Does the patient have Home health ordered?  No   Is there a DME ordered?  Yes   What DME was ordered?  [place] for DME   Prep survey completed?  Yes          Sheridan Moreno RN

## 2021-07-30 NOTE — TELEPHONE ENCOUNTER
Caller: CASANDRA    Relationship to patient: HOSPITAL STAFF    Best call back number:     New or established patient?  [] New  [x] Established    Date of discharge: 073021  Facility discharged from: [facility]    Diagnosis/Symptoms: ATAXIA    Length of stay (If applicable): [id number]    WARM TRANSFERRED TO OFFICE AS LIMITED AVAILABILITY

## 2021-08-02 ENCOUNTER — TRANSITIONAL CARE MANAGEMENT TELEPHONE ENCOUNTER (OUTPATIENT)
Dept: CALL CENTER | Facility: HOSPITAL | Age: 71
End: 2021-08-02

## 2021-08-02 ENCOUNTER — OFFICE VISIT (OUTPATIENT)
Dept: INTERNAL MEDICINE | Facility: CLINIC | Age: 71
End: 2021-08-02

## 2021-08-02 ENCOUNTER — LAB (OUTPATIENT)
Dept: LAB | Facility: HOSPITAL | Age: 71
End: 2021-08-02

## 2021-08-02 VITALS
WEIGHT: 166.4 LBS | OXYGEN SATURATION: 100 % | HEART RATE: 66 BPM | HEIGHT: 70 IN | BODY MASS INDEX: 23.82 KG/M2 | SYSTOLIC BLOOD PRESSURE: 146 MMHG | TEMPERATURE: 98.9 F | DIASTOLIC BLOOD PRESSURE: 68 MMHG

## 2021-08-02 DIAGNOSIS — I66.02 STENOSIS OF LEFT MIDDLE CEREBRAL ARTERY: ICD-10-CM

## 2021-08-02 DIAGNOSIS — N17.9 ACUTE RENAL FAILURE SUPERIMPOSED ON STAGE 3B CHRONIC KIDNEY DISEASE, UNSPECIFIED ACUTE RENAL FAILURE TYPE (HCC): ICD-10-CM

## 2021-08-02 DIAGNOSIS — N18.32 ACUTE RENAL FAILURE SUPERIMPOSED ON STAGE 3B CHRONIC KIDNEY DISEASE, UNSPECIFIED ACUTE RENAL FAILURE TYPE (HCC): ICD-10-CM

## 2021-08-02 DIAGNOSIS — G45.9 TIA (TRANSIENT ISCHEMIC ATTACK): Primary | ICD-10-CM

## 2021-08-02 DIAGNOSIS — I10 ESSENTIAL HYPERTENSION: ICD-10-CM

## 2021-08-02 DIAGNOSIS — R27.0 ATAXIA: ICD-10-CM

## 2021-08-02 LAB
ANION GAP SERPL CALCULATED.3IONS-SCNC: 11.5 MMOL/L (ref 5–15)
BUN SERPL-MCNC: 29 MG/DL (ref 8–23)
BUN/CREAT SERPL: 11.2 (ref 7–25)
CALCIUM SPEC-SCNC: 9.2 MG/DL (ref 8.6–10.5)
CHLORIDE SERPL-SCNC: 104 MMOL/L (ref 98–107)
CO2 SERPL-SCNC: 22.5 MMOL/L (ref 22–29)
CREAT SERPL-MCNC: 2.58 MG/DL (ref 0.76–1.27)
GFR SERPL CREATININE-BSD FRML MDRD: 30 ML/MIN/1.73
GLUCOSE SERPL-MCNC: 100 MG/DL (ref 65–99)
POTASSIUM SERPL-SCNC: 4.3 MMOL/L (ref 3.5–5.2)
SODIUM SERPL-SCNC: 138 MMOL/L (ref 136–145)

## 2021-08-02 PROCEDURE — 99496 TRANSJ CARE MGMT HIGH F2F 7D: CPT | Performed by: INTERNAL MEDICINE

## 2021-08-02 PROCEDURE — 1111F DSCHRG MED/CURRENT MED MERGE: CPT | Performed by: INTERNAL MEDICINE

## 2021-08-02 PROCEDURE — 80048 BASIC METABOLIC PNL TOTAL CA: CPT

## 2021-08-02 NOTE — PROGRESS NOTES
Transitional Care Follow Up Visit  Subjective     Eyal Solano is a 70 y.o. male who presents for a transitional care management visit.    Within 48 business hours after discharge our office contacted him via telephone to coordinate his care and needs.      I reviewed and discussed the details of that call along with the discharge summary, hospital problems, inpatient lab results, inpatient diagnostic studies, and consultation reports with Eyal.     Current outpatient and discharge medications have been reconciled for the patient.  Reviewed by: Edda Sanchez MD      Date of TCM Phone Call 7/30/2021   [facility]   Date of Admission 7/28/2021   Date of Discharge 7/30/2021   Discharge Disposition Home or Self Care     Risk for Readmission (LACE) Score: 5 (7/30/2021  6:01 AM)      History of Present Illness   Course During Hospital Stay: Admitted 7/28-7/30 for ataxia. He had been doing well at his recent visit 7/19/2021 but developed acute onset ataxia, sweating, vomiting. He was admitted to the hospital in [place] from 7/20-7/26. He reports medication change but is not certain what was changed. He went to ED 7/26 and was found to have SAMINA and was given meclizine which did not relieve his symptoms. He represented on 7/28 and was admitted. He was found to have severe L MCA stenosis. He was evaluated by neurology and neurosurgery. Plan for outpatient MCA stent. Per neurology keep -160. He does note a few occasions since discharge of BP <120 and becoming symptomatic. He lays down and eventually symptoms resolve.     The following portions of the patient's history were reviewed and updated as appropriate: current medications, past medical history and problem list.    Review of Systems   Constitutional: Positive for activity change.   Eyes: Positive for visual disturbance (blurred when BP low).   Respiratory: Negative.    Cardiovascular: Negative.    Genitourinary: Negative for decreased urine  volume and difficulty urinating.   Neurological: Positive for dizziness, weakness (R arm when BP low) and light-headedness. Negative for syncope.       Objective   Physical Exam  Vitals and nursing note reviewed.   Constitutional:       General: He is not in acute distress.     Appearance: He is well-developed and normal weight. He is not ill-appearing or toxic-appearing.   HENT:      Head: Normocephalic and atraumatic.   Eyes:      Conjunctiva/sclera: Conjunctivae normal.   Cardiovascular:      Rate and Rhythm: Normal rate and regular rhythm.      Heart sounds: Normal heart sounds.   Pulmonary:      Effort: Pulmonary effort is normal. No respiratory distress.      Breath sounds: Normal breath sounds.   Musculoskeletal:      Right lower leg: No edema.      Left lower leg: No edema.   Skin:     General: Skin is warm and dry.   Neurological:      Mental Status: He is alert and oriented to person, place, and time. Mental status is at baseline.      Motor: No weakness.      Gait: Gait abnormal (steady gait with rolling walker).   Psychiatric:         Mood and Affect: Mood normal.         Behavior: Behavior normal.         Assessment/Plan   Diagnoses and all orders for this visit:    Ataxia, TIA (transient ischemic attack) due to Stenosis of left middle cerebral artery  - Dizziness, ataxia, RUE weakness and blurred vision occurring when BP <120.   - Found to have severe L MCA stenosis while admitted.  - Will follow up with Dr. Grey 8/18 to discuss L MCA stent  - In the interim aiming to keep BP >120 but <160. Today 146/68 and he is doing well. However has had BP<120 with recurrence of symptoms.  - Will have him hold HCTZ 12.5mg daily unless SBP>160    Acute renal failure superimposed on stage 3b chronic kidney disease, unspecified acute renal failure type (CMS/HCC)  - Baseline creatinine 1.5-1.8, gfr 45-55. Protein creatinine ratio of 1581.9. Developed SAMINA, creatinine up to 2.5 with gfr 31. Improved to 1.93 and gfr 42  by discharge.  - CKD most likely due to hypertensive nephrosclerosis, treating HTN as below including ACEI which will help proteinuria. Will be able to manage HTN better after MCA stent.  - Repeat BMP today  - Has established with nephrology, Dr. Fischer. Has follow up 8/12.    Essential hypertension  - Management complicated by L MCA stenosis noted above. Currently keeping -160.  - BP today 142/82 but has had BP <120.  - Continue lisinopril 5mg daily, norvasc 10mg daily, coreg 6.25mg BID. Hold HCTZ 12.5mg daily unless SBP>160.    Health Maintenance  - Colonoscopy: Declined  - HCV: negative  - Immunizations: COVID complete. Declined pneumovax and flu. Discussed shingrix.  - Depression screening: negative 7/2021         Return in about 4 months (around 11/17/2021) for as scheduled, Labs today.

## 2021-08-02 NOTE — OUTREACH NOTE
Call Center TCM Note      Responses   [facility] patient discharged from?  [place]   Does the patient have one of the following disease processes/diagnoses(primary or secondary)?  Other   TCM attempt successful?  Yes [No current verbal release. ]   Call start time  1250   Call end time  1255   Discharge diagnosis  Ataxia   Meds reviewed with patient/caregiver?  Yes   Is the patient having any side effects they believe may be caused by any medication additions or changes?  No   Does the patient have all medications ordered at discharge?  Yes   Is the patient taking all medications as directed (includes completed medication regime)?  Yes   Does the patient have a primary care provider?   Yes   Does the patient have an appointment with their PCP within 7 days of discharge?  Yes   Comments regarding PCP  DC FU appt with PCP 8/2/21 @ 9:45am.   Has the patient kept scheduled appointments due by today?  Yes   Psychosocial issues?  No   Did the patient receive a copy of their discharge instructions?  Yes   Nursing interventions  Reviewed instructions with patient   What is the patient's perception of their health status since discharge?  Improving   Is the patient/caregiver able to teach back signs and symptoms related to disease process for when to call PCP?  Yes   Is the patient/caregiver able to teach back signs and symptoms related to disease process for when to call 911?  Yes   Is the patient/caregiver able to teach back the hierarchy of who to call/visit for symptoms/problems? PCP, Specialist, Home health nurse, Urgent Care, ED, 911  Yes   If the patient is a current smoker, are they able to teach back resources for cessation?  Not a smoker   TCM call completed?  Yes          Andree Warren RN    8/2/2021, 12:55 EDT

## 2021-08-03 LAB — VIT B1 BLD-SCNC: 88.4 NMOL/L (ref 66.5–200)

## 2021-08-10 LAB
QT INTERVAL: 344 MS
QTC INTERVAL: 420 MS

## 2021-08-18 ENCOUNTER — OFFICE VISIT (OUTPATIENT)
Dept: NEUROSURGERY | Facility: CLINIC | Age: 71
End: 2021-08-18

## 2021-08-18 VITALS
RESPIRATION RATE: 20 BRPM | DIASTOLIC BLOOD PRESSURE: 60 MMHG | TEMPERATURE: 97.1 F | BODY MASS INDEX: 23.05 KG/M2 | HEART RATE: 104 BPM | SYSTOLIC BLOOD PRESSURE: 126 MMHG | WEIGHT: 161 LBS | HEIGHT: 70 IN

## 2021-08-18 DIAGNOSIS — I66.02 STENOSIS OF LEFT MIDDLE CEREBRAL ARTERY: ICD-10-CM

## 2021-08-18 DIAGNOSIS — I63.512 ACUTE ISCHEMIC LEFT MCA STROKE (HCC): Primary | ICD-10-CM

## 2021-08-18 PROCEDURE — 99214 OFFICE O/P EST MOD 30 MIN: CPT | Performed by: NEUROLOGICAL SURGERY

## 2021-08-18 NOTE — PROGRESS NOTES
Subjective     Chief Complaint: Left MCA stenosis    Patient ID: Eyal Solano is a 70 y.o. male is here today for follow-up.    History of Present Illness    This is a 70-year-old man who our service saw in the hospital about a month ago for symptomatic left MCA stenosis.  He was on aspirin and Plavix prior to his event, but was found to be a Plavix nonresponder.  I switched him to Brilinta and established outpatient follow-up.  Since being discharged home from the hospital, he does not report any worsening in his symptoms.  He still has occasional expressive aphasia and he has what sound like limb shaking TIAs on the right side.  He also endorses decreased control of the right side of his body.  He denies any smoking or drug abuse.  He does have a history of alcohol use.  He denies diabetes or hypertension although he is taking Norvasc and lisinopril as an outpatient.  He does have dyslipidemia which is being treated with Lipitor 80 mg.    The following portions of the patient's history were reviewed and updated as appropriate: allergies, current medications, past family history, past medical history, past social history, past surgical history and problem list.    Family history:   Family History   Problem Relation Age of Onset   • Hyperlipidemia Mother    • Hypertension Mother    • Stroke Father 82   • Heart attack Father 82   • Hypertension Brother    • Heart disease Maternal Grandmother    • Heart disease Maternal Grandfather    • Heart disease Paternal Grandmother    • Heart disease Paternal Grandfather        Social history:   Social History     Socioeconomic History   • Marital status: Single     Spouse name: Not on file   • Number of children: Not on file   • Years of education: Not on file   • Highest education level: Not on file   Tobacco Use   • Smoking status: Former Smoker     Types: Cigarettes   • Smokeless tobacco: Current User     Types: Chew   Vaping Use   • Vaping Use: Never used   Substance and  Sexual Activity   • Alcohol use: Yes     Alcohol/week: 12.0 standard drinks     Types: 12 Standard drinks or equivalent per week     Comment:  Previously 6 beers and 1/2 pint liqour on weekends.    • Drug use: No   • Sexual activity: Defer       Review of Systems   Constitutional: Negative for activity change, appetite change, chills, diaphoresis, fatigue, fever and unexpected weight change.   HENT: Negative for congestion, dental problem, drooling, ear discharge, ear pain, facial swelling, hearing loss, mouth sores, nosebleeds, postnasal drip, rhinorrhea, sinus pressure, sinus pain, sneezing, sore throat, tinnitus, trouble swallowing and voice change.    Eyes: Negative for photophobia, pain, discharge, redness, itching and visual disturbance.   Respiratory: Negative for apnea, cough, choking, chest tightness, shortness of breath, wheezing and stridor.    Cardiovascular: Negative for chest pain, palpitations and leg swelling.   Gastrointestinal: Negative for abdominal distention, abdominal pain, anal bleeding, blood in stool, constipation, diarrhea, nausea, rectal pain and vomiting.   Endocrine: Negative for cold intolerance, heat intolerance, polydipsia, polyphagia and polyuria.   Genitourinary: Negative for decreased urine volume, difficulty urinating, dysuria, enuresis, flank pain, frequency, genital sores, hematuria and urgency.   Musculoskeletal: Negative for arthralgias, back pain, gait problem, joint swelling, myalgias, neck pain and neck stiffness.   Skin: Negative for color change, pallor, rash and wound.   Allergic/Immunologic: Negative for environmental allergies, food allergies and immunocompromised state.   Neurological: Negative for dizziness, tremors, seizures, syncope, facial asymmetry, speech difficulty, weakness, light-headedness, numbness and headaches.   Hematological: Negative for adenopathy. Does not bruise/bleed easily.   Psychiatric/Behavioral: Negative for agitation, behavioral problems,  "confusion, decreased concentration, dysphoric mood, hallucinations, self-injury, sleep disturbance and suicidal ideas. The patient is not nervous/anxious and is not hyperactive.        Objective   Blood pressure 126/60, pulse 104, temperature 97.1 °F (36.2 °C), resp. rate 20, height 177.8 cm (70\"), weight 73 kg (161 lb).  Body mass index is 23.1 kg/m².    Physical Exam  Vitals reviewed.   Constitutional:       General: He is not in acute distress.     Appearance: He is well-developed. He is not diaphoretic.   HENT:      Head: Normocephalic and atraumatic.   Pulmonary:      Effort: Pulmonary effort is normal.   Skin:     General: Skin is warm and dry.   Neurological:      Mental Status: He is alert and oriented to person, place, and time.      Deep Tendon Reflexes: Reflexes abnormal.      Reflex Scores:       Bicep reflexes are 3+ on the right side and 2+ on the left side.       Brachioradialis reflexes are 3+ on the right side and 2+ on the left side.       Patellar reflexes are 3+ on the right side and 2+ on the left side.     Comments: Right-sided pronator drift.  Asymmetrically hyperreflexic on the right upper and right lower extremity.  Speech production is fluent.  Naming and repetition are intact.   Psychiatric:         Behavior: Behavior normal.         Assessment/Plan     Independent Review of Radiographic Studies:      Available for my review is a MRI and MRA of the brain, both of which were performed on 7/28/2021.  There is a high-grade stenosis of the left MCA M1 segment, starting in about the mid segment and extending 4 mm towards the bifurcation.  There has been progression since his MRA from 2018.  His MRI of the brain does not demonstrate any definite evidence of recent diffusion restrictions.    Medical Decision Making:      Informed consent for a diagnostic cerebral angiogram with probable stenting of his left MCA was obtained from the patient in the presence of his daughter.  They acknowledge the " risk of stroke, death, pain, paralysis, coma, blindness, permanent neurologic disability, bleeding, infection, failure of benefit of the operation, or need for additional procedures.  All questions were answered.  No guarantees were given or implied.  Return to ED criteria and signs/symptoms of stroke/TIA were once again reviewed with the patient.  We will schedule him on a semielective basis in the near future.      He is to continue his aspirin and Brilinta until his procedure.    Diagnoses and all orders for this visit:    1. Acute ischemic left MCA stroke (CMS/HCC) (Primary)  -     Case Request Cath Lab: Cerebral angiogram for Intracranial Stent under anesthesia; Standing  -     Case Request Cath Lab: Cerebral angiogram for Intracranial Stent under anesthesia    2. Stenosis of left middle cerebral artery        No follow-ups on file.           This document signed by WARREN Grey MD August 18, 2021 12:37 EDT

## 2021-08-19 ENCOUNTER — PREP FOR SURGERY (OUTPATIENT)
Dept: OTHER | Facility: HOSPITAL | Age: 71
End: 2021-08-19

## 2021-08-19 DIAGNOSIS — I66.02 STENOSIS OF LEFT MIDDLE CEREBRAL ARTERY: Primary | ICD-10-CM

## 2021-08-20 ENCOUNTER — PRE-ADMISSION TESTING (OUTPATIENT)
Dept: PREADMISSION TESTING | Facility: HOSPITAL | Age: 71
End: 2021-08-20

## 2021-08-20 DIAGNOSIS — I66.02 STENOSIS OF LEFT MIDDLE CEREBRAL ARTERY: ICD-10-CM

## 2021-08-20 LAB
ANION GAP SERPL CALCULATED.3IONS-SCNC: 9 MMOL/L (ref 5–15)
BASOPHILS # BLD AUTO: 0.03 10*3/MM3 (ref 0–0.2)
BASOPHILS NFR BLD AUTO: 0.5 % (ref 0–1.5)
BUN SERPL-MCNC: 33 MG/DL (ref 8–23)
BUN/CREAT SERPL: 12.4 (ref 7–25)
CALCIUM SPEC-SCNC: 9.2 MG/DL (ref 8.6–10.5)
CHLORIDE SERPL-SCNC: 103 MMOL/L (ref 98–107)
CO2 SERPL-SCNC: 25 MMOL/L (ref 22–29)
CREAT SERPL-MCNC: 2.67 MG/DL (ref 0.76–1.27)
DEPRECATED RDW RBC AUTO: 44.8 FL (ref 37–54)
EOSINOPHIL # BLD AUTO: 0.32 10*3/MM3 (ref 0–0.4)
EOSINOPHIL NFR BLD AUTO: 5.9 % (ref 0.3–6.2)
ERYTHROCYTE [DISTWIDTH] IN BLOOD BY AUTOMATED COUNT: 14 % (ref 12.3–15.4)
GFR SERPL CREATININE-BSD FRML MDRD: 29 ML/MIN/1.73
GLUCOSE SERPL-MCNC: 94 MG/DL (ref 65–99)
HCT VFR BLD AUTO: 32.2 % (ref 37.5–51)
HGB BLD-MCNC: 10 G/DL (ref 13–17.7)
IMM GRANULOCYTES # BLD AUTO: 0.03 10*3/MM3 (ref 0–0.05)
IMM GRANULOCYTES NFR BLD AUTO: 0.5 % (ref 0–0.5)
LYMPHOCYTES # BLD AUTO: 0.98 10*3/MM3 (ref 0.7–3.1)
LYMPHOCYTES NFR BLD AUTO: 17.9 % (ref 19.6–45.3)
MCH RBC QN AUTO: 27 PG (ref 26.6–33)
MCHC RBC AUTO-ENTMCNC: 31.1 G/DL (ref 31.5–35.7)
MCV RBC AUTO: 86.8 FL (ref 79–97)
MONOCYTES # BLD AUTO: 0.48 10*3/MM3 (ref 0.1–0.9)
MONOCYTES NFR BLD AUTO: 8.8 % (ref 5–12)
NEUTROPHILS NFR BLD AUTO: 3.63 10*3/MM3 (ref 1.7–7)
NEUTROPHILS NFR BLD AUTO: 66.4 % (ref 42.7–76)
NRBC BLD AUTO-RTO: 0 /100 WBC (ref 0–0.2)
PLATELET # BLD AUTO: 350 10*3/MM3 (ref 140–450)
PMV BLD AUTO: 10.2 FL (ref 6–12)
POTASSIUM SERPL-SCNC: 5.2 MMOL/L (ref 3.5–5.2)
RBC # BLD AUTO: 3.71 10*6/MM3 (ref 4.14–5.8)
SODIUM SERPL-SCNC: 137 MMOL/L (ref 136–145)
WBC # BLD AUTO: 5.47 10*3/MM3 (ref 3.4–10.8)

## 2021-08-20 PROCEDURE — 85025 COMPLETE CBC W/AUTO DIFF WBC: CPT

## 2021-08-20 PROCEDURE — 80048 BASIC METABOLIC PNL TOTAL CA: CPT

## 2021-08-20 PROCEDURE — 36415 COLL VENOUS BLD VENIPUNCTURE: CPT

## 2021-08-20 NOTE — DISCHARGE INSTRUCTIONS
"Dear Patient,    Do NOT eat, drink, or smoke after midnight the night before your procedure.   Take your medications as instructed by your doctor.    Glasses and jewelry may be worn, but dentures must be removed prior to your procedure.    Leave any items you consider valuable at home.      MORNING of your Procedure, please bring the following:     -Photo ID and insurance card(s)    -ALL medications in their ORIGINAL CONTAINERS    -Co-pay and/or deductible required by your insurance   -Copy of living will or power of  document (if not brought to    Pre-Admission Testing department)   -CPAP mask and tubing, not your machine (if applicable)    -Relaxation aids (music, books, magazines)   -Skin Prep Instruction Sheet (if applicable)   -Relaxation Aids    Check in on the 2nd floor in the [facility].  Your procedure will be performed in the cath lab or EP lab.  During your procedure, your family will wait in the cath lab waiting area where you checked in.      Need to make arrangements for transportation prior to discharge.    A handout regarding \"Heart Healthy Eating\" was provided today to encourage healthy eating habits.    Booklet published by [employer] was given in Pre-Admission testing.  This booklet is for informational purposes only.  If you have any questions about your procedure, please speak with your physician.      Please note:  If you are scheduled to have one of the following procedures: Pulmonary Vein Ablation, Lead Extraction, MitraClip, Cerebral Coilings or Embolization, please let your family know that after your procedure you will be going to recovery unit on the 2nd floor of the [facility].  When the physician is finished speaking with your family after your procedure is completed, your family will be directed or escorted to the surgery waiting area in the [facility].  This is where your family will wait until you are given a room assignment and then your family will be directed to the " appropriate unit.

## 2021-08-20 NOTE — PAT
Colon screening information booklet given to patient during PAT visit    covid test scheduled Sunday    ekg in chart from 7/28/2021    Note in epic stating patient is to continue brillinta until procedure.

## 2021-08-22 ENCOUNTER — APPOINTMENT (OUTPATIENT)
Dept: PREADMISSION TESTING | Facility: HOSPITAL | Age: 71
End: 2021-08-22

## 2021-08-22 LAB — SARS-COV-2 RNA PNL SPEC NAA+PROBE: NOT DETECTED

## 2021-08-22 PROCEDURE — U0005 INFEC AGEN DETEC AMPLI PROBE: HCPCS

## 2021-08-22 PROCEDURE — U0004 COV-19 TEST NON-CDC HGH THRU: HCPCS

## 2021-08-22 PROCEDURE — C9803 HOPD COVID-19 SPEC COLLECT: HCPCS

## 2021-08-23 ENCOUNTER — ANESTHESIA EVENT (OUTPATIENT)
Dept: CARDIOLOGY | Facility: HOSPITAL | Age: 71
End: 2021-08-23

## 2021-08-24 ENCOUNTER — HOSPITAL ENCOUNTER (INPATIENT)
Facility: HOSPITAL | Age: 71
LOS: 1 days | Discharge: HOME | End: 2021-08-25
Attending: NEUROLOGICAL SURGERY | Admitting: NEUROLOGICAL SURGERY

## 2021-08-24 ENCOUNTER — ANESTHESIA (OUTPATIENT)
Dept: CARDIOLOGY | Facility: HOSPITAL | Age: 71
End: 2021-08-24

## 2021-08-24 DIAGNOSIS — I63.512 ACUTE ISCHEMIC LEFT MCA STROKE (HCC): ICD-10-CM

## 2021-08-24 PROBLEM — I66.12: Chronic | Status: ACTIVE | Noted: 2021-08-24

## 2021-08-24 PROBLEM — E78.2 MIXED HYPERLIPIDEMIA: Chronic | Status: ACTIVE | Noted: 2021-07-19

## 2021-08-24 PROBLEM — I66.12: Status: ACTIVE | Noted: 2021-08-24

## 2021-08-24 PROBLEM — Z87.891 FORMER SMOKER: Status: ACTIVE | Noted: 2021-08-24

## 2021-08-24 PROBLEM — N18.9 CKD (CHRONIC KIDNEY DISEASE): Status: ACTIVE | Noted: 2018-12-19

## 2021-08-24 PROBLEM — N18.9 CKD (CHRONIC KIDNEY DISEASE): Chronic | Status: ACTIVE | Noted: 2018-12-19

## 2021-08-24 LAB
BASE EXCESS BLDA CALC-SCNC: -1 MMOL/L (ref -5–5)
CA-I BLDA-SCNC: 1.31 MMOL/L (ref 1.2–1.32)
CO2 BLDA-SCNC: 26 MMOL/L (ref 24–29)
GLUCOSE BLDC GLUCOMTR-MCNC: 98 MG/DL (ref 70–130)
HCO3 BLDA-SCNC: 24.8 MMOL/L (ref 22–26)
HCT VFR BLD AUTO: 34.9 % (ref 37.5–51)
HCT VFR BLDA CALC: 34 % (ref 38–51)
HGB BLD-MCNC: 10.4 G/DL (ref 13–17.7)
HGB BLDA-MCNC: 11.6 G/DL (ref 12–17)
PA ADP PRP-ACNC: 82 PRU
PCO2 BLDA: 45 MM HG (ref 35–45)
PH BLDA: 7.35 PH UNITS (ref 7.35–7.6)
PO2 BLDA: 38 MMHG (ref 80–105)
POTASSIUM BLDA-SCNC: 5.2 MMOL/L (ref 3.5–4.9)
SAO2 % BLDA: 68 % (ref 95–98)
SODIUM BLD-SCNC: 140 MMOL/L (ref 138–146)

## 2021-08-24 PROCEDURE — 85576 BLOOD PLATELET AGGREGATION: CPT | Performed by: NEUROLOGICAL SURGERY

## 2021-08-24 PROCEDURE — 85014 HEMATOCRIT: CPT | Performed by: INTERNAL MEDICINE

## 2021-08-24 PROCEDURE — 25010000003 LIDOCAINE 1 % SOLUTION: Performed by: NURSE ANESTHETIST, CERTIFIED REGISTERED

## 2021-08-24 PROCEDURE — C1769 GUIDE WIRE: HCPCS | Performed by: NEUROLOGICAL SURGERY

## 2021-08-24 PROCEDURE — 82947 ASSAY GLUCOSE BLOOD QUANT: CPT

## 2021-08-24 PROCEDURE — B3151ZZ FLUOROSCOPY OF BILATERAL COMMON CAROTID ARTERIES USING LOW OSMOLAR CONTRAST: ICD-10-PCS | Performed by: NEUROLOGICAL SURGERY

## 2021-08-24 PROCEDURE — B31G1ZZ FLUOROSCOPY OF BILATERAL VERTEBRAL ARTERIES USING LOW OSMOLAR CONTRAST: ICD-10-PCS | Performed by: NEUROLOGICAL SURGERY

## 2021-08-24 PROCEDURE — 36224 PLACE CATH CAROTD ART: CPT | Performed by: NEUROLOGICAL SURGERY

## 2021-08-24 PROCEDURE — 84295 ASSAY OF SERUM SODIUM: CPT

## 2021-08-24 PROCEDURE — 25010000002 PROPOFOL 10 MG/ML EMULSION: Performed by: NURSE ANESTHETIST, CERTIFIED REGISTERED

## 2021-08-24 PROCEDURE — 84132 ASSAY OF SERUM POTASSIUM: CPT

## 2021-08-24 PROCEDURE — 36226 PLACE CATH VERTEBRAL ART: CPT | Performed by: NEUROLOGICAL SURGERY

## 2021-08-24 PROCEDURE — 25010000002 DEXAMETHASONE PER 1 MG: Performed by: NURSE ANESTHETIST, CERTIFIED REGISTERED

## 2021-08-24 PROCEDURE — 25010000002 NEOSTIGMINE 10 MG/10ML SOLUTION: Performed by: NURSE ANESTHETIST, CERTIFIED REGISTERED

## 2021-08-24 PROCEDURE — 99232 SBSQ HOSP IP/OBS MODERATE 35: CPT | Performed by: INTERNAL MEDICINE

## 2021-08-24 PROCEDURE — B3121ZZ FLUOROSCOPY OF LEFT SUBCLAVIAN ARTERY USING LOW OSMOLAR CONTRAST: ICD-10-PCS | Performed by: NEUROLOGICAL SURGERY

## 2021-08-24 PROCEDURE — 82330 ASSAY OF CALCIUM: CPT

## 2021-08-24 PROCEDURE — B3181ZZ FLUOROSCOPY OF BILATERAL INTERNAL CAROTID ARTERIES USING LOW OSMOLAR CONTRAST: ICD-10-PCS | Performed by: NEUROLOGICAL SURGERY

## 2021-08-24 PROCEDURE — 85018 HEMOGLOBIN: CPT | Performed by: INTERNAL MEDICINE

## 2021-08-24 PROCEDURE — C1766 INTRO/SHEATH,STRBLE,NON-PEEL: HCPCS | Performed by: NEUROLOGICAL SURGERY

## 2021-08-24 PROCEDURE — 25010000002 HEPARIN (PORCINE) PER 1000 UNITS: Performed by: NURSE ANESTHETIST, CERTIFIED REGISTERED

## 2021-08-24 PROCEDURE — 82803 BLOOD GASES ANY COMBINATION: CPT

## 2021-08-24 PROCEDURE — C1887 CATHETER, GUIDING: HCPCS | Performed by: NEUROLOGICAL SURGERY

## 2021-08-24 PROCEDURE — C1874 STENT, COATED/COV W/DEL SYS: HCPCS | Performed by: NEUROLOGICAL SURGERY

## 2021-08-24 PROCEDURE — B41F1ZZ FLUOROSCOPY OF RIGHT LOWER EXTREMITY ARTERIES USING LOW OSMOLAR CONTRAST: ICD-10-PCS | Performed by: NEUROLOGICAL SURGERY

## 2021-08-24 PROCEDURE — 037G3DZ DILATION OF INTRACRANIAL ARTERY WITH INTRALUMINAL DEVICE, PERCUTANEOUS APPROACH: ICD-10-PCS | Performed by: NEUROLOGICAL SURGERY

## 2021-08-24 PROCEDURE — 0 IODIXANOL PER 1 ML: Performed by: NEUROLOGICAL SURGERY

## 2021-08-24 PROCEDURE — C1894 INTRO/SHEATH, NON-LASER: HCPCS | Performed by: NEUROLOGICAL SURGERY

## 2021-08-24 PROCEDURE — C1760 CLOSURE DEV, VASC: HCPCS | Performed by: NEUROLOGICAL SURGERY

## 2021-08-24 PROCEDURE — 85014 HEMATOCRIT: CPT

## 2021-08-24 PROCEDURE — 61635 INTRACRAN ANGIOPLSTY W/STENT: CPT | Performed by: NEUROLOGICAL SURGERY

## 2021-08-24 PROCEDURE — 25010000003 LIDOCAINE 1 % SOLUTION: Performed by: NEUROLOGICAL SURGERY

## 2021-08-24 DEVICE — XIENCE SIERRA™ EVEROLIMUS ELUTING CORONARY STENT SYSTEM 2.25 MM X 08 MM / RAPID-EXCHANGE
Type: IMPLANTABLE DEVICE | Status: FUNCTIONAL
Brand: XIENCE SIERRA™

## 2021-08-24 RX ORDER — LABETALOL HYDROCHLORIDE 5 MG/ML
INJECTION, SOLUTION INTRAVENOUS AS NEEDED
Status: DISCONTINUED | OUTPATIENT
Start: 2021-08-24 | End: 2021-08-24 | Stop reason: SURG

## 2021-08-24 RX ORDER — SODIUM CHLORIDE 9 MG/ML
INJECTION, SOLUTION INTRAVENOUS CONTINUOUS PRN
Status: DISCONTINUED | OUTPATIENT
Start: 2021-08-24 | End: 2021-08-24 | Stop reason: SURG

## 2021-08-24 RX ORDER — SODIUM CHLORIDE 9 MG/ML
75 INJECTION, SOLUTION INTRAVENOUS CONTINUOUS
Status: DISCONTINUED | OUTPATIENT
Start: 2021-08-24 | End: 2021-08-25 | Stop reason: HOSPADM

## 2021-08-24 RX ORDER — ASPIRIN 325 MG
325 TABLET ORAL DAILY
Status: DISCONTINUED | OUTPATIENT
Start: 2021-08-24 | End: 2021-08-24 | Stop reason: ALTCHOICE

## 2021-08-24 RX ORDER — ROCURONIUM BROMIDE 10 MG/ML
INJECTION, SOLUTION INTRAVENOUS AS NEEDED
Status: DISCONTINUED | OUTPATIENT
Start: 2021-08-24 | End: 2021-08-24 | Stop reason: SURG

## 2021-08-24 RX ORDER — SODIUM CHLORIDE 0.9 % (FLUSH) 0.9 %
10 SYRINGE (ML) INJECTION AS NEEDED
Status: DISCONTINUED | OUTPATIENT
Start: 2021-08-24 | End: 2021-08-25 | Stop reason: HOSPADM

## 2021-08-24 RX ORDER — HEPARIN SODIUM 1000 [USP'U]/ML
INJECTION, SOLUTION INTRAVENOUS; SUBCUTANEOUS AS NEEDED
Status: DISCONTINUED | OUTPATIENT
Start: 2021-08-24 | End: 2021-08-24 | Stop reason: SURG

## 2021-08-24 RX ORDER — ASPIRIN 81 MG/1
81 TABLET, CHEWABLE ORAL DAILY
Status: DISCONTINUED | OUTPATIENT
Start: 2021-08-25 | End: 2021-08-25 | Stop reason: HOSPADM

## 2021-08-24 RX ORDER — EPHEDRINE SULFATE 50 MG/ML
5 INJECTION, SOLUTION INTRAVENOUS ONCE AS NEEDED
Status: DISCONTINUED | OUTPATIENT
Start: 2021-08-24 | End: 2021-08-24 | Stop reason: HOSPADM

## 2021-08-24 RX ORDER — ALLOPURINOL 300 MG/1
150 TABLET ORAL DAILY
Status: DISCONTINUED | OUTPATIENT
Start: 2021-08-24 | End: 2021-08-25 | Stop reason: HOSPADM

## 2021-08-24 RX ORDER — NEOSTIGMINE METHYLSULFATE 1 MG/ML
INJECTION, SOLUTION INTRAVENOUS AS NEEDED
Status: DISCONTINUED | OUTPATIENT
Start: 2021-08-24 | End: 2021-08-24 | Stop reason: SURG

## 2021-08-24 RX ORDER — FAMOTIDINE 20 MG/1
20 TABLET, FILM COATED ORAL ONCE
Status: COMPLETED | OUTPATIENT
Start: 2021-08-24 | End: 2021-08-24

## 2021-08-24 RX ORDER — AMLODIPINE BESYLATE 10 MG/1
10 TABLET ORAL DAILY
Status: DISCONTINUED | OUTPATIENT
Start: 2021-08-24 | End: 2021-08-25 | Stop reason: HOSPADM

## 2021-08-24 RX ORDER — ATORVASTATIN CALCIUM 40 MG/1
80 TABLET, FILM COATED ORAL NIGHTLY
Status: DISCONTINUED | OUTPATIENT
Start: 2021-08-24 | End: 2021-08-25 | Stop reason: HOSPADM

## 2021-08-24 RX ORDER — ACETAMINOPHEN 325 MG/1
650 TABLET ORAL EVERY 4 HOURS PRN
Status: DISCONTINUED | OUTPATIENT
Start: 2021-08-24 | End: 2021-08-25 | Stop reason: HOSPADM

## 2021-08-24 RX ORDER — LIDOCAINE HYDROCHLORIDE 10 MG/ML
INJECTION, SOLUTION INFILTRATION; PERINEURAL AS NEEDED
Status: DISCONTINUED | OUTPATIENT
Start: 2021-08-24 | End: 2021-08-24 | Stop reason: HOSPADM

## 2021-08-24 RX ORDER — CARVEDILOL 6.25 MG/1
6.25 TABLET ORAL 2 TIMES DAILY WITH MEALS
Status: DISCONTINUED | OUTPATIENT
Start: 2021-08-24 | End: 2021-08-25 | Stop reason: HOSPADM

## 2021-08-24 RX ORDER — GLYCOPYRROLATE 0.2 MG/ML
INJECTION INTRAMUSCULAR; INTRAVENOUS AS NEEDED
Status: DISCONTINUED | OUTPATIENT
Start: 2021-08-24 | End: 2021-08-24 | Stop reason: SURG

## 2021-08-24 RX ORDER — LIDOCAINE HYDROCHLORIDE 10 MG/ML
0.5 INJECTION, SOLUTION EPIDURAL; INFILTRATION; INTRACAUDAL; PERINEURAL ONCE AS NEEDED
Status: DISCONTINUED | OUTPATIENT
Start: 2021-08-24 | End: 2021-08-24 | Stop reason: HOSPADM

## 2021-08-24 RX ORDER — LIDOCAINE HYDROCHLORIDE 10 MG/ML
INJECTION, SOLUTION INFILTRATION; PERINEURAL AS NEEDED
Status: DISCONTINUED | OUTPATIENT
Start: 2021-08-24 | End: 2021-08-24 | Stop reason: SURG

## 2021-08-24 RX ORDER — SODIUM CHLORIDE, SODIUM LACTATE, POTASSIUM CHLORIDE, CALCIUM CHLORIDE 600; 310; 30; 20 MG/100ML; MG/100ML; MG/100ML; MG/100ML
9 INJECTION, SOLUTION INTRAVENOUS CONTINUOUS
Status: DISCONTINUED | OUTPATIENT
Start: 2021-08-24 | End: 2021-08-25 | Stop reason: HOSPADM

## 2021-08-24 RX ORDER — DEXAMETHASONE SODIUM PHOSPHATE 4 MG/ML
INJECTION, SOLUTION INTRA-ARTICULAR; INTRALESIONAL; INTRAMUSCULAR; INTRAVENOUS; SOFT TISSUE AS NEEDED
Status: DISCONTINUED | OUTPATIENT
Start: 2021-08-24 | End: 2021-08-24 | Stop reason: SURG

## 2021-08-24 RX ORDER — SODIUM CHLORIDE 0.9 % (FLUSH) 0.9 %
10 SYRINGE (ML) INJECTION EVERY 12 HOURS SCHEDULED
Status: DISCONTINUED | OUTPATIENT
Start: 2021-08-24 | End: 2021-08-25 | Stop reason: HOSPADM

## 2021-08-24 RX ORDER — ONDANSETRON 2 MG/ML
4 INJECTION INTRAMUSCULAR; INTRAVENOUS ONCE AS NEEDED
Status: DISCONTINUED | OUTPATIENT
Start: 2021-08-24 | End: 2021-08-24 | Stop reason: HOSPADM

## 2021-08-24 RX ORDER — SODIUM CHLORIDE 0.9 % (FLUSH) 0.9 %
10 SYRINGE (ML) INJECTION AS NEEDED
Status: DISCONTINUED | OUTPATIENT
Start: 2021-08-24 | End: 2021-08-24 | Stop reason: HOSPADM

## 2021-08-24 RX ORDER — MIDAZOLAM HYDROCHLORIDE 1 MG/ML
0.5 INJECTION INTRAMUSCULAR; INTRAVENOUS
Status: DISCONTINUED | OUTPATIENT
Start: 2021-08-24 | End: 2021-08-25 | Stop reason: HOSPADM

## 2021-08-24 RX ORDER — FAMOTIDINE 10 MG/ML
20 INJECTION, SOLUTION INTRAVENOUS ONCE
Status: DISCONTINUED | OUTPATIENT
Start: 2021-08-24 | End: 2021-08-24

## 2021-08-24 RX ORDER — IODIXANOL 320 MG/ML
INJECTION, SOLUTION INTRAVASCULAR AS NEEDED
Status: DISCONTINUED | OUTPATIENT
Start: 2021-08-24 | End: 2021-08-24 | Stop reason: HOSPADM

## 2021-08-24 RX ORDER — PROPOFOL 10 MG/ML
VIAL (ML) INTRAVENOUS AS NEEDED
Status: DISCONTINUED | OUTPATIENT
Start: 2021-08-24 | End: 2021-08-24 | Stop reason: SURG

## 2021-08-24 RX ORDER — NICARDIPINE HYDROCHLORIDE 2.5 MG/ML
INJECTION INTRAVENOUS AS NEEDED
Status: DISCONTINUED | OUTPATIENT
Start: 2021-08-24 | End: 2021-08-24 | Stop reason: SURG

## 2021-08-24 RX ORDER — LIDOCAINE HYDROCHLORIDE 10 MG/ML
0.5 INJECTION, SOLUTION EPIDURAL; INFILTRATION; INTRACAUDAL; PERINEURAL ONCE AS NEEDED
Status: DISCONTINUED | OUTPATIENT
Start: 2021-08-24 | End: 2021-08-25 | Stop reason: HOSPADM

## 2021-08-24 RX ORDER — MIDAZOLAM HYDROCHLORIDE 1 MG/ML
0.5 INJECTION INTRAMUSCULAR; INTRAVENOUS
Status: DISCONTINUED | OUTPATIENT
Start: 2021-08-24 | End: 2021-08-24 | Stop reason: HOSPADM

## 2021-08-24 RX ORDER — ASPIRIN 81 MG/1
81 TABLET, CHEWABLE ORAL DAILY
Status: DISCONTINUED | OUTPATIENT
Start: 2021-08-24 | End: 2021-08-24

## 2021-08-24 RX ORDER — LISINOPRIL 5 MG/1
5 TABLET ORAL DAILY
Status: DISCONTINUED | OUTPATIENT
Start: 2021-08-24 | End: 2021-08-25 | Stop reason: HOSPADM

## 2021-08-24 RX ORDER — ONDANSETRON 2 MG/ML
4 INJECTION INTRAMUSCULAR; INTRAVENOUS EVERY 6 HOURS PRN
Status: DISCONTINUED | OUTPATIENT
Start: 2021-08-24 | End: 2021-08-25 | Stop reason: HOSPADM

## 2021-08-24 RX ORDER — SODIUM CHLORIDE 0.9 % (FLUSH) 0.9 %
10 SYRINGE (ML) INJECTION EVERY 12 HOURS SCHEDULED
Status: DISCONTINUED | OUTPATIENT
Start: 2021-08-24 | End: 2021-08-24 | Stop reason: HOSPADM

## 2021-08-24 RX ORDER — ESMOLOL HYDROCHLORIDE 10 MG/ML
INJECTION INTRAVENOUS AS NEEDED
Status: DISCONTINUED | OUTPATIENT
Start: 2021-08-24 | End: 2021-08-24 | Stop reason: SURG

## 2021-08-24 RX ORDER — FAMOTIDINE 10 MG/ML
20 INJECTION, SOLUTION INTRAVENOUS ONCE
Status: DISCONTINUED | OUTPATIENT
Start: 2021-08-24 | End: 2021-08-24 | Stop reason: HOSPADM

## 2021-08-24 RX ORDER — FAMOTIDINE 20 MG/1
20 TABLET, FILM COATED ORAL ONCE
Status: DISCONTINUED | OUTPATIENT
Start: 2021-08-24 | End: 2021-08-24 | Stop reason: ALTCHOICE

## 2021-08-24 RX ORDER — FENTANYL CITRATE 50 UG/ML
50 INJECTION, SOLUTION INTRAMUSCULAR; INTRAVENOUS
Status: DISCONTINUED | OUTPATIENT
Start: 2021-08-24 | End: 2021-08-24 | Stop reason: HOSPADM

## 2021-08-24 RX ADMIN — ROCURONIUM BROMIDE 50 MG: 10 INJECTION INTRAVENOUS at 13:21

## 2021-08-24 RX ADMIN — DEXAMETHASONE SODIUM PHOSPHATE 8 MG: 4 INJECTION, SOLUTION INTRAMUSCULAR; INTRAVENOUS at 13:21

## 2021-08-24 RX ADMIN — LISINOPRIL 5 MG: 5 TABLET ORAL at 17:00

## 2021-08-24 RX ADMIN — LIDOCAINE HYDROCHLORIDE 50 MG: 10 INJECTION, SOLUTION INFILTRATION; PERINEURAL at 13:21

## 2021-08-24 RX ADMIN — SODIUM CHLORIDE: 9 INJECTION, SOLUTION INTRAVENOUS at 13:00

## 2021-08-24 RX ADMIN — FAMOTIDINE 20 MG: 20 TABLET, FILM COATED ORAL at 12:51

## 2021-08-24 RX ADMIN — ALLOPURINOL 150 MG: 300 TABLET ORAL at 17:01

## 2021-08-24 RX ADMIN — NICARDIPINE HYDROCHLORIDE 5 MG/HR: 0.1 INJECTION, SOLUTION INTRAVENOUS at 18:28

## 2021-08-24 RX ADMIN — NICARDIPINE HYDROCHLORIDE 0.3 MG: 25 INJECTION INTRAVENOUS at 14:30

## 2021-08-24 RX ADMIN — SODIUM CHLORIDE, PRESERVATIVE FREE 10 ML: 5 INJECTION INTRAVENOUS at 20:03

## 2021-08-24 RX ADMIN — NEOSTIGMINE METHYLSULFATE 2.5 MG: 1 INJECTION, SOLUTION INTRAVENOUS at 14:28

## 2021-08-24 RX ADMIN — PROPOFOL 150 MG: 10 INJECTION, EMULSION INTRAVENOUS at 13:21

## 2021-08-24 RX ADMIN — CARVEDILOL 6.25 MG: 6.25 TABLET, FILM COATED ORAL at 17:01

## 2021-08-24 RX ADMIN — AMLODIPINE BESYLATE 10 MG: 10 TABLET ORAL at 17:01

## 2021-08-24 RX ADMIN — LABETALOL 20 MG/4 ML (5 MG/ML) INTRAVENOUS SYRINGE 10 MG: at 14:36

## 2021-08-24 RX ADMIN — PROPOFOL 80 MG: 10 INJECTION, EMULSION INTRAVENOUS at 13:27

## 2021-08-24 RX ADMIN — NICARDIPINE HYDROCHLORIDE 0.2 MG: 25 INJECTION INTRAVENOUS at 14:28

## 2021-08-24 RX ADMIN — GLYCOPYRROLATE 0.4 MG: 0.2 INJECTION INTRAMUSCULAR; INTRAVENOUS at 14:28

## 2021-08-24 RX ADMIN — SODIUM CHLORIDE, PRESERVATIVE FREE 10 ML: 5 INJECTION INTRAVENOUS at 20:04

## 2021-08-24 RX ADMIN — TICAGRELOR 90 MG: 90 TABLET ORAL at 12:51

## 2021-08-24 RX ADMIN — LABETALOL 20 MG/4 ML (5 MG/ML) INTRAVENOUS SYRINGE 10 MG: at 14:57

## 2021-08-24 RX ADMIN — LABETALOL 20 MG/4 ML (5 MG/ML) INTRAVENOUS SYRINGE 10 MG: at 14:26

## 2021-08-24 RX ADMIN — TICAGRELOR 90 MG: 90 TABLET ORAL at 20:03

## 2021-08-24 RX ADMIN — HEPARIN SODIUM 7500 UNITS: 1000 INJECTION, SOLUTION INTRAVENOUS; SUBCUTANEOUS at 14:04

## 2021-08-24 RX ADMIN — ESMOLOL HYDROCHLORIDE 50 MG: 10 INJECTION, SOLUTION INTRAVENOUS at 13:27

## 2021-08-24 RX ADMIN — NICARDIPINE HYDROCHLORIDE 0.2 MG: 25 INJECTION INTRAVENOUS at 14:57

## 2021-08-24 RX ADMIN — ATORVASTATIN CALCIUM 80 MG: 40 TABLET, FILM COATED ORAL at 20:03

## 2021-08-24 RX ADMIN — SODIUM CHLORIDE 75 ML/HR: 9 INJECTION, SOLUTION INTRAVENOUS at 16:55

## 2021-08-24 RX ADMIN — ASPIRIN 325 MG ORAL TABLET 325 MG: 325 PILL ORAL at 12:51

## 2021-08-24 RX ADMIN — LABETALOL 20 MG/4 ML (5 MG/ML) INTRAVENOUS SYRINGE 5 MG: at 14:37

## 2021-08-24 NOTE — ANESTHESIA PREPROCEDURE EVALUATION
Anesthesia Evaluation     Patient summary reviewed and Nursing notes reviewed   NPO Solid Status: > 8 hours  NPO Liquid Status: > 8 hours           Airway   Mallampati: I  TM distance: >3 FB  Neck ROM: full  No difficulty expected  Dental - normal exam     Pulmonary - normal exam   (+) a smoker (remote) Former,   (-) COPD, asthma, shortness of breath, recent URI, sleep apnea  Cardiovascular   Exercise tolerance: good (4-7 METS)    ECG reviewed  Rhythm: regular  Rate: normal    (+) hypertension, hyperlipidemia,   (-) past MI, dysrhythmias, angina    ROS comment: Normal sinus rhythm  Minimal voltage criteria for LVH, may be normal variant    ECHO 2021 EF >56%.  Saline test negativeMitral V mild, anterior mitral leaflet thickening present. Trace MVR       Neuro/Psych  (+) TIA (R side ), CVA (left side remote 2018), dizziness/light headedness,     GI/Hepatic/Renal/Endo    (+)   renal disease,   (-) liver disease, diabetes, no thyroid disorder    Musculoskeletal     Abdominal    Substance History   (+) alcohol use,      OB/GYN          Other   arthritis,      ROS/Med Hx Other: K 5.2 Creat 2.6                Anesthesia Plan    ASA 2     general     intravenous induction     Anesthetic plan, all risks, benefits, and alternatives have been provided, discussed and informed consent has been obtained with: patient.

## 2021-08-24 NOTE — ANESTHESIA POSTPROCEDURE EVALUATION
Patient: Eyal Solano    Procedure Summary     Date: 08/24/21 Room / Location: [facility] /  [facility]    Anesthesia Start: 1300 Anesthesia Stop: 1453    Procedure: Cerebral angiogram for Intracranial Stent under anesthesia (Left ) Diagnosis:       Acute ischemic left MCA stroke (CMS/HCC)      (Acute ischemic left MCA stroke (CMS/HCC) [I63.512])    Providers: Isaiah Grey MD Provider: Joo Pittman MD    Anesthesia Type: general ASA Status: 2          Anesthesia Type: general    Vitals  No vitals data found for the desired time range.          Post Anesthesia Care and Evaluation    Patient location during evaluation: PACU  Patient participation: complete - patient participated  Level of consciousness: awake and alert  Pain management: adequate  Airway patency: patent  Anesthetic complications: No anesthetic complications  PONV Status: none  Cardiovascular status: hemodynamically stable and acceptable  Respiratory status: nonlabored ventilation, acceptable and nasal cannula  Hydration status: acceptable

## 2021-08-24 NOTE — ANESTHESIA PROCEDURE NOTES
Airway  Urgency: elective    Date/Time: 8/24/2021 1:23 PM  Airway not difficult    General Information and Staff    Patient location during procedure: OR  CRNA: Jerry Sharma CRNA    Indications and Patient Condition  Indications for airway management: airway protection    Preoxygenated: yes  MILS not maintained throughout  Mask difficulty assessment: 1 - vent by mask    Final Airway Details  Final airway type: endotracheal airway      Successful airway: ETT  Cuffed: yes   Successful intubation technique: direct laryngoscopy  Endotracheal tube insertion site: oral  Blade: Rossy  Blade size: 3  ETT size (mm): 7.0  Cormack-Lehane Classification: grade I - full view of glottis  Placement verified by: chest auscultation and capnometry   Cuff volume (mL): 8  Measured from: lips  ETT/EBT  to lips (cm): 20  Number of attempts at approach: 1  Assessment: lips, teeth, and gum same as pre-op and atraumatic intubation    Additional Comments  Negative epigastric sounds, Breath sound equal bilaterally with symmetric chest rise and fall

## 2021-08-25 ENCOUNTER — READMISSION MANAGEMENT (OUTPATIENT)
Dept: CALL CENTER | Facility: HOSPITAL | Age: 71
End: 2021-08-25

## 2021-08-25 VITALS
DIASTOLIC BLOOD PRESSURE: 84 MMHG | SYSTOLIC BLOOD PRESSURE: 151 MMHG | WEIGHT: 167.11 LBS | HEIGHT: 70 IN | RESPIRATION RATE: 14 BRPM | OXYGEN SATURATION: 100 % | HEART RATE: 65 BPM | BODY MASS INDEX: 23.92 KG/M2 | TEMPERATURE: 97.9 F

## 2021-08-25 LAB
ANION GAP SERPL CALCULATED.3IONS-SCNC: 11 MMOL/L (ref 5–15)
BASOPHILS # BLD AUTO: 0.04 10*3/MM3 (ref 0–0.2)
BASOPHILS NFR BLD AUTO: 0.8 % (ref 0–1.5)
BUN SERPL-MCNC: 30 MG/DL (ref 8–23)
BUN/CREAT SERPL: 16.2 (ref 7–25)
CALCIUM SPEC-SCNC: 8.9 MG/DL (ref 8.6–10.5)
CHLORIDE SERPL-SCNC: 106 MMOL/L (ref 98–107)
CO2 SERPL-SCNC: 20 MMOL/L (ref 22–29)
CREAT SERPL-MCNC: 1.85 MG/DL (ref 0.76–1.27)
DEPRECATED RDW RBC AUTO: 43.3 FL (ref 37–54)
EOSINOPHIL # BLD AUTO: 0.34 10*3/MM3 (ref 0–0.4)
EOSINOPHIL NFR BLD AUTO: 6.7 % (ref 0.3–6.2)
ERYTHROCYTE [DISTWIDTH] IN BLOOD BY AUTOMATED COUNT: 14.1 % (ref 12.3–15.4)
GFR SERPL CREATININE-BSD FRML MDRD: 44 ML/MIN/1.73
GLUCOSE SERPL-MCNC: 124 MG/DL (ref 65–99)
HCT VFR BLD AUTO: 30 % (ref 37.5–51)
HGB BLD-MCNC: 9.4 G/DL (ref 13–17.7)
IMM GRANULOCYTES # BLD AUTO: 0.02 10*3/MM3 (ref 0–0.05)
IMM GRANULOCYTES NFR BLD AUTO: 0.4 % (ref 0–0.5)
LYMPHOCYTES # BLD AUTO: 1 10*3/MM3 (ref 0.7–3.1)
LYMPHOCYTES NFR BLD AUTO: 19.6 % (ref 19.6–45.3)
MCH RBC QN AUTO: 26.9 PG (ref 26.6–33)
MCHC RBC AUTO-ENTMCNC: 31.3 G/DL (ref 31.5–35.7)
MCV RBC AUTO: 86 FL (ref 79–97)
MONOCYTES # BLD AUTO: 0.46 10*3/MM3 (ref 0.1–0.9)
MONOCYTES NFR BLD AUTO: 9 % (ref 5–12)
NEUTROPHILS NFR BLD AUTO: 3.23 10*3/MM3 (ref 1.7–7)
NEUTROPHILS NFR BLD AUTO: 63.5 % (ref 42.7–76)
NRBC BLD AUTO-RTO: 0 /100 WBC (ref 0–0.2)
PLATELET # BLD AUTO: 316 10*3/MM3 (ref 140–450)
PMV BLD AUTO: 10.5 FL (ref 6–12)
POTASSIUM SERPL-SCNC: 4.7 MMOL/L (ref 3.5–5.2)
RBC # BLD AUTO: 3.49 10*6/MM3 (ref 4.14–5.8)
SODIUM SERPL-SCNC: 137 MMOL/L (ref 136–145)
WBC # BLD AUTO: 5.09 10*3/MM3 (ref 3.4–10.8)

## 2021-08-25 PROCEDURE — 80048 BASIC METABOLIC PNL TOTAL CA: CPT | Performed by: NEUROLOGICAL SURGERY

## 2021-08-25 PROCEDURE — 99024 POSTOP FOLLOW-UP VISIT: CPT | Performed by: NEUROLOGICAL SURGERY

## 2021-08-25 PROCEDURE — 85025 COMPLETE CBC W/AUTO DIFF WBC: CPT | Performed by: NEUROLOGICAL SURGERY

## 2021-08-25 PROCEDURE — 99238 HOSP IP/OBS DSCHRG MGMT 30/<: CPT | Performed by: PHYSICIAN ASSISTANT

## 2021-08-25 RX ORDER — ASPIRIN 81 MG/1
81 TABLET, CHEWABLE ORAL DAILY
Qty: 30 TABLET | Refills: 11 | Status: SHIPPED | OUTPATIENT
Start: 2021-08-25 | End: 2021-08-25 | Stop reason: HOSPADM

## 2021-08-25 RX ADMIN — ALLOPURINOL 150 MG: 300 TABLET ORAL at 08:43

## 2021-08-25 RX ADMIN — TICAGRELOR 90 MG: 90 TABLET ORAL at 08:43

## 2021-08-25 RX ADMIN — SODIUM CHLORIDE 75 ML/HR: 9 INJECTION, SOLUTION INTRAVENOUS at 04:20

## 2021-08-25 RX ADMIN — CARVEDILOL 6.25 MG: 6.25 TABLET, FILM COATED ORAL at 08:44

## 2021-08-25 RX ADMIN — LISINOPRIL 5 MG: 5 TABLET ORAL at 08:44

## 2021-08-25 RX ADMIN — ASPIRIN 81 MG: 81 TABLET, CHEWABLE ORAL at 08:43

## 2021-08-25 RX ADMIN — AMLODIPINE BESYLATE 10 MG: 10 TABLET ORAL at 08:44

## 2021-08-25 NOTE — OUTREACH NOTE
Prep Survey      Responses   [facility] patient discharged from?  [place]   Is LACE score < 7 ?  No   Emergency Room discharge w/ pulse ox?  No   Eligibility  Psychiatric   Date of Admission  08/24/21   Date of Discharge  08/25/21   Discharge Disposition  Home or Self Care   Discharge diagnosis  Acute ischemic left MCA stroke    Does the patient have one of the following disease processes/diagnoses(primary or secondary)?  Stroke (TIA)   Does the patient have Home health ordered?  No   Is there a DME ordered?  No   Prep survey completed?  Yes          Nan Solis RN

## 2021-08-26 ENCOUNTER — TRANSITIONAL CARE MANAGEMENT TELEPHONE ENCOUNTER (OUTPATIENT)
Dept: CALL CENTER | Facility: HOSPITAL | Age: 71
End: 2021-08-26

## 2021-08-26 NOTE — OUTREACH NOTE
Call Center TCM Note      Responses   [facility] patient discharged from?  [place]   Does the patient have one of the following disease processes/diagnoses(primary or secondary)?  Stroke (TIA)   TCM attempt successful?  Yes   Call start time  1620   Call end time  1626   Meds reviewed with patient/caregiver?  Yes   Is the patient having any side effects they believe may be caused by any medication additions or changes?  No   Does the patient have all medications ordered at discharge?  Yes   Is the patient taking all medications as directed (includes completed medication regime)?  Yes   Does the patient have a primary care provider?   Yes   Does the patient have an appointment with their PCP within 7 days of discharge?  Yes   Comments regarding PCP  DC FU appt with PCP Dr Arias) on 8/31/2021   Has the patient kept scheduled appointments due by today?  N/A   What DME was ordered?  [employer] for DME   Psychosocial issues?  No   Does the patient require any assistance with activities of daily living such as eating, bathing, dressing, walking, etc.?  No   Does the patient have any residual symptoms from stroke/TIA?  No   Does the patient understand the diet ordered at discharge?  Yes   Did the patient receive a copy of their discharge instructions?  Yes   Nursing interventions  Reviewed instructions with patient   What is the patient's perception of their health status since discharge?  Improving   Nursing interventions  Nurse provided patient education   Is the patient able to teach back FAST for Stroke?  Yes   Is the patient/caregiver able to teach back the risk factors for a stroke?  High blood pressure-goal below 120/80, Smoking, High Cholesterol   Is the patient/caregiver able to teach back signs and symptoms related to disease process for when to call PCP?  Yes   Is the patient/caregiver able to teach back signs and symptoms related to disease process for when to call 911?  Yes   If the patient is a current  smoker, are they able to teach back resources for cessation?  Not a smoker   Is the patient/caregiver able to teach back the hierarchy of who to call/visit for symptoms/problems? PCP, Specialist, Home health nurse, Urgent Care, ED, 911  Yes   TCM call completed?  Yes          Choco Moncada RN    8/26/2021, 16:27 EDT

## 2021-08-31 ENCOUNTER — OFFICE VISIT (OUTPATIENT)
Dept: INTERNAL MEDICINE | Facility: CLINIC | Age: 71
End: 2021-08-31

## 2021-08-31 VITALS
OXYGEN SATURATION: 98 % | BODY MASS INDEX: 23.13 KG/M2 | HEART RATE: 63 BPM | HEIGHT: 70 IN | SYSTOLIC BLOOD PRESSURE: 140 MMHG | WEIGHT: 161.6 LBS | TEMPERATURE: 97.1 F | DIASTOLIC BLOOD PRESSURE: 82 MMHG | RESPIRATION RATE: 16 BRPM

## 2021-08-31 DIAGNOSIS — E78.2 MIXED HYPERLIPIDEMIA: ICD-10-CM

## 2021-08-31 DIAGNOSIS — I66.02 STENOSIS OF LEFT MIDDLE CEREBRAL ARTERY: Primary | ICD-10-CM

## 2021-08-31 DIAGNOSIS — R09.89 FEMORAL BRUIT: ICD-10-CM

## 2021-08-31 DIAGNOSIS — I10 ESSENTIAL HYPERTENSION: ICD-10-CM

## 2021-08-31 PROCEDURE — 1111F DSCHRG MED/CURRENT MED MERGE: CPT | Performed by: INTERNAL MEDICINE

## 2021-08-31 PROCEDURE — 99214 OFFICE O/P EST MOD 30 MIN: CPT | Performed by: INTERNAL MEDICINE

## 2021-08-31 NOTE — PROGRESS NOTES
Transitional Care Follow Up Visit  Subjective     Eyal Solano is a 70 y.o. male who presents for a transitional care management visit.    Within 48 business hours after discharge our office contacted him via telephone to coordinate his care and needs.      I reviewed and discussed the details of that call along with the discharge summary, hospital problems, inpatient lab results, inpatient diagnostic studies, and consultation reports with Eyal.     Current outpatient and discharge medications have been reconciled for the patient.  Reviewed by: Edda Sanchez MD      Date of TCM Phone Call 8/25/2021   [facility]   Date of Admission 8/24/2021   Date of Discharge 8/25/2021   Discharge Disposition Home or Self Care     Risk for Readmission (LACE) Score: 9 (8/25/2021  6:01 AM)      History of Present Illness   Course During Hospital Stay:  Admitted 8/24 for planned angioplasty and stenting of symptomatic L MCA stenosis by Dr. Grey. Procedure went well and was only complicated by a R groin site bleed which resolved with pressure. He was discharged on POD1. He will remain on ASA, brilinta for at least 3 months. He will follow up with Dr. Grey via televist 9/24. He symptomatically feels better. Still has some dizziness with BP <140. He continues on same antihypertensives and reports BP readings at home fluctuate with lowest systolic 111. He is not willing to increase antihypertensives today. He also notes issues with lipitor. He feels that it makes it hard to get out of bed in the morning.     The following portions of the patient's history were reviewed and updated as appropriate: allergies, current medications, past surgical history and problem list.    Review of Systems   Constitutional: Negative.    Eyes: Negative.    Respiratory: Negative.    Cardiovascular: Negative.    Skin:        No bruising at site of femoral access   Neurological: Positive for dizziness. Negative for  weakness and numbness.       Objective   Physical Exam  Vitals and nursing note reviewed.   Constitutional:       General: He is not in acute distress.     Appearance: He is well-developed. He is not ill-appearing or toxic-appearing.   HENT:      Head: Normocephalic and atraumatic.   Cardiovascular:      Rate and Rhythm: Normal rate and regular rhythm.      Pulses:           Femoral pulses are 2+ on the right side with bruit.       Dorsalis pedis pulses are 2+ on the right side.        Posterior tibial pulses are 2+ on the right side.      Heart sounds: Normal heart sounds.      Comments: Pulsatile bruit R femoral artery  Pulmonary:      Effort: Pulmonary effort is normal. No respiratory distress.      Breath sounds: Normal breath sounds.   Musculoskeletal:        Legs:    Skin:     General: Skin is warm and dry.      Findings: No bruising.      Comments: Femoral access well healed   Neurological:      General: No focal deficit present.      Mental Status: He is alert and oriented to person, place, and time. Mental status is at baseline.      Gait: Gait normal.         Assessment/Plan   Diagnoses and all orders for this visit:    Stenosis of left middle cerebral artery  - s/p angioplasty and stent by Dr. Grey 8/24  - Doing well postop  - Continue ASA, brilinta as instructed.  - Follow up 9/24 with Dr. Grey.    Femoral bruit  - evaluation of R femoral sheath site with no bruising, small hematoma and pulsatile bruit. Pulses in RLE intact, feet warm and well perfused.  - Will obtain duplex to assess for pseudoaneurysm or AVF formation.    Essential hypertension  - BP above goal in office. Discussed that we would ideally like to uptitrate medications and that his dizziness when normotensive should lessen now that he has L MCA stent.  - He remains hesitant to make changes today so will continue norvasc 10mg daily, lisinopril 5mg daily, and coreg 6.25mg BID.  - Plan to reassess at next visit    Mixed hyperlipidemia  -  On lipitor 80mg daily, hx of CVA, MCA stenosis warranting high intensity statin  - He is concerned that lipitor may be causing AM leg weakness, no objective weakness on exam  - Lipid panel 7/2021 with  despite statin  - Discussed trying to take statin in AM instead of qhs to see if weakness resolves and if not then hold statin x 2 weeks and monitor for improvement. Notify office if symptoms do improve for alternate statin.  - Given persistently elevated LDL may also need to consider addition of zetia but defer for now given his concerns.    Health Maintenance  - Colonoscopy: Declined  - HCV: negative  - Immunizations: COVID complete. Declined pneumovax and flu. Discussed shingrix.  - Depression screening: negative 7/2021       Return in about 3 months (around 11/17/2021) for as scheduled.

## 2021-09-02 ENCOUNTER — HOSPITAL ENCOUNTER (OUTPATIENT)
Dept: CARDIOLOGY | Facility: HOSPITAL | Age: 71
Discharge: HOME | End: 2021-09-02
Admitting: INTERNAL MEDICINE

## 2021-09-02 VITALS — BODY MASS INDEX: 23.13 KG/M2 | WEIGHT: 161.6 LBS | HEIGHT: 70 IN

## 2021-09-02 DIAGNOSIS — R09.89 FEMORAL BRUIT: ICD-10-CM

## 2021-09-02 LAB
BH CV GRAFT BRACHIAL PRESSURE LEFT: 125 MMHG
BH CV GRAFT BRACHIAL PRESSURE RIGHT: 128 MMHG
BH CV LEA LEFT DPA PRESSURE: 115 MMHG
BH CV LEA LEFT PTA PRESSURE: 113 MMHG
BH CV LEA RIGHT ANT TIBIAL A DISTAL EDV: 9.43 CM/S
BH CV LEA RIGHT ANT TIBIAL A DISTAL PSV: 63.6 CM/S
BH CV LEA RIGHT ANT TIBIAL A MID EDV: 8.25 CM/S
BH CV LEA RIGHT ANT TIBIAL A MID PSV: 51.1 CM/S
BH CV LEA RIGHT ANT TIBIAL A PROX EDV: 9.93 CM/S
BH CV LEA RIGHT ANT TIBIAL A PROX PSV: 81.6 CM/S
BH CV LEA RIGHT CFA DISTAL EDV: 6.07 CM/S
BH CV LEA RIGHT CFA DISTAL PSV: 82.7 CM/S
BH CV LEA RIGHT DFA PROX EDV: 21 CM/S
BH CV LEA RIGHT DFA PROX PSV: 304 CM/S
BH CV LEA RIGHT DPA PRESSURE: 115 MMHG
BH CV LEA RIGHT EXT ILIAC PSV: 104 CM/S
BH CV LEA RIGHT PERONEAL  MID EDV: 4.27 CM/S
BH CV LEA RIGHT PERONEAL  MID PSV: 29.6 CM/S
BH CV LEA RIGHT POPITEAL A  DISTAL EDV: 4.21 CM/S
BH CV LEA RIGHT POPITEAL A  DISTAL PSV: 47.2 CM/S
BH CV LEA RIGHT POPITEAL A  PROX EDV: 6.06 CM/S
BH CV LEA RIGHT POPITEAL A  PROX PSV: 40.6 CM/S
BH CV LEA RIGHT PTA DISTAL EDV: 9.93 CM/S
BH CV LEA RIGHT PTA DISTAL PSV: 67.3 CM/S
BH CV LEA RIGHT PTA MID EDV: 5.11 CM/S
BH CV LEA RIGHT PTA MID PSV: 38.1 CM/S
BH CV LEA RIGHT PTA PRESSURE: 115 MMHG
BH CV LEA RIGHT PTA PROX EDV: 5.05 CM/S
BH CV LEA RIGHT PTA PROX PSV: 44.9 CM/S
BH CV LEA RIGHT SFA DISTAL EDV: 8.17 CM/S
BH CV LEA RIGHT SFA DISTAL PSV: 113 CM/S
BH CV LEA RIGHT SFA MID EDV: 20.4 CM/S
BH CV LEA RIGHT SFA MID PSV: 283 CM/S
BH CV LEA RIGHT SFA PROX PSV: 68.8 CM/S
BH CV LOWER ARTERIAL LEFT ABI RATIO: 0.9
BH CV LOWER ARTERIAL RIGHT ABI RATIO: 0.9
MAXIMAL PREDICTED HEART RATE: 152 BPM
STRESS TARGET HR: 129 BPM

## 2021-09-02 PROCEDURE — 93926 LOWER EXTREMITY STUDY: CPT

## 2021-09-02 PROCEDURE — 93926 LOWER EXTREMITY STUDY: CPT | Performed by: INTERNAL MEDICINE

## 2021-09-06 ENCOUNTER — READMISSION MANAGEMENT (OUTPATIENT)
Dept: CALL CENTER | Facility: HOSPITAL | Age: 71
End: 2021-09-06

## 2021-09-06 NOTE — OUTREACH NOTE
Stroke Week 2 Survey      Responses   [facility] patient discharged from?  [place]   Does the patient have one of the following disease processes/diagnoses(primary or secondary)?  Stroke (TIA)   Week 2 attempt successful?  Yes   Call start time  1428   Call end time  1430   Discharge diagnosis  Acute ischemic left MCA stroke    Meds reviewed with patient/caregiver?  Yes   Is the patient having any side effects they believe may be caused by any medication additions or changes?  No   Does the patient have all medications ordered at discharge?  Yes   Is the patient taking all medications as directed (includes completed medication regime)?  Yes   Does the patient have a primary care provider?   Yes   Does the patient have an appointment with their PCP within 7 days of discharge?  Yes   Has the patient kept scheduled appointments due by today?  Yes   Psychosocial issues?  No   Does the patient require any assistance with activities of daily living such as eating, bathing, dressing, walking, etc.?  No   Does the patient have any residual symptoms from stroke/TIA?  No   Does the patient understand the diet ordered at discharge?  Yes   Comments  in morning, light headed.   Did the patient receive a copy of their discharge instructions?  Yes   Nursing interventions  Reviewed instructions with patient   What is the patient's perception of their health status since discharge?  Improving   Nursing interventions  Nurse provided patient education   Is the patient able to teach back FAST for Stroke?  Yes   Is the patient/caregiver able to teach back the risk factors for a stroke?  High blood pressure-goal below 120/80, High Cholesterol   Is the patient/caregiver able to teach back signs and symptoms related to disease process for when to call PCP?  Yes   Is the patient/caregiver able to teach back signs and symptoms related to disease process for when to call 911?  Yes   If the patient is a current smoker, are they able to  teach back resources for cessation?  Not a smoker   Is the patient/caregiver able to teach back the hierarchy of who to call/visit for symptoms/problems? PCP, Specialist, Home health nurse, Urgent Care, ED, 911  Yes   Week 2 call completed?  Yes   Wrap up additional comments  pt will talk to pcp, about bp's and meds.          Oneida Lopez, RN

## 2021-09-07 DIAGNOSIS — I73.9 PERIPHERAL VASCULAR DISEASE (HCC): Primary | ICD-10-CM

## 2021-09-14 ENCOUNTER — READMISSION MANAGEMENT (OUTPATIENT)
Dept: CALL CENTER | Facility: HOSPITAL | Age: 71
End: 2021-09-14

## 2021-09-14 NOTE — OUTREACH NOTE
Stroke Week 3 Survey      Responses   [facility] patient discharged from?  [place]   Does the patient have one of the following disease processes/diagnoses(primary or secondary)?  Stroke (TIA)   Week 3 attempt successful?  No   Unsuccessful attempts  Attempt 1          Brittany Park LPN

## 2021-09-17 ENCOUNTER — READMISSION MANAGEMENT (OUTPATIENT)
Dept: CALL CENTER | Facility: HOSPITAL | Age: 71
End: 2021-09-17

## 2021-09-17 NOTE — OUTREACH NOTE
Stroke Week 3 Survey      Responses   [facility] patient discharged from?  [place]   Does the patient have one of the following disease processes/diagnoses(primary or secondary)?  Stroke (TIA)   Week 3 attempt successful?  No   Unsuccessful attempts  Attempt 2          Marva Og RN

## 2021-09-24 ENCOUNTER — OFFICE VISIT (OUTPATIENT)
Dept: NEUROSURGERY | Facility: CLINIC | Age: 71
End: 2021-09-24

## 2021-09-24 ENCOUNTER — TELEPHONE (OUTPATIENT)
Dept: NEUROSURGERY | Facility: CLINIC | Age: 71
End: 2021-09-24

## 2021-09-24 VITALS — BODY MASS INDEX: 23.05 KG/M2 | WEIGHT: 161 LBS | HEIGHT: 70 IN

## 2021-09-24 DIAGNOSIS — I67.9 INTRACRANIAL VASCULAR STENOSIS: Primary | ICD-10-CM

## 2021-09-24 PROCEDURE — 99441 PR PHYS/QHP TELEPHONE EVALUATION 5-10 MIN: CPT | Performed by: NEUROLOGICAL SURGERY

## 2021-09-24 NOTE — PROGRESS NOTES
"Subjective     Chief Complaint: Follow-up intracranial stent    Patient ID: Eyal Solano is a 70 y.o. male is here today for follow-up.    History of Present Illness    This is a 70-year-old man in whom I placed an intracranial stent about 6 weeks ago.  Telephone follow-up was established today on a routine basis.  He is still taking aspirin and Brilinta.  He denies any strokelike symptoms.    The following portions of the patient's history were reviewed and updated as appropriate: allergies, current medications, past family history, past medical history, past social history, past surgical history and problem list.    Family history:   Family History   Problem Relation Age of Onset   • Hyperlipidemia Mother    • Hypertension Mother    • Stroke Father 82   • Heart attack Father 82   • Hypertension Brother    • Heart disease Maternal Grandmother    • Heart disease Maternal Grandfather    • Heart disease Paternal Grandmother    • Heart disease Paternal Grandfather        Social history:   Social History     Socioeconomic History   • Marital status: Single     Spouse name: Not on file   • Number of children: Not on file   • Years of education: Not on file   • Highest education level: Not on file   Tobacco Use   • Smoking status: Former Smoker     Packs/day: 1.00     Years: 30.00     Pack years: 30.00     Types: Cigarettes   • Smokeless tobacco: Current User     Types: Chew   • Tobacco comment: Quit >25 yrs ago; tobacco cessation pamphlet given to pt   Vaping Use   • Vaping Use: Never used   Substance and Sexual Activity   • Alcohol use: Not Currently     Alcohol/week: 12.0 standard drinks     Types: 12 Standard drinks or equivalent per week     Comment:  Previously 5-6beers/day and 1/2 pint liquor on weekends. none now   • Drug use: No   • Sexual activity: Defer       Review of Systems    Objective   Height 177.8 cm (70\"), weight 73 kg (161 lb).  Body mass index is 23.1 kg/m².    Physical Exam    Assessment/Plan "     Independent Review of Radiographic Studies:      He has no imaging for me to review    Medical Decision Making:      You have chosen to receive care through a telephone visit. Do you consent to use a telephone visit for your medical care today? Yes    Telephone consultation was requested due to limited staff and availability secondary to the coronavirus pandemic.    I reviewed the signs and symptoms of stroke.  I directed him to call 911 if he thinks he is having a stroke.    He should stay on aspirin and Brilinta until his follow-up MRI which will be ordered in 2 months time.  I will follow-up with him after this test has been completed.    Approximately 8 minutes was spent on today's phone call including time spent on review of prior records.      There are no diagnoses linked to this encounter.    No follow-ups on file.           This document signed by WARREN Grey MD September 24, 2021 12:07 EDT

## 2021-11-19 ENCOUNTER — HOSPITAL ENCOUNTER (OUTPATIENT)
Dept: MRI IMAGING | Facility: HOSPITAL | Age: 71
Discharge: HOME | End: 2021-11-19
Admitting: NEUROLOGICAL SURGERY

## 2021-11-19 ENCOUNTER — OFFICE VISIT (OUTPATIENT)
Dept: NEUROSURGERY | Facility: CLINIC | Age: 71
End: 2021-11-19

## 2021-11-19 VITALS — WEIGHT: 164.6 LBS | BODY MASS INDEX: 23.56 KG/M2 | HEART RATE: 79 BPM | RESPIRATION RATE: 20 BRPM | HEIGHT: 70 IN

## 2021-11-19 DIAGNOSIS — F17.220 CHEWING TOBACCO NICOTINE DEPENDENCE WITHOUT COMPLICATION: ICD-10-CM

## 2021-11-19 DIAGNOSIS — I67.9 INTRACRANIAL VASCULAR STENOSIS: ICD-10-CM

## 2021-11-19 DIAGNOSIS — I66.02 STENOSIS OF LEFT MIDDLE CEREBRAL ARTERY: ICD-10-CM

## 2021-11-19 DIAGNOSIS — I63.512 ACUTE ISCHEMIC LEFT MCA STROKE (HCC): Primary | ICD-10-CM

## 2021-11-19 PROCEDURE — 0 GADOBENATE DIMEGLUMINE 529 MG/ML SOLUTION: Performed by: NEUROLOGICAL SURGERY

## 2021-11-19 PROCEDURE — 82565 ASSAY OF CREATININE: CPT

## 2021-11-19 PROCEDURE — 99214 OFFICE O/P EST MOD 30 MIN: CPT | Performed by: NEUROLOGICAL SURGERY

## 2021-11-19 PROCEDURE — A9577 INJ MULTIHANCE: HCPCS | Performed by: NEUROLOGICAL SURGERY

## 2021-11-19 PROCEDURE — 70546 MR ANGIOGRAPH HEAD W/O&W/DYE: CPT

## 2021-11-19 RX ADMIN — GADOBENATE DIMEGLUMINE 15 ML: 529 INJECTION, SOLUTION INTRAVENOUS at 10:45

## 2021-11-19 NOTE — PROGRESS NOTES
Subjective     Chief Complaint: Follow-up intracranial stent    Patient ID: Eyal Solano is a 70 y.o. male is here today for follow-up.    History of Present Illness    This is a 70-year-old man in whom I placed a left MCA stent in August, about 2-1/2 months ago.  He is a Plavix nonresponder and has been maintained on aspirin and Brilinta since that time.  He presents today to discuss the results of his most recent surveillance MRA.    The following portions of the patient's history were reviewed and updated as appropriate: allergies, current medications, past family history, past medical history, past social history, past surgical history and problem list.    Family history:   Family History   Problem Relation Age of Onset   • Hyperlipidemia Mother    • Hypertension Mother    • Stroke Father 82   • Heart attack Father 82   • Hypertension Brother    • Heart disease Maternal Grandmother    • Heart disease Maternal Grandfather    • Heart disease Paternal Grandmother    • Heart disease Paternal Grandfather        Social history:   Social History     Socioeconomic History   • Marital status: Single   Tobacco Use   • Smoking status: Former Smoker     Packs/day: 1.00     Years: 30.00     Pack years: 30.00     Types: Cigarettes   • Smokeless tobacco: Current User     Types: Chew   • Tobacco comment: Quit >25 yrs ago; tobacco cessation pamphlet given to pt   Vaping Use   • Vaping Use: Never used   Substance and Sexual Activity   • Alcohol use: Not Currently     Alcohol/week: 12.0 standard drinks     Types: 12 Standard drinks or equivalent per week     Comment:  Previously 5-6beers/day and 1/2 pint liquor on weekends. none now   • Drug use: No   • Sexual activity: Defer       Review of Systems   Constitutional: Negative for activity change, appetite change, chills, diaphoresis, fatigue, fever and unexpected weight change.   HENT: Negative for congestion, dental problem, drooling, ear discharge, ear pain, facial swelling,  "hearing loss, mouth sores, nosebleeds, postnasal drip, rhinorrhea, sinus pressure, sinus pain, sneezing, sore throat, tinnitus, trouble swallowing and voice change.    Eyes: Negative for photophobia, pain, discharge, redness, itching and visual disturbance.   Respiratory: Negative for apnea, cough, choking, chest tightness, shortness of breath, wheezing and stridor.    Cardiovascular: Negative for chest pain, palpitations and leg swelling.   Gastrointestinal: Negative for abdominal distention, abdominal pain, anal bleeding, blood in stool, constipation, diarrhea, nausea, rectal pain and vomiting.   Endocrine: Negative for cold intolerance, heat intolerance, polydipsia, polyphagia and polyuria.   Genitourinary: Negative for decreased urine volume, difficulty urinating, dysuria, enuresis, flank pain, frequency, genital sores, hematuria and urgency.   Musculoskeletal: Negative for arthralgias, back pain, gait problem, joint swelling, myalgias, neck pain and neck stiffness.   Skin: Negative for color change, pallor, rash and wound.   Allergic/Immunologic: Negative for environmental allergies, food allergies and immunocompromised state.   Neurological: Negative for dizziness, tremors, seizures, syncope, facial asymmetry, speech difficulty, weakness, light-headedness, numbness and headaches.   Hematological: Negative for adenopathy. Does not bruise/bleed easily.   Psychiatric/Behavioral: Negative for agitation, behavioral problems, confusion, decreased concentration, dysphoric mood, hallucinations, self-injury, sleep disturbance and suicidal ideas. The patient is not nervous/anxious and is not hyperactive.        Objective   Pulse 79, resp. rate 20, height 177.8 cm (70\"), weight 74.7 kg (164 lb 9.6 oz).  Body mass index is 23.62 kg/m².    Physical Exam  Vitals reviewed.   Constitutional:       General: He is not in acute distress.     Appearance: He is well-developed. He is not diaphoretic.   HENT:      Head: " Normocephalic and atraumatic.   Pulmonary:      Effort: Pulmonary effort is normal.   Skin:     General: Skin is warm and dry.   Neurological:      Mental Status: He is alert and oriented to person, place, and time.      Deep Tendon Reflexes: Reflexes abnormal.      Reflex Scores:       Bicep reflexes are 3+ on the right side and 2+ on the left side.       Brachioradialis reflexes are 3+ on the right side and 2+ on the left side.       Patellar reflexes are 3+ on the right side and 2+ on the left side.     Comments: Right-sided pronator drift.  Asymmetrically hyperreflexic on the right upper and right lower extremity.  Speech production is fluent.  Naming and repetition are intact.   Psychiatric:         Behavior: Behavior normal.         Assessment/Plan     Independent Review of Radiographic Studies:      Available for my review is a MRA of the brain with and without contrast that was performed on November 19, 2021.  There is susceptibility artifact in the region of the mid and distal left MCA which is the site of his stent.  The time-of-flight signal dropout in this region and there is no appreciable opacification on the postcontrast sequences, however there is satisfactory distal opacification of the MCA branches which is symmetric when compared to the right side.  This indicates that his stent is widely patent with no flow limitation.    Medical Decision Making:      Continue aspirin and Brilinta    Follow-up with me 6 months post stent    Signs and symptoms of stroke were once again reviewed with the patient.    Diagnoses and all orders for this visit:    1. Acute ischemic left MCA stroke s/p stent (Primary)    2. Stenosis of left middle cerebral artery    3. Chewing tobacco nicotine dependence without complication        No follow-ups on file.           This document signed by WARREN Grey MD November 19, 2021 12:04 EST

## 2021-11-24 ENCOUNTER — OFFICE VISIT (OUTPATIENT)
Dept: INTERNAL MEDICINE | Facility: CLINIC | Age: 71
End: 2021-11-24

## 2021-11-24 VITALS
HEIGHT: 70 IN | RESPIRATION RATE: 16 BRPM | WEIGHT: 162 LBS | OXYGEN SATURATION: 99 % | HEART RATE: 71 BPM | DIASTOLIC BLOOD PRESSURE: 74 MMHG | BODY MASS INDEX: 23.19 KG/M2 | TEMPERATURE: 97.1 F | SYSTOLIC BLOOD PRESSURE: 152 MMHG

## 2021-11-24 DIAGNOSIS — I10 ESSENTIAL HYPERTENSION: Primary | ICD-10-CM

## 2021-11-24 DIAGNOSIS — M1A.39X0 CHRONIC GOUT DUE TO RENAL IMPAIRMENT OF MULTIPLE SITES WITHOUT TOPHUS: ICD-10-CM

## 2021-11-24 DIAGNOSIS — I73.9 PERIPHERAL VASCULAR DISEASE (HCC): ICD-10-CM

## 2021-11-24 PROCEDURE — 99214 OFFICE O/P EST MOD 30 MIN: CPT | Performed by: INTERNAL MEDICINE

## 2021-11-24 RX ORDER — ALLOPURINOL 100 MG/1
200 TABLET ORAL DAILY
Qty: 180 TABLET | Refills: 1 | Status: SHIPPED | OUTPATIENT
Start: 2021-11-24 | End: 2023-02-05 | Stop reason: SDUPTHER

## 2021-11-24 NOTE — PROGRESS NOTES
Internal Medicine Follow Up    Chief Complaint  Eyal Solano is a 70 y.o. male who presents today for follow up of chronic medical conditions outlined below.    Chief Complaint   Patient presents with   • Hypertension     4 month follow up        HPI  Mr. Solano comes in today for follow up on HTN and vascular disease. He reports that he has been doing well. He reports BP 140s on his current medications. It is not clear if he is taking or has stopped amlodipine. He had called reporting low BP so he was instructed to cut this in half but it does not seem that he is doing this. He continues to be weary of medication changes due to lightheadedness when BP is in the normotensive range. He cancelled appt with vascular surgery and nephrology. He plans to reschedule nephrology for after the first of the year but declines to see vascular surgery. He denies leg pain, weakness, numbness, color changes, ulceration. He is taking allopurinol 100mg BID rather than 150mg daily. He reports resolution in gout flares.       Review of Systems  Review of Systems   Constitutional: Negative.    Respiratory: Negative.    Cardiovascular: Negative.    Musculoskeletal: Negative for arthralgias, joint swelling and myalgias.   Skin: Negative for skin lesions.   Neurological: Negative for weakness and numbness.        Current Medications  Current Outpatient Medications on File Prior to Visit   Medication Sig Dispense Refill   • amLODIPine (NORVASC) 10 MG tablet Take 1 tablet by mouth Daily. 90 tablet 3   • aspirin 81 MG chewable tablet Chew 1 tablet Daily. 90 tablet 3   • atorvastatin (LIPITOR) 80 MG tablet Take 1 tablet by mouth Every Night. 90 tablet 3   • carvedilol (COREG) 6.25 MG tablet Take 1 tablet by mouth 2 (Two) Times a Day With Meals. 180 tablet 3   • ferrous sulfate 325 (65 FE) MG tablet Take 1 tablet by mouth 2 (Two) Times a Day With Meals. 60 tablet 0   • fexofenadine (Allegra Allergy) 180 MG tablet Take 1 tablet by mouth Daily.  "90 tablet 3   • lisinopril (PRINIVIL,ZESTRIL) 5 MG tablet Take 1 tablet by mouth Daily. 90 tablet 3   • ticagrelor (BRILINTA) 90 MG tablet tablet Take 1 tablet by mouth 2 (Two) Times a Day. 60 tablet 0   • triamcinolone (KENALOG) 0.1 % ointment Apply 1 application topically to the appropriate area as directed Daily As Needed for Irritation.     • VITAMIN D PO Take 1 tablet by mouth Daily.     • [DISCONTINUED] allopurinol (ZYLOPRIM) 100 MG tablet Take 1.5 tablets by mouth Daily. (Patient taking differently: Take 150 mg by mouth Daily. Takes one pill in morning and one at night) 135 tablet 3     No current facility-administered medications on file prior to visit.       Allergies  No Known Allergies    Objective  Visit Vitals  /74   Pulse 71   Temp 97.1 °F (36.2 °C)   Resp 16   Ht 177.8 cm (70\")   Wt 73.5 kg (162 lb)   SpO2 99%   BMI 23.24 kg/m²        Physical Exam  Physical Exam  Vitals and nursing note reviewed.   Constitutional:       General: He is not in acute distress.     Appearance: Normal appearance. He is well-developed. He is not ill-appearing or toxic-appearing.   HENT:      Head: Normocephalic and atraumatic.   Eyes:      Conjunctiva/sclera: Conjunctivae normal.   Cardiovascular:      Rate and Rhythm: Normal rate and regular rhythm.      Heart sounds: Normal heart sounds.   Pulmonary:      Effort: Pulmonary effort is normal. No respiratory distress.      Breath sounds: Normal breath sounds.   Musculoskeletal:      Right lower leg: No edema.      Left lower leg: No edema.   Skin:     General: Skin is warm and dry.   Neurological:      Mental Status: He is alert and oriented to person, place, and time. Mental status is at baseline.      Gait: Gait normal.         Results  Results for orders placed or performed during the hospital encounter of 09/02/21   Duplex Lower Extremity Art / Grafts - Right CAR   Result Value Ref Range    Target HR (85%) 129 bpm    Max. Pred. HR (100%) 152 bpm    RIGHT SEJAL RATIO " 0.90     LEFT SEJAL RATIO 0.90     Right Brachial Pressure 128 mmHg    Left Brachial Pressure 125 mmHg    Ext Iliac PSV-Right 104.00 cm/s    CFA Distal PSV-Right 82.70 cm/s    CFA Distal EDV-Right 6.07 cm/s    DFA Prox PSV-Right 304.00 cm/s    DFA Prox EDV-Right 21.00 cm/s    SFA Prox PSV-Right 68.80 cm/s    SFA Mid PSV-Right 283.00 cm/s    SFA Mid EDV-Right 20.40 cm/s    SFA Distal PSV-Right 113.00 cm/s    SFA Distal EDV-Right 8.17 cm/s    Popiteal A Prox PSV-Right 40.60 cm/s    Popiteal A Prox EDV-Right 6.06 cm/s    Popiteal A Distal PSV-Right 47.20 cm/s    Popiteal A Distal EDV-Right 4.21 cm/s    PTA Prox PSV-Right 44.90 cm/s    PTA Prox EDV-Right 5.05 cm/s    PTA Mid PSV-Right 38.10 cm/s    PTA Mid EDV-Right 5.11 cm/s    PTA Distal PSV-Right 67.30 cm/s    PTA Distal EDV-Right 9.93 cm/s    Peroneal Mid PSV-Right 29.60 cm/s    Peroneal Mid EDV-Right 4.27 cm/s    Ant Tibial A Prox PSV-Right 81.60 cm/s    Ant Tibial A Prox EDV-Right 9.93 cm/s    Ant Tibial A Mid PSV-Right 51.10 cm/s    Ant Tibial A Mid EDV-Right 8.25 cm/s    Ant Tibial A Distal PSV-Right 63.60 cm/s    Ant Tibial A Distal EDV-Right 9.43 cm/s    LEFT DPA PRESSURE 115 mmHg    LEFT PTA PRESSURE 113 mmHg    RIGHT DPA PRESSURE 115 mmHg    RIGHT PTA PRESSURE 115 mmHg        Assessment and Plan  Diagnoses and all orders for this visit:    Essential hypertension  - BP above goal in office, stable. Discussed that we would ideally like to uptitrate medications for /80 or less but he declines due to lightheadedness with normotension.  - He had been instructed to reduce dose of amlodipine due to reported hypotension however not certain he had done so. Will continue regimen which presumably is norvasc 10mg daily, lisinopril 5mg daily, and coreg 6.25mg BID.    Peripheral vascular disease (HCC)  - Has R groin bruit with duplex showing at least 50% stenosis of the SFA.  - He was scheduled with vascular surgery but cancelled and refuses to reschedule at this  time.  - He denies ulceration, pain, numbness, weakness, color change.  - On ASA, brilinta, and atorvastatin 80mg daily    Chronic gout due to renal impairment of multiple sites without tophus  - Will increase uric acid prescription to 200mg daily as this is what he has been taking with clinical resolution in gout.     Health Maintenance  - Colonoscopy: Declined  - HCV: negative  - Immunizations: COVID complete, discussed booster. Declined pneumovax and flu. Discussed shingrix.  - Depression screening: negative 7/2021    Return in about 4 months (around 3/24/2022) for Follow up HTN, kidney disease.

## 2021-12-15 LAB — CREAT BLDA-MCNC: 2.3 MG/DL (ref 0.6–1.3)

## 2022-01-03 DIAGNOSIS — I67.9 INTRACRANIAL VASCULAR STENOSIS: ICD-10-CM

## 2022-01-03 DIAGNOSIS — I63.512 ACUTE ISCHEMIC LEFT MCA STROKE: Primary | ICD-10-CM

## 2022-01-03 DIAGNOSIS — I66.02 STENOSIS OF LEFT MIDDLE CEREBRAL ARTERY: ICD-10-CM

## 2022-01-03 NOTE — TELEPHONE ENCOUNTER
Provider:  Que  Caller: patient  Time of call:   11:35  Phone #:  [phone]  Surgery:  Acute left MCA stroke  Surgery Date:08/24/21  Last visit:   11/19/21  Next visit: 03/18/22    WALDEMAR:         Reason for call:     Patient requests refill on Brilinta.

## 2022-03-18 ENCOUNTER — OFFICE VISIT (OUTPATIENT)
Dept: NEUROSURGERY | Facility: CLINIC | Age: 72
End: 2022-03-18

## 2022-03-18 VITALS
BODY MASS INDEX: 23.02 KG/M2 | DIASTOLIC BLOOD PRESSURE: 86 MMHG | SYSTOLIC BLOOD PRESSURE: 150 MMHG | HEIGHT: 70 IN | RESPIRATION RATE: 18 BRPM | WEIGHT: 160.8 LBS | HEART RATE: 75 BPM

## 2022-03-18 DIAGNOSIS — N18.30 STAGE 3 CHRONIC KIDNEY DISEASE, UNSPECIFIED WHETHER STAGE 3A OR 3B CKD: ICD-10-CM

## 2022-03-18 DIAGNOSIS — I63.512 ACUTE ISCHEMIC LEFT MCA STROKE: Primary | ICD-10-CM

## 2022-03-18 DIAGNOSIS — I66.02 STENOSIS OF LEFT MIDDLE CEREBRAL ARTERY: ICD-10-CM

## 2022-03-18 DIAGNOSIS — I73.9 PERIPHERAL VASCULAR DISEASE: ICD-10-CM

## 2022-03-18 DIAGNOSIS — I67.9 INTRACRANIAL VASCULAR STENOSIS: ICD-10-CM

## 2022-03-18 PROCEDURE — 99214 OFFICE O/P EST MOD 30 MIN: CPT | Performed by: NEUROLOGICAL SURGERY

## 2022-03-18 RX ORDER — AMOXICILLIN 500 MG/1
CAPSULE ORAL
COMMUNITY
Start: 2022-03-08 | End: 2022-04-08

## 2022-03-18 NOTE — PROGRESS NOTES
Subjective     Chief Complaint: Status post left MCA stent    Patient ID: Eyal Solano is a 71 y.o. male is here today for follow-up.    History of Present Illness    This is a 71-year-old man in whom I placed a left MCA stent about 7 months ago.  He presents today for routine follow-up.  He denies any numbness, tingling, weakness, strokelike symptoms, or seizures.  He is still taking aspirin and Brilinta daily.  He is a Plavix nonresponder.  He is chewing tobacco but is not smoking.    The following portions of the patient's history were reviewed and updated as appropriate: allergies, current medications, past family history, past medical history, past social history, past surgical history and problem list.    Family history:   Family History   Problem Relation Age of Onset   • Hyperlipidemia Mother    • Hypertension Mother    • Stroke Father 82   • Heart attack Father 82   • Hypertension Brother    • Heart disease Maternal Grandmother    • Heart disease Maternal Grandfather    • Heart disease Paternal Grandmother    • Heart disease Paternal Grandfather        Social history:   Social History     Socioeconomic History   • Marital status: Single   Tobacco Use   • Smoking status: Former Smoker     Packs/day: 1.00     Years: 30.00     Pack years: 30.00     Types: Cigarettes   • Smokeless tobacco: Current User     Types: Chew   • Tobacco comment: Quit >25 yrs ago; tobacco cessation pamphlet given to pt   Vaping Use   • Vaping Use: Never used   Substance and Sexual Activity   • Alcohol use: Not Currently     Alcohol/week: 12.0 standard drinks     Types: 12 Standard drinks or equivalent per week     Comment:  Previously 5-6beers/day and 1/2 pint liquor on weekends. none now   • Drug use: No   • Sexual activity: Defer       Review of Systems   Constitutional: Negative for activity change, appetite change, chills, diaphoresis, fatigue, fever and unexpected weight change.   HENT: Negative for congestion, dental problem,  "drooling, ear discharge, ear pain, facial swelling, hearing loss, mouth sores, nosebleeds, postnasal drip, rhinorrhea, sinus pressure, sinus pain, sneezing, sore throat, tinnitus, trouble swallowing and voice change.    Eyes: Negative for photophobia, pain, discharge, redness, itching and visual disturbance.   Respiratory: Negative for apnea, cough, choking, chest tightness, shortness of breath, wheezing and stridor.    Cardiovascular: Negative for chest pain, palpitations and leg swelling.   Gastrointestinal: Negative for abdominal distention, abdominal pain, anal bleeding, blood in stool, constipation, diarrhea, nausea, rectal pain and vomiting.   Endocrine: Negative for cold intolerance, heat intolerance, polydipsia, polyphagia and polyuria.   Genitourinary: Negative for decreased urine volume, difficulty urinating, dysuria, enuresis, flank pain, frequency, genital sores, hematuria and urgency.   Musculoskeletal: Negative for arthralgias, back pain, gait problem, joint swelling, myalgias, neck pain and neck stiffness.   Skin: Negative for color change, pallor, rash and wound.   Allergic/Immunologic: Negative for environmental allergies, food allergies and immunocompromised state.   Neurological: Negative for dizziness, tremors, seizures, syncope, facial asymmetry, speech difficulty, weakness, light-headedness, numbness and headaches.   Hematological: Negative for adenopathy. Does not bruise/bleed easily.   Psychiatric/Behavioral: Negative for agitation, behavioral problems, confusion, decreased concentration, dysphoric mood, hallucinations, self-injury, sleep disturbance and suicidal ideas. The patient is not nervous/anxious and is not hyperactive.        Objective   Blood pressure 150/86, pulse 75, resp. rate 18, height 177.8 cm (70\"), weight 72.9 kg (160 lb 12.8 oz).  Body mass index is 23.07 kg/m².    Physical Exam  Vitals reviewed.   Constitutional:       General: He is not in acute distress.     Appearance: " He is well-developed. He is not diaphoretic.   HENT:      Head: Normocephalic and atraumatic.   Pulmonary:      Effort: Pulmonary effort is normal.   Skin:     General: Skin is warm and dry.   Neurological:      Mental Status: He is alert and oriented to person, place, and time.      Comments: Speech production is fluent with no paraphasic errors.  Naming and repetition are intact.  He has no pronator drift.  Cranial nerves II through XII are grossly intact.   Psychiatric:         Behavior: Behavior normal.         Assessment/Plan     Independent Review of Radiographic Studies:      He has no imaging for me to review    Medical Decision Making:      This is a 71-year-old man 7 months status post left MCA stent.  He can come off of his Brilinta.  I reviewed the signs and symptoms of stroke with him.  I directed him to call 911 if he thinks he is having a stroke or a TIA.  I will get a baseline MRI and MRA of the brain today.  I would like to follow-up with him in December, or sooner if clinically indicated.    Diagnoses and all orders for this visit:    1. Acute ischemic left MCA stroke (HCC) (Primary)  -     MRI Angiogram Head Without Contrast; Future  -     MRI Brain Without Contrast; Future    2. Stenosis of left middle cerebral artery  -     MRI Angiogram Head Without Contrast; Future  -     MRI Brain Without Contrast; Future    3. Intracranial vascular stenosis  -     MRI Angiogram Head Without Contrast; Future  -     MRI Brain Without Contrast; Future    4. Peripheral vascular disease (HCC)    5. Stage 3 chronic kidney disease, unspecified whether stage 3a or 3b CKD (HCC)        No follow-ups on file.           This document signed by WARREN Grey MD March 18, 2022 11:30 EDT

## 2022-03-24 PROBLEM — Z86.73 HISTORY OF STROKE: Status: ACTIVE | Noted: 2021-08-18

## 2022-03-24 PROBLEM — Z78.9 PLAVIX RESISTANCE: Status: ACTIVE | Noted: 2022-03-24

## 2022-04-08 ENCOUNTER — LAB (OUTPATIENT)
Dept: LAB | Facility: HOSPITAL | Age: 72
End: 2022-04-08

## 2022-04-08 ENCOUNTER — HOSPITAL ENCOUNTER (OUTPATIENT)
Dept: MRI IMAGING | Facility: HOSPITAL | Age: 72
Discharge: HOME | End: 2022-04-08

## 2022-04-08 ENCOUNTER — OFFICE VISIT (OUTPATIENT)
Dept: INTERNAL MEDICINE | Facility: CLINIC | Age: 72
End: 2022-04-08

## 2022-04-08 VITALS
HEIGHT: 70 IN | OXYGEN SATURATION: 99 % | DIASTOLIC BLOOD PRESSURE: 74 MMHG | RESPIRATION RATE: 16 BRPM | TEMPERATURE: 97.1 F | BODY MASS INDEX: 23.54 KG/M2 | SYSTOLIC BLOOD PRESSURE: 152 MMHG | HEART RATE: 70 BPM | WEIGHT: 164.4 LBS

## 2022-04-08 DIAGNOSIS — I66.02 STENOSIS OF LEFT MIDDLE CEREBRAL ARTERY: ICD-10-CM

## 2022-04-08 DIAGNOSIS — I63.512 ACUTE ISCHEMIC LEFT MCA STROKE: ICD-10-CM

## 2022-04-08 DIAGNOSIS — I73.9 PERIPHERAL VASCULAR DISEASE: ICD-10-CM

## 2022-04-08 DIAGNOSIS — N18.31 STAGE 3A CHRONIC KIDNEY DISEASE: ICD-10-CM

## 2022-04-08 DIAGNOSIS — I67.9 INTRACRANIAL VASCULAR STENOSIS: ICD-10-CM

## 2022-04-08 DIAGNOSIS — R80.9 NEPHROTIC RANGE PROTEINURIA: ICD-10-CM

## 2022-04-08 DIAGNOSIS — I10 ESSENTIAL HYPERTENSION: Primary | Chronic | ICD-10-CM

## 2022-04-08 DIAGNOSIS — J30.89 NON-SEASONAL ALLERGIC RHINITIS, UNSPECIFIED TRIGGER: ICD-10-CM

## 2022-04-08 PROCEDURE — 70544 MR ANGIOGRAPHY HEAD W/O DYE: CPT

## 2022-04-08 PROCEDURE — 99214 OFFICE O/P EST MOD 30 MIN: CPT | Performed by: INTERNAL MEDICINE

## 2022-04-08 PROCEDURE — 70551 MRI BRAIN STEM W/O DYE: CPT

## 2022-04-08 PROCEDURE — 85027 COMPLETE CBC AUTOMATED: CPT | Performed by: INTERNAL MEDICINE

## 2022-04-08 PROCEDURE — 80053 COMPREHEN METABOLIC PANEL: CPT | Performed by: INTERNAL MEDICINE

## 2022-04-08 NOTE — PROGRESS NOTES
Internal Medicine Follow Up    Chief Complaint  Eyal Solano is a 71 y.o. male who presents today for follow up of chronic medical conditions outlined below.    Chief Complaint   Patient presents with   • Hypertension     4 month follow up        HPI  Mr. Solano comes in today for follow up. He has had MRI brain and MRA brain earlier today for Dr. Grey. He feels well. He has not kept follow up with nephrology. He denies difficulty urinating or hematuria. He does have 1-2 episodes of nocturia nightly. He continues to have high BP 150s. He declines changes to his medications due to perception of dizziness when SBP in the 140s. He denies chronic wounds or pain in his legs. He notes chronic congestion and rhinorrhea. He reports this is worse when indoors with his dogs but better when out. He declines allergy testing or immunotherapy.       Review of Systems  Review of Systems   Constitutional: Negative.    Respiratory: Negative.    Cardiovascular: Negative.    Genitourinary: Positive for nocturia. Negative for decreased urine volume, difficulty urinating, dysuria, flank pain, frequency and hematuria.   Musculoskeletal: Negative for myalgias.   Skin: Negative for wound.   Neurological: Negative for dizziness and light-headedness.        Current Medications  Current Outpatient Medications on File Prior to Visit   Medication Sig Dispense Refill   • allopurinol (ZYLOPRIM) 100 MG tablet Take 2 tablets by mouth Daily. 180 tablet 1   • amLODIPine (NORVASC) 10 MG tablet Take 1 tablet by mouth Daily. 90 tablet 3   • aspirin 81 MG chewable tablet Chew 1 tablet Daily. 90 tablet 3   • atorvastatin (LIPITOR) 80 MG tablet Take 1 tablet by mouth Every Night. 90 tablet 3   • carvedilol (COREG) 6.25 MG tablet Take 1 tablet by mouth 2 (Two) Times a Day With Meals. 180 tablet 3   • ferrous sulfate 325 (65 FE) MG tablet Take 1 tablet by mouth 2 (Two) Times a Day With Meals. 60 tablet 0   • fexofenadine (Allegra Allergy) 180 MG tablet  "Take 1 tablet by mouth Daily. 90 tablet 3   • lisinopril (PRINIVIL,ZESTRIL) 5 MG tablet Take 1 tablet by mouth Daily. 90 tablet 3   • ticagrelor (BRILINTA) 90 MG tablet tablet Take 1 tablet by mouth 2 (Two) Times a Day. 60 tablet 5   • triamcinolone (KENALOG) 0.1 % ointment Apply 1 application topically to the appropriate area as directed Daily As Needed for Irritation.     • VITAMIN D PO Take 1 tablet by mouth Daily.     • [DISCONTINUED] amoxicillin (AMOXIL) 500 MG capsule        No current facility-administered medications on file prior to visit.       Allergies  No Known Allergies    Objective  Visit Vitals  /74   Pulse 70   Temp 97.1 °F (36.2 °C)   Resp 16   Ht 177.8 cm (70\")   Wt 74.6 kg (164 lb 6.4 oz)   SpO2 99%   BMI 23.59 kg/m²        Physical Exam  Physical Exam  Vitals and nursing note reviewed.   Constitutional:       General: He is not in acute distress.     Appearance: Normal appearance. He is well-developed and normal weight. He is not ill-appearing or toxic-appearing.   HENT:      Head: Normocephalic and atraumatic.   Eyes:      Conjunctiva/sclera: Conjunctivae normal.   Cardiovascular:      Rate and Rhythm: Normal rate and regular rhythm.      Heart sounds: Normal heart sounds.   Pulmonary:      Effort: Pulmonary effort is normal. No respiratory distress.      Breath sounds: Normal breath sounds.   Musculoskeletal:         General: No deformity.      Right lower leg: No edema.      Left lower leg: No edema.   Skin:     General: Skin is warm and dry.   Neurological:      Mental Status: He is alert and oriented to person, place, and time. Mental status is at baseline.      Gait: Gait normal.         Results  Results for orders placed or performed during the hospital encounter of 11/19/21   POC Creatinine    Specimen: Blood   Result Value Ref Range    Creatinine 2.30 (H) 0.60 - 1.30 mg/dL        Assessment and Plan  Diagnoses and all orders for this visit:    Essential hypertension  - BP above " goal in office, stable. He continues to decline medication changes due to dizziness when SBP<140.  - Continue norvasc 10mg daily, lisinopril 5mg daily, and coreg 6.25mg BID.    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 Has had nephrotic range proteinuria.   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 HTN has been uncontrolled and he declines med adjustment.  - He is on ACEI  - He refuses to maintain follow up with nephrology  - Will obtain CBC, CMP, UA, urine protein creatinine ratio today    Stenosis of left middle cerebral artery  - s/p angioplasty and stent by Dr. Grey 8/24  - Had MRI and MRA brain today  - Now off brilinta, remains on ASA and atorvastatin    Peripheral vascular disease (HCC)  - Has R groin bruit with duplex showing at least 50% stenosis of the SFA.  - He has declined vascular evaluation  - He denies ulceration, pain, numbness, weakness, color change.  - On ASA and atorvastatin 80mg daily    Non-seasonal allergic rhinitis, unspecified trigger  - He declines referral for allergy testing or immunotherapy. He is already on allegra with no improvement.     Health Maintenance  - Colonoscopy: Declined  - HCV: negative  - Immunizations: COVID complete. Declined pneumovax and flu. Discussed shingrix.  - Depression screening: negative 4/2022    Return in about 15 weeks (around 7/22/2022) for Next scheduled follow up, Labs today.

## 2022-04-09 LAB
ALBUMIN SERPL-MCNC: 3.9 G/DL (ref 3.5–5.2)
ALBUMIN/GLOB SERPL: 1.3 G/DL
ALP SERPL-CCNC: 80 U/L (ref 39–117)
ALT SERPL W P-5'-P-CCNC: 15 U/L (ref 1–41)
ANION GAP SERPL CALCULATED.3IONS-SCNC: 9 MMOL/L (ref 5–15)
AST SERPL-CCNC: 29 U/L (ref 1–40)
BILIRUB SERPL-MCNC: 0.2 MG/DL (ref 0–1.2)
BUN SERPL-MCNC: 34 MG/DL (ref 8–23)
BUN/CREAT SERPL: 18.6 (ref 7–25)
CALCIUM SPEC-SCNC: 9.3 MG/DL (ref 8.6–10.5)
CHLORIDE SERPL-SCNC: 106 MMOL/L (ref 98–107)
CO2 SERPL-SCNC: 22 MMOL/L (ref 22–29)
CREAT SERPL-MCNC: 1.83 MG/DL (ref 0.76–1.27)
DEPRECATED RDW RBC AUTO: 49.9 FL (ref 37–54)
EGFRCR SERPLBLD CKD-EPI 2021: 39.5 ML/MIN/1.73
ERYTHROCYTE [DISTWIDTH] IN BLOOD BY AUTOMATED COUNT: 15.3 % (ref 12.3–15.4)
GLOBULIN UR ELPH-MCNC: 3.1 GM/DL
GLUCOSE SERPL-MCNC: 101 MG/DL (ref 65–99)
HCT VFR BLD AUTO: 37.6 % (ref 37.5–51)
HGB BLD-MCNC: 12.1 G/DL (ref 13–17.7)
MCH RBC QN AUTO: 28.5 PG (ref 26.6–33)
MCHC RBC AUTO-ENTMCNC: 32.2 G/DL (ref 31.5–35.7)
MCV RBC AUTO: 88.7 FL (ref 79–97)
PLATELET # BLD AUTO: 221 10*3/MM3 (ref 140–450)
PMV BLD AUTO: 11 FL (ref 6–12)
POTASSIUM SERPL-SCNC: 4.1 MMOL/L (ref 3.5–5.2)
PROT SERPL-MCNC: 7 G/DL (ref 6–8.5)
RBC # BLD AUTO: 4.24 10*6/MM3 (ref 4.14–5.8)
SODIUM SERPL-SCNC: 137 MMOL/L (ref 136–145)
WBC NRBC COR # BLD: 4.8 10*3/MM3 (ref 3.4–10.8)

## 2022-07-22 ENCOUNTER — OFFICE VISIT (OUTPATIENT)
Dept: INTERNAL MEDICINE | Facility: CLINIC | Age: 72
End: 2022-07-22

## 2022-07-22 ENCOUNTER — LAB (OUTPATIENT)
Dept: LAB | Facility: HOSPITAL | Age: 72
End: 2022-07-22

## 2022-07-22 VITALS
WEIGHT: 166 LBS | HEIGHT: 70 IN | SYSTOLIC BLOOD PRESSURE: 156 MMHG | BODY MASS INDEX: 23.77 KG/M2 | DIASTOLIC BLOOD PRESSURE: 96 MMHG | OXYGEN SATURATION: 98 % | HEART RATE: 74 BPM | TEMPERATURE: 97.4 F

## 2022-07-22 DIAGNOSIS — I10 ESSENTIAL HYPERTENSION: ICD-10-CM

## 2022-07-22 DIAGNOSIS — J30.89 NON-SEASONAL ALLERGIC RHINITIS, UNSPECIFIED TRIGGER: ICD-10-CM

## 2022-07-22 DIAGNOSIS — I66.12: ICD-10-CM

## 2022-07-22 DIAGNOSIS — K13.70 LESION OF ORAL MUCOSA: ICD-10-CM

## 2022-07-22 DIAGNOSIS — E78.2 MIXED HYPERLIPIDEMIA: ICD-10-CM

## 2022-07-22 DIAGNOSIS — R80.9 NEPHROTIC RANGE PROTEINURIA: ICD-10-CM

## 2022-07-22 DIAGNOSIS — M1A.39X0 CHRONIC GOUT DUE TO RENAL IMPAIRMENT OF MULTIPLE SITES WITHOUT TOPHUS: ICD-10-CM

## 2022-07-22 DIAGNOSIS — Z86.73 HISTORY OF STROKE: ICD-10-CM

## 2022-07-22 DIAGNOSIS — D63.1 ANEMIA DUE TO STAGE 3B CHRONIC KIDNEY DISEASE: ICD-10-CM

## 2022-07-22 DIAGNOSIS — N18.31 STAGE 3A CHRONIC KIDNEY DISEASE: ICD-10-CM

## 2022-07-22 DIAGNOSIS — N18.32 STAGE 3B CHRONIC KIDNEY DISEASE: ICD-10-CM

## 2022-07-22 DIAGNOSIS — R73.03 PREDIABETES: ICD-10-CM

## 2022-07-22 DIAGNOSIS — F17.220 CHEWING TOBACCO NICOTINE DEPENDENCE WITHOUT COMPLICATION: ICD-10-CM

## 2022-07-22 DIAGNOSIS — I66.02 STENOSIS OF LEFT MIDDLE CEREBRAL ARTERY: ICD-10-CM

## 2022-07-22 DIAGNOSIS — I73.9 PERIPHERAL VASCULAR DISEASE: ICD-10-CM

## 2022-07-22 DIAGNOSIS — N18.32 ANEMIA DUE TO STAGE 3B CHRONIC KIDNEY DISEASE: ICD-10-CM

## 2022-07-22 DIAGNOSIS — F10.10 ALCOHOL ABUSE: ICD-10-CM

## 2022-07-22 DIAGNOSIS — Z00.00 MEDICARE ANNUAL WELLNESS VISIT, SUBSEQUENT: Primary | ICD-10-CM

## 2022-07-22 PROBLEM — M10.9 GOUT: Status: ACTIVE | Noted: 2022-07-22

## 2022-07-22 LAB
ALBUMIN SERPL-MCNC: 4 G/DL (ref 3.5–5.2)
ALBUMIN/GLOB SERPL: 1.3 G/DL
ALP SERPL-CCNC: 78 U/L (ref 39–117)
ALT SERPL W P-5'-P-CCNC: 20 U/L (ref 1–41)
ANION GAP SERPL CALCULATED.3IONS-SCNC: 11.4 MMOL/L (ref 5–15)
AST SERPL-CCNC: 29 U/L (ref 1–40)
BILIRUB SERPL-MCNC: 0.2 MG/DL (ref 0–1.2)
BILIRUB UR QL STRIP: NEGATIVE
BUN SERPL-MCNC: 40 MG/DL (ref 8–23)
BUN/CREAT SERPL: 16.9 (ref 7–25)
CALCIUM SPEC-SCNC: 8.6 MG/DL (ref 8.6–10.5)
CHLORIDE SERPL-SCNC: 102 MMOL/L (ref 98–107)
CHOLEST SERPL-MCNC: 261 MG/DL (ref 0–200)
CLARITY UR: CLEAR
CO2 SERPL-SCNC: 24.6 MMOL/L (ref 22–29)
COLOR UR: YELLOW
CREAT SERPL-MCNC: 2.37 MG/DL (ref 0.76–1.27)
DEPRECATED RDW RBC AUTO: 45.1 FL (ref 37–54)
EGFRCR SERPLBLD CKD-EPI 2021: 28.9 ML/MIN/1.73
ERYTHROCYTE [DISTWIDTH] IN BLOOD BY AUTOMATED COUNT: 14.4 % (ref 12.3–15.4)
GLOBULIN UR ELPH-MCNC: 3 GM/DL
GLUCOSE SERPL-MCNC: 96 MG/DL (ref 65–99)
GLUCOSE UR STRIP-MCNC: NEGATIVE MG/DL
HBA1C MFR BLD: 5.6 % (ref 4.8–5.6)
HCT VFR BLD AUTO: 38.1 % (ref 37.5–51)
HDLC SERPL-MCNC: 53 MG/DL (ref 40–60)
HGB BLD-MCNC: 12.6 G/DL (ref 13–17.7)
HGB UR QL STRIP.AUTO: ABNORMAL
IRON 24H UR-MRATE: 76 MCG/DL (ref 59–158)
IRON SATN MFR SERPL: 24 % (ref 20–50)
KETONES UR QL STRIP: NEGATIVE
LDLC SERPL CALC-MCNC: 156 MG/DL (ref 0–100)
LDLC/HDLC SERPL: 2.85 {RATIO}
LEUKOCYTE ESTERASE UR QL STRIP.AUTO: NEGATIVE
MCH RBC QN AUTO: 28.6 PG (ref 26.6–33)
MCHC RBC AUTO-ENTMCNC: 33.1 G/DL (ref 31.5–35.7)
MCV RBC AUTO: 86.4 FL (ref 79–97)
NITRITE UR QL STRIP: NEGATIVE
PH UR STRIP.AUTO: 7 [PH] (ref 5–8)
PLATELET # BLD AUTO: 190 10*3/MM3 (ref 140–450)
PMV BLD AUTO: 11.2 FL (ref 6–12)
POTASSIUM SERPL-SCNC: 5 MMOL/L (ref 3.5–5.2)
PROT SERPL-MCNC: 7 G/DL (ref 6–8.5)
PROT UR QL STRIP: ABNORMAL
RBC # BLD AUTO: 4.41 10*6/MM3 (ref 4.14–5.8)
SODIUM SERPL-SCNC: 138 MMOL/L (ref 136–145)
SP GR UR STRIP: 1.01 (ref 1–1.03)
TIBC SERPL-MCNC: 323 MCG/DL (ref 298–536)
TRANSFERRIN SERPL-MCNC: 217 MG/DL (ref 200–360)
TRIGL SERPL-MCNC: 284 MG/DL (ref 0–150)
URATE SERPL-MCNC: 8.4 MG/DL (ref 3.4–7)
UROBILINOGEN UR QL STRIP: ABNORMAL
VLDLC SERPL-MCNC: 52 MG/DL (ref 5–40)
WBC NRBC COR # BLD: 4.48 10*3/MM3 (ref 3.4–10.8)

## 2022-07-22 PROCEDURE — 1170F FXNL STATUS ASSESSED: CPT | Performed by: INTERNAL MEDICINE

## 2022-07-22 PROCEDURE — 82570 ASSAY OF URINE CREATININE: CPT

## 2022-07-22 PROCEDURE — 80061 LIPID PANEL: CPT

## 2022-07-22 PROCEDURE — 83540 ASSAY OF IRON: CPT

## 2022-07-22 PROCEDURE — G0439 PPPS, SUBSEQ VISIT: HCPCS | Performed by: INTERNAL MEDICINE

## 2022-07-22 PROCEDURE — 1159F MED LIST DOCD IN RCRD: CPT | Performed by: INTERNAL MEDICINE

## 2022-07-22 PROCEDURE — 81001 URINALYSIS AUTO W/SCOPE: CPT

## 2022-07-22 PROCEDURE — 80053 COMPREHEN METABOLIC PANEL: CPT

## 2022-07-22 PROCEDURE — 84156 ASSAY OF PROTEIN URINE: CPT

## 2022-07-22 PROCEDURE — 1126F AMNT PAIN NOTED NONE PRSNT: CPT | Performed by: INTERNAL MEDICINE

## 2022-07-22 PROCEDURE — 84550 ASSAY OF BLOOD/URIC ACID: CPT

## 2022-07-22 PROCEDURE — 85027 COMPLETE CBC AUTOMATED: CPT

## 2022-07-22 PROCEDURE — 83036 HEMOGLOBIN GLYCOSYLATED A1C: CPT

## 2022-07-22 PROCEDURE — 84466 ASSAY OF TRANSFERRIN: CPT

## 2022-07-22 PROCEDURE — 82728 ASSAY OF FERRITIN: CPT

## 2022-07-22 PROCEDURE — 82306 VITAMIN D 25 HYDROXY: CPT

## 2022-07-22 RX ORDER — CARVEDILOL 6.25 MG/1
6.25 TABLET ORAL 2 TIMES DAILY WITH MEALS
Qty: 180 TABLET | Refills: 3 | Status: SHIPPED | OUTPATIENT
Start: 2022-07-22

## 2022-07-22 RX ORDER — LISINOPRIL 5 MG/1
5 TABLET ORAL DAILY
Qty: 90 TABLET | Refills: 3 | Status: SHIPPED | OUTPATIENT
Start: 2022-07-22

## 2022-07-22 RX ORDER — ATORVASTATIN CALCIUM 80 MG/1
80 TABLET, FILM COATED ORAL NIGHTLY
Qty: 90 TABLET | Refills: 3 | Status: SHIPPED | OUTPATIENT
Start: 2022-07-22

## 2022-07-22 RX ORDER — AMLODIPINE BESYLATE 10 MG/1
10 TABLET ORAL DAILY
Qty: 90 TABLET | Refills: 3 | Status: SHIPPED | OUTPATIENT
Start: 2022-07-22

## 2022-07-22 NOTE — PROGRESS NOTES
The ABCs of the Annual Wellness Visit  Subsequent Medicare Wellness Visit    Chief Complaint   Patient presents with   • Medicare Wellness-subsequent       Subjective   History of Present Illness:  Eyal Solano is a 71 y.o. male who presents for a Subsequent Medicare Wellness Visit.    HEALTH RISK ASSESSMENT    Recent Hospitalizations:  Recently treated at the following:  [facility]. 8/2021 for L MCA stent.    Current Medical Providers:  Patient Care Team:  Edda Sanchez MD as PCP - General (Internal Medicine)  Praveen Fischer MD as Consulting Physician (Nephrology)    Smoking Status:  Social History     Tobacco Use   Smoking Status Former Smoker   • Packs/day: 1.00   • Years: 30.00   • Pack years: 30.00   • Types: Cigarettes   Smokeless Tobacco Current User   • Types: Chew   Tobacco Comment    Quit >25 yrs ago; tobacco cessation pamphlet given to pt       Alcohol Consumption:  Social History     Substance and Sexual Activity   Alcohol Use Not Currently   • Alcohol/week: 12.0 standard drinks   • Types: 12 Standard drinks or equivalent per week    Comment:  Previously 5-6beers/day and 1/2 pint liquor on weekends. none now       Depression Screen:   PHQ-2/PHQ-9 Depression Screening 7/22/2022   Retired PHQ-9 Total Score -   Retired Total Score -   Little Interest or Pleasure in Doing Things 0-->not at all   Feeling Down, Depressed or Hopeless 0-->not at all   PHQ-9: Brief Depression Severity Measure Score 0       Fall Risk Screen:  KATIE Fall Risk Assessment was completed, and patient is at LOW risk for falls.Assessment completed on:7/22/2022    Health Habits and Functional and Cognitive Screening:  Functional & Cognitive Status 7/22/2022   Do you have difficulty preparing food and eating? No   Do you have difficulty bathing yourself, getting dressed or grooming yourself? No   Do you have difficulty using the toilet? No   Do you have difficulty moving around from place to place? No   Do you  have trouble with steps or getting out of a bed or a chair? No   Current Diet Well Balanced Diet   Dental Exam Up to date   Eye Exam Up to date   Exercise (times per week) 7 times per week   Current Exercises Include Gardening;Yard Work   Do you need help using the phone?  No   Are you deaf or do you have serious difficulty hearing?  No   Do you need help with transportation? No   Do you need help shopping? No   Do you need help preparing meals?  No   Do you need help with housework?  No   Do you need help with laundry? No   Do you need help taking your medications? No   Do you need help managing money? No   Do you ever drive or ride in a car without wearing a seat belt? No   Have you felt unusual stress, anger or loneliness in the last month? No   Who do you live with? Other   If you need help, do you have trouble finding someone available to you? No   Have you been bothered in the last four weeks by sexual problems? No   Do you have difficulty concentrating, remembering or making decisions? No         Does the patient have evidence of cognitive impairment? No    Asprin use counseling:Taking ASA appropriately as indicated    Age-appropriate Screening Schedule:  Refer to the list below for future screening recommendations based on patient's age, sex and/or medical conditions. Orders for these recommended tests are listed in the plan section. The patient has been provided with a written plan.    Health Maintenance   Topic Date Due   • TDAP/TD VACCINES (1 - Tdap) Never done   • ZOSTER VACCINE (1 of 2) Never done   • LIPID PANEL  07/29/2022   • INFLUENZA VACCINE  10/01/2022          The following portions of the patient's history were reviewed and updated as appropriate: allergies, current medications, past family history, past medical history, past social history, past surgical history and problem list.    Outpatient Medications Prior to Visit   Medication Sig Dispense Refill   • allopurinol (ZYLOPRIM) 100 MG tablet  Take 2 tablets by mouth Daily. 180 tablet 1   • amLODIPine (NORVASC) 10 MG tablet Take 1 tablet by mouth Daily. 90 tablet 3   • aspirin 81 MG chewable tablet Chew 1 tablet Daily. 90 tablet 3   • atorvastatin (LIPITOR) 80 MG tablet Take 1 tablet by mouth Every Night. 90 tablet 3   • carvedilol (COREG) 6.25 MG tablet Take 1 tablet by mouth 2 (Two) Times a Day With Meals. 180 tablet 3   • ferrous sulfate 325 (65 FE) MG tablet Take 1 tablet by mouth 2 (Two) Times a Day With Meals. 60 tablet 0   • fexofenadine (Allegra Allergy) 180 MG tablet Take 1 tablet by mouth Daily. 90 tablet 3   • lisinopril (PRINIVIL,ZESTRIL) 5 MG tablet Take 1 tablet by mouth Daily. 90 tablet 3   • ticagrelor (BRILINTA) 90 MG tablet tablet Take 1 tablet by mouth 2 (Two) Times a Day. 60 tablet 5   • triamcinolone (KENALOG) 0.1 % ointment Apply 1 application topically to the appropriate area as directed Daily As Needed for Irritation.     • VITAMIN D PO Take 1 tablet by mouth Daily.       No facility-administered medications prior to visit.       Patient Active Problem List   Diagnosis   • Alcohol abuse   • Essential hypertension   • H/O CVA in 2018    • CKD    • Primary insomnia   • Nephrotic range proteinuria   • Chronic gout due to renal impairment of multiple sites without tophus   • Rash   • Dizziness   • Non-seasonal allergic rhinitis   • Mixed hyperlipidemia   • Ataxia   • Anemia   • History of stroke s/p L MCA stent   • Former smoker   • Chronic L LUCRECIA occlusion    • Stenosis of left middle cerebral artery   • Peripheral vascular disease (HCC)   • Chewing tobacco nicotine dependence without complication   • Plavix resistance   • Prediabetes   • Gout       Advanced Care Planning:  ACP discussion was held with the patient during this visit. Patient does not have an advance directive, declines further assistance.    Review of Systems   Constitutional: Negative.    Eyes: Negative.    Respiratory: Negative.    Cardiovascular: Negative.   "  Gastrointestinal: Negative.    Genitourinary: Negative.    Musculoskeletal: Negative.    Skin: Negative.    Neurological: Negative.    Psychiatric/Behavioral: Negative.        Compared to one year ago, the patient feels his physical health is better.  Compared to one year ago, the patient feels his mental health is the same.    Reviewed chart for potential of high risk medication in the elderly: yes  Reviewed chart for potential of harmful drug interactions in the elderly:yes    Objective         Vitals:    07/22/22 1247   BP: 156/96   Pulse: 74   Temp: 97.4 °F (36.3 °C)   SpO2: 98%   Weight: 75.3 kg (166 lb)   Height: 177.8 cm (70\")       Body mass index is 23.82 kg/m².  Discussed the patient's BMI with him. The BMI is in the acceptable range.    Physical Exam  Vitals and nursing note reviewed.   Constitutional:       General: He is not in acute distress.     Appearance: Normal appearance. He is well-developed and normal weight. He is not ill-appearing, toxic-appearing or diaphoretic.   HENT:      Head: Normocephalic and atraumatic.      Right Ear: Tympanic membrane, ear canal and external ear normal.      Left Ear: Tympanic membrane, ear canal and external ear normal.      Nose: Nose normal.      Mouth/Throat:      Comments: 2 small white patches on L buccal mucosa  Eyes:      General: No scleral icterus.     Conjunctiva/sclera: Conjunctivae normal.   Cardiovascular:      Rate and Rhythm: Normal rate and regular rhythm.      Heart sounds: Normal heart sounds.   Pulmonary:      Effort: Pulmonary effort is normal. No respiratory distress.      Breath sounds: Normal breath sounds.   Abdominal:      General: Bowel sounds are normal. There is no distension.      Palpations: Abdomen is soft. There is no mass.      Tenderness: There is no abdominal tenderness. There is no guarding or rebound.      Hernia: A hernia (large ventral hernia versus diastasis recti ) is present.   Musculoskeletal:         General: No " deformity.      Cervical back: Neck supple.      Right lower leg: No edema.      Left lower leg: No edema.   Lymphadenopathy:      Cervical: No cervical adenopathy.   Skin:     General: Skin is warm and dry.      Findings: No rash.   Neurological:      Mental Status: He is alert and oriented to person, place, and time. Mental status is at baseline.      Gait: Gait normal.   Psychiatric:         Mood and Affect: Mood normal.         Behavior: Behavior normal.         Thought Content: Thought content normal.         Judgment: Judgment normal.               Assessment & Plan   Medicare Risks and Personalized Health Plan  CMS Preventative Services Quick Reference  Advance Directive Discussion  Immunizations Discussed/Encouraged (specific immunizations; COVID19 )  Tobacco Use/Dependance (use dotphrase .tobaccocessation for documentation)    The above risks/problems have been discussed with the patient.  Pertinent information has been shared with the patient in the After Visit Summary.  Follow up plans and orders are seen below in the Assessment/Plan Section.    Diagnoses and all orders for this visit:    Medicare annual wellness visit, subsequent    Essential hypertension  - BP above goal in office, he is off all medications today.   - He has declined medication changes due to dizziness when SBP<140.  - Continue norvasc 10mg daily, lisinopril 5mg daily, and coreg 6.25mg BID.    Stage 3b chronic kidney disease (HCC) with Nephrotic range proteinuria  - Baseline creatinine 1.8-2, gfr 40. Has had nephrotic range proteinuria.   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 HTN has been uncontrolled and he declines med adjustment.  - He is on ACEI  - He refuses to maintain follow up with nephrology  - Will obtain CBC, CMP, UA, urine protein creatinine ratio, vitamin D, iron studies today    Anemia due to stage 3b chronic kidney disease (HCC)   - mild, improving on last labs. On oral iron. Will check CBC and iron  studies today.    Stenosis of left middle cerebral artery  Chronic L LUCRECIA occlusion   History of stroke s/p L MCA stent  - s/p angioplasty and stent by Dr. Grey 8/2021  - Now off brilinta, remains on ASA and atorvastatin  - Follow up with Dr. Grey in December    Peripheral vascular disease (HCC)  - Has R groin bruit with duplex showing at least 50% stenosis of the SFA.  - He has declined vascular evaluation  - He denies ulceration, pain, numbness, weakness, color change.  - On ASA and atorvastatin 80mg daily    Prediabetes  - A1c ordered    Chewing tobacco nicotine dependence without complication  - Not interested in quitting. Needs routine dental care.    Mixed hyperlipidemia  - Continue atorvastatin 80mg daily and update lipid panel    Non-seasonal allergic rhinitis, unspecified trigger  - He declines referral for allergy testing or immunotherapy. Allegra ineffective so discontinued.    Chronic gout due to renal impairment of multiple sites without tophus  - On allopurinol 200mg daily without recent flare. Historically flares involve knees, feet, hands.  - Check uric acid level    Alcohol abuse  - 14-21 beers or more per week.     Lesion of oral mucosa  - Has small white patches on inside of L cheek. Had dental extraction in March and evaluated by dentist then, felt to be due to dental abscess per patient. Needs to be monitored with next follow up.    Health Maintenance  - Colonoscopy: Declined  - HCV: negative  - Immunizations: COVID booster declined today. Declined pneumovax and flu. Discussed shingrix.  - Depression screening: negative 4/2022     Follow Up:  Return in about 3 months (around 10/22/2022) for Follow up, 1 year for wellness 45 minutes, Labs today.     An After Visit Summary and PPPS were given to the patient.

## 2022-07-23 LAB
25(OH)D3 SERPL-MCNC: 20.8 NG/ML (ref 30–100)
BACTERIA UR QL AUTO: ABNORMAL /HPF
CREAT UR-MCNC: 29.3 MG/DL
FERRITIN SERPL-MCNC: 815 NG/ML (ref 30–400)
HYALINE CASTS UR QL AUTO: ABNORMAL /LPF
PROT ?TM UR-MCNC: 269.3 MG/DL
PROT/CREAT UR: 9191.1 MG/G CREA (ref 0–200)
RBC # UR STRIP: ABNORMAL /HPF
REF LAB TEST METHOD: ABNORMAL
SQUAMOUS #/AREA URNS HPF: ABNORMAL /HPF
WBC # UR STRIP: ABNORMAL /HPF

## 2022-08-01 ENCOUNTER — TELEPHONE (OUTPATIENT)
Dept: INTERNAL MEDICINE | Facility: CLINIC | Age: 72
End: 2022-08-01

## 2022-08-01 NOTE — TELEPHONE ENCOUNTER
----- Message from Edda Sanchez MD sent at 7/31/2022  9:19 PM EDT -----  Please call patient and let him know that his labs show increasing amounts of protein in the urine as well as a small amount of blood which is new. I strongly recommend he return to see the kidney specialist to see if there is anything that can be done to improve these issues. I can refer him to a new group if he prefers. Please let me know what he would like to do.  His other labs show that his iron levels are now normal and anemia is improving. Continue iron supplement. His cholesterol is elevated. Is he taking the atorvastatin every day? Diabetes screening is negative. Vitamin D is low, how much vitamin D is he taking?

## 2022-08-02 DIAGNOSIS — R80.9 NEPHROTIC RANGE PROTEINURIA: ICD-10-CM

## 2022-08-02 DIAGNOSIS — E55.9 VITAMIN D DEFICIENCY: Primary | ICD-10-CM

## 2022-08-02 DIAGNOSIS — E78.2 MIXED HYPERLIPIDEMIA: ICD-10-CM

## 2022-08-02 DIAGNOSIS — N18.4 STAGE 4 CHRONIC KIDNEY DISEASE: ICD-10-CM

## 2022-08-02 RX ORDER — EZETIMIBE 10 MG/1
10 TABLET ORAL DAILY
Qty: 90 TABLET | Refills: 3 | Status: SHIPPED | OUTPATIENT
Start: 2022-08-02

## 2022-08-02 RX ORDER — ERGOCALCIFEROL 1.25 MG/1
50000 CAPSULE ORAL WEEKLY
Qty: 12 CAPSULE | Refills: 1 | Status: SHIPPED | OUTPATIENT
Start: 2022-08-02 | End: 2023-01-17

## 2022-10-24 ENCOUNTER — OFFICE VISIT (OUTPATIENT)
Dept: INTERNAL MEDICINE | Facility: CLINIC | Age: 72
End: 2022-10-24

## 2022-10-24 ENCOUNTER — LAB (OUTPATIENT)
Dept: LAB | Facility: HOSPITAL | Age: 72
End: 2022-10-24

## 2022-10-24 VITALS
SYSTOLIC BLOOD PRESSURE: 154 MMHG | RESPIRATION RATE: 16 BRPM | DIASTOLIC BLOOD PRESSURE: 72 MMHG | BODY MASS INDEX: 23.19 KG/M2 | HEIGHT: 70 IN | OXYGEN SATURATION: 98 % | HEART RATE: 87 BPM | WEIGHT: 162 LBS

## 2022-10-24 DIAGNOSIS — M79.10 MYALGIA: ICD-10-CM

## 2022-10-24 DIAGNOSIS — E55.9 VITAMIN D DEFICIENCY: ICD-10-CM

## 2022-10-24 DIAGNOSIS — J30.89 NON-SEASONAL ALLERGIC RHINITIS, UNSPECIFIED TRIGGER: ICD-10-CM

## 2022-10-24 DIAGNOSIS — E78.2 MIXED HYPERLIPIDEMIA: Chronic | ICD-10-CM

## 2022-10-24 DIAGNOSIS — R80.9 NEPHROTIC RANGE PROTEINURIA: ICD-10-CM

## 2022-10-24 DIAGNOSIS — N18.32 STAGE 3B CHRONIC KIDNEY DISEASE: Chronic | ICD-10-CM

## 2022-10-24 DIAGNOSIS — I10 ESSENTIAL HYPERTENSION: Chronic | ICD-10-CM

## 2022-10-24 DIAGNOSIS — N18.32 STAGE 3B CHRONIC KIDNEY DISEASE: ICD-10-CM

## 2022-10-24 DIAGNOSIS — I73.9 PERIPHERAL VASCULAR DISEASE: ICD-10-CM

## 2022-10-24 DIAGNOSIS — R80.9 NEPHROTIC RANGE PROTEINURIA: Primary | ICD-10-CM

## 2022-10-24 PROCEDURE — 82306 VITAMIN D 25 HYDROXY: CPT

## 2022-10-24 PROCEDURE — 82570 ASSAY OF URINE CREATININE: CPT

## 2022-10-24 PROCEDURE — 80061 LIPID PANEL: CPT

## 2022-10-24 PROCEDURE — 80048 BASIC METABOLIC PNL TOTAL CA: CPT

## 2022-10-24 PROCEDURE — 84156 ASSAY OF PROTEIN URINE: CPT

## 2022-10-24 PROCEDURE — 99214 OFFICE O/P EST MOD 30 MIN: CPT | Performed by: INTERNAL MEDICINE

## 2022-10-24 PROCEDURE — 82550 ASSAY OF CK (CPK): CPT

## 2022-10-24 PROCEDURE — 81001 URINALYSIS AUTO W/SCOPE: CPT

## 2022-10-24 RX ORDER — LEVOCETIRIZINE DIHYDROCHLORIDE 5 MG/1
5 TABLET, FILM COATED ORAL EVERY EVENING
Qty: 90 TABLET | Refills: 1 | Status: SHIPPED | OUTPATIENT
Start: 2022-10-24

## 2022-10-24 RX ORDER — FLUTICASONE PROPIONATE 50 MCG
2 SPRAY, SUSPENSION (ML) NASAL DAILY
Qty: 16 G | Refills: 11 | Status: SHIPPED | OUTPATIENT
Start: 2022-10-24

## 2022-10-24 NOTE — PROGRESS NOTES
Internal Medicine Follow Up    Chief Complaint  Eyal Solano is a 71 y.o. male who presents today for follow up of chronic medical conditions outlined below.    Chief Complaint   Patient presents with   • Hypertension     3mo fu, HTN        HPI  Mr. Solano comes in today for follow up. He notes ongoing nasal congestion and now also experiencing chest congestion in the AM that clears as he gets moving. He is not taking anything currently for this but has tried mucinex, nyquil, and allegra. He notes also that he has cramps in both calves with activity. Has also had cramps that wake him at night but this has been less often lately. He reports BP fluctuates, one day systolic was as low as 97. Typically 140s-150. He has not been to the nephrologist because he feels that he already has too many doctors. Denies changes to urination.       Review of Systems  Review of Systems   Constitutional: Negative.    Respiratory: Positive for cough (AM). Negative for shortness of breath.    Cardiovascular: Negative.    Genitourinary: Negative for decreased urine volume, difficulty urinating and hematuria.   Musculoskeletal: Positive for myalgias (calves).        Current Medications  Current Outpatient Medications on File Prior to Visit   Medication Sig Dispense Refill   • allopurinol (ZYLOPRIM) 100 MG tablet Take 2 tablets by mouth Daily. 180 tablet 1   • amLODIPine (NORVASC) 10 MG tablet Take 1 tablet by mouth Daily. 90 tablet 3   • aspirin 81 MG chewable tablet Chew 1 tablet Daily. 90 tablet 3   • atorvastatin (LIPITOR) 80 MG tablet Take 1 tablet by mouth Every Night. 90 tablet 3   • carvedilol (COREG) 6.25 MG tablet Take 1 tablet by mouth 2 (Two) Times a Day With Meals. 180 tablet 3   • ezetimibe (Zetia) 10 MG tablet Take 1 tablet by mouth Daily. 90 tablet 3   • ferrous sulfate 325 (65 FE) MG tablet Take 1 tablet by mouth 2 (Two) Times a Day With Meals. 60 tablet 0   • lisinopril (PRINIVIL,ZESTRIL) 5 MG tablet Take 1 tablet by  "mouth Daily. 90 tablet 3   • triamcinolone (KENALOG) 0.1 % ointment Apply 1 application topically to the appropriate area as directed Daily As Needed for Irritation.     • vitamin D (ERGOCALCIFEROL) 1.25 MG (95276 UT) capsule capsule Take 1 capsule by mouth 1 (One) Time Per Week. 12 capsule 1   • VITAMIN D PO Take 1 tablet by mouth Daily.       No current facility-administered medications on file prior to visit.       Allergies  No Known Allergies    Objective  Visit Vitals  /72   Pulse 87   Resp 16   Ht 177.8 cm (70\")   Wt 73.5 kg (162 lb)   SpO2 98%   BMI 23.24 kg/m²        Physical Exam  Physical Exam  Vitals and nursing note reviewed.   Constitutional:       General: He is not in acute distress.     Appearance: He is well-developed. He is not ill-appearing or toxic-appearing.   HENT:      Head: Normocephalic and atraumatic.      Nose: Congestion present. No rhinorrhea.      Mouth/Throat:      Mouth: Mucous membranes are moist.      Pharynx: No pharyngeal swelling, oropharyngeal exudate or posterior oropharyngeal erythema.   Eyes:      Conjunctiva/sclera: Conjunctivae normal.   Cardiovascular:      Rate and Rhythm: Normal rate and regular rhythm.      Heart sounds: Normal heart sounds.   Pulmonary:      Effort: Pulmonary effort is normal. No respiratory distress.      Breath sounds: Normal breath sounds.   Musculoskeletal:         General: No tenderness (calves nontender and soft) or deformity.      Right lower leg: No edema.      Left lower leg: No edema.   Skin:     General: Skin is warm and dry.   Neurological:      Mental Status: He is alert and oriented to person, place, and time. Mental status is at baseline.      Gait: Gait normal.         Results  Results for orders placed or performed in visit on 07/22/22   Protein / Creatinine Ratio, Urine - Urine, Clean Catch    Specimen: Urine, Clean Catch   Result Value Ref Range    Protein/Creatinine Ratio, Urine 9,191.1 (H) 0.0 - 200.0 mg/G Crea    " Creatinine, Urine 29.3 mg/dL    Total Protein, Urine 269.3 mg/dL   CBC (No Diff)    Specimen: Blood   Result Value Ref Range    WBC 4.48 3.40 - 10.80 10*3/mm3    RBC 4.41 4.14 - 5.80 10*6/mm3    Hemoglobin 12.6 (L) 13.0 - 17.7 g/dL    Hematocrit 38.1 37.5 - 51.0 %    MCV 86.4 79.0 - 97.0 fL    MCH 28.6 26.6 - 33.0 pg    MCHC 33.1 31.5 - 35.7 g/dL    RDW 14.4 12.3 - 15.4 %    RDW-SD 45.1 37.0 - 54.0 fl    MPV 11.2 6.0 - 12.0 fL    Platelets 190 140 - 450 10*3/mm3   Comprehensive Metabolic Panel    Specimen: Blood   Result Value Ref Range    Glucose 96 65 - 99 mg/dL    BUN 40 (H) 8 - 23 mg/dL    Creatinine 2.37 (H) 0.76 - 1.27 mg/dL    Sodium 138 136 - 145 mmol/L    Potassium 5.0 3.5 - 5.2 mmol/L    Chloride 102 98 - 107 mmol/L    CO2 24.6 22.0 - 29.0 mmol/L    Calcium 8.6 8.6 - 10.5 mg/dL    Total Protein 7.0 6.0 - 8.5 g/dL    Albumin 4.00 3.50 - 5.20 g/dL    ALT (SGPT) 20 1 - 41 U/L    AST (SGOT) 29 1 - 40 U/L    Alkaline Phosphatase 78 39 - 117 U/L    Total Bilirubin 0.2 0.0 - 1.2 mg/dL    Globulin 3.0 gm/dL    A/G Ratio 1.3 g/dL    BUN/Creatinine Ratio 16.9 7.0 - 25.0    Anion Gap 11.4 5.0 - 15.0 mmol/L    eGFR 28.9 (L) >60.0 mL/min/1.73   Vitamin D 25 Hydroxy    Specimen: Blood   Result Value Ref Range    25 Hydroxy, Vitamin D 20.8 (L) 30.0 - 100.0 ng/ml   Hemoglobin A1c    Specimen: Blood   Result Value Ref Range    Hemoglobin A1C 5.60 4.80 - 5.60 %   Lipid Panel    Specimen: Blood   Result Value Ref Range    Total Cholesterol 261 (H) 0 - 200 mg/dL    Triglycerides 284 (H) 0 - 150 mg/dL    HDL Cholesterol 53 40 - 60 mg/dL    LDL Cholesterol  156 (H) 0 - 100 mg/dL    VLDL Cholesterol 52 (H) 5 - 40 mg/dL    LDL/HDL Ratio 2.85    Iron Profile    Specimen: Blood   Result Value Ref Range    Iron 76 59 - 158 mcg/dL    Iron Saturation 24 20 - 50 %    Transferrin 217 200 - 360 mg/dL    TIBC 323 298 - 536 mcg/dL   Ferritin    Specimen: Blood   Result Value Ref Range    Ferritin 815.00 (H) 30.00 - 400.00 ng/mL   Uric acid     Specimen: Blood   Result Value Ref Range    Uric Acid 8.4 (H) 3.4 - 7.0 mg/dL   Urinalysis without microscopic (no culture) - Urine, Clean Catch    Specimen: Urine, Clean Catch   Result Value Ref Range    Color, UA Yellow Yellow, Straw    Appearance, UA Clear Clear    pH, UA 7.0 5.0 - 8.0    Specific Gravity, UA 1.011 1.005 - 1.030    Glucose, UA Negative Negative    Ketones, UA Negative Negative    Bilirubin, UA Negative Negative    Blood, UA Trace (A) Negative    Protein, UA >=300 mg/dL (3+) (A) Negative    Leuk Esterase, UA Negative Negative    Nitrite, UA Negative Negative    Urobilinogen, UA 0.2 E.U./dL 0.2 - 1.0 E.U./dL   Urinalysis, Microscopic Only - Urine, Clean Catch    Specimen: Urine, Clean Catch   Result Value Ref Range    RBC, UA 3-5 (A) None Seen, 0-2 /HPF    WBC, UA 0-2 None Seen, 0-2 /HPF    Bacteria, UA None Seen None Seen /HPF    Squamous Epithelial Cells, UA 0-2 None Seen, 0-2 /HPF    Hyaline Casts, UA None Seen None Seen /LPF    Methodology Automated Microscopy         Assessment and Plan  Diagnoses and all orders for this visit:    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 Has had nephrotic range proteinuria. Both gfr and proteinuria worse when checked in July. gfr down to 28 and protein creatinine ratio over 9000.  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 HTN has been uncontrolled and he declines med adjustment.  - He is on ACEI  - He refuses to maintain follow up with nephrology. Discussed this again today and risk of progression in renal failure to HD.  - Will obtain CMP, UA, urine protein creatinine ratio today    Essential hypertension  - BP chronically elevated however he reports recent episode of systolic 97  - Continue norvasc 10mg daily, lisinopril 5mg daily, and coreg 6.25mg BID  - call office for recurrent systolic BP<100    Vitamin D deficiency  - completing ergocalciferol  - recheck vitamin D  level    Peripheral vascular disease (HCC)  - Has R groin bruit with duplex showing at least 50% stenosis of the SFA.  - He has declined vascular evaluation  - On ASA and atorvastatin 80mg daily  - now having bilateral calf pain discussed below    Myalgia  - possibly due to PAD  - CK to rule out myopathy, on statin and zetia  - if CK normal will recommend vascular eval    Non-seasonal allergic rhinitis, unspecified trigger  - previously saw no benefit to allegra  - start xyzal and flonase     Health Maintenance  - Colonoscopy: Declined  - HCV: negative  - Immunizations: Declined pneumovax and flu. Discussed shingrix.  - Depression screening: negative 4/2022    Return in about 3 months (around 1/24/2023) for Follow up, Labs today.

## 2022-10-25 LAB
25(OH)D3 SERPL-MCNC: 40.5 NG/ML (ref 30–100)
ANION GAP SERPL CALCULATED.3IONS-SCNC: 9.2 MMOL/L (ref 5–15)
BACTERIA UR QL AUTO: NORMAL /HPF
BILIRUB UR QL STRIP: NEGATIVE
BUN SERPL-MCNC: 58 MG/DL (ref 8–23)
BUN/CREAT SERPL: 22.4 (ref 7–25)
CALCIUM SPEC-SCNC: 8.6 MG/DL (ref 8.6–10.5)
CHLORIDE SERPL-SCNC: 103 MMOL/L (ref 98–107)
CHOLEST SERPL-MCNC: 224 MG/DL (ref 0–200)
CK SERPL-CCNC: 158 U/L (ref 20–200)
CLARITY UR: CLEAR
CO2 SERPL-SCNC: 24.8 MMOL/L (ref 22–29)
COLOR UR: YELLOW
CREAT SERPL-MCNC: 2.59 MG/DL (ref 0.76–1.27)
CREAT UR-MCNC: 25.5 MG/DL
EGFRCR SERPLBLD CKD-EPI 2021: 26 ML/MIN/1.73
GLUCOSE SERPL-MCNC: 88 MG/DL (ref 65–99)
GLUCOSE UR STRIP-MCNC: NEGATIVE MG/DL
HDLC SERPL-MCNC: 47 MG/DL (ref 40–60)
HGB UR QL STRIP.AUTO: ABNORMAL
HYALINE CASTS UR QL AUTO: NORMAL /LPF
KETONES UR QL STRIP: NEGATIVE
LDLC SERPL CALC-MCNC: 102 MG/DL (ref 0–100)
LDLC/HDLC SERPL: 1.86 {RATIO}
LEUKOCYTE ESTERASE UR QL STRIP.AUTO: NEGATIVE
NITRITE UR QL STRIP: NEGATIVE
PH UR STRIP.AUTO: 6.5 [PH] (ref 5–8)
POTASSIUM SERPL-SCNC: 4.8 MMOL/L (ref 3.5–5.2)
PROT ?TM UR-MCNC: 84.3 MG/DL
PROT UR QL STRIP: ABNORMAL
PROT/CREAT UR: 3305.9 MG/G CREA (ref 0–200)
RBC # UR STRIP: NORMAL /HPF
REF LAB TEST METHOD: NORMAL
SODIUM SERPL-SCNC: 137 MMOL/L (ref 136–145)
SP GR UR STRIP: 1.01 (ref 1–1.03)
SQUAMOUS #/AREA URNS HPF: NORMAL /HPF
TRIGL SERPL-MCNC: 447 MG/DL (ref 0–150)
UROBILINOGEN UR QL STRIP: ABNORMAL
VLDLC SERPL-MCNC: 75 MG/DL (ref 5–40)
WBC # UR STRIP: NORMAL /HPF

## 2022-12-05 ENCOUNTER — OFFICE VISIT (OUTPATIENT)
Dept: NEUROSURGERY | Facility: CLINIC | Age: 72
End: 2022-12-05

## 2022-12-05 VITALS — BODY MASS INDEX: 22.79 KG/M2 | HEIGHT: 70 IN | WEIGHT: 159.2 LBS | TEMPERATURE: 97.3 F

## 2022-12-05 DIAGNOSIS — I10 ESSENTIAL HYPERTENSION: Chronic | ICD-10-CM

## 2022-12-05 DIAGNOSIS — Z78.9 PLAVIX RESISTANCE: ICD-10-CM

## 2022-12-05 DIAGNOSIS — Z86.73 HISTORY OF STROKE: ICD-10-CM

## 2022-12-05 DIAGNOSIS — F17.220 CHEWING TOBACCO NICOTINE DEPENDENCE WITHOUT COMPLICATION: ICD-10-CM

## 2022-12-05 DIAGNOSIS — I66.02 STENOSIS OF LEFT MIDDLE CEREBRAL ARTERY: ICD-10-CM

## 2022-12-05 DIAGNOSIS — E78.2 MIXED HYPERLIPIDEMIA: Chronic | ICD-10-CM

## 2022-12-05 DIAGNOSIS — Z86.73 HISTORY OF STROKE: Primary | ICD-10-CM

## 2022-12-05 DIAGNOSIS — R73.03 PREDIABETES: ICD-10-CM

## 2022-12-05 PROCEDURE — 99213 OFFICE O/P EST LOW 20 MIN: CPT | Performed by: NEUROLOGICAL SURGERY

## 2022-12-05 NOTE — PROGRESS NOTES
Subjective     Chief Complaint: Left MCA stenosis, status post angioplasty and stent    Patient ID: Eyal Solano is a 71 y.o. male is here today for follow-up.    History of Present Illness    This is a 71-year-old man in whom I performed angioplasty and stenting of a medically refractory, symptomatic, high-grade left M1 stenosis in August of last year.  He presents today for routine follow-up.  He denies any TIAs or strokelike symptoms.  He is not smoking cigarettes, and he is still taking his aspirin daily.  He is a Plavix nonresponder.    The following portions of the patient's history were reviewed and updated as appropriate: allergies, current medications, past family history, past medical history, past social history, past surgical history and problem list.    Family history:   Family History   Problem Relation Age of Onset   • Hyperlipidemia Mother    • Hypertension Mother    • Stroke Father 82   • Heart attack Father 82   • Hypertension Brother    • Heart disease Maternal Grandmother    • Heart disease Maternal Grandfather    • Heart disease Paternal Grandmother    • Heart disease Paternal Grandfather        Social history:   Social History     Socioeconomic History   • Marital status: Single   Tobacco Use   • Smoking status: Former     Packs/day: 1.00     Years: 30.00     Pack years: 30.00     Types: Cigarettes   • Smokeless tobacco: Current     Types: Chew   • Tobacco comments:     Quit >25 yrs ago; tobacco cessation pamphlet given to pt   Vaping Use   • Vaping Use: Never used   Substance and Sexual Activity   • Alcohol use: Yes     Alcohol/week: 14.0 - 21.0 standard drinks     Types: 14 - 21 Cans of beer per week   • Drug use: No   • Sexual activity: Defer       Review of Systems   Constitutional: Negative for activity change, appetite change, chills, diaphoresis, fatigue, fever and unexpected weight change.   HENT: Negative for congestion, dental problem, drooling, ear discharge, ear pain, facial  "swelling, hearing loss, mouth sores, nosebleeds, postnasal drip, rhinorrhea, sinus pressure, sinus pain, sneezing, sore throat, tinnitus, trouble swallowing and voice change.    Eyes: Negative for photophobia, pain, discharge, redness, itching and visual disturbance.   Respiratory: Negative for apnea, cough, choking, chest tightness, shortness of breath, wheezing and stridor.    Cardiovascular: Negative for chest pain, palpitations and leg swelling.   Gastrointestinal: Negative for abdominal distention, abdominal pain, anal bleeding, blood in stool, constipation, diarrhea, nausea, rectal pain and vomiting.   Endocrine: Negative for cold intolerance, heat intolerance, polydipsia, polyphagia and polyuria.   Genitourinary: Negative for decreased urine volume, difficulty urinating, dysuria, enuresis, flank pain, frequency, genital sores, hematuria and urgency.   Musculoskeletal: Negative for arthralgias, back pain, gait problem, joint swelling, myalgias, neck pain and neck stiffness.   Skin: Negative for color change, pallor, rash and wound.   Allergic/Immunologic: Negative for environmental allergies, food allergies and immunocompromised state.   Neurological: Negative for dizziness, tremors, seizures, syncope, facial asymmetry, speech difficulty, weakness, light-headedness, numbness and headaches.   Hematological: Negative for adenopathy. Does not bruise/bleed easily.   Psychiatric/Behavioral: Negative for agitation, behavioral problems, confusion, decreased concentration, dysphoric mood, hallucinations, self-injury, sleep disturbance and suicidal ideas. The patient is not nervous/anxious and is not hyperactive.        Objective   Temperature 97.3 °F (36.3 °C), height 177.8 cm (70\"), weight 72.2 kg (159 lb 3.2 oz).  Body mass index is 22.84 kg/m².    Physical Exam  Vitals reviewed.   Constitutional:       General: He is not in acute distress.     Appearance: He is well-developed. He is not diaphoretic.   HENT:      " Head: Normocephalic and atraumatic.   Pulmonary:      Effort: Pulmonary effort is normal.   Skin:     General: Skin is warm and dry.   Neurological:      Mental Status: He is alert and oriented to person, place, and time.      Comments: Casual gait is unremarkable.  Cranial nerves II through XII are grossly intact.  Speech production is fluent with no paraphasic errors.   Psychiatric:         Behavior: Behavior normal.         Assessment & Plan     Independent Review of Radiographic Studies:      He has no new imaging for me to review    Medical Decision Making:      I once again reviewed the signs and symptoms of stroke and TIA with him.  I directed him to call 911 if he thinks he is having a stroke and/or TIA.  I will follow-up with him in 1 year, or sooner if clinically indicated.    Diagnoses and all orders for this visit:    1. H/O CVA in 2018  (Primary)    2. History of stroke s/p L MCA stent    3. Stenosis of left middle cerebral artery    4. Chewing tobacco nicotine dependence without complication    5. Essential hypertension    6. Mixed hyperlipidemia    7. Plavix resistance    8. Prediabetes        No follow-ups on file.           This document signed by WARREN Grey MD December 5, 2022 13:12 EST

## 2023-01-17 DIAGNOSIS — E55.9 VITAMIN D DEFICIENCY: ICD-10-CM

## 2023-01-17 RX ORDER — ERGOCALCIFEROL 1.25 MG/1
CAPSULE ORAL
Qty: 12 CAPSULE | Refills: 0 | Status: SHIPPED | OUTPATIENT
Start: 2023-01-17 | End: 2023-04-06

## 2023-01-25 ENCOUNTER — LAB (OUTPATIENT)
Dept: LAB | Facility: HOSPITAL | Age: 73
End: 2023-01-25
Payer: MEDICARE

## 2023-01-25 ENCOUNTER — OFFICE VISIT (OUTPATIENT)
Dept: INTERNAL MEDICINE | Facility: CLINIC | Age: 73
End: 2023-01-25
Payer: MEDICARE

## 2023-01-25 VITALS
HEIGHT: 70 IN | OXYGEN SATURATION: 99 % | SYSTOLIC BLOOD PRESSURE: 146 MMHG | DIASTOLIC BLOOD PRESSURE: 74 MMHG | TEMPERATURE: 97.7 F | WEIGHT: 165 LBS | BODY MASS INDEX: 23.62 KG/M2 | HEART RATE: 74 BPM

## 2023-01-25 DIAGNOSIS — N18.32 STAGE 3B CHRONIC KIDNEY DISEASE: Chronic | ICD-10-CM

## 2023-01-25 DIAGNOSIS — M1A.30X0 CHRONIC GOUT DUE TO RENAL IMPAIRMENT WITHOUT TOPHUS, UNSPECIFIED SITE: ICD-10-CM

## 2023-01-25 DIAGNOSIS — N18.32 ANEMIA DUE TO STAGE 3B CHRONIC KIDNEY DISEASE: ICD-10-CM

## 2023-01-25 DIAGNOSIS — R80.9 NEPHROTIC RANGE PROTEINURIA: ICD-10-CM

## 2023-01-25 DIAGNOSIS — N18.4 STAGE 4 CHRONIC KIDNEY DISEASE: ICD-10-CM

## 2023-01-25 DIAGNOSIS — I10 ESSENTIAL HYPERTENSION: Primary | Chronic | ICD-10-CM

## 2023-01-25 DIAGNOSIS — R35.1 NOCTURIA: ICD-10-CM

## 2023-01-25 DIAGNOSIS — D63.1 ANEMIA DUE TO STAGE 3B CHRONIC KIDNEY DISEASE: ICD-10-CM

## 2023-01-25 DIAGNOSIS — D63.1 ANEMIA DUE TO STAGE 4 CHRONIC KIDNEY DISEASE: ICD-10-CM

## 2023-01-25 DIAGNOSIS — N18.4 ANEMIA DUE TO STAGE 4 CHRONIC KIDNEY DISEASE: ICD-10-CM

## 2023-01-25 LAB
ANION GAP SERPL CALCULATED.3IONS-SCNC: 10.2 MMOL/L (ref 5–15)
BUN SERPL-MCNC: 54 MG/DL (ref 8–23)
BUN/CREAT SERPL: 18.6 (ref 7–25)
CALCIUM SPEC-SCNC: 9.1 MG/DL (ref 8.6–10.5)
CHLORIDE SERPL-SCNC: 108 MMOL/L (ref 98–107)
CO2 SERPL-SCNC: 22.8 MMOL/L (ref 22–29)
CREAT SERPL-MCNC: 2.9 MG/DL (ref 0.76–1.27)
DEPRECATED RDW RBC AUTO: 44.9 FL (ref 37–54)
EGFRCR SERPLBLD CKD-EPI 2021: 22.6 ML/MIN/1.73
ERYTHROCYTE [DISTWIDTH] IN BLOOD BY AUTOMATED COUNT: 14.4 % (ref 12.3–15.4)
GLUCOSE SERPL-MCNC: 114 MG/DL (ref 65–99)
HCT VFR BLD AUTO: 34 % (ref 37.5–51)
HGB BLD-MCNC: 11.1 G/DL (ref 13–17.7)
IRON 24H UR-MRATE: 43 MCG/DL (ref 59–158)
IRON SATN MFR SERPL: 14 % (ref 20–50)
MCH RBC QN AUTO: 28 PG (ref 26.6–33)
MCHC RBC AUTO-ENTMCNC: 32.6 G/DL (ref 31.5–35.7)
MCV RBC AUTO: 85.6 FL (ref 79–97)
PLATELET # BLD AUTO: 212 10*3/MM3 (ref 140–450)
PMV BLD AUTO: 11.5 FL (ref 6–12)
POTASSIUM SERPL-SCNC: 4.7 MMOL/L (ref 3.5–5.2)
RBC # BLD AUTO: 3.97 10*6/MM3 (ref 4.14–5.8)
SODIUM SERPL-SCNC: 141 MMOL/L (ref 136–145)
TIBC SERPL-MCNC: 310 MCG/DL (ref 298–536)
TRANSFERRIN SERPL-MCNC: 208 MG/DL (ref 200–360)
URATE SERPL-MCNC: 7.6 MG/DL (ref 3.4–7)
WBC NRBC COR # BLD: 4.16 10*3/MM3 (ref 3.4–10.8)

## 2023-01-25 PROCEDURE — 99214 OFFICE O/P EST MOD 30 MIN: CPT | Performed by: INTERNAL MEDICINE

## 2023-01-25 PROCEDURE — 85027 COMPLETE CBC AUTOMATED: CPT

## 2023-01-25 PROCEDURE — 82728 ASSAY OF FERRITIN: CPT

## 2023-01-25 PROCEDURE — 80048 BASIC METABOLIC PNL TOTAL CA: CPT

## 2023-01-25 PROCEDURE — 83540 ASSAY OF IRON: CPT

## 2023-01-25 PROCEDURE — 84550 ASSAY OF BLOOD/URIC ACID: CPT

## 2023-01-25 PROCEDURE — 84466 ASSAY OF TRANSFERRIN: CPT

## 2023-01-25 RX ORDER — TAMSULOSIN HYDROCHLORIDE 0.4 MG/1
1 CAPSULE ORAL DAILY
Qty: 90 CAPSULE | Refills: 1 | Status: SHIPPED | OUTPATIENT
Start: 2023-01-25

## 2023-01-25 NOTE — PROGRESS NOTES
Internal Medicine Follow Up    Chief Complaint  Eyal Solano is a 72 y.o. male who presents today for follow up of chronic medical conditions outlined below.    Chief Complaint   Patient presents with   • Hypertension        HPI  Mr. Solano comes in today for follow up. Doing well. Notes that he only takes BP meds on days when BP is >130. He continues to feel fatigued, dizzy when BP is lower than 130. He notes nocturia but otherwise denies urinary symptoms. No edema, chest pain, SOA, headaches.    Hypertension         Review of Systems  Review of Systems   Constitutional: Negative.    Respiratory: Negative.    Cardiovascular: Negative.    Genitourinary: Positive for nocturia. Negative for decreased urine volume, difficulty urinating, dysuria, flank pain, frequency, hematuria and urgency.   Neurological: Positive for dizziness. Negative for headache.        Current Medications  Current Outpatient Medications on File Prior to Visit   Medication Sig Dispense Refill   • allopurinol (ZYLOPRIM) 100 MG tablet Take 2 tablets by mouth Daily. 180 tablet 1   • amLODIPine (NORVASC) 10 MG tablet Take 1 tablet by mouth Daily. 90 tablet 3   • aspirin 81 MG chewable tablet Chew 1 tablet Daily. 90 tablet 3   • atorvastatin (LIPITOR) 80 MG tablet Take 1 tablet by mouth Every Night. 90 tablet 3   • carvedilol (COREG) 6.25 MG tablet Take 1 tablet by mouth 2 (Two) Times a Day With Meals. 180 tablet 3   • ezetimibe (Zetia) 10 MG tablet Take 1 tablet by mouth Daily. 90 tablet 3   • ferrous sulfate 325 (65 FE) MG tablet Take 1 tablet by mouth 2 (Two) Times a Day With Meals. 60 tablet 0   • fluticasone (Flonase) 50 MCG/ACT nasal spray 2 sprays into the nostril(s) as directed by provider Daily. 16 g 11   • levocetirizine (XYZAL) 5 MG tablet Take 1 tablet by mouth Every Evening. 90 tablet 1   • lisinopril (PRINIVIL,ZESTRIL) 5 MG tablet Take 1 tablet by mouth Daily. 90 tablet 3   • triamcinolone (KENALOG) 0.1 % ointment Apply 1 application  "topically to the appropriate area as directed Daily As Needed for Irritation.     • vitamin D (ERGOCALCIFEROL) 1.25 MG (18656 UT) capsule capsule Take 1 capsule by mouth once a week 12 capsule 0   • VITAMIN D PO Take 1 tablet by mouth Daily.       No current facility-administered medications on file prior to visit.       Allergies  No Known Allergies    Objective  Visit Vitals  /74   Pulse 74   Temp 97.7 °F (36.5 °C)   Ht 177.8 cm (70\")   Wt 74.8 kg (165 lb)   SpO2 99%   BMI 23.68 kg/m²        Physical Exam  Physical Exam  Vitals and nursing note reviewed.   Constitutional:       General: He is not in acute distress.     Appearance: Normal appearance. He is well-developed and normal weight. He is not ill-appearing or toxic-appearing.   HENT:      Head: Normocephalic and atraumatic.   Eyes:      Conjunctiva/sclera: Conjunctivae normal.   Cardiovascular:      Rate and Rhythm: Normal rate and regular rhythm.      Heart sounds: Normal heart sounds.   Pulmonary:      Effort: Pulmonary effort is normal. No respiratory distress.      Breath sounds: Normal breath sounds.   Abdominal:      Tenderness: There is no right CVA tenderness or left CVA tenderness.   Musculoskeletal:      Right lower leg: No edema.      Left lower leg: No edema.   Skin:     General: Skin is warm and dry.   Neurological:      Mental Status: He is alert and oriented to person, place, and time. Mental status is at baseline.      Gait: Gait normal.   Psychiatric:         Mood and Affect: Mood normal.         Behavior: Behavior normal.         Results  Results for orders placed or performed in visit on 10/24/22   Protein / Creatinine Ratio, Urine - Urine, Clean Catch    Specimen: Urine, Clean Catch   Result Value Ref Range    Protein/Creatinine Ratio, Urine 3,305.9 (H) 0.0 - 200.0 mg/G Crea    Creatinine, Urine 25.5 mg/dL    Total Protein, Urine 84.3 mg/dL   Basic Metabolic Panel    Specimen: Blood   Result Value Ref Range    Glucose 88 65 - 99 " mg/dL    BUN 58 (H) 8 - 23 mg/dL    Creatinine 2.59 (H) 0.76 - 1.27 mg/dL    Sodium 137 136 - 145 mmol/L    Potassium 4.8 3.5 - 5.2 mmol/L    Chloride 103 98 - 107 mmol/L    CO2 24.8 22.0 - 29.0 mmol/L    Calcium 8.6 8.6 - 10.5 mg/dL    BUN/Creatinine Ratio 22.4 7.0 - 25.0    Anion Gap 9.2 5.0 - 15.0 mmol/L    eGFR 26.0 (L) >60.0 mL/min/1.73   Vitamin D,25-Hydroxy    Specimen: Blood   Result Value Ref Range    25 Hydroxy, Vitamin D 40.5 30.0 - 100.0 ng/ml   CK    Specimen: Blood   Result Value Ref Range    Creatine Kinase 158 20 - 200 U/L   Lipid Panel    Specimen: Blood   Result Value Ref Range    Total Cholesterol 224 (H) 0 - 200 mg/dL    Triglycerides 447 (H) 0 - 150 mg/dL    HDL Cholesterol 47 40 - 60 mg/dL    LDL Cholesterol  102 (H) 0 - 100 mg/dL    VLDL Cholesterol 75 (H) 5 - 40 mg/dL    LDL/HDL Ratio 1.86    Urinalysis without microscopic (no culture) - Urine, Clean Catch    Specimen: Urine, Clean Catch   Result Value Ref Range    Color, UA Yellow Yellow, Straw    Appearance, UA Clear Clear    pH, UA 6.5 5.0 - 8.0    Specific Gravity, UA 1.010 1.005 - 1.030    Glucose, UA Negative Negative    Ketones, UA Negative Negative    Bilirubin, UA Negative Negative    Blood, UA Trace (A) Negative    Protein,  mg/dL (2+) (A) Negative    Leuk Esterase, UA Negative Negative    Nitrite, UA Negative Negative    Urobilinogen, UA 0.2 E.U./dL 0.2 - 1.0 E.U./dL   Urinalysis, Microscopic Only - Urine, Clean Catch    Specimen: Urine, Clean Catch   Result Value Ref Range    RBC, UA 0-2 None Seen, 0-2 /HPF    WBC, UA 0-2 None Seen, 0-2 /HPF    Bacteria, UA None Seen None Seen /HPF    Squamous Epithelial Cells, UA 0-2 None Seen, 0-2 /HPF    Hyaline Casts, UA None Seen None Seen /LPF    Methodology Automated Microscopy         Assessment and Plan  Diagnoses and all orders for this visit:    Essential hypertension  - BP chronically elevated, has dizziness when BP <130 so does not take medications daily  - Continue norvasc 10mg  daily, lisinopril 5mg daily, and coreg 6.25mg BID    Stage 4 chronic kidney disease (HCC), Nephrotic range proteinuria, Anemia due to stage 4 chronic kidney disease (HCC)  - progressive, last gfr 26. Stable.  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 HTN has been uncontrolled and he declines med adjustment.  - He is on ACEI due to nephrotic range proteinuria  - has mild anemia on iron  - He refuses to maintain follow up with nephrology. Discussed this again today and risk of progression in renal failure to HD.  - BMP, CBC, iron studies ordered    Nocturia  - will start flomax daily    Chronic gout due to renal impairment without tophus, unspecified site  - on allopurinol 200mg daily  - no recent gout flare  - uric acid level ordered     Health Maintenance  - Colonoscopy: Declined  - HCV: negative  - Immunizations: Declined pneumococcal. Discussed shingrix and COVID booster. Flu UTD.  - Depression screening: negative 4/2022    Return for Next scheduled follow up, Labs today.

## 2023-01-26 LAB — FERRITIN SERPL-MCNC: 808 NG/ML (ref 30–400)

## 2023-02-05 DIAGNOSIS — M1A.39X0 CHRONIC GOUT DUE TO RENAL IMPAIRMENT OF MULTIPLE SITES WITHOUT TOPHUS: ICD-10-CM

## 2023-02-05 DIAGNOSIS — N18.4 STAGE 4 CHRONIC KIDNEY DISEASE: ICD-10-CM

## 2023-02-05 DIAGNOSIS — R80.9 NEPHROTIC RANGE PROTEINURIA: Primary | ICD-10-CM

## 2023-02-05 RX ORDER — ALLOPURINOL 100 MG/1
250 TABLET ORAL DAILY
Qty: 225 TABLET | Refills: 1 | Status: SHIPPED | OUTPATIENT
Start: 2023-02-05

## 2023-02-06 ENCOUNTER — TELEPHONE (OUTPATIENT)
Dept: INTERNAL MEDICINE | Facility: CLINIC | Age: 73
End: 2023-02-06
Payer: MEDICARE

## 2023-02-06 NOTE — TELEPHONE ENCOUNTER
----- Message from Edda Sanchez MD sent at 2/5/2023  8:33 PM EST -----  Please call patient and let him know that labs show worsening of kidney function. He is going to have to return to see nephrology and I have put in a referral for him to reestablish with [facility]. He also needs to stop lisinopril. He remains anemic and needs to remain on iron. His allopurinol dose will be increased.

## 2023-02-06 NOTE — TELEPHONE ENCOUNTER
Explained lab results and recommendations to patient.  Advised that scheduling will be calling with appointment to Nephrology.  Informed to stop taking Lisinopril and informed of increased dose of allopurinol.  Verbalized understanding.

## 2023-04-06 DIAGNOSIS — E55.9 VITAMIN D DEFICIENCY: ICD-10-CM

## 2023-04-06 RX ORDER — ERGOCALCIFEROL 1.25 MG/1
CAPSULE ORAL
Qty: 12 CAPSULE | Refills: 0 | Status: SHIPPED | OUTPATIENT
Start: 2023-04-06

## 2023-04-12 DIAGNOSIS — J30.89 NON-SEASONAL ALLERGIC RHINITIS, UNSPECIFIED TRIGGER: ICD-10-CM

## 2023-04-12 RX ORDER — LEVOCETIRIZINE DIHYDROCHLORIDE 5 MG/1
TABLET, FILM COATED ORAL
Qty: 90 TABLET | Refills: 2 | Status: SHIPPED | OUTPATIENT
Start: 2023-04-12

## 2023-08-31 ENCOUNTER — OFFICE VISIT (OUTPATIENT)
Dept: INTERNAL MEDICINE | Facility: CLINIC | Age: 73
End: 2023-08-31
Payer: MEDICARE

## 2023-08-31 ENCOUNTER — LAB (OUTPATIENT)
Dept: LAB | Facility: HOSPITAL | Age: 73
End: 2023-08-31
Payer: MEDICARE

## 2023-08-31 VITALS
DIASTOLIC BLOOD PRESSURE: 82 MMHG | SYSTOLIC BLOOD PRESSURE: 160 MMHG | WEIGHT: 163.2 LBS | RESPIRATION RATE: 20 BRPM | BODY MASS INDEX: 23.37 KG/M2 | HEART RATE: 75 BPM | HEIGHT: 70 IN | OXYGEN SATURATION: 98 % | TEMPERATURE: 98 F

## 2023-08-31 DIAGNOSIS — I66.12: Chronic | ICD-10-CM

## 2023-08-31 DIAGNOSIS — E78.2 MIXED HYPERLIPIDEMIA: Chronic | ICD-10-CM

## 2023-08-31 DIAGNOSIS — N18.4 ANEMIA DUE TO STAGE 4 CHRONIC KIDNEY DISEASE: ICD-10-CM

## 2023-08-31 DIAGNOSIS — R80.9 NEPHROTIC RANGE PROTEINURIA: ICD-10-CM

## 2023-08-31 DIAGNOSIS — M1A.39X0 CHRONIC GOUT DUE TO RENAL IMPAIRMENT OF MULTIPLE SITES WITHOUT TOPHUS: ICD-10-CM

## 2023-08-31 DIAGNOSIS — F17.220 CHEWING TOBACCO NICOTINE DEPENDENCE WITHOUT COMPLICATION: ICD-10-CM

## 2023-08-31 DIAGNOSIS — R73.03 PREDIABETES: ICD-10-CM

## 2023-08-31 DIAGNOSIS — N18.4 STAGE 4 CHRONIC KIDNEY DISEASE: ICD-10-CM

## 2023-08-31 DIAGNOSIS — Z86.73 HISTORY OF STROKE: ICD-10-CM

## 2023-08-31 DIAGNOSIS — I73.9 PERIPHERAL VASCULAR DISEASE: ICD-10-CM

## 2023-08-31 DIAGNOSIS — D63.1 ANEMIA DUE TO STAGE 4 CHRONIC KIDNEY DISEASE: ICD-10-CM

## 2023-08-31 DIAGNOSIS — Z00.00 MEDICARE ANNUAL WELLNESS VISIT, SUBSEQUENT: Primary | ICD-10-CM

## 2023-08-31 DIAGNOSIS — I66.02 STENOSIS OF LEFT MIDDLE CEREBRAL ARTERY: ICD-10-CM

## 2023-08-31 DIAGNOSIS — E55.9 VITAMIN D DEFICIENCY: ICD-10-CM

## 2023-08-31 DIAGNOSIS — J30.89 NON-SEASONAL ALLERGIC RHINITIS, UNSPECIFIED TRIGGER: ICD-10-CM

## 2023-08-31 DIAGNOSIS — F10.10 ALCOHOL ABUSE: Chronic | ICD-10-CM

## 2023-08-31 DIAGNOSIS — I10 ESSENTIAL HYPERTENSION: Chronic | ICD-10-CM

## 2023-08-31 DIAGNOSIS — R35.1 NOCTURIA: ICD-10-CM

## 2023-08-31 LAB
BACTERIA UR QL AUTO: NORMAL /HPF
BILIRUB UR QL STRIP: NEGATIVE
CLARITY UR: CLEAR
COLOR UR: YELLOW
GLUCOSE UR STRIP-MCNC: NEGATIVE MG/DL
HGB UR QL STRIP.AUTO: ABNORMAL
HYALINE CASTS UR QL AUTO: NORMAL /LPF
KETONES UR QL STRIP: NEGATIVE
LEUKOCYTE ESTERASE UR QL STRIP.AUTO: NEGATIVE
NITRITE UR QL STRIP: NEGATIVE
PH UR STRIP.AUTO: 6 [PH] (ref 5–8)
PROT UR QL STRIP: ABNORMAL
RBC # UR STRIP: NORMAL /HPF
REF LAB TEST METHOD: NORMAL
SP GR UR STRIP: 1.01 (ref 1–1.03)
SQUAMOUS #/AREA URNS HPF: NORMAL /HPF
UROBILINOGEN UR QL STRIP: ABNORMAL
WBC # UR STRIP: NORMAL /HPF

## 2023-08-31 PROCEDURE — 84156 ASSAY OF PROTEIN URINE: CPT

## 2023-08-31 PROCEDURE — 80053 COMPREHEN METABOLIC PANEL: CPT

## 2023-08-31 PROCEDURE — 84466 ASSAY OF TRANSFERRIN: CPT

## 2023-08-31 PROCEDURE — 82570 ASSAY OF URINE CREATININE: CPT

## 2023-08-31 PROCEDURE — 82728 ASSAY OF FERRITIN: CPT

## 2023-08-31 PROCEDURE — 82306 VITAMIN D 25 HYDROXY: CPT

## 2023-08-31 PROCEDURE — 81001 URINALYSIS AUTO W/SCOPE: CPT

## 2023-08-31 PROCEDURE — 83036 HEMOGLOBIN GLYCOSYLATED A1C: CPT

## 2023-08-31 PROCEDURE — 85027 COMPLETE CBC AUTOMATED: CPT

## 2023-08-31 PROCEDURE — 83540 ASSAY OF IRON: CPT

## 2023-08-31 PROCEDURE — 84550 ASSAY OF BLOOD/URIC ACID: CPT

## 2023-08-31 PROCEDURE — 80061 LIPID PANEL: CPT

## 2023-08-31 NOTE — PROGRESS NOTES
The ABCs of the Annual Wellness Visit  Subsequent Medicare Wellness Visit    Chief Complaint   Patient presents with    Medicare Wellness-subsequent       Subjective   History of Present Illness:  Eyal Solano is a 72 y.o. male who presents for a Subsequent Medicare Wellness Visit.    HEALTH RISK ASSESSMENT    Recent Hospitalizations:  No hospitalization(s) within the last year.    Current Medical Providers:  Patient Care Team:  Edda Sanchez MD as PCP - General (Internal Medicine)  Praveen Fischer MD as Consulting Physician (Nephrology)    Smoking Status:  Social History     Tobacco Use   Smoking Status Former    Packs/day: 1.00    Years: 30.00    Pack years: 30.00    Types: Cigarettes   Smokeless Tobacco Current    Types: Chew   Tobacco Comments    Quit >25 yrs ago; tobacco cessation pamphlet given to pt       Alcohol Consumption:  Social History     Substance and Sexual Activity   Alcohol Use Yes    Alcohol/week: 14.0 - 21.0 standard drinks    Types: 14 - 21 Cans of beer per week       Depression Screen:       2023     1:54 PM   PHQ-2/PHQ-9 Depression Screening   Little Interest or Pleasure in Doing Things 0-->not at all   Feeling Down, Depressed or Hopeless 0-->not at all   PHQ-9: Brief Depression Severity Measure Score 0       Fall Risk Screen:  KATIE Fall Risk Assessment was completed, and patient is at LOW risk for falls.Assessment completed on:2023    Health Habits and Functional and Cognitive Screenin/31/2023     1:52 PM   Functional & Cognitive Status   Do you have difficulty preparing food and eating? No   Do you have difficulty bathing yourself, getting dressed or grooming yourself? No   Do you have difficulty using the toilet? No   Do you have difficulty moving around from place to place? No   Do you have trouble with steps or getting out of a bed or a chair? No   Current Diet Limited Junk Food   Dental Exam Up to date   Eye Exam Up to date   Exercise (times per week) 7  times per week   Current Exercises Include Yard Work;Weightlifting   Do you need help using the phone?  No   Are you deaf or do you have serious difficulty hearing?  No   Do you need help to go to places out of walking distance? No   Do you need help shopping? No   Do you need help preparing meals?  No   Do you need help with housework?  No   Do you need help with laundry? No   Do you need help taking your medications? No   Do you need help managing money? No   Do you ever drive or ride in a car without wearing a seat belt? No   Have you felt unusual stress, anger or loneliness in the last month? No   Who do you live with? Other   If you need help, do you have trouble finding someone available to you? No   Have you been bothered in the last four weeks by sexual problems? No   Do you have difficulty concentrating, remembering or making decisions? No         Does the patient have evidence of cognitive impairment? No    Asprin use counseling:Taking ASA appropriately as indicated    Age-appropriate Screening Schedule:  Refer to the list below for future screening recommendations based on patient's age, sex and/or medical conditions. Orders for these recommended tests are listed in the plan section. The patient has been provided with a written plan.    Health Maintenance   Topic Date Due    TDAP/TD VACCINES (1 - Tdap) Never done    ZOSTER VACCINE (1 of 2) Never done    COVID-19 Vaccine (4 - Booster for Aleks series) 09/29/2022    ANNUAL WELLNESS VISIT  07/22/2023    INFLUENZA VACCINE  10/01/2023    LIPID PANEL  10/24/2023    HEPATITIS C SCREENING  Completed    AAA SCREEN (ONE-TIME)  Completed    Pneumococcal Vaccine 65+  Discontinued    COLORECTAL CANCER SCREENING  Discontinued          The following portions of the patient's history were reviewed and updated as appropriate: allergies, current medications, past family history, past medical history, past social history, past surgical history, and problem  list.    Outpatient Medications Prior to Visit   Medication Sig Dispense Refill    allopurinol (ZYLOPRIM) 100 MG tablet Take 2.5 tablets by mouth Daily. 225 tablet 1    amLODIPine (NORVASC) 10 MG tablet Take 1 tablet by mouth Daily. 90 tablet 3    aspirin 81 MG chewable tablet Chew 1 tablet Daily. 90 tablet 3    atorvastatin (LIPITOR) 80 MG tablet Take 1 tablet by mouth Every Night. 90 tablet 3    carvedilol (COREG) 6.25 MG tablet Take 1 tablet by mouth 2 (Two) Times a Day With Meals. 180 tablet 3    ezetimibe (Zetia) 10 MG tablet Take 1 tablet by mouth Daily. 90 tablet 3    ferrous sulfate 325 (65 FE) MG tablet Take 1 tablet by mouth 2 (Two) Times a Day With Meals. 60 tablet 0    fluticasone (Flonase) 50 MCG/ACT nasal spray 2 sprays into the nostril(s) as directed by provider Daily. 16 g 11    levocetirizine (XYZAL) 5 MG tablet TAKE 1 TABLET BY MOUTH ONCE DAILY IN THE EVENING 90 tablet 2    lisinopril (PRINIVIL,ZESTRIL) 5 MG tablet Take 1 tablet by mouth Daily. 90 tablet 3    tamsulosin (FLOMAX) 0.4 MG capsule 24 hr capsule Take 1 capsule by mouth Daily. 90 capsule 1    triamcinolone (KENALOG) 0.1 % ointment Apply 1 application  topically to the appropriate area as directed Daily As Needed for Irritation.      vitamin D (ERGOCALCIFEROL) 1.25 MG (49880 UT) capsule capsule Take 1 capsule by mouth once a week 12 capsule 0    VITAMIN D PO Take 1 tablet by mouth Daily.       No facility-administered medications prior to visit.       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 Patient does not have an advance directive, information provided.    Review of Systems   Constitutional:  Positive for appetite change (chronic poor appetite). Negative for fever and unexpected weight loss.   Eyes: Negative.    Respiratory: Negative.     Cardiovascular: Negative.    Gastrointestinal: Negative.    Genitourinary:  Positive for nocturia.   Musculoskeletal: Negative.    Skin: Negative.    Neurological: Negative.    Psychiatric/Behavioral: Negative.       Compared to one year ago, the patient feels his physical health is the same.  Compared to one year ago, the patient feels his mental health is the same.    Reviewed chart for potential of high risk medication in the elderly: yes  Reviewed chart for potential of harmful drug interactions in the elderly:yes    Objective         Vitals:    08/31/23 1354   BP: 160/82   Pulse: 75   Resp: 20   Temp: 98 øF (36.7 øC)   SpO2: 98%   Weight: 74 kg (163 lb 3.2 oz)   Height: 177.8 cm (70\")   PainSc: 0-No pain       Body mass index is 23.42 kg/mý.  Discussed the patient's BMI with him. The BMI is in the acceptable range.    Physical Exam  Vitals and nursing note reviewed.   Constitutional:       General: He is not in acute distress.     Appearance: Normal appearance. He is well-developed and normal weight. He is not ill-appearing, toxic-appearing or diaphoretic.   HENT:      Head: Normocephalic and atraumatic.      Right Ear: Tympanic membrane, ear canal and external ear normal.      Left Ear: Tympanic membrane, ear canal and external ear normal.      Nose: Nose normal.   Eyes:      General: No scleral icterus.     Conjunctiva/sclera: Conjunctivae normal.   Neck:      Thyroid: No thyromegaly.   Cardiovascular:      Rate " and Rhythm: Normal rate and regular rhythm.      Heart sounds: Normal heart sounds. No murmur heard.  Pulmonary:      Effort: Pulmonary effort is normal. No respiratory distress.      Breath sounds: Normal breath sounds.   Abdominal:      General: There is no distension.      Palpations: Abdomen is soft. There is no mass.      Tenderness: There is no abdominal tenderness.   Musculoskeletal:         General: No deformity.      Cervical back: Neck supple.      Right lower leg: No edema.      Left lower leg: No edema.   Lymphadenopathy:      Cervical: No cervical adenopathy.   Skin:     General: Skin is warm and dry.      Findings: No rash.   Neurological:      Mental Status: He is alert and oriented to person, place, and time. Mental status is at baseline.      Gait: Gait normal.   Psychiatric:         Mood and Affect: Mood normal.         Behavior: Behavior normal.         Thought Content: Thought content normal.         Judgment: Judgment normal.             Assessment & Plan   Medicare Risks and Personalized Health Plan  CMS Preventative Services Quick Reference  Advance Directive Discussion  Immunizations Discussed/Encouraged (specific immunizations; Tdap, Influenza, Shingrix, COVID19, and RSV )  Tobacco Use/Dependance (use dotphrase .tobaccocessation for documentation)    The above risks/problems have been discussed with the patient.  Pertinent information has been shared with the patient in the After Visit Summary.  Follow up plans and orders are seen below in the Assessment/Plan Section.    Diagnoses and all orders for this visit:    Medicare annual wellness visit, subsequent    Prediabetes  - update A1c    Essential hypertension  - BP chronically elevated, has dizziness when BP <130 so does not take medications as prescribed  - Continue norvasc 10mg daily, and coreg 6.25mg BID  - off ACEI due to worsening renal function    Stage 4 chronic kidney disease (HCC), Nephrotic range proteinuria, Anemia due to stage 4  chronic kidney disease (HCC)  - progressive, last gfr 22.6. Worsening.  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 HTN has been uncontrolled and he declines med adjustment.  - has nephrotic range proteinuria and no longer on ACEI due to worsening renal function  - has mild anemia on iron  - He has been referred on numerous occasions to nephrology but refuses to keep appts. Discussed risk of progression in renal failure to HD.  - CMP, CBC, iron studies ordered    Nocturia  - on flomax with no improvement  - declines evaluation for TEOFILO    Chronic gout due to renal impairment of multiple sites without tophus  - on allopurinol 250mg daily, increased last visit due to uric acid >6  - no recent gout flare  - uric acid level ordered    Stenosis of left middle cerebral artery  Chronic L LUCRECIA occlusion   History of stroke s/p L MCA stent  - s/p angioplasty and stent by Dr. Grey 8/2021  - on ASA and atorvastatin  - Follow up with Dr. Grey in December    Mixed hyperlipidemia  - Continue atorvastatin 80mg daily and zetia, update lipid panel     Peripheral vascular disease  - Has R groin bruit with duplex showing at least 50% stenosis of the SFA.  - He has declined vascular evaluation  - On ASA and atorvastatin 80mg daily    Non-seasonal allergic rhinitis, unspecified trigger  - on xyzal and flonase    Vitamin D deficiency  - on supplement  - will update vitamin D level    Chewing tobacco nicotine dependence without complication  - Not interested in quitting. Needs routine dental care.    Alcohol abuse  - 14-21 beers or more per week.     Health Maintenance  - Colonoscopy: Declined  - HCV: negative  - Immunizations: Declined pneumococcal. Discussed shingrix, COVID, flu, RSV, tdap.  - Depression screening: negative 8/2023    Follow Up:  Return in about 4 months (around 12/31/2023) for Follow up, 1 year for wellness 45 minutes, Labs today.     An After Visit Summary and PPPS were given to the patient.

## 2023-09-01 LAB
25(OH)D3 SERPL-MCNC: 27.2 NG/ML (ref 30–100)
ALBUMIN SERPL-MCNC: 3.8 G/DL (ref 3.5–5.2)
ALBUMIN/GLOB SERPL: 1.2 G/DL
ALP SERPL-CCNC: 63 U/L (ref 39–117)
ALT SERPL W P-5'-P-CCNC: 15 U/L (ref 1–41)
ANION GAP SERPL CALCULATED.3IONS-SCNC: 11 MMOL/L (ref 5–15)
AST SERPL-CCNC: 26 U/L (ref 1–40)
BILIRUB SERPL-MCNC: 0.3 MG/DL (ref 0–1.2)
BUN SERPL-MCNC: 52 MG/DL (ref 8–23)
BUN/CREAT SERPL: 15 (ref 7–25)
CALCIUM SPEC-SCNC: 8.7 MG/DL (ref 8.6–10.5)
CHLORIDE SERPL-SCNC: 103 MMOL/L (ref 98–107)
CHOLEST SERPL-MCNC: 260 MG/DL (ref 0–200)
CO2 SERPL-SCNC: 23 MMOL/L (ref 22–29)
CREAT SERPL-MCNC: 3.47 MG/DL (ref 0.76–1.27)
CREAT UR-MCNC: 93 MG/DL
DEPRECATED RDW RBC AUTO: 43.1 FL (ref 37–54)
EGFRCR SERPLBLD CKD-EPI 2021: 18.2 ML/MIN/1.73
ERYTHROCYTE [DISTWIDTH] IN BLOOD BY AUTOMATED COUNT: 14 % (ref 12.3–15.4)
FERRITIN SERPL-MCNC: 924 NG/ML (ref 30–400)
GLOBULIN UR ELPH-MCNC: 3.1 GM/DL
GLUCOSE SERPL-MCNC: 83 MG/DL (ref 65–99)
HBA1C MFR BLD: 5.9 % (ref 4.8–5.6)
HCT VFR BLD AUTO: 34 % (ref 37.5–51)
HDLC SERPL-MCNC: 50 MG/DL (ref 40–60)
HGB BLD-MCNC: 11 G/DL (ref 13–17.7)
IRON 24H UR-MRATE: 52 MCG/DL (ref 59–158)
IRON SATN MFR SERPL: 19 % (ref 20–50)
LDLC SERPL CALC-MCNC: 150 MG/DL (ref 0–100)
LDLC/HDLC SERPL: 2.9 {RATIO}
MCH RBC QN AUTO: 27.4 PG (ref 26.6–33)
MCHC RBC AUTO-ENTMCNC: 32.4 G/DL (ref 31.5–35.7)
MCV RBC AUTO: 84.8 FL (ref 79–97)
PLATELET # BLD AUTO: 190 10*3/MM3 (ref 140–450)
PMV BLD AUTO: 11.3 FL (ref 6–12)
POTASSIUM SERPL-SCNC: 4.8 MMOL/L (ref 3.5–5.2)
PROT ?TM UR-MCNC: 386.7 MG/DL
PROT SERPL-MCNC: 6.9 G/DL (ref 6–8.5)
PROT/CREAT UR: 4158.1 MG/G CREA (ref 0–200)
RBC # BLD AUTO: 4.01 10*6/MM3 (ref 4.14–5.8)
SODIUM SERPL-SCNC: 137 MMOL/L (ref 136–145)
TIBC SERPL-MCNC: 274 MCG/DL (ref 298–536)
TRANSFERRIN SERPL-MCNC: 184 MG/DL (ref 200–360)
TRIGL SERPL-MCNC: 325 MG/DL (ref 0–150)
URATE SERPL-MCNC: 8.8 MG/DL (ref 3.4–7)
VLDLC SERPL-MCNC: 60 MG/DL (ref 5–40)
WBC NRBC COR # BLD: 4.69 10*3/MM3 (ref 3.4–10.8)

## 2023-12-11 ENCOUNTER — OFFICE VISIT (OUTPATIENT)
Dept: NEUROSURGERY | Facility: CLINIC | Age: 73
End: 2023-12-11
Payer: MEDICARE

## 2023-12-11 VITALS — WEIGHT: 164.4 LBS | HEART RATE: 96 BPM | HEIGHT: 70 IN | BODY MASS INDEX: 23.54 KG/M2 | OXYGEN SATURATION: 99 %

## 2023-12-11 DIAGNOSIS — Z86.73 HISTORY OF STROKE: Primary | ICD-10-CM

## 2023-12-11 DIAGNOSIS — F17.220 CHEWING TOBACCO NICOTINE DEPENDENCE WITHOUT COMPLICATION: ICD-10-CM

## 2023-12-11 DIAGNOSIS — I66.02 STENOSIS OF LEFT MIDDLE CEREBRAL ARTERY: ICD-10-CM

## 2023-12-11 PROCEDURE — 99213 OFFICE O/P EST LOW 20 MIN: CPT | Performed by: NEUROLOGICAL SURGERY

## 2023-12-11 NOTE — PROGRESS NOTES
"Subjective     Chief Complaint: Left MCA stenosis, status post left MCA stent    Patient ID: Eyal Solano is a 72 y.o. male is here today for follow-up.    History of Present Illness    This is a 72-year-old man in whom I placed a left MCA stent a little over a year ago for left MCA stenosis which was refractory to medical therapy.  He presents today for routine follow-up.    He is still chewing tobacco but is not smoking anymore.  He stopped taking his Plavix but is still taking aspirin every day.  He is on high-dose statin and is seeing his primary care doctor for control of his blood pressure.  He denies any numbness, tingling, seizures, strokelike symptoms, or aphasia.    The following portions of the patient's history were reviewed and updated as appropriate: allergies, current medications, past family history, past medical history, past social history, past surgical history and problem list.    Family history:   Family History   Problem Relation Age of Onset    Hyperlipidemia Mother     Hypertension Mother     Stroke Father 82    Heart attack Father 82    Hypertension Brother     Heart disease Maternal Grandmother     Heart disease Maternal Grandfather     Heart disease Paternal Grandmother     Heart disease Paternal Grandfather        Social history:   Social History     Socioeconomic History    Marital status: Single   Tobacco Use    Smoking status: Former     Packs/day: 1.00     Years: 30.00     Additional pack years: 0.00     Total pack years: 30.00     Types: Cigarettes    Smokeless tobacco: Current     Types: Chew    Tobacco comments:     Quit >25 yrs ago; tobacco cessation pamphlet given to pt   Vaping Use    Vaping Use: Never used   Substance and Sexual Activity    Alcohol use: Yes     Alcohol/week: 14.0 - 21.0 standard drinks of alcohol     Types: 14 - 21 Cans of beer per week    Drug use: No    Sexual activity: Defer       Review of Systems    Objective   Pulse 96, height 177.8 cm (70\"), weight " 74.6 kg (164 lb 6.4 oz), SpO2 99%.  Body mass index is 23.59 kg/m².    Physical Exam  Vitals reviewed.   Constitutional:       General: He is not in acute distress.     Appearance: He is well-developed. He is not diaphoretic.   HENT:      Head: Normocephalic and atraumatic.   Pulmonary:      Effort: Pulmonary effort is normal.   Skin:     General: Skin is warm and dry.   Neurological:      Mental Status: He is alert and oriented to person, place, and time.      Comments: Speech production is fluent with no paraphasic errors.  Face is symmetric.  Casual gait is unremarkable.  He is alert, pleasant, conversant, and appropriate.   Psychiatric:         Behavior: Behavior normal.         Assessment & Plan     Independent Review of Radiographic Studies:      He has no new imaging for me to review    Medical Decision Making:      He can follow-up with his primary care doctor for management of his intracranial stenosis at this point.  I carefully reviewed the signs and symptoms of stroke with him.  I directed him to call 911 if he thinks he is having a stroke and/or TIA.  He should remain on aspirin and high-dose statin indefinitely.  I am happy to see him back at any point for new or worsening symptoms.  There is no role for surveillance imaging at this point unless clinically indicated.    Diagnoses and all orders for this visit:    1. History of stroke s/p L MCA stent (Primary)    2. Stenosis of left middle cerebral artery    3. Chewing tobacco nicotine dependence without complication        No follow-ups on file.           This document signed by WARREN Grey MD December 11, 2023 15:59 EST

## 2024-01-03 ENCOUNTER — LAB (OUTPATIENT)
Dept: LAB | Facility: HOSPITAL | Age: 74
End: 2024-01-03
Payer: MEDICARE

## 2024-01-03 ENCOUNTER — OFFICE VISIT (OUTPATIENT)
Dept: INTERNAL MEDICINE | Facility: CLINIC | Age: 74
End: 2024-01-03
Payer: MEDICARE

## 2024-01-03 VITALS
HEIGHT: 70 IN | DIASTOLIC BLOOD PRESSURE: 102 MMHG | SYSTOLIC BLOOD PRESSURE: 162 MMHG | TEMPERATURE: 97.8 F | OXYGEN SATURATION: 98 % | WEIGHT: 162 LBS | BODY MASS INDEX: 23.19 KG/M2 | HEART RATE: 90 BPM

## 2024-01-03 DIAGNOSIS — I66.12: Chronic | ICD-10-CM

## 2024-01-03 DIAGNOSIS — N18.4 STAGE 4 CHRONIC KIDNEY DISEASE: Primary | ICD-10-CM

## 2024-01-03 DIAGNOSIS — E55.9 VITAMIN D DEFICIENCY: ICD-10-CM

## 2024-01-03 DIAGNOSIS — N18.4 STAGE 4 CHRONIC KIDNEY DISEASE: ICD-10-CM

## 2024-01-03 DIAGNOSIS — Z86.73 HISTORY OF STROKE: ICD-10-CM

## 2024-01-03 DIAGNOSIS — D63.1 ANEMIA DUE TO STAGE 4 CHRONIC KIDNEY DISEASE: ICD-10-CM

## 2024-01-03 DIAGNOSIS — Z23 NEED FOR COVID-19 VACCINE: ICD-10-CM

## 2024-01-03 DIAGNOSIS — N18.4 ANEMIA DUE TO STAGE 4 CHRONIC KIDNEY DISEASE: ICD-10-CM

## 2024-01-03 DIAGNOSIS — R35.1 NOCTURIA: ICD-10-CM

## 2024-01-03 DIAGNOSIS — E78.2 MIXED HYPERLIPIDEMIA: Chronic | ICD-10-CM

## 2024-01-03 DIAGNOSIS — Z23 NEED FOR INFLUENZA VACCINATION: ICD-10-CM

## 2024-01-03 DIAGNOSIS — I10 ESSENTIAL HYPERTENSION: Chronic | ICD-10-CM

## 2024-01-03 DIAGNOSIS — R80.9 NEPHROTIC RANGE PROTEINURIA: ICD-10-CM

## 2024-01-03 DIAGNOSIS — I66.02 STENOSIS OF LEFT MIDDLE CEREBRAL ARTERY: ICD-10-CM

## 2024-01-03 LAB
IRON 24H UR-MRATE: 53 MCG/DL (ref 59–158)
IRON SATN MFR SERPL: 21 % (ref 20–50)
TIBC SERPL-MCNC: 253 MCG/DL (ref 298–536)
TRANSFERRIN SERPL-MCNC: 170 MG/DL (ref 200–360)

## 2024-01-03 PROCEDURE — 85027 COMPLETE CBC AUTOMATED: CPT

## 2024-01-03 PROCEDURE — 83540 ASSAY OF IRON: CPT

## 2024-01-03 PROCEDURE — 84466 ASSAY OF TRANSFERRIN: CPT

## 2024-01-03 PROCEDURE — 80048 BASIC METABOLIC PNL TOTAL CA: CPT

## 2024-01-03 RX ORDER — ATORVASTATIN CALCIUM 80 MG/1
80 TABLET, FILM COATED ORAL NIGHTLY
Qty: 90 TABLET | Refills: 3 | Status: SHIPPED | OUTPATIENT
Start: 2024-01-03

## 2024-01-03 RX ORDER — CARVEDILOL 6.25 MG/1
6.25 TABLET ORAL 2 TIMES DAILY WITH MEALS
Qty: 180 TABLET | Refills: 3 | Status: SHIPPED | OUTPATIENT
Start: 2024-01-03

## 2024-01-03 RX ORDER — EZETIMIBE 10 MG/1
10 TABLET ORAL DAILY
Qty: 90 TABLET | Refills: 3 | Status: SHIPPED | OUTPATIENT
Start: 2024-01-03

## 2024-01-03 RX ORDER — AMLODIPINE BESYLATE 10 MG/1
10 TABLET ORAL DAILY
Qty: 90 TABLET | Refills: 3 | Status: SHIPPED | OUTPATIENT
Start: 2024-01-03

## 2024-01-03 NOTE — PROGRESS NOTES
Internal Medicine Follow Up    Chief Complaint  Eyal Solano is a 73 y.o. male who presents today for follow up of chronic medical conditions outlined below.    Chief Complaint   Patient presents with    Prediabetes    Hypertension    Chronic Kidney Disease        HPI  Mr. Solano comes in today for follow up. Feels well aside from mild intermittent knee stiffness. Notes that he often is not taking BP medications due to feeling poorly with lower BP. BP often 160s systolic. He is also inconsistently taking cholesterol medication. He notes ongoing nocturia but otherwise denies urinary symptoms. Stopped tamsulosin due to inefficacy. No edema, chest pain, SOA, headaches.    Hypertension    Chronic Kidney Disease  Associated symptoms include arthralgias and fatigue.        Review of Systems  Review of Systems   Constitutional:  Positive for appetite change and fatigue.   Respiratory: Negative.     Cardiovascular: Negative.    Genitourinary:  Positive for nocturia. Negative for decreased urine volume, difficulty urinating and flank pain.   Musculoskeletal:  Positive for arthralgias.        Current Medications  Current Outpatient Medications on File Prior to Visit   Medication Sig Dispense Refill    allopurinol (ZYLOPRIM) 100 MG tablet Take 2.5 tablets by mouth Daily. 225 tablet 1    aspirin 81 MG chewable tablet Chew 1 tablet Daily. 90 tablet 3    ferrous sulfate 325 (65 FE) MG tablet Take 1 tablet by mouth 2 (Two) Times a Day With Meals. 60 tablet 0    fluticasone (Flonase) 50 MCG/ACT nasal spray 2 sprays into the nostril(s) as directed by provider Daily. 16 g 11    levocetirizine (XYZAL) 5 MG tablet TAKE 1 TABLET BY MOUTH ONCE DAILY IN THE EVENING 90 tablet 2    triamcinolone (KENALOG) 0.1 % ointment Apply 1 application  topically to the appropriate area as directed Daily As Needed for Irritation.      VITAMIN D PO Take 1 tablet by mouth Daily.      [DISCONTINUED] amLODIPine (NORVASC) 10 MG tablet Take 1 tablet by  "mouth Daily. 90 tablet 3    [DISCONTINUED] atorvastatin (LIPITOR) 80 MG tablet Take 1 tablet by mouth Every Night. 90 tablet 3    [DISCONTINUED] carvedilol (COREG) 6.25 MG tablet Take 1 tablet by mouth 2 (Two) Times a Day With Meals. 180 tablet 3    [DISCONTINUED] ezetimibe (Zetia) 10 MG tablet Take 1 tablet by mouth Daily. 90 tablet 3    [DISCONTINUED] tamsulosin (FLOMAX) 0.4 MG capsule 24 hr capsule Take 1 capsule by mouth Daily. 90 capsule 1     No current facility-administered medications on file prior to visit.       Allergies  No Known Allergies    Objective  Visit Vitals  BP (!) 162/102   Pulse 90   Temp 97.8 °F (36.6 °C)   Ht 177.8 cm (70\")   Wt 73.5 kg (162 lb)   SpO2 98%   BMI 23.24 kg/m²        Physical Exam  Physical Exam  Vitals and nursing note reviewed.   Constitutional:       General: He is not in acute distress.     Appearance: Normal appearance. He is well-developed and normal weight. He is not ill-appearing or toxic-appearing.   HENT:      Head: Normocephalic and atraumatic.   Eyes:      Conjunctiva/sclera: Conjunctivae normal.   Cardiovascular:      Rate and Rhythm: Normal rate and regular rhythm.      Heart sounds: Normal heart sounds.   Pulmonary:      Effort: Pulmonary effort is normal. No respiratory distress.      Breath sounds: Normal breath sounds.   Musculoskeletal:      Right lower leg: No edema.      Left lower leg: No edema.   Skin:     General: Skin is warm and dry.   Neurological:      Mental Status: He is alert and oriented to person, place, and time. Mental status is at baseline.      Gait: Gait normal.   Psychiatric:         Mood and Affect: Mood normal.         Behavior: Behavior normal.         Results  Results for orders placed or performed in visit on 08/31/23   CBC (No Diff)    Specimen: Blood   Result Value Ref Range    WBC 4.69 3.40 - 10.80 10*3/mm3    RBC 4.01 (L) 4.14 - 5.80 10*6/mm3    Hemoglobin 11.0 (L) 13.0 - 17.7 g/dL    Hematocrit 34.0 (L) 37.5 - 51.0 %    MCV 84.8 " 79.0 - 97.0 fL    MCH 27.4 26.6 - 33.0 pg    MCHC 32.4 31.5 - 35.7 g/dL    RDW 14.0 12.3 - 15.4 %    RDW-SD 43.1 37.0 - 54.0 fl    MPV 11.3 6.0 - 12.0 fL    Platelets 190 140 - 450 10*3/mm3   Iron Profile    Specimen: Blood   Result Value Ref Range    Iron 52 (L) 59 - 158 mcg/dL    Iron Saturation (TSAT) 19 (L) 20 - 50 %    Transferrin 184 (L) 200 - 360 mg/dL    TIBC 274 (L) 298 - 536 mcg/dL   Ferritin    Specimen: Blood   Result Value Ref Range    Ferritin 924.00 (H) 30.00 - 400.00 ng/mL   Vitamin D,25-Hydroxy    Specimen: Blood   Result Value Ref Range    25 Hydroxy, Vitamin D 27.2 (L) 30.0 - 100.0 ng/ml   Lipid Panel    Specimen: Blood   Result Value Ref Range    Total Cholesterol 260 (H) 0 - 200 mg/dL    Triglycerides 325 (H) 0 - 150 mg/dL    HDL Cholesterol 50 40 - 60 mg/dL    LDL Cholesterol  150 (H) 0 - 100 mg/dL    VLDL Cholesterol 60 (H) 5 - 40 mg/dL    LDL/HDL Ratio 2.90    Hemoglobin A1c    Specimen: Blood   Result Value Ref Range    Hemoglobin A1C 5.90 (H) 4.80 - 5.60 %   Protein / Creatinine Ratio, Urine - Urine, Clean Catch    Specimen: Urine, Clean Catch   Result Value Ref Range    Protein/Creatinine Ratio, Urine 4,158.1 (H) 0.0 - 200.0 mg/G Crea    Creatinine, Urine 93.0 mg/dL    Total Protein, Urine 386.7 mg/dL   Comprehensive Metabolic Panel    Specimen: Blood   Result Value Ref Range    Glucose 83 65 - 99 mg/dL    BUN 52 (H) 8 - 23 mg/dL    Creatinine 3.47 (H) 0.76 - 1.27 mg/dL    Sodium 137 136 - 145 mmol/L    Potassium 4.8 3.5 - 5.2 mmol/L    Chloride 103 98 - 107 mmol/L    CO2 23.0 22.0 - 29.0 mmol/L    Calcium 8.7 8.6 - 10.5 mg/dL    Total Protein 6.9 6.0 - 8.5 g/dL    Albumin 3.8 3.5 - 5.2 g/dL    ALT (SGPT) 15 1 - 41 U/L    AST (SGOT) 26 1 - 40 U/L    Alkaline Phosphatase 63 39 - 117 U/L    Total Bilirubin 0.3 0.0 - 1.2 mg/dL    Globulin 3.1 gm/dL    A/G Ratio 1.2 g/dL    BUN/Creatinine Ratio 15.0 7.0 - 25.0    Anion Gap 11.0 5.0 - 15.0 mmol/L    eGFR 18.2 (L) >60.0 mL/min/1.73   Uric acid     Specimen: Blood   Result Value Ref Range    Uric Acid 8.8 (H) 3.4 - 7.0 mg/dL   Urinalysis without microscopic (no culture) - Urine, Clean Catch    Specimen: Urine, Clean Catch   Result Value Ref Range    Color, UA Yellow Yellow, Straw    Appearance, UA Clear Clear    pH, UA 6.0 5.0 - 8.0    Specific Gravity, UA 1.013 1.005 - 1.030    Glucose, UA Negative Negative    Ketones, UA Negative Negative    Bilirubin, UA Negative Negative    Blood, UA Trace (A) Negative    Protein, UA >=300 mg/dL (3+) (A) Negative    Leuk Esterase, UA Negative Negative    Nitrite, UA Negative Negative    Urobilinogen, UA 0.2 E.U./dL 0.2 - 1.0 E.U./dL   Urinalysis, Microscopic Only - Urine, Clean Catch    Specimen: Urine, Clean Catch   Result Value Ref Range    RBC, UA 0-2 None Seen, 0-2 /HPF    WBC, UA 0-2 None Seen, 0-2 /HPF    Bacteria, UA None Seen None Seen /HPF    Squamous Epithelial Cells, UA 0-2 None Seen, 0-2 /HPF    Hyaline Casts, UA None Seen None Seen /LPF    Methodology Automated Microscopy         Assessment and Plan  Diagnoses and all orders for this visit:    Stage 4 chronic kidney disease (HCC), Nephrotic range proteinuria, Anemia due to stage 4 chronic kidney disease (HCC)  - progressive, last gfr 18.2. Worsening.  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 HTN has been uncontrolled and he declines med adjustment.  - has nephrotic range proteinuria and no longer on ACEI due to worsening renal function  - has mild anemia on iron  - He has been referred on numerous occasions to nephrology but refuses to keep appts. Discussed risk of progression in renal failure to HD and he is willing to see a different nephrologist. Will refer to Dr. Peace.  - BMP, CBC, iron studies ordered    Mixed hyperlipidemia  - uncontrolled on labs in August but he was not taking medications consistently.  - discussed need to take medications as prescribed  - continue atorvastatin 80mg daily and zetia     Stenosis of left middle  cerebral artery  Chronic L LUCRECIA occlusion   History of stroke s/p L MCA stent  - s/p angioplasty and stent by Dr. Grey 8/2021  - on ASA and atorvastatin  - will see NSG PRN    Essential hypertension  - BP chronically elevated, has dizziness when BP <130 so does not take medications as prescribed  - Continue norvasc 10mg daily, and coreg 6.25mg BID  - off ACEI due to worsening renal function    Nocturia  - flomax ineffective  - has declined TEOFILO eval  - discussed urology eval and this is deferred for now    Vitamin D deficiency  - started supplementing about a month ago, will recheck next visit    Need for COVID-19 vaccine  -     COVID-19 F23 (Pfizer) 12yrs+ (COMIRNATY)     Health Maintenance  - Colonoscopy: Declined  - HCV: negative  - Immunizations: Flu and COVID today. Declined pneumococcal. Discussed shingrix, RSV, tdap.  - Depression screening: negative 8/2023    Return in about 4 months (around 5/3/2024) for Follow up, Labs today.

## 2024-01-04 LAB
ANION GAP SERPL CALCULATED.3IONS-SCNC: 17 MMOL/L (ref 5–15)
BUN SERPL-MCNC: 86 MG/DL (ref 8–23)
BUN/CREAT SERPL: 13.7 (ref 7–25)
CALCIUM SPEC-SCNC: 8 MG/DL (ref 8.6–10.5)
CHLORIDE SERPL-SCNC: 99 MMOL/L (ref 98–107)
CO2 SERPL-SCNC: 21 MMOL/L (ref 22–29)
CREAT SERPL-MCNC: 6.26 MG/DL (ref 0.76–1.27)
DEPRECATED RDW RBC AUTO: 44.2 FL (ref 37–54)
EGFRCR SERPLBLD CKD-EPI 2021: 8.9 ML/MIN/1.73
ERYTHROCYTE [DISTWIDTH] IN BLOOD BY AUTOMATED COUNT: 14.9 % (ref 12.3–15.4)
GLUCOSE SERPL-MCNC: 110 MG/DL (ref 65–99)
HCT VFR BLD AUTO: 27.7 % (ref 37.5–51)
HGB BLD-MCNC: 8.7 G/DL (ref 13–17.7)
MCH RBC QN AUTO: 26.1 PG (ref 26.6–33)
MCHC RBC AUTO-ENTMCNC: 31.4 G/DL (ref 31.5–35.7)
MCV RBC AUTO: 83.2 FL (ref 79–97)
PLATELET # BLD AUTO: 256 10*3/MM3 (ref 140–450)
PMV BLD AUTO: 11 FL (ref 6–12)
POTASSIUM SERPL-SCNC: 5.2 MMOL/L (ref 3.5–5.2)
RBC # BLD AUTO: 3.33 10*6/MM3 (ref 4.14–5.8)
SODIUM SERPL-SCNC: 137 MMOL/L (ref 136–145)
WBC NRBC COR # BLD AUTO: 4.54 10*3/MM3 (ref 3.4–10.8)

## 2024-02-18 DIAGNOSIS — N18.4 STAGE 4 CHRONIC KIDNEY DISEASE: Primary | ICD-10-CM

## 2024-02-19 ENCOUNTER — TELEPHONE (OUTPATIENT)
Dept: INTERNAL MEDICINE | Facility: CLINIC | Age: 74
End: 2024-02-19
Payer: MEDICARE

## 2024-02-19 NOTE — TELEPHONE ENCOUNTER
Pt notified of lab results and states that he has an appt with Nephrology on Wednesday, but is going to go to [facility] today to the ER because he is having trouble breathing. Pt advised that if he is having trouble breathing he definitely should go to the hospital and he agrees and I let him know I will pass this message on to Dr. Sanchez.

## 2024-02-19 NOTE — TELEPHONE ENCOUNTER
----- Message from Edda Sanchez MD sent at 2/18/2024 10:39 PM EST -----  Please call patient. Kidney function and anemia are worsening. When is he scheduled with nephrology? I would like him to have labs repeated. If he is not establishing soon with nephrology he will need to potentially be sent to the hospital for evaluation.

## 2024-03-12 ENCOUNTER — TELEPHONE (OUTPATIENT)
Dept: INTERNAL MEDICINE | Facility: CLINIC | Age: 74
End: 2024-03-12

## 2024-03-12 DIAGNOSIS — M1A.39X0 CHRONIC GOUT DUE TO RENAL IMPAIRMENT OF MULTIPLE SITES WITHOUT TOPHUS: ICD-10-CM

## 2024-03-12 RX ORDER — ALLOPURINOL 100 MG/1
250 TABLET ORAL DAILY
Qty: 225 TABLET | Refills: 1 | Status: CANCELLED | OUTPATIENT
Start: 2024-03-12

## 2024-03-12 RX ORDER — PREDNISONE 20 MG/1
40 TABLET ORAL DAILY
Qty: 14 TABLET | Refills: 0 | Status: SHIPPED | OUTPATIENT
Start: 2024-03-12 | End: 2024-03-19

## 2024-03-12 NOTE — TELEPHONE ENCOUNTER
Caller: Eyal Solano    Relationship: Self    Best call back number: [phone]     Which medication are you concerned about: ALLOPURINOL (ZYLOPRIM) 100MG     Who prescribed you this medication: DR PHAM    What are your concerns: PATIENT STATED THAT HE IS EXPERIENCING A GOUT FLARE UP THAT STARTED ON PRESTON 3/10/24. HE STATED THAT HIS KNEE IS SWELLING AND HE IS HAVING DIFFICULTIES WITH PAIN AND MOBILITY.    HE STATED THAT HE DID NOT CHANGE THE WAY HE TAKES ANY OF HIS MEDICATIONS AND CAN NOT REMEMBER ANY TRAUMA OR INJURY TO THE AREA.    SHOULD A CHANGE IN DOSE BE MADE OR CAN ANOTHER PRESCRIPTION BE CALLED IN TO ADDRESS HIS SYMPTOMS?    PLEASE ADVISE PATIENT

## 2024-03-24 ENCOUNTER — APPOINTMENT (OUTPATIENT)
Dept: GENERAL RADIOLOGY | Facility: HOSPITAL | Age: 74
DRG: 673 | End: 2024-03-24
Payer: MEDICARE

## 2024-03-24 ENCOUNTER — APPOINTMENT (OUTPATIENT)
Dept: MRI IMAGING | Facility: HOSPITAL | Age: 74
DRG: 673 | End: 2024-03-24
Payer: MEDICARE

## 2024-03-24 ENCOUNTER — HOSPITAL ENCOUNTER (INPATIENT)
Facility: HOSPITAL | Age: 74
LOS: 8 days | Discharge: HOME | DRG: 673 | End: 2024-04-01
Attending: EMERGENCY MEDICINE | Admitting: STUDENT IN AN ORGANIZED HEALTH CARE EDUCATION/TRAINING PROGRAM
Payer: MEDICARE

## 2024-03-24 DIAGNOSIS — H53.2 DIPLOPIA: ICD-10-CM

## 2024-03-24 DIAGNOSIS — Z86.79 HISTORY OF HYPERTENSION: ICD-10-CM

## 2024-03-24 DIAGNOSIS — J30.89 NON-SEASONAL ALLERGIC RHINITIS, UNSPECIFIED TRIGGER: ICD-10-CM

## 2024-03-24 DIAGNOSIS — R27.0 ATAXIA: ICD-10-CM

## 2024-03-24 DIAGNOSIS — I10 ESSENTIAL HYPERTENSION: Chronic | ICD-10-CM

## 2024-03-24 DIAGNOSIS — E78.2 MIXED HYPERLIPIDEMIA: Chronic | ICD-10-CM

## 2024-03-24 DIAGNOSIS — N19 RENAL FAILURE, UNSPECIFIED CHRONICITY: ICD-10-CM

## 2024-03-24 DIAGNOSIS — Z86.73 HISTORY OF CVA (CEREBROVASCULAR ACCIDENT): ICD-10-CM

## 2024-03-24 DIAGNOSIS — E87.5 HYPERKALEMIA: Primary | ICD-10-CM

## 2024-03-24 DIAGNOSIS — I63.9 CEREBROVASCULAR ACCIDENT (CVA), UNSPECIFIED MECHANISM: ICD-10-CM

## 2024-03-24 DIAGNOSIS — M1A.39X0 CHRONIC GOUT DUE TO RENAL IMPAIRMENT OF MULTIPLE SITES WITHOUT TOPHUS: ICD-10-CM

## 2024-03-24 PROBLEM — N17.9 AKI (ACUTE KIDNEY INJURY): Status: ACTIVE | Noted: 2024-03-24

## 2024-03-24 LAB
ABO GROUP BLD: NORMAL
ABO GROUP BLD: NORMAL
ALBUMIN SERPL-MCNC: 3.4 G/DL (ref 3.5–5.2)
ALBUMIN/GLOB SERPL: 1.2 G/DL
ALP SERPL-CCNC: 59 U/L (ref 39–117)
ALT SERPL W P-5'-P-CCNC: 29 U/L (ref 1–41)
AMMONIA BLD-SCNC: 18 UMOL/L (ref 16–60)
ANION GAP SERPL CALCULATED.3IONS-SCNC: 15 MMOL/L (ref 5–15)
ANION GAP SERPL CALCULATED.3IONS-SCNC: 16 MMOL/L (ref 5–15)
AST SERPL-CCNC: 35 U/L (ref 1–40)
BACTERIA UR QL AUTO: NORMAL /HPF
BASOPHILS # BLD AUTO: 0.04 10*3/MM3 (ref 0–0.2)
BASOPHILS NFR BLD AUTO: 0.5 % (ref 0–1.5)
BILIRUB SERPL-MCNC: 0.3 MG/DL (ref 0–1.2)
BILIRUB UR QL STRIP: NEGATIVE
BLD GP AB SCN SERPL QL: NEGATIVE
BUN SERPL-MCNC: 104 MG/DL (ref 8–23)
BUN SERPL-MCNC: 99 MG/DL (ref 8–23)
BUN/CREAT SERPL: 18.1 (ref 7–25)
BUN/CREAT SERPL: 20 (ref 7–25)
CA-I SERPL ISE-MCNC: 1.09 MMOL/L (ref 1.12–1.32)
CALCIUM SPEC-SCNC: 7.4 MG/DL (ref 8.6–10.5)
CALCIUM SPEC-SCNC: 8.2 MG/DL (ref 8.6–10.5)
CHLORIDE SERPL-SCNC: 101 MMOL/L (ref 98–107)
CHLORIDE SERPL-SCNC: 98 MMOL/L (ref 98–107)
CLARITY UR: CLEAR
CO2 SERPL-SCNC: 18 MMOL/L (ref 22–29)
CO2 SERPL-SCNC: 18 MMOL/L (ref 22–29)
COLOR UR: YELLOW
CREAT SERPL-MCNC: 5.19 MG/DL (ref 0.76–1.27)
CREAT SERPL-MCNC: 5.47 MG/DL (ref 0.76–1.27)
DEPRECATED RDW RBC AUTO: 46.7 FL (ref 37–54)
EGFRCR SERPLBLD CKD-EPI 2021: 10.5 ML/MIN/1.73
EGFRCR SERPLBLD CKD-EPI 2021: 11.2 ML/MIN/1.73
EOSINOPHIL # BLD AUTO: 0.1 10*3/MM3 (ref 0–0.4)
EOSINOPHIL NFR BLD AUTO: 1.2 % (ref 0.3–6.2)
ERYTHROCYTE [DISTWIDTH] IN BLOOD BY AUTOMATED COUNT: 15.2 % (ref 12.3–15.4)
FERRITIN SERPL-MCNC: 1250 NG/ML (ref 30–400)
GEN 5 2HR TROPONIN T REFLEX: 70 NG/L
GLOBULIN UR ELPH-MCNC: 2.8 GM/DL
GLUCOSE BLDC GLUCOMTR-MCNC: 133 MG/DL (ref 70–130)
GLUCOSE BLDC GLUCOMTR-MCNC: 86 MG/DL (ref 70–130)
GLUCOSE SERPL-MCNC: 81 MG/DL (ref 65–99)
GLUCOSE SERPL-MCNC: 92 MG/DL (ref 65–99)
GLUCOSE UR STRIP-MCNC: NEGATIVE MG/DL
HCT VFR BLD AUTO: 24.7 % (ref 37.5–51)
HGB BLD-MCNC: 7.6 G/DL (ref 13–17.7)
HGB UR QL STRIP.AUTO: ABNORMAL
HOLD SPECIMEN: NORMAL
HYALINE CASTS UR QL AUTO: NORMAL /LPF
IMM GRANULOCYTES # BLD AUTO: 0.09 10*3/MM3 (ref 0–0.05)
IMM GRANULOCYTES NFR BLD AUTO: 1.1 % (ref 0–0.5)
KETONES UR QL STRIP: NEGATIVE
LDH SERPL-CCNC: 283 U/L (ref 135–225)
LEUKOCYTE ESTERASE UR QL STRIP.AUTO: NEGATIVE
LYMPHOCYTES # BLD AUTO: 0.74 10*3/MM3 (ref 0.7–3.1)
LYMPHOCYTES NFR BLD AUTO: 8.8 % (ref 19.6–45.3)
MAGNESIUM SERPL-MCNC: 1.6 MG/DL (ref 1.6–2.4)
MCH RBC QN AUTO: 26.4 PG (ref 26.6–33)
MCHC RBC AUTO-ENTMCNC: 30.8 G/DL (ref 31.5–35.7)
MCV RBC AUTO: 85.8 FL (ref 79–97)
MONOCYTES # BLD AUTO: 0.71 10*3/MM3 (ref 0.1–0.9)
MONOCYTES NFR BLD AUTO: 8.5 % (ref 5–12)
NEUTROPHILS NFR BLD AUTO: 6.7 10*3/MM3 (ref 1.7–7)
NEUTROPHILS NFR BLD AUTO: 79.9 % (ref 42.7–76)
NITRITE UR QL STRIP: NEGATIVE
NRBC BLD AUTO-RTO: 0 /100 WBC (ref 0–0.2)
PH UR STRIP.AUTO: 5.5 [PH] (ref 5–8)
PLATELET # BLD AUTO: 237 10*3/MM3 (ref 140–450)
PMV BLD AUTO: 11.4 FL (ref 6–12)
POTASSIUM SERPL-SCNC: 5.5 MMOL/L (ref 3.5–5.2)
POTASSIUM SERPL-SCNC: 6.5 MMOL/L (ref 3.5–5.2)
PROT SERPL-MCNC: 6.2 G/DL (ref 6–8.5)
PROT UR QL STRIP: ABNORMAL
RBC # BLD AUTO: 2.88 10*6/MM3 (ref 4.14–5.8)
RBC # UR STRIP: NORMAL /HPF
REF LAB TEST METHOD: NORMAL
RETICS # AUTO: 0.05 10*6/MM3 (ref 0.02–0.13)
RETICS/RBC NFR AUTO: 1.66 % (ref 0.7–1.9)
RH BLD: POSITIVE
RH BLD: POSITIVE
SODIUM SERPL-SCNC: 131 MMOL/L (ref 136–145)
SODIUM SERPL-SCNC: 135 MMOL/L (ref 136–145)
SP GR UR STRIP: 1.01 (ref 1–1.03)
SQUAMOUS #/AREA URNS HPF: NORMAL /HPF
T&S EXPIRATION DATE: NORMAL
TROPONIN T DELTA: -9 NG/L
TROPONIN T SERPL HS-MCNC: 79 NG/L
TSH SERPL DL<=0.05 MIU/L-ACNC: 1.49 UIU/ML (ref 0.27–4.2)
UROBILINOGEN UR QL STRIP: ABNORMAL
WBC # UR STRIP: NORMAL /HPF
WBC NRBC COR # BLD AUTO: 8.38 10*3/MM3 (ref 3.4–10.8)
WHOLE BLOOD HOLD COAG: NORMAL
WHOLE BLOOD HOLD SPECIMEN: NORMAL

## 2024-03-24 PROCEDURE — 84484 ASSAY OF TROPONIN QUANT: CPT | Performed by: HOSPITALIST

## 2024-03-24 PROCEDURE — 70547 MR ANGIOGRAPHY NECK W/O DYE: CPT

## 2024-03-24 PROCEDURE — 63710000001 INSULIN REGULAR HUMAN PER 5 UNITS: Performed by: EMERGENCY MEDICINE

## 2024-03-24 PROCEDURE — 86900 BLOOD TYPING SEROLOGIC ABO: CPT

## 2024-03-24 PROCEDURE — 81001 URINALYSIS AUTO W/SCOPE: CPT | Performed by: EMERGENCY MEDICINE

## 2024-03-24 PROCEDURE — 85025 COMPLETE CBC W/AUTO DIFF WBC: CPT | Performed by: EMERGENCY MEDICINE

## 2024-03-24 PROCEDURE — 85045 AUTOMATED RETICULOCYTE COUNT: CPT | Performed by: INTERNAL MEDICINE

## 2024-03-24 PROCEDURE — 86901 BLOOD TYPING SEROLOGIC RH(D): CPT | Performed by: HOSPITALIST

## 2024-03-24 PROCEDURE — 84484 ASSAY OF TROPONIN QUANT: CPT | Performed by: EMERGENCY MEDICINE

## 2024-03-24 PROCEDURE — 84443 ASSAY THYROID STIM HORMONE: CPT | Performed by: INTERNAL MEDICINE

## 2024-03-24 PROCEDURE — 86923 COMPATIBILITY TEST ELECTRIC: CPT

## 2024-03-24 PROCEDURE — 86850 RBC ANTIBODY SCREEN: CPT | Performed by: HOSPITALIST

## 2024-03-24 PROCEDURE — 70551 MRI BRAIN STEM W/O DYE: CPT

## 2024-03-24 PROCEDURE — 93005 ELECTROCARDIOGRAM TRACING: CPT | Performed by: EMERGENCY MEDICINE

## 2024-03-24 PROCEDURE — 25010000002 CALCIUM GLUCONATE-NACL 1-0.675 GM/50ML-% SOLUTION: Performed by: EMERGENCY MEDICINE

## 2024-03-24 PROCEDURE — 83735 ASSAY OF MAGNESIUM: CPT | Performed by: EMERGENCY MEDICINE

## 2024-03-24 PROCEDURE — 82330 ASSAY OF CALCIUM: CPT | Performed by: INTERNAL MEDICINE

## 2024-03-24 PROCEDURE — 82140 ASSAY OF AMMONIA: CPT | Performed by: HOSPITALIST

## 2024-03-24 PROCEDURE — 82728 ASSAY OF FERRITIN: CPT | Performed by: INTERNAL MEDICINE

## 2024-03-24 PROCEDURE — 83615 LACTATE (LD) (LDH) ENZYME: CPT | Performed by: INTERNAL MEDICINE

## 2024-03-24 PROCEDURE — 99223 1ST HOSP IP/OBS HIGH 75: CPT | Performed by: HOSPITALIST

## 2024-03-24 PROCEDURE — 25810000003 SODIUM CHLORIDE 0.9 % SOLUTION: Performed by: HOSPITALIST

## 2024-03-24 PROCEDURE — 86900 BLOOD TYPING SEROLOGIC ABO: CPT | Performed by: HOSPITALIST

## 2024-03-24 PROCEDURE — 86901 BLOOD TYPING SEROLOGIC RH(D): CPT

## 2024-03-24 PROCEDURE — 94640 AIRWAY INHALATION TREATMENT: CPT

## 2024-03-24 PROCEDURE — 80053 COMPREHEN METABOLIC PANEL: CPT | Performed by: EMERGENCY MEDICINE

## 2024-03-24 PROCEDURE — 99285 EMERGENCY DEPT VISIT HI MDM: CPT

## 2024-03-24 PROCEDURE — 71045 X-RAY EXAM CHEST 1 VIEW: CPT

## 2024-03-24 PROCEDURE — 70544 MR ANGIOGRAPHY HEAD W/O DYE: CPT

## 2024-03-24 PROCEDURE — 25010000002 FUROSEMIDE PER 20 MG: Performed by: EMERGENCY MEDICINE

## 2024-03-24 PROCEDURE — 82948 REAGENT STRIP/BLOOD GLUCOSE: CPT

## 2024-03-24 RX ORDER — ASPIRIN 81 MG/1
81 TABLET, CHEWABLE ORAL DAILY
Status: DISCONTINUED | OUTPATIENT
Start: 2024-03-24 | End: 2024-04-01 | Stop reason: HOSPADM

## 2024-03-24 RX ORDER — CALCITRIOL 0.25 UG/1
0.25 CAPSULE, LIQUID FILLED ORAL DAILY
Status: ON HOLD | COMMUNITY
End: 2024-04-01

## 2024-03-24 RX ORDER — SODIUM CHLORIDE 0.9 % (FLUSH) 0.9 %
10 SYRINGE (ML) INJECTION AS NEEDED
Status: DISCONTINUED | OUTPATIENT
Start: 2024-03-24 | End: 2024-04-01 | Stop reason: HOSPADM

## 2024-03-24 RX ORDER — BISACODYL 5 MG/1
5 TABLET, DELAYED RELEASE ORAL DAILY PRN
Status: DISCONTINUED | OUTPATIENT
Start: 2024-03-24 | End: 2024-03-31

## 2024-03-24 RX ORDER — FERROUS SULFATE 325(65) MG
325 TABLET ORAL 2 TIMES DAILY WITH MEALS
Status: DISCONTINUED | OUTPATIENT
Start: 2024-03-24 | End: 2024-04-01 | Stop reason: HOSPADM

## 2024-03-24 RX ORDER — SODIUM CHLORIDE 0.9 % (FLUSH) 0.9 %
10 SYRINGE (ML) INJECTION EVERY 12 HOURS SCHEDULED
Status: DISCONTINUED | OUTPATIENT
Start: 2024-03-24 | End: 2024-04-01 | Stop reason: HOSPADM

## 2024-03-24 RX ORDER — FUROSEMIDE 10 MG/ML
80 INJECTION INTRAMUSCULAR; INTRAVENOUS ONCE
Status: COMPLETED | OUTPATIENT
Start: 2024-03-24 | End: 2024-03-24

## 2024-03-24 RX ORDER — CETIRIZINE HYDROCHLORIDE 10 MG/1
10 TABLET ORAL DAILY
Status: DISCONTINUED | OUTPATIENT
Start: 2024-03-24 | End: 2024-04-01 | Stop reason: HOSPADM

## 2024-03-24 RX ORDER — DOXAZOSIN MESYLATE 4 MG/1
4 TABLET ORAL NIGHTLY
Status: ON HOLD | COMMUNITY
End: 2024-04-01

## 2024-03-24 RX ORDER — ATORVASTATIN CALCIUM 40 MG/1
80 TABLET, FILM COATED ORAL NIGHTLY
Status: DISCONTINUED | OUTPATIENT
Start: 2024-03-24 | End: 2024-04-01 | Stop reason: HOSPADM

## 2024-03-24 RX ORDER — FLUTICASONE PROPIONATE 50 MCG
2 SPRAY, SUSPENSION (ML) NASAL DAILY
Status: DISCONTINUED | OUTPATIENT
Start: 2024-03-24 | End: 2024-04-01 | Stop reason: HOSPADM

## 2024-03-24 RX ORDER — DEXTROSE MONOHYDRATE 25 G/50ML
25 INJECTION, SOLUTION INTRAVENOUS ONCE
Status: COMPLETED | OUTPATIENT
Start: 2024-03-24 | End: 2024-03-24

## 2024-03-24 RX ORDER — CALCITRIOL 0.25 UG/1
0.25 CAPSULE, LIQUID FILLED ORAL DAILY
Status: DISCONTINUED | OUTPATIENT
Start: 2024-03-25 | End: 2024-04-01 | Stop reason: HOSPADM

## 2024-03-24 RX ORDER — ALLOPURINOL 100 MG/1
100 TABLET ORAL DAILY
Status: DISCONTINUED | OUTPATIENT
Start: 2024-03-24 | End: 2024-04-01 | Stop reason: HOSPADM

## 2024-03-24 RX ORDER — CALCIUM ACETATE 667 MG/1
1334 CAPSULE ORAL 3 TIMES DAILY
Status: ON HOLD | COMMUNITY
End: 2024-04-01

## 2024-03-24 RX ORDER — CARVEDILOL 6.25 MG/1
6.25 TABLET ORAL 2 TIMES DAILY WITH MEALS
Status: DISCONTINUED | OUTPATIENT
Start: 2024-03-24 | End: 2024-03-31

## 2024-03-24 RX ORDER — TORSEMIDE 100 MG/1
50 TABLET ORAL DAILY
COMMUNITY
End: 2024-04-01 | Stop reason: HOSPADM

## 2024-03-24 RX ORDER — PROCHLORPERAZINE 25 MG
25 SUPPOSITORY, RECTAL RECTAL EVERY 12 HOURS PRN
Status: DISCONTINUED | OUTPATIENT
Start: 2024-03-24 | End: 2024-04-01 | Stop reason: HOSPADM

## 2024-03-24 RX ORDER — ALBUTEROL SULFATE 2.5 MG/3ML
10 SOLUTION RESPIRATORY (INHALATION) ONCE
Status: COMPLETED | OUTPATIENT
Start: 2024-03-24 | End: 2024-03-24

## 2024-03-24 RX ORDER — CALCIUM ACETATE 667 MG/1
1334 CAPSULE ORAL 3 TIMES DAILY
Status: DISCONTINUED | OUTPATIENT
Start: 2024-03-25 | End: 2024-04-01 | Stop reason: HOSPADM

## 2024-03-24 RX ORDER — CALCIUM GLUCONATE 20 MG/ML
1000 INJECTION, SOLUTION INTRAVENOUS ONCE
Status: COMPLETED | OUTPATIENT
Start: 2024-03-24 | End: 2024-03-24

## 2024-03-24 RX ORDER — NIFEDIPINE 60 MG/1
60 TABLET, EXTENDED RELEASE ORAL DAILY
Status: ON HOLD | COMMUNITY
End: 2024-04-01

## 2024-03-24 RX ORDER — AMLODIPINE BESYLATE 10 MG/1
10 TABLET ORAL DAILY
Status: DISCONTINUED | OUTPATIENT
Start: 2024-03-24 | End: 2024-04-01 | Stop reason: HOSPADM

## 2024-03-24 RX ORDER — SODIUM CHLORIDE 9 MG/ML
40 INJECTION, SOLUTION INTRAVENOUS AS NEEDED
Status: DISCONTINUED | OUTPATIENT
Start: 2024-03-24 | End: 2024-04-01 | Stop reason: HOSPADM

## 2024-03-24 RX ORDER — BISACODYL 10 MG
10 SUPPOSITORY, RECTAL RECTAL DAILY PRN
Status: DISCONTINUED | OUTPATIENT
Start: 2024-03-24 | End: 2024-03-31

## 2024-03-24 RX ORDER — AMOXICILLIN 250 MG
2 CAPSULE ORAL 2 TIMES DAILY PRN
Status: DISCONTINUED | OUTPATIENT
Start: 2024-03-24 | End: 2024-03-31

## 2024-03-24 RX ORDER — PROCHLORPERAZINE EDISYLATE 5 MG/ML
5 INJECTION INTRAMUSCULAR; INTRAVENOUS EVERY 6 HOURS PRN
Status: DISCONTINUED | OUTPATIENT
Start: 2024-03-24 | End: 2024-04-01 | Stop reason: HOSPADM

## 2024-03-24 RX ORDER — SODIUM CHLORIDE 0.9 % (FLUSH) 0.9 %
10 SYRINGE (ML) INJECTION AS NEEDED
Status: DISCONTINUED | OUTPATIENT
Start: 2024-03-24 | End: 2024-03-25

## 2024-03-24 RX ORDER — SODIUM CHLORIDE 9 MG/ML
100 INJECTION, SOLUTION INTRAVENOUS CONTINUOUS
Status: DISCONTINUED | OUTPATIENT
Start: 2024-03-24 | End: 2024-03-29

## 2024-03-24 RX ORDER — ALBUTEROL SULFATE 90 UG/1
1 AEROSOL, METERED RESPIRATORY (INHALATION) EVERY 4 HOURS PRN
COMMUNITY

## 2024-03-24 RX ORDER — ONDANSETRON 2 MG/ML
4 INJECTION INTRAMUSCULAR; INTRAVENOUS EVERY 6 HOURS PRN
Status: DISCONTINUED | OUTPATIENT
Start: 2024-03-24 | End: 2024-04-01 | Stop reason: HOSPADM

## 2024-03-24 RX ORDER — PROCHLORPERAZINE MALEATE 5 MG/1
5 TABLET ORAL EVERY 6 HOURS PRN
Status: DISCONTINUED | OUTPATIENT
Start: 2024-03-24 | End: 2024-04-01 | Stop reason: HOSPADM

## 2024-03-24 RX ORDER — POLYETHYLENE GLYCOL 3350 17 G/17G
17 POWDER, FOR SOLUTION ORAL DAILY PRN
Status: DISCONTINUED | OUTPATIENT
Start: 2024-03-24 | End: 2024-03-31

## 2024-03-24 RX ADMIN — FUROSEMIDE 80 MG: 10 INJECTION, SOLUTION INTRAMUSCULAR; INTRAVENOUS at 18:15

## 2024-03-24 RX ADMIN — INSULIN HUMAN 5 UNITS: 100 INJECTION, SOLUTION PARENTERAL at 18:17

## 2024-03-24 RX ADMIN — SODIUM BICARBONATE 50 MEQ: 84 INJECTION INTRAVENOUS at 18:16

## 2024-03-24 RX ADMIN — SODIUM ZIRCONIUM CYCLOSILICATE 10 G: 10 POWDER, FOR SUSPENSION ORAL at 21:18

## 2024-03-24 RX ADMIN — Medication 10 ML: at 21:19

## 2024-03-24 RX ADMIN — SODIUM CHLORIDE 100 ML/HR: 9 INJECTION, SOLUTION INTRAVENOUS at 21:18

## 2024-03-24 RX ADMIN — ATORVASTATIN CALCIUM 80 MG: 40 TABLET, FILM COATED ORAL at 21:18

## 2024-03-24 RX ADMIN — ALLOPURINOL 100 MG: 100 TABLET ORAL at 21:18

## 2024-03-24 RX ADMIN — FERROUS SULFATE TAB 325 MG (65 MG ELEMENTAL FE) 325 MG: 325 (65 FE) TAB at 21:18

## 2024-03-24 RX ADMIN — CETIRIZINE HYDROCHLORIDE 10 MG: 10 TABLET, FILM COATED ORAL at 21:18

## 2024-03-24 RX ADMIN — ASPIRIN 81 MG: 81 TABLET, CHEWABLE ORAL at 21:19

## 2024-03-24 RX ADMIN — SODIUM ZIRCONIUM CYCLOSILICATE 10 G: 10 POWDER, FOR SUSPENSION ORAL at 18:16

## 2024-03-24 RX ADMIN — CALCIUM GLUCONATE 1000 MG: 20 INJECTION, SOLUTION INTRAVENOUS at 18:17

## 2024-03-24 RX ADMIN — DEXTROSE MONOHYDRATE 25 G: 25 INJECTION, SOLUTION INTRAVENOUS at 18:16

## 2024-03-24 RX ADMIN — AMLODIPINE BESYLATE 10 MG: 10 TABLET ORAL at 21:18

## 2024-03-24 RX ADMIN — CARVEDILOL 6.25 MG: 6.25 TABLET, FILM COATED ORAL at 21:18

## 2024-03-24 RX ADMIN — ALBUTEROL SULFATE 10 MG: 2.5 SOLUTION RESPIRATORY (INHALATION) at 17:43

## 2024-03-24 NOTE — ED NOTES
Eyal Solano    Nursing Report ED to Floor:  Mental status: A&O x4  Ambulatory status: bedrest in ED  Oxygen Therapy:  RA  Cardiac Rhythm: nsr  Admitted from: ED  Safety Concerns:  n/a  Social Issues: n/a  ED Room #:  27    ED Nurse Phone Extension - [phone] or may call [phone].      HPI:   Chief Complaint   Patient presents with    Dizziness       Past Medical History:  Past Medical History:   Diagnosis Date    Acute gout of right knee 12/18/2018    Alcohol use     Conversion reaction     Dizziness     Enlarged prostate     Gout     Hyperlipidemia     Hypertension     Kidney disorder     Stroke     right side weakness        Past Surgical History:  Past Surgical History:   Procedure Laterality Date    CEREBRAL ANGIOGRAM Left 08/24/2021    Procedure: Cerebral angiogram for Intracranial Stent under anesthesia;  Surgeon: Isaiah Grey MD;  Location: [facility] INVASIVE LOCATION;  Service: Interventional Radiology;  Laterality: Left;    NASAL SEPTUM SURGERY      10 years ago    NASAL SINUS SURGERY      TOOTH EXTRACTION Left 03/2022        Admitting Doctor:   Alley Rockwell MD    Consulting Provider(s):  Consults       No orders found from 2/24/2024 to 3/25/2024.             Admitting Diagnosis:   The primary encounter diagnosis was Hyperkalemia. Diagnoses of Renal failure, unspecified chronicity, Ataxia, Diplopia, History of CVA (cerebrovascular accident), and History of hypertension were also pertinent to this visit.    Most Recent Vitals:   Vitals:    03/24/24 1645 03/24/24 1700 03/24/24 1715 03/24/24 1743   BP: 155/89 136/78 140/76    BP Location:       Patient Position:       Pulse: 63 61 61 58   Resp:    16   Temp:       TempSrc:       SpO2: 97% 97% 98% 98%   Weight:       Height:           Active LDAs/IV Access:   Lines, Drains & Airways       Active LDAs       Name Placement date Placement time Site Days    Peripheral IV 08/24/21 Anterior;Right Antecubital 08/24/21  --  Antecubital  943     Peripheral IV 03/24/24 1630 Left Antecubital 03/24/24  1630  Antecubital  less than 1                    Labs (abnormal labs have a star):   Labs Reviewed   COMPREHENSIVE METABOLIC PANEL - Abnormal; Notable for the following components:       Result Value    BUN 99 (*)     Creatinine 5.47 (*)     Sodium 135 (*)     Potassium 6.5 (*)     CO2 18.0 (*)     Calcium 7.4 (*)     Albumin 3.4 (*)     Anion Gap 16.0 (*)     eGFR 10.5 (*)     All other components within normal limits    Narrative:     GFR Normal >60  Chronic Kidney Disease <60  Kidney Failure <15    The GFR formula is only valid for adults with stable renal function between ages 18 and 70.   SINGLE HS TROPONIN T - Abnormal; Notable for the following components:    HS Troponin T 79 (*)     All other components within normal limits    Narrative:     High Sensitive Troponin T Reference Range:  <14.0 ng/L- Negative Female for AMI  <22.0 ng/L- Negative Male for AMI  >=14 - Abnormal Female indicating possible myocardial injury.  >=22 - Abnormal Male indicating possible myocardial injury.   Clinicians would have to utilize clinical acumen, EKG, Troponin, and serial changes to determine if it is an Acute Myocardial Infarction or myocardial injury due to an underlying chronic condition.        URINALYSIS W/ MICROSCOPIC IF INDICATED (NO CULTURE) - Abnormal; Notable for the following components:    Blood, UA Trace (*)     Protein,  mg/dL (2+) (*)     All other components within normal limits   CBC WITH AUTO DIFFERENTIAL - Abnormal; Notable for the following components:    RBC 2.88 (*)     Hemoglobin 7.6 (*)     Hematocrit 24.7 (*)     MCH 26.4 (*)     MCHC 30.8 (*)     Neutrophil % 79.9 (*)     Lymphocyte % 8.8 (*)     Immature Grans % 1.1 (*)     Immature Grans, Absolute 0.09 (*)     All other components within normal limits   MAGNESIUM - Normal   URINALYSIS, MICROSCOPIC ONLY   RAINBOW DRAW    Narrative:     The following orders were created for panel order  [place] Draw.  Procedure                               Abnormality         Status                     ---------                               -----------         ------                     Green Top (Gel)[[id number]]                                  Final result               Lavender Top[[id number]]                                     Final result               Gold Top - SST[[id number]]                                   Final result               Gray Top[[id number]]                                         In process                 Light Blue Top[[id number]]                                   Final result                 Please view results for these tests on the individual orders.   HIGH SENSITIVITIY TROPONIN T 2HR   CALCIUM, IONIZED   AMMONIA   TSH   FERRITIN   OCCULT BLOOD X 1, STOOL   LACTATE DEHYDROGENASE   RETICULOCYTES   BASIC METABOLIC PANEL   POCT GLUCOSE FINGERSTICK   POCT GLUCOSE FINGERSTICK   POCT GLUCOSE FINGERSTICK   POCT GLUCOSE FINGERSTICK   TYPE AND SCREEN   CBC AND DIFFERENTIAL    Narrative:     The following orders were created for panel order CBC & Differential.  Procedure                               Abnormality         Status                     ---------                               -----------         ------                     CBC Auto Differential[[id number]]        Abnormal            Final result                 Please view results for these tests on the individual orders.   GREEN TOP   LAVENDER TOP   GOLD TOP - SST   LIGHT BLUE TOP   GRAY TOP       Meds Given in ED:   Medications   sodium chloride 0.9 % flush 10 mL (has no administration in time range)   calcium gluconate 1000 Mg/50ml 0.675% NaCl IV SOLN (has no administration in time range)   insulin regular (humuLIN R,novoLIN R) injection 5 Units (has no administration in time range)   dextrose (D50W) (25 g/50 mL) IV injection 25 g (has no administration in time range)   sodium zirconium cyclosilicate (LOKELMA) packet 10 g (has no  administration in time range)   sodium bicarbonate injection 8.4% 50 mEq (has no administration in time range)   furosemide (LASIX) injection 80 mg (has no administration in time range)   albuterol (PROVENTIL) nebulizer solution 0.083% 2.5 mg/3mL (10 mg Nebulization Given 3/24/24 1743)           Last NIH score:                                                          Dysphagia screening results:  Patient Factors Component (Dysphagia:Stroke or Rule-out)  Best Eye Response: 4-->(E4) spontaneous (03/24/24 1635)  Best Motor Response: 6-->(M6) obeys commands (03/24/24 1635)  Best Verbal Response: 5-->(V5) oriented (03/24/24 1635)  [place] Coma Scale Score: 15 (03/24/24 1635)     [place] Coma Scale:  No data recorded     CIWA:        Restraint Type:            Isolation Status:  No active isolations

## 2024-03-24 NOTE — ED PROVIDER NOTES
[place]    EMERGENCY DEPARTMENT ENCOUNTER      Pt Name: Eyal Solano  MRN: [id number]  YOB: 1950  Date of evaluation: 3/24/2024  Provider: Elie Carroll MD    CHIEF COMPLAINT       Chief Complaint   Patient presents with    Dizziness         HISTORY OF PRESENT ILLNESS   Eyal Solano is a 73 y.o. male who presents to the emergency department with complaint of difficulty walking/gait ataxia that began yesterday morning.  Patient has history of stroke with prior MCA stenting.  Patient also reports bilateral diplopia yesterday but this has since resolved, however ataxia has persisted.  He reports history of end-stage renal disease and is supposed to be going on dialysis soon per his report.  He denies any headache or chest pain today.      Nursing notes were reviewed.    REVIEW OF SYSTEMS     ROS:  A chief complaint appropriate review of systems was completed and is negative except as noted in the HPI.      PAST MEDICAL HISTORY     Past Medical History:   Diagnosis Date    Acute gout of right knee 12/18/2018    Alcohol use     Conversion reaction     Dizziness     Enlarged prostate     Gout     Hyperlipidemia     Hypertension     Kidney disorder     Stroke     right side weakness         SURGICAL HISTORY       Past Surgical History:   Procedure Laterality Date    CEREBRAL ANGIOGRAM Left 08/24/2021    Procedure: Cerebral angiogram for Intracranial Stent under anesthesia;  Surgeon: Isaiah Grey MD;  Location: [facility] INVASIVE LOCATION;  Service: Interventional Radiology;  Laterality: Left;    NASAL SEPTUM SURGERY      10 years ago    NASAL SINUS SURGERY      TOOTH EXTRACTION Left 03/2022         CURRENT MEDICATIONS       Current Facility-Administered Medications:     allopurinol (ZYLOPRIM) tablet 100 mg, 100 mg, Oral, Daily, Akshat Adkins MD, 100 mg at 03/24/24 2118    amLODIPine (NORVASC) tablet 10 mg, 10 mg, Oral, Daily, Akshat Adkins MD, 10 mg at 03/24/24 2118    aspirin  chewable tablet 81 mg, 81 mg, Oral, Daily, Akshat Adkins MD, 81 mg at 03/24/24 2119    atorvastatin (LIPITOR) tablet 80 mg, 80 mg, Oral, Nightly, Akshat Adkins MD, 80 mg at 03/24/24 2118    sennosides-docusate (PERICOLACE) 8.6-50 MG per tablet 2 tablet, 2 tablet, Oral, BID PRN **AND** polyethylene glycol (MIRALAX) packet 17 g, 17 g, Oral, Daily PRN **AND** bisacodyl (DULCOLAX) EC tablet 5 mg, 5 mg, Oral, Daily PRN **AND** bisacodyl (DULCOLAX) suppository 10 mg, 10 mg, Rectal, Daily PRN, Akshat Adkins MD    [START ON 3/25/2024] calcitriol (ROCALTROL) capsule 0.25 mcg, 0.25 mcg, Oral, Daily, Alley Rockwell MD    [START ON 3/25/2024] calcium acetate (PHOS BINDER)) capsule 1,334 mg, 1,334 mg, Oral, TID, Alley Rockwell MD    carvedilol (COREG) tablet 6.25 mg, 6.25 mg, Oral, BID With Meals, Akshat Adkins MD, 6.25 mg at 03/24/24 2118    cetirizine (zyrTEC) tablet 10 mg, 10 mg, Oral, Daily, Akshat Adkins MD, 10 mg at 03/24/24 2118    ferrous sulfate tablet 325 mg, 325 mg, Oral, BID With Meals, Akshat Adkins MD, 325 mg at 03/24/24 2118    fluticasone (FLONASE) 50 MCG/ACT nasal spray 2 spray, 2 spray, Nasal, Daily, Akshat Adkins MD    ondansetron (ZOFRAN) injection 4 mg, 4 mg, Intravenous, Q6H PRN, Akshat Adkins MD    prochlorperazine (COMPAZINE) injection 5 mg, 5 mg, Intravenous, Q6H PRN **OR** prochlorperazine (COMPAZINE) tablet 5 mg, 5 mg, Oral, Q6H PRN **OR** prochlorperazine (COMPAZINE) suppository 25 mg, 25 mg, Rectal, Q12H PRN, Akshat Adkins MD    sodium chloride 0.9 % flush 10 mL, 10 mL, Intravenous, PRNJed Marc P, MD    sodium chloride 0.9 % flush 10 mL, 10 mL, Intravenous, Q12H, Akshat Adkins MD, 10 mL at 03/24/24 2119    sodium chloride 0.9 % flush 10 mL, 10 mL, Intravenous, PRN, Akshat Adkins MD    sodium chloride 0.9 % infusion 40 mL, 40 mL, Intravenous, PRN, Akshat Adkins MD    sodium chloride 0.9 % infusion, 100 mL/hr, Intravenous, Continuous, Akshat Adkins  MD, Last Rate: 100 mL/hr at 03/24/24 2118, 100 mL/hr at 03/24/24 2118    sodium zirconium cyclosilicate (LOKELMA) packet 10 g, 10 g, Oral, BID, Akshat Adkins MD, 10 g at 03/24/24 2118    ALLERGIES     Patient has no known allergies.    FAMILY HISTORY       Family History   Problem Relation Age of Onset    Hyperlipidemia Mother     Hypertension Mother     Stroke Father 82    Heart attack Father 82    Hypertension Brother     Heart disease Maternal Grandmother     Heart disease Maternal Grandfather     Heart disease Paternal Grandmother     Heart disease Paternal Grandfather           SOCIAL HISTORY       Social History     Socioeconomic History    Marital status: Single   Tobacco Use    Smoking status: Former     Current packs/day: 1.00     Average packs/day: 1 pack/day for 30.0 years (30.0 ttl pk-yrs)     Types: Cigarettes    Smokeless tobacco: Current     Types: Chew    Tobacco comments:     Quit >25 yrs ago; tobacco cessation pamphlet given to pt   Vaping Use    Vaping status: Never Used   Substance and Sexual Activity    Alcohol use: Yes     Alcohol/week: 14.0 - 21.0 standard drinks of alcohol     Types: 14 - 21 Cans of beer per week    Drug use: No    Sexual activity: Defer         PHYSICAL EXAM    (up to 7 for level 4, 8 or more for level 5)     Vitals:    03/24/24 1715 03/24/24 1743 03/24/24 1810 03/24/24 2046   BP: 140/76  154/82 151/84   BP Location:    Right arm   Patient Position:    Sitting   Pulse: 61 58 61 73   Resp:  16  18   Temp:    97.8 °F (36.6 °C)   TempSrc:    Oral   SpO2: 98% 98%  98%   Weight:    80.5 kg (177 lb 6.4 oz)   Height:           General: Awake, alert, no acute distress.  HEENT: Conjunctivae normal.  Neck: Trachea midline.  Cardiac: Heart regular rate, rhythm, no murmurs, rubs, or gallops  Lungs: Lungs are clear to auscultation, there is no wheezing, rhonchi, or rales. There is no use of accessory muscles.  Chest wall: There is no tenderness to palpation over the chest wall or over  ribs  Abdomen: Abdomen is soft, nontender, nondistended. There are no firm or pulsatile masses, no rebound rigidity or guarding.   Musculoskeletal: No deformity.  Neuro: Alert and oriented x 4.  Dermatology: Skin is warm and dry  Psych: Mentation is grossly normal, cognition is grossly normal. Affect is appropriate.        DIAGNOSTIC RESULTS     EKG: All EKGs are interpreted by the Emergency Department Physician who either signs or Co-signs this chart in the absence of a cardiologist.    ECG 12 Lead ED Triage Standing Order; Weak / Dizzy / AMS   Preliminary Result   Test Reason : ED Triage Standing Order~   Blood Pressure :   */*   mmHG   Vent. Rate :  62 BPM     Atrial Rate :  62 BPM      P-R Int : 160 ms          QRS Dur :  90 ms       QT Int : 454 ms       P-R-T Axes :  16  19  71 degrees      QTc Int : 460 ms      Normal sinus rhythm   Normal ECG   When compared with ECG of 28-JUL-2021 15:16,   No significant change was found      Referred By: EDMD           Confirmed By:             RADIOLOGY:   [x] Radiologist's Report Reviewed:  MRI Angiogram Neck Without Contrast   Final Result   There is approximately 50% stenosis of the right internal carotid artery at the skull base. Evidence of atheromatous disease. Otherwise no vessel occlusion.            Electronically Signed: Guru Rayo MD     3/24/2024 7:59 PM EDT     Workstation ID: [id number]      MRI Angiogram Head Without Contrast   Final Result   There is approximately 50% stenosis of the right internal carotid artery at the skull base. Evidence of atheromatous disease. Otherwise no vessel occlusion.            Electronically Signed: Guru Rayo MD     3/24/2024 7:59 PM EDT     Workstation ID: [id number]      MRI Brain Without Contrast   Final Result   Impression: No acute intracranial pathology. Chronic small vessel/microangiopathic ischemic changes.         Electronically Signed: Guru Rayo MD     3/24/2024 7:24 PM EDT     Workstation ID: [id number]      XR  Chest 1 View   Final Result   Impression:   No acute pulmonary process         Electronically Signed: Thang Coronel MD     3/24/2024 4:55 PM EDT     Workstation ID: [id number]          I ordered and independently reviewed the above noted radiographic studies.        LABS:    I have reviewed and interpreted all of the currently available lab results from this visit (if applicable):  Results for orders placed or performed during the hospital encounter of 03/24/24   Comprehensive Metabolic Panel    Specimen: Blood   Result Value Ref Range    Glucose 92 65 - 99 mg/dL    BUN 99 (H) 8 - 23 mg/dL    Creatinine 5.47 (H) 0.76 - 1.27 mg/dL    Sodium 135 (L) 136 - 145 mmol/L    Potassium 6.5 (C) 3.5 - 5.2 mmol/L    Chloride 101 98 - 107 mmol/L    CO2 18.0 (L) 22.0 - 29.0 mmol/L    Calcium 7.4 (L) 8.6 - 10.5 mg/dL    Total Protein 6.2 6.0 - 8.5 g/dL    Albumin 3.4 (L) 3.5 - 5.2 g/dL    ALT (SGPT) 29 1 - 41 U/L    AST (SGOT) 35 1 - 40 U/L    Alkaline Phosphatase 59 39 - 117 U/L    Total Bilirubin 0.3 0.0 - 1.2 mg/dL    Globulin 2.8 gm/dL    A/G Ratio 1.2 g/dL    BUN/Creatinine Ratio 18.1 7.0 - 25.0    Anion Gap 16.0 (H) 5.0 - 15.0 mmol/L    eGFR 10.5 (L) >60.0 mL/min/1.73   Single High Sensitivity Troponin T    Specimen: Blood   Result Value Ref Range    HS Troponin T 79 (C) <22 ng/L   Magnesium    Specimen: Blood   Result Value Ref Range    Magnesium 1.6 1.6 - 2.4 mg/dL   Urinalysis With Microscopic If Indicated (No Culture) - Urine, Clean Catch    Specimen: Urine, Clean Catch   Result Value Ref Range    Color, UA Yellow Yellow, Straw    Appearance, UA Clear Clear    pH, UA 5.5 5.0 - 8.0    Specific Gravity, UA 1.011 1.001 - 1.030    Glucose, UA Negative Negative    Ketones, UA Negative Negative    Bilirubin, UA Negative Negative    Blood, UA Trace (A) Negative    Protein,  mg/dL (2+) (A) Negative    Leuk Esterase, UA Negative Negative    Nitrite, UA Negative Negative    Urobilinogen, UA 0.2 E.U./dL 0.2 - 1.0 E.U./dL    CBC Auto Differential    Specimen: Blood   Result Value Ref Range    WBC 8.38 3.40 - 10.80 10*3/mm3    RBC 2.88 (L) 4.14 - 5.80 10*6/mm3    Hemoglobin 7.6 (L) 13.0 - 17.7 g/dL    Hematocrit 24.7 (L) 37.5 - 51.0 %    MCV 85.8 79.0 - 97.0 fL    MCH 26.4 (L) 26.6 - 33.0 pg    MCHC 30.8 (L) 31.5 - 35.7 g/dL    RDW 15.2 12.3 - 15.4 %    RDW-SD 46.7 37.0 - 54.0 fl    MPV 11.4 6.0 - 12.0 fL    Platelets 237 140 - 450 10*3/mm3    Neutrophil % 79.9 (H) 42.7 - 76.0 %    Lymphocyte % 8.8 (L) 19.6 - 45.3 %    Monocyte % 8.5 5.0 - 12.0 %    Eosinophil % 1.2 0.3 - 6.2 %    Basophil % 0.5 0.0 - 1.5 %    Immature Grans % 1.1 (H) 0.0 - 0.5 %    Neutrophils, Absolute 6.70 1.70 - 7.00 10*3/mm3    Lymphocytes, Absolute 0.74 0.70 - 3.10 10*3/mm3    Monocytes, Absolute 0.71 0.10 - 0.90 10*3/mm3    Eosinophils, Absolute 0.10 0.00 - 0.40 10*3/mm3    Basophils, Absolute 0.04 0.00 - 0.20 10*3/mm3    Immature Grans, Absolute 0.09 (H) 0.00 - 0.05 10*3/mm3    nRBC 0.0 0.0 - 0.2 /100 WBC   Urinalysis, Microscopic Only - Urine, Clean Catch    Specimen: Urine, Clean Catch   Result Value Ref Range    RBC, UA 0-2 None Seen, 0-2 /HPF    WBC, UA 0-2 None Seen, 0-2 /HPF    Bacteria, UA None Seen None Seen, Trace /HPF    Squamous Epithelial Cells, UA 0-2 None Seen, 0-2 /HPF    Hyaline Casts, UA None Seen 0 - 6 /LPF    Methodology Automated Microscopy    High Sensitivity Troponin T 2Hr    Specimen: Blood   Result Value Ref Range    HS Troponin T 70 (C) <22 ng/L    Troponin T Delta -9 (L) >=-4 - <+4 ng/L   Calcium, Ionized    Specimen: Blood   Result Value Ref Range    Ionized Calcium 1.09 (L) 1.12 - 1.32 mmol/L   Ammonia    Specimen: Blood   Result Value Ref Range    Ammonia 18 16 - 60 umol/L   TSH    Specimen: Blood   Result Value Ref Range    TSH 1.490 0.270 - 4.200 uIU/mL   Ferritin    Specimen: Blood   Result Value Ref Range    Ferritin 1,250.00 (H) 30.00 - 400.00 ng/mL   Lactate Dehydrogenase    Specimen: Blood   Result Value Ref Range      (H) 135 - 225 U/L   Reticulocytes    Specimen: Blood   Result Value Ref Range    Reticulocyte % 1.66 0.70 - 1.90 %    Reticulocyte Absolute 0.0463 0.0200 - 0.1300 10*6/mm3   Basic Metabolic Panel    Specimen: Blood   Result Value Ref Range    Glucose 81 65 - 99 mg/dL     (H) 8 - 23 mg/dL    Creatinine 5.19 (H) 0.76 - 1.27 mg/dL    Sodium 131 (L) 136 - 145 mmol/L    Potassium 5.5 (H) 3.5 - 5.2 mmol/L    Chloride 98 98 - 107 mmol/L    CO2 18.0 (L) 22.0 - 29.0 mmol/L    Calcium 8.2 (L) 8.6 - 10.5 mg/dL    BUN/Creatinine Ratio 20.0 7.0 - 25.0    Anion Gap 15.0 5.0 - 15.0 mmol/L    eGFR 11.2 (L) >60.0 mL/min/1.73   POC Glucose Once    Specimen: Blood   Result Value Ref Range    Glucose 86 70 - 130 mg/dL   POC Glucose Once    Specimen: Blood   Result Value Ref Range    Glucose 133 (H) 70 - 130 mg/dL   ECG 12 Lead ED Triage Standing Order; Weak / Dizzy / AMS   Result Value Ref Range    QT Interval 454 ms    QTC Interval 460 ms   Type & Screen    Specimen: Blood   Result Value Ref Range    ABO Type A     RH type Positive     Antibody Screen Negative     T&S Expiration Date 3/27/2024 11:59:59 PM    ABO RH Specimen Verification    Specimen: Blood   Result Value Ref Range    ABO Type A     RH type Positive    Green Top (Gel)   Result Value Ref Range    Extra Tube Hold for add-ons.    Lavender Top   Result Value Ref Range    Extra Tube hold for add-on    Gold Top - SST   Result Value Ref Range    Extra Tube Hold for add-ons.    Gray Top   Result Value Ref Range    Extra Tube Hold for add-ons.    Light Blue Top   Result Value Ref Range    Extra Tube Hold for add-ons.         If labs were ordered, I independently reviewed the results and considered them in treating the patient.      EMERGENCY DEPARTMENT COURSE and DIFFERENTIAL DIAGNOSIS/MDM:   Vitals:  AS OF 23:24 EDT    BP - 151/84  HR - 73  TEMP - 97.8 °F (36.6 °C) (Oral)  O2 SATS - 98%        Discussion below represents my analysis of pertinent findings related to  patient's condition, differential diagnosis, treatment plan and final disposition.      Differential diagnosis:  The differential diagnosis associated with the patient's presentation includes: CVA, intracranial hemorrhage, cranial mass, seizure, migraine, electrolyte derangement, BPPV, labyrinthitis      Independent interpretations (ECG/rhythm strip/X-ray/US/CT scan): I independently interpreted the patient's chest x-ray and cardiac monitor.  There is no pulmonary infiltrate and the patient is in sinus rhythm.      Additional sources:  Discussed/obtained information from independent historians:   [] Spouse:   [] Parent:   [] Friend:   [] EMS:   [] Other:  External (non-ED) record review:   [x] Inpatient record: I reviewed prior inpatient record.  Patient had prior MCA stent placed.   [] Office record:   [] Outpatient record:   [] Prior Outpatient labs:   [] Prior Outpatient radiology:   [] Primary Care record:   [] Outside ED record:   [] Other:       Patient's care impacted by:   [] Diabetes   [] Hypertension   [] Coronary Artery Disease   [] Cancer   [x] Other: History of CVA, ESRD    Care significantly affected by Social Determinants of Health (housing and economic circumstances, unemployment)    [] Yes     [x] No   If yes, Patient's care significantly limited by  Social Determinants of Health including:    [] Inadequate housing    [] Low income    [] Alcoholism and drug addiction in family    [] Problems related to primary support group    [] Unemployment    [] Problems related to employment    [] Other Social Determinants of Health:       Consideration of admission/observation vs discharge: Patient presents with findings concerning for possible underlying ischemic stroke as well as significant hyperkalemia and requires admission for management      ED Course:    ED Course as of 03/24/24 2324   Sun Mar 24, 2024   1742 Patient has ongoing renal dysfunction with hyperkalemia. He came in for onset of ataxia and  intermittent diplopia that began yesterday morning. He has previous history of stroke with placement of MCA stent. Stroke team is following and MRI/MRA has been ordered. He has no EKG changes associated with his hyperkalemia and have ordered treatment. He is reportedly supposed to go on dialysis soon but has not started yet. [NS]   1742 I discussed case with hospitalist Dr. Rockwell.  Discussed history, presentation, workup.  Excess patient for admission. [NS]      ED Course User Index  [NS] Elie Carroll MD         CRITICAL CARE TIME    Approximately 35 minutes of discontinuous critical care time was provided to this patient by myself absent of any time spent performing procedures.  Patient presents critically ill with severe hyperkalemia placing the cardiovascular system at risk requiring the following interventions: IV calcium, IV insulin, bicarbonate, Lokelma, Lasix, interpretation of lab/ECG/imaging, frequent assessment, coronation admission.  Patient at high risk of deterioration and possibly death without these interventions.      FINAL IMPRESSION      1. Hyperkalemia    2. Renal failure, unspecified chronicity    3. Ataxia    4. Diplopia    5. History of CVA (cerebrovascular accident)    6. History of hypertension          DISPOSITION/PLAN     ED Disposition       ED Disposition   Decision to Admit    Condition   --    Comment   Level of Care: Telemetry [5]   Diagnosis: SAMINA (acute kidney injury) [827392]   Admitting Physician: HAIDER CHAVIRA [[id number]]   Attending Physician: HAIDER CHAVIRA [[id number]]   Certification: I Certify That Inpatient Hospital Services Are Medically Necessary For Greater Than 2 Midnights                   Comment: Please note this report has been produced using speech recognition software.      Elie Carroll MD  Attending Emergency Physician             Elie Carroll MD  03/24/24 [id number]

## 2024-03-24 NOTE — Clinical Note
Level of Care: Telemetry [5]   Diagnosis: Ataxia [476123]   Admitting Physician: HAIDER CHAVIRA [[id number]]   Attending Physician: HAIDER CHAVIRA [[id number]]

## 2024-03-24 NOTE — H&P
[facility] Medicine Services  HISTORY AND PHYSICAL    Patient Name: Eyal Solano  : 1950  MRN: [id number]  Primary Care Physician: Edda Sanchez MD  Date of admission: 3/24/2024      Subjective   Subjective     Chief Complaint:  Dizziness/nausea    HPI:  Eyal Solano is a 73 y.o. male with history of CVA s/p L MCA stent, HTN, HL, CKD IV, here with 2 days of dizziness, ataxia, blurred/double vision and nausea/vomiting. He started feeling poorly on 3/23, noting HA and dizziness. Feels like he might pass out at times, but also feels unsteady when he sits up or gets on his feet. Noted GEE yesterday, resolved today. N/V today. Currently feels SOA, worse with exertion. No chest pain. No f/c/sweats. No new cough/congestion.     Noted being hospitalized in [place] for anemia and transfused, noting then he needed to be evaluated for dialysis.     Review of Systems   Constitutional:  Positive for activity change, appetite change and fatigue.   Respiratory:  Positive for shortness of breath. Negative for chest tightness.    Cardiovascular:  Positive for leg swelling. Negative for chest pain and palpitations.   Gastrointestinal:  Positive for nausea and vomiting.   Neurological:  Positive for dizziness, weakness, light-headedness and headaches.           Personal History     Past Medical History:   Diagnosis Date    Acute gout of right knee 2018    Alcohol use     Conversion reaction     Dizziness     Enlarged prostate     Gout     Hyperlipidemia     Hypertension     Kidney disorder     Stroke     right side weakness           Past Surgical History:   Procedure Laterality Date    CEREBRAL ANGIOGRAM Left 2021    Procedure: Cerebral angiogram for Intracranial Stent under anesthesia;  Surgeon: Isaiah Grey MD;  Location: [facility] INVASIVE LOCATION;  Service: Interventional Radiology;  Laterality: Left;    NASAL SEPTUM SURGERY      10 years ago    NASAL  SINUS SURGERY      TOOTH EXTRACTION Left 03/2022       Family History: family history includes Heart attack (age of onset: 82) in his father; Heart disease in his maternal grandfather, maternal grandmother, paternal grandfather, and paternal grandmother; Hyperlipidemia in his mother; Hypertension in his brother and mother; Stroke (age of onset: 82) in his father.     Social History:  reports that he has quit smoking. His smoking use included cigarettes. He has a 30 pack-year smoking history. His smokeless tobacco use includes chew. He reports current alcohol use of about 14.0 - 21.0 standard drinks of alcohol per week. He reports that he does not use drugs.  Social History     Social History Narrative    Not on file       Medications:  Available home medication information reviewed.  Vitamin D, allopurinol, amLODIPine, aspirin, atorvastatin, carvedilol, ezetimibe, ferrous sulfate, fluticasone, levocetirizine, and triamcinolone    No Known Allergies    Objective   Objective     Vital Signs:   Temp:  [97.5 °F (36.4 °C)] 97.5 °F (36.4 °C)  Heart Rate:  [58-66] 61  Resp:  [16] 16  BP: (135-155)/(76-89) 154/82       Physical Exam   NAD, alert and oriented  OP clear, dry MM  Neck supple  No LAD  RRR  CTAB  +BS, soft  MINA  Normal affect  Tr edema  No gross neurologic deficits, gait not tested    Result Review:  I have personally reviewed the results from the time of this admission to 3/24/2024 18:36 EDT and agree with these findings:  []  Laboratory list / accordion  []  Microbiology  []  Radiology  []  EKG/Telemetry   []  Cardiology/Vascular   []  Pathology  []  Old records  []  Other:  Most notable findings include: EKG with peaked T waves, Hgb 7.6, K 6.5,       LAB RESULTS:      Lab 03/24/24  1631   WBC 8.38   HEMOGLOBIN 7.6*   HEMATOCRIT 24.7*   PLATELETS 237   NEUTROS ABS 6.70   IMMATURE GRANS (ABS) 0.09*   LYMPHS ABS 0.74   MONOS ABS 0.71   EOS ABS 0.10   MCV 85.8   *         Lab 03/24/24  1631   SODIUM 135*    POTASSIUM 6.5*   CHLORIDE 101   CO2 18.0*   ANION GAP 16.0*   BUN 99*   CREATININE 5.47*   EGFR 10.5*   GLUCOSE 92   CALCIUM 7.4*   IONIZED CALCIUM 1.09*   MAGNESIUM 1.6   TSH 1.490         Lab 03/24/24  1631   TOTAL PROTEIN 6.2   ALBUMIN 3.4*   GLOBULIN 2.8   ALT (SGPT) 29   AST (SGOT) 35   BILIRUBIN 0.3   ALK PHOS 59         Lab 03/24/24  1631   HSTROP T 79*             Lab 03/24/24  1631   FERRITIN 1,250.00*         UA          8/31/2023    14:40 3/24/2024    16:47   Urinalysis   Squamous Epithelial Cells, UA 0-2  0-2    Specific Gravity, UA 1.013  1.011    Ketones, UA Negative  Negative    Blood, UA Trace  Trace    Leukocytes, UA Negative  Negative    Nitrite, UA Negative  Negative    RBC, UA 0-2  0-2    WBC, UA 0-2  0-2    Bacteria, UA None Seen  None Seen        Microbiology Results (last 10 days)       ** No results found for the last 240 hours. **            XR Chest 1 View    Result Date: 3/24/2024  XR CHEST 1 VW Date of Exam: 3/24/2024 4:33 PM EDT Indication: Weak/Dizzy/AMS triage protocol Comparison: None available. Findings: There are no airspace consolidations. No pleural fluid. No pneumothorax. The pulmonary vasculature appears within normal limits. The cardiac and mediastinal silhouette appear unremarkable. No acute osseous abnormality identified.     Impression: Impression: No acute pulmonary process Electronically Signed: Thang Coronel MD  3/24/2024 4:55 PM EDT  Workstation ID: [id number]     Results for orders placed during the hospital encounter of 07/28/21    Adult Transthoracic Echo Complete W/ Cont if Necessary Per Protocol (With Agitated Saline)    Interpretation Summary  · Left ventricular ejection fraction appears to be 56 - 60%.  · Normal left atrial size and volume noted. No evidence of a patent foramen ovale. Saline test results are negative  · Mild mitral annular calcification is present. There is mild, anterior mitral leaflet thickening present. Trace mitral  regurgitation.      Assessment & Plan   Assessment & Plan       Ataxia    Essential hypertension    Stage 4 chronic kidney disease    Mixed hyperlipidemia    History of stroke s/p L MCA stent    SAMINA (acute kidney injury)    Dizziness/ataxia/visual disturbances  -hx of MCA stent  -evaluated by stroke team  -on asa  -MRI pending    SAMINA/CKD IV  HyperK  -IVF  -calcium gluconate/D50/insulin/lokelma  -consult nephrology    Anemia  -no history of bleeding, transfused 2/23 in [place], suspected AOCD    HTN  -monitor    HL  -statin        DVT prophylaxis:  Mechanical DVT prophylaxis orders are signed and held.            CODE STATUS:    Code Status and Medical Interventions:   Ordered at: 03/24/24 1831     Code Status (Patient has no pulse and is not breathing):    CPR (Attempt to Resuscitate)     Medical Interventions (Patient has pulse or is breathing):    Full Support       Expected Discharge   Expected discharge date/ time has not been documented.     Akshat Adkins MD  03/24/24

## 2024-03-25 LAB
ANION GAP SERPL CALCULATED.3IONS-SCNC: 17 MMOL/L (ref 5–15)
BASOPHILS # BLD AUTO: 0.03 10*3/MM3 (ref 0–0.2)
BASOPHILS NFR BLD AUTO: 0.4 % (ref 0–1.5)
BUN SERPL-MCNC: 104 MG/DL (ref 8–23)
BUN/CREAT SERPL: 18.8 (ref 7–25)
CALCIUM SPEC-SCNC: 7.3 MG/DL (ref 8.6–10.5)
CHLORIDE SERPL-SCNC: 103 MMOL/L (ref 98–107)
CO2 SERPL-SCNC: 17 MMOL/L (ref 22–29)
CREAT SERPL-MCNC: 5.54 MG/DL (ref 0.76–1.27)
DEPRECATED RDW RBC AUTO: 46.4 FL (ref 37–54)
EGFRCR SERPLBLD CKD-EPI 2021: 10.3 ML/MIN/1.73
EOSINOPHIL # BLD AUTO: 0.19 10*3/MM3 (ref 0–0.4)
EOSINOPHIL NFR BLD AUTO: 2.5 % (ref 0.3–6.2)
ERYTHROCYTE [DISTWIDTH] IN BLOOD BY AUTOMATED COUNT: 15.2 % (ref 12.3–15.4)
GLUCOSE BLDC GLUCOMTR-MCNC: 106 MG/DL (ref 70–130)
GLUCOSE BLDC GLUCOMTR-MCNC: 134 MG/DL (ref 70–130)
GLUCOSE SERPL-MCNC: 85 MG/DL (ref 65–99)
HCT VFR BLD AUTO: 23.3 % (ref 37.5–51)
HGB BLD-MCNC: 7.2 G/DL (ref 13–17.7)
IMM GRANULOCYTES # BLD AUTO: 0.05 10*3/MM3 (ref 0–0.05)
IMM GRANULOCYTES NFR BLD AUTO: 0.7 % (ref 0–0.5)
LYMPHOCYTES # BLD AUTO: 0.97 10*3/MM3 (ref 0.7–3.1)
LYMPHOCYTES NFR BLD AUTO: 12.8 % (ref 19.6–45.3)
MCH RBC QN AUTO: 26.2 PG (ref 26.6–33)
MCHC RBC AUTO-ENTMCNC: 30.9 G/DL (ref 31.5–35.7)
MCV RBC AUTO: 84.7 FL (ref 79–97)
MONOCYTES # BLD AUTO: 0.76 10*3/MM3 (ref 0.1–0.9)
MONOCYTES NFR BLD AUTO: 10 % (ref 5–12)
NEUTROPHILS NFR BLD AUTO: 5.6 10*3/MM3 (ref 1.7–7)
NEUTROPHILS NFR BLD AUTO: 73.6 % (ref 42.7–76)
NRBC BLD AUTO-RTO: 0 /100 WBC (ref 0–0.2)
PLATELET # BLD AUTO: 227 10*3/MM3 (ref 140–450)
PMV BLD AUTO: 10.3 FL (ref 6–12)
POTASSIUM SERPL-SCNC: 5 MMOL/L (ref 3.5–5.2)
POTASSIUM SERPL-SCNC: 5.9 MMOL/L (ref 3.5–5.2)
PROT ?TM UR-MCNC: 90.8 MG/DL
RBC # BLD AUTO: 2.75 10*6/MM3 (ref 4.14–5.8)
SODIUM SERPL-SCNC: 137 MMOL/L (ref 136–145)
SODIUM UR-SCNC: 87 MMOL/L
WBC NRBC COR # BLD AUTO: 7.6 10*3/MM3 (ref 3.4–10.8)

## 2024-03-25 PROCEDURE — 25010000002 CALCIUM GLUCONATE-NACL 1-0.675 GM/50ML-% SOLUTION: Performed by: INTERNAL MEDICINE

## 2024-03-25 PROCEDURE — 84132 ASSAY OF SERUM POTASSIUM: CPT | Performed by: INTERNAL MEDICINE

## 2024-03-25 PROCEDURE — 25810000003 SODIUM CHLORIDE 0.9 % SOLUTION: Performed by: HOSPITALIST

## 2024-03-25 PROCEDURE — 84156 ASSAY OF PROTEIN URINE: CPT | Performed by: INTERNAL MEDICINE

## 2024-03-25 PROCEDURE — 85025 COMPLETE CBC W/AUTO DIFF WBC: CPT | Performed by: INTERNAL MEDICINE

## 2024-03-25 PROCEDURE — 84540 ASSAY OF URINE/UREA-N: CPT | Performed by: INTERNAL MEDICINE

## 2024-03-25 PROCEDURE — 80048 BASIC METABOLIC PNL TOTAL CA: CPT | Performed by: INTERNAL MEDICINE

## 2024-03-25 PROCEDURE — 94664 DEMO&/EVAL PT USE INHALER: CPT

## 2024-03-25 PROCEDURE — 82570 ASSAY OF URINE CREATININE: CPT | Performed by: INTERNAL MEDICINE

## 2024-03-25 PROCEDURE — 82948 REAGENT STRIP/BLOOD GLUCOSE: CPT

## 2024-03-25 PROCEDURE — 25010000002 EPOETIN ALFA-EPBX 10000 UNIT/ML SOLUTION: Performed by: INTERNAL MEDICINE

## 2024-03-25 PROCEDURE — 63710000001 INSULIN REGULAR HUMAN PER 5 UNITS: Performed by: INTERNAL MEDICINE

## 2024-03-25 PROCEDURE — 99232 SBSQ HOSP IP/OBS MODERATE 35: CPT | Performed by: INTERNAL MEDICINE

## 2024-03-25 PROCEDURE — 84300 ASSAY OF URINE SODIUM: CPT | Performed by: INTERNAL MEDICINE

## 2024-03-25 RX ORDER — CALCIUM GLUCONATE 20 MG/ML
1000 INJECTION, SOLUTION INTRAVENOUS ONCE
Status: COMPLETED | OUTPATIENT
Start: 2024-03-25 | End: 2024-03-25

## 2024-03-25 RX ORDER — DEXTROSE MONOHYDRATE 25 G/50ML
25 INJECTION, SOLUTION INTRAVENOUS ONCE
Status: COMPLETED | OUTPATIENT
Start: 2024-03-25 | End: 2024-03-25

## 2024-03-25 RX ORDER — ALBUTEROL SULFATE 2.5 MG/3ML
10 SOLUTION RESPIRATORY (INHALATION) ONCE
Status: COMPLETED | OUTPATIENT
Start: 2024-03-25 | End: 2024-03-25

## 2024-03-25 RX ADMIN — INSULIN HUMAN 5 UNITS: 100 INJECTION, SOLUTION PARENTERAL at 10:45

## 2024-03-25 RX ADMIN — SODIUM CHLORIDE 100 ML/HR: 9 INJECTION, SOLUTION INTRAVENOUS at 05:45

## 2024-03-25 RX ADMIN — CALCITRIOL CAPSULES 0.25 MCG 0.25 MCG: 0.25 CAPSULE ORAL at 08:25

## 2024-03-25 RX ADMIN — ASPIRIN 81 MG: 81 TABLET, CHEWABLE ORAL at 08:25

## 2024-03-25 RX ADMIN — CALCIUM GLUCONATE 1000 MG: 20 INJECTION, SOLUTION INTRAVENOUS at 10:45

## 2024-03-25 RX ADMIN — SODIUM ZIRCONIUM CYCLOSILICATE 10 G: 10 POWDER, FOR SUSPENSION ORAL at 08:25

## 2024-03-25 RX ADMIN — AMLODIPINE BESYLATE 10 MG: 10 TABLET ORAL at 08:25

## 2024-03-25 RX ADMIN — SODIUM CHLORIDE 100 ML/HR: 9 INJECTION, SOLUTION INTRAVENOUS at 17:12

## 2024-03-25 RX ADMIN — CARVEDILOL 6.25 MG: 6.25 TABLET, FILM COATED ORAL at 17:11

## 2024-03-25 RX ADMIN — FLUTICASONE PROPIONATE 2 SPRAY: 50 SPRAY, METERED NASAL at 08:25

## 2024-03-25 RX ADMIN — CARVEDILOL 6.25 MG: 6.25 TABLET, FILM COATED ORAL at 08:25

## 2024-03-25 RX ADMIN — ATORVASTATIN CALCIUM 80 MG: 40 TABLET, FILM COATED ORAL at 20:09

## 2024-03-25 RX ADMIN — DEXTROSE MONOHYDRATE 25 G: 25 INJECTION, SOLUTION INTRAVENOUS at 10:45

## 2024-03-25 RX ADMIN — CALCIUM ACETATE 1334 MG: 667 CAPSULE ORAL at 17:11

## 2024-03-25 RX ADMIN — CETIRIZINE HYDROCHLORIDE 10 MG: 10 TABLET, FILM COATED ORAL at 08:25

## 2024-03-25 RX ADMIN — SODIUM ZIRCONIUM CYCLOSILICATE 10 G: 10 POWDER, FOR SUSPENSION ORAL at 11:49

## 2024-03-25 RX ADMIN — Medication 10 ML: at 08:25

## 2024-03-25 RX ADMIN — Medication 10 ML: at 20:10

## 2024-03-25 RX ADMIN — FERROUS SULFATE TAB 325 MG (65 MG ELEMENTAL FE) 325 MG: 325 (65 FE) TAB at 17:11

## 2024-03-25 RX ADMIN — SODIUM BICARBONATE 50 MEQ: 84 INJECTION INTRAVENOUS at 11:49

## 2024-03-25 RX ADMIN — ALBUTEROL SULFATE 10 MG: 2.5 SOLUTION RESPIRATORY (INHALATION) at 12:04

## 2024-03-25 RX ADMIN — EPOETIN ALFA-EPBX 20000 UNITS: 10000 INJECTION, SOLUTION INTRAVENOUS; SUBCUTANEOUS at 20:09

## 2024-03-25 RX ADMIN — CALCIUM ACETATE 1334 MG: 667 CAPSULE ORAL at 11:53

## 2024-03-25 RX ADMIN — FERROUS SULFATE TAB 325 MG (65 MG ELEMENTAL FE) 325 MG: 325 (65 FE) TAB at 08:25

## 2024-03-25 RX ADMIN — ALLOPURINOL 100 MG: 100 TABLET ORAL at 08:25

## 2024-03-25 RX ADMIN — CALCIUM ACETATE 1334 MG: 667 CAPSULE ORAL at 08:25

## 2024-03-25 NOTE — H&P (VIEW-ONLY)
[facility]  Renal Consult Note    Eyal Solano  1950  [id number]    Date of Admit:  3/24/2024    Date of Consult: 3/25/2024    Requesting Provider: No ref. provider found    Evaluating Physician: Guru Menjivar MD    Chief Complaint: Vertigo with GI upset    Reason for Consultation: Elevated creatinine    History of present illness:    Patient is a 73 y.o.  Yr old male with history of CKD stage IV and history of CVA in the past with MCA stent..  Comes in with 2 days of dizziness ataxia blurred vision and nausea and vomiting.  Developed headache with dizziness 3/23.  Poor p.o. intake.  On initial evaluation creatinine was noted to be elevated at 5.5 with a BUN of 99.  Patient was admitted and started on IV fluids.  Creatinine initially improved down to 5.2 but is back up to 5.5 today with a BUN elevated at 104.  Potassium elevated at 5.9 with metabolic acidosis.  Nephrology consulted to evaluate.  GI upset improved today.  Patient reports he admitted to feeling poorly for some time.  Has had dialysis teaching and wants to do peritoneal dialysis.  I been waiting to get PD catheter placed but had not yet been arranged.  Patient anxious to go ahead and get started on dialysis.  Denies any chest pain or shortness of breath.  No further vertigo.  Nausea improved today.      Past Medical History  Past Medical History:   Diagnosis Date    Acute gout of right knee 12/18/2018    Alcohol use     Conversion reaction     Dizziness     Enlarged prostate     Gout     Hyperlipidemia     Hypertension     Kidney disorder     Stroke     right side weakness       Past Surgical History:   Procedure Laterality Date    CEREBRAL ANGIOGRAM Left 08/24/2021    Procedure: Cerebral angiogram for Intracranial Stent under anesthesia;  Surgeon: Isaiah Grey MD;  Location: [facility] INVASIVE LOCATION;  Service: Interventional Radiology;  Laterality: Left;    NASAL SEPTUM SURGERY      10  "years ago    NASAL SINUS SURGERY      TOOTH EXTRACTION Left 03/2022       Allergies:  No Known Allergies    Medication:   See electronic record    Soc Hx:   Social History     Socioeconomic History    Marital status: Single   Tobacco Use    Smoking status: Former     Current packs/day: 1.00     Average packs/day: 1 pack/day for 30.0 years (30.0 ttl pk-yrs)     Types: Cigarettes    Smokeless tobacco: Current     Types: Chew    Tobacco comments:     Quit >25 yrs ago; tobacco cessation pamphlet given to pt   Vaping Use    Vaping status: Never Used   Substance and Sexual Activity    Alcohol use: Yes     Alcohol/week: 14.0 - 21.0 standard drinks of alcohol     Types: 14 - 21 Cans of beer per week    Drug use: No    Sexual activity: Defer       Fam Hx:  No congenital renal disease      Review of Systems:  Full review of systems reviewed and are as above  In HPI or per admitting H&P,otherwise negative for acute complaints    Physical Exam:   Vital Signs   Blood pressure 139/77, pulse 83, temperature 98.6 °F (37 °C), temperature source Oral, resp. rate 18, height 177.8 cm (70\"), weight 80.5 kg (177 lb 6.4 oz), SpO2 96%.  03/24 0701 - 03/25 0700  In: -   Out: 1600 [Urine:1600]    GENERAL: WD AAM NAD  NEURO: Awake and alert, oriented. No focal deficit  PSYCHIATRIC: NMA. Cooperative with PE  EYE: PE, no icterus, no conjunctivitis  ENT: ommm, dentition intact,  Hearing intact  NECK:  No JVD discernable,  Trachea midline  CV: No edema, RRR  LUNGS:  Quiet,  Nonlabored resp.  Symmetrical expansion  ABDOMEN: Nondistended, soft nontender.  : No Gil, no palp bladder  SKIN: Warm and dry without rash    Laboratory Data  Results from last 7 days   Lab Units 03/25/24  0705 03/24/24  1631   HEMOGLOBIN g/dL 7.2* 7.6*   HEMATOCRIT % 23.3* 24.7*     Results from last 7 days   Lab Units 03/25/24  0705 03/24/24 2054 03/24/24  1631   SODIUM mmol/L 137 131* 135*   POTASSIUM mmol/L 5.9* 5.5* 6.5*   CHLORIDE mmol/L 103 98 101   CO2 mmol/L " 17.0* 18.0* 18.0*   BUN mg/dL 104* 104* 99*   CREATININE mg/dL 5.54* 5.19* 5.47*   CALCIUM mg/dL 7.3* 8.2* 7.4*   MAGNESIUM mg/dL  --   --  1.6   ALBUMIN g/dL  --   --  3.4*     Results from last 7 days   Lab Units 03/24/24  1647   COLOR UA  Yellow   CLARITY UA  Clear   PH, URINE  5.5   SPECIFIC GRAVITY, URINE  1.011   GLUCOSE UA  Negative   KETONES UA  Negative   BILIRUBIN UA  Negative   PROTEIN UA  100 mg/dL (2+)*   BLOOD UA  Trace*   LEUKOCYTES UA  Negative   NITRITE UA  Negative     Results from last 7 days   Lab Units 03/24/24  1631   ALK PHOS U/L 59   BILIRUBIN mg/dL 0.3   ALT (SGPT) U/L 29   AST (SGOT) U/L 35         Estimated Creatinine Clearance: 13.9 mL/min (A) (by C-G formula based on SCr of 5.54 mg/dL (H)).  Lab Results   Component Value Date    CREATININEUR 93.0 08/31/2023    PROTEINUR 386.7 08/31/2023    NAUR 112 07/29/2021       A/P:      ARF: Creatinine initially 5.5.  Improved to 5.2 yesterday.  Back up to 5.5 today after Lasix yesterday.  Urine output nonoliguric.  Unsure of baseline creatinine but reportedly nearing ESRD.  Patient acidotic with difficult to control potassium.  For now continue supportive care.  Strict I's and O's.  Check urine indices.  Will make n.p.o. after midnight for  dialysis cath and HD in AM.  Patient reports that discussed starting peritoneal dialysis in the past.  Will see if a PD catheter can be placed during this admission.    CKD4: Unsure baseline creatinine but per report patient is nearing dialysis.  4 g proteinuria on past evaluation.  Follows with Dr. Nicholas in [place].  May have progressed to ESRD.    HTN: Blood pressure stable.    Hyperkalemia: Potassium 6.5 on admission.  Down to 5.5 yesterday.  Back up to 5.9 today.  Patient getting Lokelma.  Continue low potassium diet.    Hyponatremia: Sodium improved overnight.  Monitor for now.    Metabolic Acidosis: Bicarbonate levels are low.  Replace bicarb.    Anemia: Hemoglobin below goal.  Patient with very poor  renal function.  Will start Epogen.  Check iron stores.    Volume: Likely with underlying dehydration on admission after 2 days of GI upset.  Chest x-ray clear without pulmonary edema.  Patient did receive Lasix yesterday.    Hypocalcemia: Calcium remains low at 7.3.  Patient on calcitriol and phosphorus binder calcium acetate.    Hyperphosphatemia: Continue Phos binder low Phos diet.    History of CVA: Neurology evaluating    Thank you consulting us on Eyal Solano who is of high risk and complexity.  We will follow along closely    Guru Menjivar MD  3/25/2024  09:32 EDT

## 2024-03-25 NOTE — CASE MANAGEMENT/SOCIAL WORK
Discharge Planning Assessment  [facility]     Patient Name: Eyal Solano  MRN: [id number]  Today's Date: 3/25/2024    Admit Date: 3/24/2024    Plan: Home   Discharge Needs Assessment       Row Name 03/25/24 1743       Living Environment    People in Home significant other    Name(s) of People in Home iNkky Bentley  Significant Other  [phone]    Primary Care Provided by self    Provides Primary Care For no one    Family Caregiver if Needed significant other    Family Caregiver Names Nikky Bentley  Significant Other  [phone]    Quality of Family Relationships unable to assess    Able to Return to Prior Arrangements yes       Discharge Needs Assessment    Equipment Currently Used at Home none                   Discharge Plan       Row Name 03/25/24 1744       Plan    Plan Home    Patient/Family in Agreement with Plan yes    Plan Comments Spoke with Mr. Solano in his room, to initiate discharge planning. He lives in a double wide mobile home in [facility]. His significant other lives with him. He verified he has Medicare insurance and has prescription coverage. He uses [facility] in [place] to get his prescriptions filled. His PCP is Edda Sanchez. Prior to admission, he was independent with ADL's. He uses no medical equipment at home. He is not current with home health. His plan is to return home at discharge. Family will transport at discharge. CM will continue to follow.    Final Discharge Disposition Code 30 - still a patient                  Continued Care and Services - Admitted Since 3/24/2024    No active coordination exists for this encounter.       Expected Discharge Date and Time       Expected Discharge Date Expected Discharge Time    Mar 27, 2024            Demographic Summary       Row Name 03/25/24 1742       General Information    Admission Type inpatient    Arrived From emergency department    Referral Source admission list    Reason for Consult discharge planning    Preferred Language English        Contact Information    Permission Granted to Share Info With     Contact Information Obtained for                    Functional Status       Row Name 03/25/24 1743       Functional Status    Usual Activity Tolerance moderate    Current Activity Tolerance moderate       Functional Status, IADL    Medications independent    Meal Preparation independent    Housekeeping independent    Laundry independent    Shopping independent                   Psychosocial    No documentation.                  Abuse/Neglect    No documentation.                  Legal    No documentation.                  Substance Abuse    No documentation.                  Patient Forms    No documentation.                     Andree Mo RN

## 2024-03-25 NOTE — PROGRESS NOTES
"    [facility] Medicine Services  PROGRESS NOTE    Patient Name: Eyal Solano  : 1950  MRN: [id number]    Date of Admission: 3/24/2024  Primary Care Physician: Edda Sanchez MD    Subjective   Subjective     CC:  ataxia    HPI:  Pt doing okay this am, just upset because his breakfast got taken away \"just as I was finishing up\".     Objective   Objective     Vital Signs:   Temp:  [97.5 °F (36.4 °C)-98.4 °F (36.9 °C)] 98.4 °F (36.9 °C)  Heart Rate:  [58-73] 65  Resp:  [16-18] 18  BP: (128-155)/(69-89) 129/69     Physical Exam:  Constitutional: No acute distress, awake, alert  HENT: NCAT, mucous membranes moist  Respiratory: Clear to auscultation bilaterally, respiratory effort normal   Cardiovascular: RRR, no murmurs, rubs, or gallops  Gastrointestinal: Positive bowel sounds, soft, nontender, nondistended  Musculoskeletal: No bilateral ankle edema  Psychiatric: flat affect, cooperative  Neurologic: Oriented x 3, strength symmetric in all extremities, Cranial Nerves grossly intact to confrontation, speech clear  Skin: No rashes     Results Reviewed:  LAB RESULTS:      Lab 24  1631   WBC 8.38   HEMOGLOBIN 7.6*   HEMATOCRIT 24.7*   PLATELETS 237   NEUTROS ABS 6.70   IMMATURE GRANS (ABS) 0.09*   LYMPHS ABS 0.74   MONOS ABS 0.71   EOS ABS 0.10   MCV 85.8   *         Lab 24  1631   SODIUM 131* 135*   POTASSIUM 5.5* 6.5*   CHLORIDE 98 101   CO2 18.0* 18.0*   ANION GAP 15.0 16.0*   * 99*   CREATININE 5.19* 5.47*   EGFR 11.2* 10.5*   GLUCOSE 81 92   CALCIUM 8.2* 7.4*   IONIZED CALCIUM  --  1.09*   MAGNESIUM  --  1.6   TSH  --  1.490         Lab 24  1631   TOTAL PROTEIN 6.2   ALBUMIN 3.4*   GLOBULIN 2.8   ALT (SGPT) 29   AST (SGOT) 35   BILIRUBIN 0.3   ALK PHOS 59         Lab 24  1631   HSTROP T 70* 79*             Lab 24  1631   FERRITIN  --   --   --  1,250.00*   ABO " TYPING A   < > A  --    RH TYPING Positive   < > Positive  --    ANTIBODY SCREEN  --   --  Negative  --     < > = values in this interval not displayed.         Brief Urine Lab Results  (Last result in the past 365 days)        Color   Clarity   Blood   Leuk Est   Nitrite   Protein   CREAT   Urine HCG        03/24/24 1647 Yellow   Clear   Trace   Negative   Negative   100 mg/dL (2+)                   Microbiology Results Abnormal       None            MRI Angiogram Head Without Contrast    Result Date: 3/24/2024  MRI ANGIOGRAM HEAD WO CONTRAST, MRI ANGIOGRAM NECK WO CONTRAST Date of Exam: 3/24/2024 7:12 PM EDT Indication: ataxia/diplopia.  Comparison: 4/8/2022 Technique:  Routine 3-D time-of-flight gradient echo imaging was obtained of the head and neck without contrast administration. Findings: The visualized proximal common carotid arteries are normal. Irregularity of the carotid bulbs indicative of likely atheromatous disease. The visualized extracranial segments of the internal carotid arteries are normal. The vertebral arteries are codominant within the neck. Irregularity of the extracranial internal carotid arteries. At the cervical portion of the right internal carotid artery there is significant narrowing appreciated with greater than 50% stenosis visually. Above this level both internal carotid arteries are irregular likely due to atheromatous disease. The carotid termini are normal. The visualized branches of the anterior and middle cerebral arteries appear patent. Both vertebral arteries supply the basilar artery. The basilar artery and basilar artery tip are normal. The basilar artery terminates in normal-appearing bilateral posterior cerebral arteries.     Impression: There is approximately 50% stenosis of the right internal carotid artery at the skull base. Evidence of atheromatous disease. Otherwise no vessel occlusion. Electronically Signed: Guru Rayo MD  3/24/2024 7:59 PM EDT  Workstation ID:  [id number]    MRI Angiogram Neck Without Contrast    Result Date: 3/24/2024  MRI ANGIOGRAM HEAD WO CONTRAST, MRI ANGIOGRAM NECK WO CONTRAST Date of Exam: 3/24/2024 7:12 PM EDT Indication: ataxia/diplopia.  Comparison: 4/8/2022 Technique:  Routine 3-D time-of-flight gradient echo imaging was obtained of the head and neck without contrast administration. Findings: The visualized proximal common carotid arteries are normal. Irregularity of the carotid bulbs indicative of likely atheromatous disease. The visualized extracranial segments of the internal carotid arteries are normal. The vertebral arteries are codominant within the neck. Irregularity of the extracranial internal carotid arteries. At the cervical portion of the right internal carotid artery there is significant narrowing appreciated with greater than 50% stenosis visually. Above this level both internal carotid arteries are irregular likely due to atheromatous disease. The carotid termini are normal. The visualized branches of the anterior and middle cerebral arteries appear patent. Both vertebral arteries supply the basilar artery. The basilar artery and basilar artery tip are normal. The basilar artery terminates in normal-appearing bilateral posterior cerebral arteries.     Impression: There is approximately 50% stenosis of the right internal carotid artery at the skull base. Evidence of atheromatous disease. Otherwise no vessel occlusion. Electronically Signed: Guru Rayo MD  3/24/2024 7:59 PM EDT  Workstation ID: [id number]    MRI Brain Without Contrast    Result Date: 3/24/2024  MRI BRAIN WO CONTRAST Date of Exam: 3/24/2024 7:01 PM EDT Indication: ataxia/diplopia.  Comparison: 4/8/2022 Technique:  Routine multiplanar/multisequence sequence images of the brain were obtained without contrast administration. Findings: No area of restricted diffusion is identified to suggest an acute intracranial infarct. Multifocal areas of T2/FLAIR signal increase are  noted within the subcortical, deep cerebral, and periventricular white matter consistent with chronic small  vessel/microangiopathic ischemic changes. The ventricles and sulci are normal in size and configuration. No intracranial mass or mass effect. No extra-axial mass or collection. The posterior fossa is normal. Sellar and suprasellar structures are normal.  The major intracranial flow-voids of the [place] are patent. Orbital and periorbital soft tissues are normal. Mucoid retention cyst within the anterior right maxillary sinus. Mucosal thickening right maxillary sinus. The mastoid air cells are aerated..     Impression: Impression: No acute intracranial pathology. Chronic small vessel/microangiopathic ischemic changes. Electronically Signed: Guru Rayo MD  3/24/2024 7:24 PM EDT  Workstation ID: [id number]    XR Chest 1 View    Result Date: 3/24/2024  XR CHEST 1 VW Date of Exam: 3/24/2024 4:33 PM EDT Indication: Weak/Dizzy/AMS triage protocol Comparison: None available. Findings: There are no airspace consolidations. No pleural fluid. No pneumothorax. The pulmonary vasculature appears within normal limits. The cardiac and mediastinal silhouette appear unremarkable. No acute osseous abnormality identified.     Impression: Impression: No acute pulmonary process Electronically Signed: Thagn Coronel MD  3/24/2024 4:55 PM EDT  Workstation ID: [id number]     Results for orders placed during the hospital encounter of 07/28/21    Adult Transthoracic Echo Complete W/ Cont if Necessary Per Protocol (With Agitated Saline)    Interpretation Summary  · Left ventricular ejection fraction appears to be 56 - 60%.  · Normal left atrial size and volume noted. No evidence of a patent foramen ovale. Saline test results are negative  · Mild mitral annular calcification is present. There is mild, anterior mitral leaflet thickening present. Trace mitral regurgitation.      Current medications:  Scheduled Meds:allopurinol, 100  mg, Oral, Daily  amLODIPine, 10 mg, Oral, Daily  aspirin, 81 mg, Oral, Daily  atorvastatin, 80 mg, Oral, Nightly  calcitriol, 0.25 mcg, Oral, Daily  calcium acetate, 1,334 mg, Oral, TID  carvedilol, 6.25 mg, Oral, BID With Meals  cetirizine, 10 mg, Oral, Daily  ferrous sulfate, 325 mg, Oral, BID With Meals  fluticasone, 2 spray, Nasal, Daily  sodium chloride, 10 mL, Intravenous, Q12H  sodium zirconium cyclosilicate, 10 g, Oral, BID      Continuous Infusions:sodium chloride, 100 mL/hr, Last Rate: 100 mL/hr (03/24/24 2118)      PRN Meds:.  senna-docusate sodium **AND** polyethylene glycol **AND** bisacodyl **AND** bisacodyl    ondansetron    prochlorperazine **OR** prochlorperazine **OR** prochlorperazine    sodium chloride    sodium chloride    sodium chloride    Assessment & Plan   Assessment & Plan     Active Hospital Problems    Diagnosis  POA    **Ataxia [R27.0]  Yes    SAMINA (acute kidney injury) [N17.9]  Yes    History of stroke s/p L MCA stent [Z86.73]  Not Applicable    Mixed hyperlipidemia [E78.2]  Yes    Stage 4 chronic kidney disease [N18.4]  Yes    Essential hypertension [I10]  Yes      Resolved Hospital Problems   No resolved problems to display.        Brief Hospital Course to date:  Eyal Solano is a 73 y.o. male  with history of CVA s/p L MCA stent, HTN, HL, CKD IV, here with 2 days of dizziness, ataxia, blurred/double vision and nausea/vomiting. He was found to have SAMINA on CKDIV with significant hyperkalemia upon admission.    Dizziness/ataxia/visual disturbances  -hx of MCA stent  -evaluated by stroke team reportedly, nothing more to do per my discussion with Ms. Beltran this am.   -on asa  -MRI brain with DYLON stenosis (50%) otherwise unremarkable     SAMINA/CKD IV  HyperK  -IVF  -calcium gluconate/D50/insulin/lokelma. Improved on last check from 6.5 to 5.5, however, this am, worsened again to 5.9. treating with Calcium, insulin/dextrose, lokelma, sodium bicarb and albuterol nebs again  -consult  nephrology     Anemia  -no history of bleeding, transfused 2/23 in [place], suspected AOCD     HTN  -monitor     HL  -statin    Total time spent: Time Spent: Time Spent: 30 minutes  Time spent includes time reviewing chart, face-to-face time, counseling patient/family/caregiver, ordering medications/tests/procedures, communicating with other health care professionals, documenting clinical information in the electronic health record, and coordination of care.      Expected Discharge Location and Transportation: TBD, PT/OT to assess today   Expected Discharge   Expected Discharge Date: 3/26/2024; Expected Discharge Time:      DVT prophylaxis:  Mechanical DVT prophylaxis orders are present.         AM-PAC 6 Clicks Score (PT): 24 (03/24/24 2102)    CODE STATUS:   Code Status and Medical Interventions:   Ordered at: 03/24/24 1831     Code Status (Patient has no pulse and is not breathing):    CPR (Attempt to Resuscitate)     Medical Interventions (Patient has pulse or is breathing):    Full Support       Chelsey Real MD  03/25/24

## 2024-03-25 NOTE — CONSULTS
[facility]  Renal Consult Note    Eyal Solano  1950  [id number]    Date of Admit:  3/24/2024    Date of Consult: 3/25/2024    Requesting Provider: No ref. provider found    Evaluating Physician: Guru Menjivar MD    Chief Complaint: Vertigo with GI upset    Reason for Consultation: Elevated creatinine    History of present illness:    Patient is a 73 y.o.  Yr old male with history of CKD stage IV and history of CVA in the past with MCA stent..  Comes in with 2 days of dizziness ataxia blurred vision and nausea and vomiting.  Developed headache with dizziness 3/23.  Poor p.o. intake.  On initial evaluation creatinine was noted to be elevated at 5.5 with a BUN of 99.  Patient was admitted and started on IV fluids.  Creatinine initially improved down to 5.2 but is back up to 5.5 today with a BUN elevated at 104.  Potassium elevated at 5.9 with metabolic acidosis.  Nephrology consulted to evaluate.  GI upset improved today.  Patient reports he admitted to feeling poorly for some time.  Has had dialysis teaching and wants to do peritoneal dialysis.  I been waiting to get PD catheter placed but had not yet been arranged.  Patient anxious to go ahead and get started on dialysis.  Denies any chest pain or shortness of breath.  No further vertigo.  Nausea improved today.      Past Medical History  Past Medical History:   Diagnosis Date    Acute gout of right knee 12/18/2018    Alcohol use     Conversion reaction     Dizziness     Enlarged prostate     Gout     Hyperlipidemia     Hypertension     Kidney disorder     Stroke     right side weakness       Past Surgical History:   Procedure Laterality Date    CEREBRAL ANGIOGRAM Left 08/24/2021    Procedure: Cerebral angiogram for Intracranial Stent under anesthesia;  Surgeon: Isaiah Grey MD;  Location: [facility] INVASIVE LOCATION;  Service: Interventional Radiology;  Laterality: Left;    NASAL SEPTUM SURGERY      10  "years ago    NASAL SINUS SURGERY      TOOTH EXTRACTION Left 03/2022       Allergies:  No Known Allergies    Medication:   See electronic record    Soc Hx:   Social History     Socioeconomic History    Marital status: Single   Tobacco Use    Smoking status: Former     Current packs/day: 1.00     Average packs/day: 1 pack/day for 30.0 years (30.0 ttl pk-yrs)     Types: Cigarettes    Smokeless tobacco: Current     Types: Chew    Tobacco comments:     Quit >25 yrs ago; tobacco cessation pamphlet given to pt   Vaping Use    Vaping status: Never Used   Substance and Sexual Activity    Alcohol use: Yes     Alcohol/week: 14.0 - 21.0 standard drinks of alcohol     Types: 14 - 21 Cans of beer per week    Drug use: No    Sexual activity: Defer       Fam Hx:  No congenital renal disease      Review of Systems:  Full review of systems reviewed and are as above  In HPI or per admitting H&P,otherwise negative for acute complaints    Physical Exam:   Vital Signs   Blood pressure 139/77, pulse 83, temperature 98.6 °F (37 °C), temperature source Oral, resp. rate 18, height 177.8 cm (70\"), weight 80.5 kg (177 lb 6.4 oz), SpO2 96%.  03/24 0701 - 03/25 0700  In: -   Out: 1600 [Urine:1600]    GENERAL: WD AAM NAD  NEURO: Awake and alert, oriented. No focal deficit  PSYCHIATRIC: NMA. Cooperative with PE  EYE: PE, no icterus, no conjunctivitis  ENT: ommm, dentition intact,  Hearing intact  NECK:  No JVD discernable,  Trachea midline  CV: No edema, RRR  LUNGS:  Quiet,  Nonlabored resp.  Symmetrical expansion  ABDOMEN: Nondistended, soft nontender.  : No Gil, no palp bladder  SKIN: Warm and dry without rash    Laboratory Data  Results from last 7 days   Lab Units 03/25/24  0705 03/24/24  1631   HEMOGLOBIN g/dL 7.2* 7.6*   HEMATOCRIT % 23.3* 24.7*     Results from last 7 days   Lab Units 03/25/24  0705 03/24/24 2054 03/24/24  1631   SODIUM mmol/L 137 131* 135*   POTASSIUM mmol/L 5.9* 5.5* 6.5*   CHLORIDE mmol/L 103 98 101   CO2 mmol/L " 17.0* 18.0* 18.0*   BUN mg/dL 104* 104* 99*   CREATININE mg/dL 5.54* 5.19* 5.47*   CALCIUM mg/dL 7.3* 8.2* 7.4*   MAGNESIUM mg/dL  --   --  1.6   ALBUMIN g/dL  --   --  3.4*     Results from last 7 days   Lab Units 03/24/24  1647   COLOR UA  Yellow   CLARITY UA  Clear   PH, URINE  5.5   SPECIFIC GRAVITY, URINE  1.011   GLUCOSE UA  Negative   KETONES UA  Negative   BILIRUBIN UA  Negative   PROTEIN UA  100 mg/dL (2+)*   BLOOD UA  Trace*   LEUKOCYTES UA  Negative   NITRITE UA  Negative     Results from last 7 days   Lab Units 03/24/24  1631   ALK PHOS U/L 59   BILIRUBIN mg/dL 0.3   ALT (SGPT) U/L 29   AST (SGOT) U/L 35         Estimated Creatinine Clearance: 13.9 mL/min (A) (by C-G formula based on SCr of 5.54 mg/dL (H)).  Lab Results   Component Value Date    CREATININEUR 93.0 08/31/2023    PROTEINUR 386.7 08/31/2023    NAUR 112 07/29/2021       A/P:      ARF: Creatinine initially 5.5.  Improved to 5.2 yesterday.  Back up to 5.5 today after Lasix yesterday.  Urine output nonoliguric.  Unsure of baseline creatinine but reportedly nearing ESRD.  Patient acidotic with difficult to control potassium.  For now continue supportive care.  Strict I's and O's.  Check urine indices.  Will make n.p.o. after midnight for  dialysis cath and HD in AM.  Patient reports that discussed starting peritoneal dialysis in the past.  Will see if a PD catheter can be placed during this admission.    CKD4: Unsure baseline creatinine but per report patient is nearing dialysis.  4 g proteinuria on past evaluation.  Follows with Dr. Nicholas in [place].  May have progressed to ESRD.    HTN: Blood pressure stable.    Hyperkalemia: Potassium 6.5 on admission.  Down to 5.5 yesterday.  Back up to 5.9 today.  Patient getting Lokelma.  Continue low potassium diet.    Hyponatremia: Sodium improved overnight.  Monitor for now.    Metabolic Acidosis: Bicarbonate levels are low.  Replace bicarb.    Anemia: Hemoglobin below goal.  Patient with very poor  renal function.  Will start Epogen.  Check iron stores.    Volume: Likely with underlying dehydration on admission after 2 days of GI upset.  Chest x-ray clear without pulmonary edema.  Patient did receive Lasix yesterday.    Hypocalcemia: Calcium remains low at 7.3.  Patient on calcitriol and phosphorus binder calcium acetate.    Hyperphosphatemia: Continue Phos binder low Phos diet.    History of CVA: Neurology evaluating    Thank you consulting us on Eyal Solano who is of high risk and complexity.  We will follow along closely    Guru Menjivar MD  3/25/2024  09:32 EDT

## 2024-03-26 ENCOUNTER — APPOINTMENT (OUTPATIENT)
Dept: INTERVENTIONAL RADIOLOGY/VASCULAR | Facility: HOSPITAL | Age: 74
DRG: 673 | End: 2024-03-26
Payer: MEDICARE

## 2024-03-26 ENCOUNTER — APPOINTMENT (OUTPATIENT)
Dept: NEPHROLOGY | Facility: HOSPITAL | Age: 74
DRG: 673 | End: 2024-03-26
Payer: MEDICARE

## 2024-03-26 ENCOUNTER — APPOINTMENT (OUTPATIENT)
Dept: GENERAL RADIOLOGY | Facility: HOSPITAL | Age: 74
DRG: 673 | End: 2024-03-26
Payer: MEDICARE

## 2024-03-26 LAB
ALBUMIN SERPL-MCNC: 2.8 G/DL (ref 3.5–5.2)
ANION GAP SERPL CALCULATED.3IONS-SCNC: 14 MMOL/L (ref 5–15)
BASOPHILS # BLD AUTO: 0.03 10*3/MM3 (ref 0–0.2)
BASOPHILS NFR BLD AUTO: 0.5 % (ref 0–1.5)
BUN SERPL-MCNC: 101 MG/DL (ref 8–23)
BUN/CREAT SERPL: 16.7 (ref 7–25)
CALCIUM SPEC-SCNC: 7.7 MG/DL (ref 8.6–10.5)
CHLORIDE SERPL-SCNC: 102 MMOL/L (ref 98–107)
CO2 SERPL-SCNC: 19 MMOL/L (ref 22–29)
CREAT SERPL-MCNC: 6.05 MG/DL (ref 0.76–1.27)
CREAT UR-MCNC: 52.6 MG/DL
DEPRECATED RDW RBC AUTO: 47.1 FL (ref 37–54)
EGFRCR SERPLBLD CKD-EPI 2021: 9.3 ML/MIN/1.73
EOSINOPHIL # BLD AUTO: 0.12 10*3/MM3 (ref 0–0.4)
EOSINOPHIL NFR BLD AUTO: 2 % (ref 0.3–6.2)
ERYTHROCYTE [DISTWIDTH] IN BLOOD BY AUTOMATED COUNT: 15.2 % (ref 12.3–15.4)
GLUCOSE SERPL-MCNC: 101 MG/DL (ref 65–99)
HAV IGM SERPL QL IA: NORMAL
HBV CORE IGM SERPL QL IA: NORMAL
HBV SURFACE AG SERPL QL IA: NORMAL
HCT VFR BLD AUTO: 19.8 % (ref 37.5–51)
HCT VFR BLD AUTO: 24.5 % (ref 37.5–51)
HCV AB SER DONR QL: NORMAL
HGB BLD-MCNC: 6.2 G/DL (ref 13–17.7)
HGB BLD-MCNC: 7.8 G/DL (ref 13–17.7)
IMM GRANULOCYTES # BLD AUTO: 0.03 10*3/MM3 (ref 0–0.05)
IMM GRANULOCYTES NFR BLD AUTO: 0.5 % (ref 0–0.5)
INR PPP: 1.2 (ref 0.89–1.12)
IRON 24H UR-MRATE: 24 MCG/DL (ref 59–158)
IRON SATN MFR SERPL: 14 % (ref 20–50)
LYMPHOCYTES # BLD AUTO: 0.9 10*3/MM3 (ref 0.7–3.1)
LYMPHOCYTES NFR BLD AUTO: 15.2 % (ref 19.6–45.3)
MCH RBC QN AUTO: 26.6 PG (ref 26.6–33)
MCHC RBC AUTO-ENTMCNC: 31.3 G/DL (ref 31.5–35.7)
MCV RBC AUTO: 85 FL (ref 79–97)
MONOCYTES # BLD AUTO: 0.57 10*3/MM3 (ref 0.1–0.9)
MONOCYTES NFR BLD AUTO: 9.6 % (ref 5–12)
NEUTROPHILS NFR BLD AUTO: 4.28 10*3/MM3 (ref 1.7–7)
NEUTROPHILS NFR BLD AUTO: 72.2 % (ref 42.7–76)
NRBC BLD AUTO-RTO: 0 /100 WBC (ref 0–0.2)
PHOSPHATE SERPL-MCNC: 5.4 MG/DL (ref 2.5–4.5)
PLATELET # BLD AUTO: 198 10*3/MM3 (ref 140–450)
PMV BLD AUTO: 11.5 FL (ref 6–12)
POTASSIUM SERPL-SCNC: 5 MMOL/L (ref 3.5–5.2)
PROTHROMBIN TIME: 15.3 SECONDS (ref 12.2–14.5)
PTH-INTACT SERPL-MCNC: 245.3 PG/ML (ref 15–65)
RBC # BLD AUTO: 2.33 10*6/MM3 (ref 4.14–5.8)
SODIUM SERPL-SCNC: 135 MMOL/L (ref 136–145)
TIBC SERPL-MCNC: 171 MCG/DL (ref 298–536)
TRANSFERRIN SERPL-MCNC: 115 MG/DL (ref 200–360)
UUN 24H UR-MCNC: 248 MG/DL
WBC NRBC COR # BLD AUTO: 5.93 10*3/MM3 (ref 3.4–10.8)

## 2024-03-26 PROCEDURE — B548ZZA ULTRASONOGRAPHY OF SUPERIOR VENA CAVA, GUIDANCE: ICD-10-PCS | Performed by: RADIOLOGY

## 2024-03-26 PROCEDURE — C1750 CATH, HEMODIALYSIS,LONG-TERM: HCPCS

## 2024-03-26 PROCEDURE — 83970 ASSAY OF PARATHORMONE: CPT | Performed by: INTERNAL MEDICINE

## 2024-03-26 PROCEDURE — C1769 GUIDE WIRE: HCPCS

## 2024-03-26 PROCEDURE — 25010000002 HEPARIN (PORCINE) PER 1000 UNITS

## 2024-03-26 PROCEDURE — 99152 MOD SED SAME PHYS/QHP 5/>YRS: CPT

## 2024-03-26 PROCEDURE — 85025 COMPLETE CBC W/AUTO DIFF WBC: CPT | Performed by: INTERNAL MEDICINE

## 2024-03-26 PROCEDURE — 85610 PROTHROMBIN TIME: CPT | Performed by: INTERNAL MEDICINE

## 2024-03-26 PROCEDURE — 77001 FLUOROGUIDE FOR VEIN DEVICE: CPT

## 2024-03-26 PROCEDURE — C1894 INTRO/SHEATH, NON-LASER: HCPCS

## 2024-03-26 PROCEDURE — 76937 US GUIDE VASCULAR ACCESS: CPT

## 2024-03-26 PROCEDURE — 5A1D70Z PERFORMANCE OF URINARY FILTRATION, INTERMITTENT, LESS THAN 6 HOURS PER DAY: ICD-10-PCS | Performed by: INTERNAL MEDICINE

## 2024-03-26 PROCEDURE — 82272 OCCULT BLD FECES 1-3 TESTS: CPT | Performed by: HOSPITALIST

## 2024-03-26 PROCEDURE — 83540 ASSAY OF IRON: CPT | Performed by: INTERNAL MEDICINE

## 2024-03-26 PROCEDURE — 85014 HEMATOCRIT: CPT | Performed by: HOSPITALIST

## 2024-03-26 PROCEDURE — 02HV33Z INSERTION OF INFUSION DEVICE INTO SUPERIOR VENA CAVA, PERCUTANEOUS APPROACH: ICD-10-PCS | Performed by: RADIOLOGY

## 2024-03-26 PROCEDURE — 99233 SBSQ HOSP IP/OBS HIGH 50: CPT | Performed by: HOSPITALIST

## 2024-03-26 PROCEDURE — 84466 ASSAY OF TRANSFERRIN: CPT | Performed by: INTERNAL MEDICINE

## 2024-03-26 PROCEDURE — 25810000003 SODIUM CHLORIDE 0.9 % SOLUTION: Performed by: HOSPITALIST

## 2024-03-26 PROCEDURE — 71045 X-RAY EXAM CHEST 1 VIEW: CPT

## 2024-03-26 PROCEDURE — P9016 RBC LEUKOCYTES REDUCED: HCPCS

## 2024-03-26 PROCEDURE — 80074 ACUTE HEPATITIS PANEL: CPT | Performed by: INTERNAL MEDICINE

## 2024-03-26 PROCEDURE — 36558 INSERT TUNNELED CV CATH: CPT

## 2024-03-26 PROCEDURE — 25010000002 HEPARIN (PORCINE) PER 1000 UNITS: Performed by: INTERNAL MEDICINE

## 2024-03-26 PROCEDURE — 86900 BLOOD TYPING SEROLOGIC ABO: CPT

## 2024-03-26 PROCEDURE — 80069 RENAL FUNCTION PANEL: CPT | Performed by: INTERNAL MEDICINE

## 2024-03-26 PROCEDURE — 85018 HEMOGLOBIN: CPT | Performed by: HOSPITALIST

## 2024-03-26 PROCEDURE — 25010000002 MIDAZOLAM PER 1 MG: Performed by: RADIOLOGY

## 2024-03-26 PROCEDURE — 25010000002 FENTANYL CITRATE (PF) 50 MCG/ML SOLUTION: Performed by: RADIOLOGY

## 2024-03-26 PROCEDURE — 0JH63XZ INSERTION OF TUNNELED VASCULAR ACCESS DEVICE INTO CHEST SUBCUTANEOUS TISSUE AND FASCIA, PERCUTANEOUS APPROACH: ICD-10-PCS | Performed by: RADIOLOGY

## 2024-03-26 RX ORDER — HEPARIN SODIUM 1000 [USP'U]/ML
INJECTION, SOLUTION INTRAVENOUS; SUBCUTANEOUS
Status: COMPLETED
Start: 2024-03-26 | End: 2024-03-26

## 2024-03-26 RX ORDER — FOLIC ACID/VIT B COMPLEX AND C 0.8 MG
1 TABLET ORAL DAILY
Status: DISCONTINUED | OUTPATIENT
Start: 2024-03-26 | End: 2024-04-01 | Stop reason: HOSPADM

## 2024-03-26 RX ORDER — ACETAMINOPHEN 325 MG/1
650 TABLET ORAL EVERY 6 HOURS PRN
Status: DISCONTINUED | OUTPATIENT
Start: 2024-03-26 | End: 2024-04-01 | Stop reason: HOSPADM

## 2024-03-26 RX ORDER — FENTANYL CITRATE 50 UG/ML
INJECTION, SOLUTION INTRAMUSCULAR; INTRAVENOUS AS NEEDED
Status: COMPLETED | OUTPATIENT
Start: 2024-03-26 | End: 2024-03-26

## 2024-03-26 RX ORDER — MIDAZOLAM HYDROCHLORIDE 1 MG/ML
INJECTION INTRAMUSCULAR; INTRAVENOUS
Status: DISPENSED
Start: 2024-03-26 | End: 2024-03-27

## 2024-03-26 RX ORDER — MIDAZOLAM HYDROCHLORIDE 1 MG/ML
INJECTION INTRAMUSCULAR; INTRAVENOUS AS NEEDED
Status: COMPLETED | OUTPATIENT
Start: 2024-03-26 | End: 2024-03-26

## 2024-03-26 RX ORDER — HEPARIN SODIUM 1000 [USP'U]/ML
1000 INJECTION, SOLUTION INTRAVENOUS; SUBCUTANEOUS AS NEEDED
Status: DISCONTINUED | OUTPATIENT
Start: 2024-03-26 | End: 2024-04-01 | Stop reason: HOSPADM

## 2024-03-26 RX ORDER — VANCOMYCIN/0.9 % SOD CHLORIDE 1.5G/250ML
20 PLASTIC BAG, INJECTION (ML) INTRAVENOUS ONCE
Status: COMPLETED | OUTPATIENT
Start: 2024-03-27 | End: 2024-03-27

## 2024-03-26 RX ORDER — HEPARIN SODIUM 200 [USP'U]/100ML
INJECTION, SOLUTION INTRAVENOUS
Status: DISPENSED
Start: 2024-03-26 | End: 2024-03-27

## 2024-03-26 RX ORDER — ALBUMIN (HUMAN) 12.5 G/50ML
25 SOLUTION INTRAVENOUS AS NEEDED
Status: DISCONTINUED | OUTPATIENT
Start: 2024-03-26 | End: 2024-04-01 | Stop reason: HOSPADM

## 2024-03-26 RX ORDER — FENTANYL CITRATE 50 UG/ML
INJECTION, SOLUTION INTRAMUSCULAR; INTRAVENOUS
Status: DISPENSED
Start: 2024-03-26 | End: 2024-03-27

## 2024-03-26 RX ADMIN — HEPARIN SODIUM 1000 UNITS: 1000 INJECTION INTRAVENOUS; SUBCUTANEOUS at 17:01

## 2024-03-26 RX ADMIN — ACETAMINOPHEN 650 MG: 325 TABLET ORAL at 23:33

## 2024-03-26 RX ADMIN — MIDAZOLAM HYDROCHLORIDE 1 MG: 1 INJECTION, SOLUTION INTRAMUSCULAR; INTRAVENOUS at 13:27

## 2024-03-26 RX ADMIN — ATORVASTATIN CALCIUM 80 MG: 40 TABLET, FILM COATED ORAL at 20:22

## 2024-03-26 RX ADMIN — CETIRIZINE HYDROCHLORIDE 10 MG: 10 TABLET, FILM COATED ORAL at 08:35

## 2024-03-26 RX ADMIN — FENTANYL CITRATE 50 MCG: 50 INJECTION, SOLUTION INTRAMUSCULAR; INTRAVENOUS at 13:27

## 2024-03-26 RX ADMIN — CALCIUM ACETATE 1334 MG: 667 CAPSULE ORAL at 08:36

## 2024-03-26 RX ADMIN — CARVEDILOL 6.25 MG: 6.25 TABLET, FILM COATED ORAL at 08:35

## 2024-03-26 RX ADMIN — CALCIUM ACETATE 1334 MG: 667 CAPSULE ORAL at 18:47

## 2024-03-26 RX ADMIN — Medication 10 ML: at 20:22

## 2024-03-26 RX ADMIN — FERROUS SULFATE TAB 325 MG (65 MG ELEMENTAL FE) 325 MG: 325 (65 FE) TAB at 08:35

## 2024-03-26 RX ADMIN — LIDOCAINE HYDROCHLORIDE 4 ML: 10; .005 INJECTION, SOLUTION EPIDURAL; INFILTRATION; INTRACAUDAL; PERINEURAL at 13:44

## 2024-03-26 RX ADMIN — AMLODIPINE BESYLATE 10 MG: 10 TABLET ORAL at 08:36

## 2024-03-26 RX ADMIN — CALCITRIOL CAPSULES 0.25 MCG 0.25 MCG: 0.25 CAPSULE ORAL at 08:35

## 2024-03-26 RX ADMIN — ALLOPURINOL 100 MG: 100 TABLET ORAL at 08:35

## 2024-03-26 RX ADMIN — ASPIRIN 81 MG: 81 TABLET, CHEWABLE ORAL at 08:35

## 2024-03-26 RX ADMIN — HEPARIN SODIUM 4100 UNITS: 1000 INJECTION INTRAVENOUS; SUBCUTANEOUS at 13:46

## 2024-03-26 RX ADMIN — FLUTICASONE PROPIONATE 2 SPRAY: 50 SPRAY, METERED NASAL at 08:33

## 2024-03-26 RX ADMIN — SODIUM CHLORIDE 100 ML/HR: 9 INJECTION, SOLUTION INTRAVENOUS at 02:42

## 2024-03-26 RX ADMIN — FERROUS SULFATE TAB 325 MG (65 MG ELEMENTAL FE) 325 MG: 325 (65 FE) TAB at 18:47

## 2024-03-26 RX ADMIN — CARVEDILOL 6.25 MG: 6.25 TABLET, FILM COATED ORAL at 18:47

## 2024-03-26 NOTE — CASE MANAGEMENT/SOCIAL WORK
Continued Stay Note  [facility]     Patient Name: Eyal Solano  MRN: [id number]  Today's Date: 3/26/2024    Admit Date: 3/24/2024    Plan: Ongoing   Discharge Plan       Row Name 03/26/24 1208       Plan    Plan Ongoing    Plan Comments Discussed patient in MDR.  Patient to have dialysis accss placed today.  CM asked to begin arranging outpatient HD at [facility] in [place].  Clinical information faxed to [facility] at [phone].  Patient's goal is to return home at discharge.  PT/OT ordered.  Will await their recommendations to determine proper discharge placement.  CM will continue to follow.    Final Discharge Disposition Code 30 - still a patient                   Discharge Codes    No documentation.                 Expected Discharge Date and Time       Expected Discharge Date Expected Discharge Time    Mar 27, 2024               Kalpana López RN

## 2024-03-26 NOTE — PLAN OF CARE
Goal Outcome Evaluation:  Plan of Care Reviewed With: (P) patient        Progress: (P) no change  Outcome Evaluation: (P) VSS. NSR on tele. RA. 1 unit PRBC given this AM. To I.R. for tunnel cath placement then to hemodialysis. Awaiting repeat H&H. Planned discharge when medically ready.

## 2024-03-26 NOTE — PLAN OF CARE
Problem: Hemodynamic Instability (Hemodialysis)  Goal: Effective Tissue Perfusion  Outcome: Ongoing, Progressing  Goal: Effective Tissue Perfusion  Outcome: Ongoing, Progressing     Problem: Infection (Hemodialysis)  Goal: Absence of Infection Signs and Symptoms  Outcome: Ongoing, Progressing  Goal: Absence of Infection Signs and Symptoms  Outcome: Ongoing, Progressing   Goal Outcome Evaluation:      HD complete, blood reinfused, tx tolerated well. Report given to primary RN Laverne

## 2024-03-26 NOTE — PROGRESS NOTES
"   LOS: 2 days    Patient Care Team:  Edda Sanchez MD as PCP - General (Internal Medicine)  Praveen Fischer MD as Consulting Physician (Nephrology)    Subjective     Stable overnight.  Awaiting dialysis catheter placement.    Objective     Vital Signs:  Blood pressure 141/83, pulse 73, temperature 98.3 °F (36.8 °C), temperature source Oral, resp. rate 16, height 177.8 cm (70\"), weight 80.5 kg (177 lb 6.4 oz), SpO2 94%.      Intake/Output Summary (Last 24 hours) at 3/26/2024 1135  Last data filed at 3/26/2024 1019  Gross per 24 hour   Intake 1644.17 ml   Output 2325 ml   Net -680.83 ml        03/25 0701 - 03/26 0700  In: 1250 [P.O.:100; I.V.:1150]  Out: 1750 [Urine:1750]    Physical Exam:        GENERAL: WD AAM NAD  NEURO: Awake and alert, oriented. No focal deficit  PSYCHIATRIC: NMA. Cooperative with PE  EYE: PE, no icterus, no conjunctivitis  ENT: ommm, dentition intact,  Hearing intact  NECK:  No JVD discernable,  Trachea midline  CV: No edema, RRR  LUNGS:  Quiet,  Nonlabored resp.  Symmetrical expansion  ABDOMEN: Nondistended, soft nontender.  : No Gil, no palp bladder  SKIN: Warm and dry without rash      Labs:  Results from last 7 days   Lab Units 03/26/24  0333 03/25/24  0705 03/24/24  1631   WBC 10*3/mm3 5.93 7.60 8.38   HEMOGLOBIN g/dL 6.2* 7.2* 7.6*   PLATELETS 10*3/mm3 198 227 237     Results from last 7 days   Lab Units 03/26/24  0333 03/25/24  1434 03/25/24  0705 03/24/24  2054 03/24/24  1631   SODIUM mmol/L 135*  --  137 131* 135*   POTASSIUM mmol/L 5.0 5.0 5.9* 5.5* 6.5*   CHLORIDE mmol/L 102  --  103 98 101   CO2 mmol/L 19.0*  --  17.0* 18.0* 18.0*   BUN mg/dL 101*  --  104* 104* 99*   CREATININE mg/dL 6.05*  --  5.54* 5.19* 5.47*   CALCIUM mg/dL 7.7*  --  7.3* 8.2* 7.4*   PHOSPHORUS mg/dL 5.4*  --   --   --   --    MAGNESIUM mg/dL  --   --   --   --  1.6   ALBUMIN g/dL 2.8*  --   --   --  3.4*     Results from last 7 days   Lab Units 03/24/24  1631   ALK PHOS U/L 59   BILIRUBIN mg/dL 0.3 "   ALT (SGPT) U/L 29   AST (SGOT) U/L 35                   Estimated Creatinine Clearance: 12.8 mL/min (A) (by C-G formula based on SCr of 6.05 mg/dL (H)).         A/P:      ARF: BUN/creatinine up further at 101/6.1.  Urine output nonoliguric.  Patient has been heading towards dialysis prior to admission.  Awaiting HD cath and dialysis initiation today.  Patient has had dialysis teaching and is very interested in home dialysis.  Will see if a PD catheter can be placed during this admission.     CKD4: Unsure baseline creatinine but per report patient is nearing dialysis.  4 g proteinuria on past evaluation.  Follows with Dr. Nicholas in [place].  May have progressed to ESRD.     HTN: Blood pressure stable.     Hyperkalemia: Improved to 5.0 today.  Adjust with HD.  Low potassium diet.     Hyponatremia: Sodium improved overnight.  Monitor for now.     Metabolic Acidosis: Bicarbonate levels are low.  Replace bicarb.     Anemia: Hemoglobin below goal.  Patient with very poor renal function.  Will start Epogen.  Iron saturation low at 14%, however ferritin prohibitively elevated at 1200.  Unable to give IV iron for now.  Transfuse as needed for Hgb <7.     Volume: Likely with underlying dehydration on admission after 2 days of GI upset.  Chest x-ray clear without pulmonary edema.  Hold further Lasix for now.    Hypocalcemia: Stable.  Patient on calcitriol and phosphorus binder calcium acetate.     Hyperphosphatemia: Stable at 5.4.  Continue Phos binder low Phos diet.    Nutrition: Start renal vitamin.  Will need to transition to a high-protein low phosphorus low potassium dialysis diet once taking p.o.     History of CVA: Evaluated by neurology.  Doubt new insult.    High risk and complexity patient    Guru Menjivar MD  03/26/24  11:35 EDT

## 2024-03-26 NOTE — NURSING NOTE
15.5 [place] Image guided tunneled dialysis catheter placed to right internal jugular by Dr Pina. Patient tolerated well. Sedation time of 22 minutes. Patient was given 50 mcg Fentanyl & 1 mg Versed. Report called to Dialysis RN.

## 2024-03-26 NOTE — PROGRESS NOTES
[facility] Medicine Services  PROGRESS NOTE    Patient Name: Eyal Solano  : 1950  MRN: [id number]    Date of Admission: 3/24/2024  Primary Care Physician: Edda Sanchez MD    Subjective   Subjective     CC:  ataxia    HPI:  Patient seen resting in bed, no issues overnight. No new complaints.    Objective   Objective     Vital Signs:   Temp:  [98 °F (36.7 °C)-99.4 °F (37.4 °C)] 98.6 °F (37 °C)  Heart Rate:  [73-89] 80  Resp:  [16-18] 18  BP: (120-164)/(64-86) 145/82     Physical Exam:  Constitutional: No acute distress, awake, alert  HENT: NCAT, mucous membranes moist  Respiratory: decreased to auscultation bilaterally, respiratory effort normal   Cardiovascular: RRR, no murmurs, rubs, or gallops  Gastrointestinal: Positive bowel sounds, soft, nontender, nondistended  Musculoskeletal: No bilateral ankle edema  Psychiatric: flat affect, cooperative  Neurologic: Oriented x 3, MINA, speech clear  Skin: No rashes     Results Reviewed:  LAB RESULTS:      Lab 24  0333 24  0705 24  1631   WBC 5.93 7.60 8.38   HEMOGLOBIN 6.2* 7.2* 7.6*   HEMATOCRIT 19.8* 23.3* 24.7*   PLATELETS 198 227 237   NEUTROS ABS 4.28 5.60 6.70   IMMATURE GRANS (ABS) 0.03 0.05 0.09*   LYMPHS ABS 0.90 0.97 0.74   MONOS ABS 0.57 0.76 0.71   EOS ABS 0.12 0.19 0.10   MCV 85.0 84.7 85.8   LDH  --   --  283*   PROTIME 15.3*  --   --          Lab 24  0333 24  1434 24  0705 24  1631   SODIUM 135*  --  137 131* 135*   POTASSIUM 5.0 5.0 5.9* 5.5* 6.5*   CHLORIDE 102  --  103 98 101   CO2 19.0*  --  17.0* 18.0* 18.0*   ANION GAP 14.0  --  17.0* 15.0 16.0*   *  --  104* 104* 99*   CREATININE 6.05*  --  5.54* 5.19* 5.47*   EGFR 9.3*  --  10.3* 11.2* 10.5*   GLUCOSE 101*  --  85 81 92   CALCIUM 7.7*  --  7.3* 8.2* 7.4*   IONIZED CALCIUM  --   --   --   --  1.09*   MAGNESIUM  --   --   --   --  1.6   PHOSPHORUS 5.4*  --   --   --   --    TSH  --   --   --    --  1.490         Lab 03/26/24 0333 03/24/24  1631   TOTAL PROTEIN  --  6.2   ALBUMIN 2.8* 3.4*   GLOBULIN  --  2.8   ALT (SGPT)  --  29   AST (SGOT)  --  35   BILIRUBIN  --  0.3   ALK PHOS  --  59         Lab 03/26/24 0333 03/24/24 2054 03/24/24  1631   HSTROP T  --  70* 79*   PROTIME 15.3*  --   --    INR 1.20*  --   --              Lab 03/26/24 0333 03/24/24 2146 03/24/24 2054 03/24/24 2054 03/24/24  1631   IRON 24*  --   --   --   --    IRON SATURATION (TSAT) 14*  --   --   --   --    TIBC 171*  --   --   --   --    TRANSFERRIN 115*  --   --   --   --    FERRITIN  --   --   --   --  1,250.00*   ABO TYPING  --  A   < > A  --    RH TYPING  --  Positive   < > Positive  --    ANTIBODY SCREEN  --   --   --  Negative  --     < > = values in this interval not displayed.         Brief Urine Lab Results  (Last result in the past 365 days)        Color   Clarity   Blood   Leuk Est   Nitrite   Protein   CREAT   Urine HCG        03/25/24 2006             52.6                 Microbiology Results Abnormal       None            MRI Angiogram Head Without Contrast    Result Date: 3/24/2024  MRI ANGIOGRAM HEAD WO CONTRAST, MRI ANGIOGRAM NECK WO CONTRAST Date of Exam: 3/24/2024 7:12 PM EDT Indication: ataxia/diplopia.  Comparison: 4/8/2022 Technique:  Routine 3-D time-of-flight gradient echo imaging was obtained of the head and neck without contrast administration. Findings: The visualized proximal common carotid arteries are normal. Irregularity of the carotid bulbs indicative of likely atheromatous disease. The visualized extracranial segments of the internal carotid arteries are normal. The vertebral arteries are codominant within the neck. Irregularity of the extracranial internal carotid arteries. At the cervical portion of the right internal carotid artery there is significant narrowing appreciated with greater than 50% stenosis visually. Above this level both internal carotid arteries are irregular likely due to  atheromatous disease. The carotid termini are normal. The visualized branches of the anterior and middle cerebral arteries appear patent. Both vertebral arteries supply the basilar artery. The basilar artery and basilar artery tip are normal. The basilar artery terminates in normal-appearing bilateral posterior cerebral arteries.     Impression: There is approximately 50% stenosis of the right internal carotid artery at the skull base. Evidence of atheromatous disease. Otherwise no vessel occlusion. Electronically Signed: Guru Rayo MD  3/24/2024 7:59 PM EDT  Workstation ID: [id number]    MRI Angiogram Neck Without Contrast    Result Date: 3/24/2024  MRI ANGIOGRAM HEAD WO CONTRAST, MRI ANGIOGRAM NECK WO CONTRAST Date of Exam: 3/24/2024 7:12 PM EDT Indication: ataxia/diplopia.  Comparison: 4/8/2022 Technique:  Routine 3-D time-of-flight gradient echo imaging was obtained of the head and neck without contrast administration. Findings: The visualized proximal common carotid arteries are normal. Irregularity of the carotid bulbs indicative of likely atheromatous disease. The visualized extracranial segments of the internal carotid arteries are normal. The vertebral arteries are codominant within the neck. Irregularity of the extracranial internal carotid arteries. At the cervical portion of the right internal carotid artery there is significant narrowing appreciated with greater than 50% stenosis visually. Above this level both internal carotid arteries are irregular likely due to atheromatous disease. The carotid termini are normal. The visualized branches of the anterior and middle cerebral arteries appear patent. Both vertebral arteries supply the basilar artery. The basilar artery and basilar artery tip are normal. The basilar artery terminates in normal-appearing bilateral posterior cerebral arteries.     Impression: There is approximately 50% stenosis of the right internal carotid artery at the skull base.  Evidence of atheromatous disease. Otherwise no vessel occlusion. Electronically Signed: Guru Rayo MD  3/24/2024 7:59 PM EDT  Workstation ID: [id number]    MRI Brain Without Contrast    Result Date: 3/24/2024  MRI BRAIN WO CONTRAST Date of Exam: 3/24/2024 7:01 PM EDT Indication: ataxia/diplopia.  Comparison: 4/8/2022 Technique:  Routine multiplanar/multisequence sequence images of the brain were obtained without contrast administration. Findings: No area of restricted diffusion is identified to suggest an acute intracranial infarct. Multifocal areas of T2/FLAIR signal increase are noted within the subcortical, deep cerebral, and periventricular white matter consistent with chronic small  vessel/microangiopathic ischemic changes. The ventricles and sulci are normal in size and configuration. No intracranial mass or mass effect. No extra-axial mass or collection. The posterior fossa is normal. Sellar and suprasellar structures are normal.  The major intracranial flow-voids of the [place] are patent. Orbital and periorbital soft tissues are normal. Mucoid retention cyst within the anterior right maxillary sinus. Mucosal thickening right maxillary sinus. The mastoid air cells are aerated..     Impression: Impression: No acute intracranial pathology. Chronic small vessel/microangiopathic ischemic changes. Electronically Signed: Guru Rayo MD  3/24/2024 7:24 PM EDT  Workstation ID: [id number]    XR Chest 1 View    Result Date: 3/24/2024  XR CHEST 1 VW Date of Exam: 3/24/2024 4:33 PM EDT Indication: Weak/Dizzy/AMS triage protocol Comparison: None available. Findings: There are no airspace consolidations. No pleural fluid. No pneumothorax. The pulmonary vasculature appears within normal limits. The cardiac and mediastinal silhouette appear unremarkable. No acute osseous abnormality identified.     Impression: Impression: No acute pulmonary process Electronically Signed: Thang Coronel MD  3/24/2024 4:55 PM EDT   Workstation ID: [id number]     Results for orders placed during the hospital encounter of 07/28/21    Adult Transthoracic Echo Complete W/ Cont if Necessary Per Protocol (With Agitated Saline)    Interpretation Summary  · Left ventricular ejection fraction appears to be 56 - 60%.  · Normal left atrial size and volume noted. No evidence of a patent foramen ovale. Saline test results are negative  · Mild mitral annular calcification is present. There is mild, anterior mitral leaflet thickening present. Trace mitral regurgitation.      Current medications:  Scheduled Meds:allopurinol, 100 mg, Oral, Daily  amLODIPine, 10 mg, Oral, Daily  aspirin, 81 mg, Oral, Daily  atorvastatin, 80 mg, Oral, Nightly  b complex-vitamin c-folic acid, 1 tablet, Oral, Daily  calcitriol, 0.25 mcg, Oral, Daily  calcium acetate, 1,334 mg, Oral, TID  carvedilol, 6.25 mg, Oral, BID With Meals  cetirizine, 10 mg, Oral, Daily  epoetin minnie/minnie-epbx, 20,000 Units, Subcutaneous, Weekly  ferrous sulfate, 325 mg, Oral, BID With Meals  fluticasone, 2 spray, Nasal, Daily  heparin (porcine), , ,   heparin (porcine), , ,   lidocaine-EPINEPHrine, , ,   sodium chloride, 10 mL, Intravenous, Q12H      Continuous Infusions:sodium chloride, 100 mL/hr, Last Rate: 100 mL/hr (03/26/24 0242)      PRN Meds:.  senna-docusate sodium **AND** polyethylene glycol **AND** bisacodyl **AND** bisacodyl    heparin (porcine)    heparin (porcine)    lidocaine-EPINEPHrine    ondansetron    prochlorperazine **OR** prochlorperazine **OR** prochlorperazine    sodium chloride    sodium chloride    Assessment & Plan   Assessment & Plan     Active Hospital Problems    Diagnosis  POA    **Ataxia [R27.0]  Yes    SAMINA (acute kidney injury) [N17.9]  Yes    History of stroke s/p L MCA stent [Z86.73]  Not Applicable    Mixed hyperlipidemia [E78.2]  Yes    Stage 4 chronic kidney disease [N18.4]  Yes    Essential hypertension [I10]  Yes      Resolved Hospital Problems   No resolved problems to  display.        Brief Hospital Course to date:  Eyal Solano is a 73 y.o. male  with history of CVA s/p L MCA stent, HTN, HL, CKD IV, here with 2 days of dizziness, ataxia, blurred/double vision and nausea/vomiting. He was found to have SAMINA on CKDIV with significant hyperkalemia upon admission.    This patient's problems and plans were partially entered by my partner and updated as appropriate by me 03/26/24.    All problems are new to me today.    Dizziness/ataxia/visual disturbances  -hx of MCA stent  -evaluated by stroke team reportedly, nothing more to do per discussion with them  -on asa  -MRI brain with DYLON stenosis (50%) otherwise unremarkable     SAMINA/CKD IV  HyperK- resolved  -IVF  -for hyperkalemia- s/p calcium gluconate/D50/insulin/lokelma. Improved on last check from 6.5 to 5.5, however, this am, worsened again to 5.9. treating with Calcium, insulin/dextrose, lokelma, sodium bicarb and albuterol nebs again  -nephrology following  - worsening creatinine  - HD catheter placement today per Dr. Melendez  - HD to start today after HD catheter placaement     Anemia  -no history of bleeding, transfused 2/23 in [place], suspected AOCD     HTN  -monitor     HL  -statin    Expected Discharge Location and Transportation: TBD, PT/OT to assess today   Expected Discharge   Expected Discharge Date: 3/28/2024; Expected Discharge Time:      DVT prophylaxis:  Medical DVT prophylaxis orders are signed and held. Medical and mechanical DVT prophylaxis orders are present.         AM-PAC 6 Clicks Score (PT): (P) 21 (03/26/24 5730)    CODE STATUS:   Code Status and Medical Interventions:   Ordered at: 03/24/24 0791     Code Status (Patient has no pulse and is not breathing):    CPR (Attempt to Resuscitate)     Medical Interventions (Patient has pulse or is breathing):    Full Support       Lili Denson,   03/26/24

## 2024-03-26 NOTE — PROCEDURES
The following procedure was performed: GALO TOTH pl    Please see corresponding Radiology report for in detail procedural information. The Radiology report will be dictated shortly, if not done so already. Please see the IR RN note for the information regarding medicines administered if any, kenyatta-procedural vitals and I/O information.

## 2024-03-27 LAB
ALBUMIN SERPL-MCNC: 2.6 G/DL (ref 3.5–5.2)
ALBUMIN/GLOB SERPL: 0.9 G/DL
ALP SERPL-CCNC: 50 U/L (ref 39–117)
ALT SERPL W P-5'-P-CCNC: 18 U/L (ref 1–41)
ANION GAP SERPL CALCULATED.3IONS-SCNC: 11 MMOL/L (ref 5–15)
AST SERPL-CCNC: 17 U/L (ref 1–40)
BACTERIA BLD CULT: ABNORMAL
BACTERIA UR QL AUTO: NORMAL /HPF
BASOPHILS # BLD AUTO: 0.02 10*3/MM3 (ref 0–0.2)
BASOPHILS NFR BLD AUTO: 0.3 % (ref 0–1.5)
BH BB BLOOD EXPIRATION DATE: NORMAL
BH BB BLOOD TYPE BARCODE: 6200
BH BB DISPENSE STATUS: NORMAL
BH BB PRODUCT CODE: NORMAL
BH BB UNIT NUMBER: NORMAL
BILIRUB SERPL-MCNC: 0.3 MG/DL (ref 0–1.2)
BILIRUB UR QL STRIP: NEGATIVE
BOTTLE TYPE: ABNORMAL
BUN SERPL-MCNC: 58 MG/DL (ref 8–23)
BUN/CREAT SERPL: 13.3 (ref 7–25)
CALCIUM SPEC-SCNC: 7.9 MG/DL (ref 8.6–10.5)
CHLORIDE SERPL-SCNC: 99 MMOL/L (ref 98–107)
CLARITY UR: CLEAR
CO2 SERPL-SCNC: 23 MMOL/L (ref 22–29)
COLOR UR: YELLOW
CREAT SERPL-MCNC: 4.37 MG/DL (ref 0.76–1.27)
CROSSMATCH INTERPRETATION: NORMAL
CRP SERPL-MCNC: 11.63 MG/DL (ref 0–0.5)
D-LACTATE SERPL-SCNC: 0.6 MMOL/L (ref 0.5–2)
DEPRECATED RDW RBC AUTO: 45.9 FL (ref 37–54)
EGFRCR SERPLBLD CKD-EPI 2021: 13.7 ML/MIN/1.73
EOSINOPHIL # BLD AUTO: 0.12 10*3/MM3 (ref 0–0.4)
EOSINOPHIL NFR BLD AUTO: 1.6 % (ref 0.3–6.2)
ERYTHROCYTE [DISTWIDTH] IN BLOOD BY AUTOMATED COUNT: 14.9 % (ref 12.3–15.4)
FLUAV RNA RESP QL NAA+PROBE: NOT DETECTED
FLUBV RNA RESP QL NAA+PROBE: NOT DETECTED
GLOBULIN UR ELPH-MCNC: 3 GM/DL
GLUCOSE SERPL-MCNC: 118 MG/DL (ref 65–99)
GLUCOSE UR STRIP-MCNC: NEGATIVE MG/DL
HCT VFR BLD AUTO: 24 % (ref 37.5–51)
HEMOCCULT STL QL: NEGATIVE
HGB BLD-MCNC: 7.7 G/DL (ref 13–17.7)
HGB UR QL STRIP.AUTO: ABNORMAL
HYALINE CASTS UR QL AUTO: NORMAL /LPF
IMM GRANULOCYTES # BLD AUTO: 0.03 10*3/MM3 (ref 0–0.05)
IMM GRANULOCYTES NFR BLD AUTO: 0.4 % (ref 0–0.5)
KETONES UR QL STRIP: NEGATIVE
LEUKOCYTE ESTERASE UR QL STRIP.AUTO: NEGATIVE
LYMPHOCYTES # BLD AUTO: 0.52 10*3/MM3 (ref 0.7–3.1)
LYMPHOCYTES NFR BLD AUTO: 7.1 % (ref 19.6–45.3)
MAGNESIUM SERPL-MCNC: 1.6 MG/DL (ref 1.6–2.4)
MCH RBC QN AUTO: 27.2 PG (ref 26.6–33)
MCHC RBC AUTO-ENTMCNC: 32.1 G/DL (ref 31.5–35.7)
MCV RBC AUTO: 84.8 FL (ref 79–97)
MONOCYTES # BLD AUTO: 0.69 10*3/MM3 (ref 0.1–0.9)
MONOCYTES NFR BLD AUTO: 9.4 % (ref 5–12)
MRSA DNA SPEC QL NAA+PROBE: NEGATIVE
NEUTROPHILS NFR BLD AUTO: 5.99 10*3/MM3 (ref 1.7–7)
NEUTROPHILS NFR BLD AUTO: 81.2 % (ref 42.7–76)
NITRITE UR QL STRIP: NEGATIVE
NRBC BLD AUTO-RTO: 0 /100 WBC (ref 0–0.2)
PH UR STRIP.AUTO: 7.5 [PH] (ref 5–8)
PLATELET # BLD AUTO: 176 10*3/MM3 (ref 140–450)
PMV BLD AUTO: 10.5 FL (ref 6–12)
POTASSIUM SERPL-SCNC: 4.6 MMOL/L (ref 3.5–5.2)
PROCALCITONIN SERPL-MCNC: 1.02 NG/ML (ref 0–0.25)
PROT SERPL-MCNC: 5.6 G/DL (ref 6–8.5)
PROT UR QL STRIP: ABNORMAL
RBC # BLD AUTO: 2.83 10*6/MM3 (ref 4.14–5.8)
RBC # UR STRIP: NORMAL /HPF
REF LAB TEST METHOD: NORMAL
SARS-COV-2 RNA RESP QL NAA+PROBE: NOT DETECTED
SODIUM SERPL-SCNC: 133 MMOL/L (ref 136–145)
SP GR UR STRIP: 1.01 (ref 1–1.03)
SQUAMOUS #/AREA URNS HPF: NORMAL /HPF
UNIT  ABO: NORMAL
UNIT  RH: NORMAL
UROBILINOGEN UR QL STRIP: ABNORMAL
WBC # UR STRIP: NORMAL /HPF
WBC NRBC COR # BLD AUTO: 7.37 10*3/MM3 (ref 3.4–10.8)

## 2024-03-27 PROCEDURE — 87636 SARSCOV2 & INF A&B AMP PRB: CPT | Performed by: NURSE PRACTITIONER

## 2024-03-27 PROCEDURE — 97161 PT EVAL LOW COMPLEX 20 MIN: CPT

## 2024-03-27 PROCEDURE — 80053 COMPREHEN METABOLIC PANEL: CPT | Performed by: NURSE PRACTITIONER

## 2024-03-27 PROCEDURE — 87154 CUL TYP ID BLD PTHGN 6+ TRGT: CPT | Performed by: NURSE PRACTITIONER

## 2024-03-27 PROCEDURE — 86140 C-REACTIVE PROTEIN: CPT | Performed by: NURSE PRACTITIONER

## 2024-03-27 PROCEDURE — 85025 COMPLETE CBC W/AUTO DIFF WBC: CPT | Performed by: NURSE PRACTITIONER

## 2024-03-27 PROCEDURE — 87147 CULTURE TYPE IMMUNOLOGIC: CPT | Performed by: NURSE PRACTITIONER

## 2024-03-27 PROCEDURE — 99232 SBSQ HOSP IP/OBS MODERATE 35: CPT | Performed by: HOSPITALIST

## 2024-03-27 PROCEDURE — 97535 SELF CARE MNGMENT TRAINING: CPT

## 2024-03-27 PROCEDURE — 83735 ASSAY OF MAGNESIUM: CPT | Performed by: NURSE PRACTITIONER

## 2024-03-27 PROCEDURE — 83605 ASSAY OF LACTIC ACID: CPT | Performed by: NURSE PRACTITIONER

## 2024-03-27 PROCEDURE — 87040 BLOOD CULTURE FOR BACTERIA: CPT | Performed by: NURSE PRACTITIONER

## 2024-03-27 PROCEDURE — 81001 URINALYSIS AUTO W/SCOPE: CPT | Performed by: NURSE PRACTITIONER

## 2024-03-27 PROCEDURE — 25010000002 VANCOMYCIN HCL IN NACL 1.5-0.9 GM/500ML-% SOLUTION

## 2024-03-27 PROCEDURE — 97165 OT EVAL LOW COMPLEX 30 MIN: CPT

## 2024-03-27 PROCEDURE — 84145 PROCALCITONIN (PCT): CPT | Performed by: NURSE PRACTITIONER

## 2024-03-27 PROCEDURE — 25010000002 PIPERACILLIN SOD-TAZOBACTAM PER 1 G: Performed by: NURSE PRACTITIONER

## 2024-03-27 PROCEDURE — 87641 MR-STAPH DNA AMP PROBE: CPT | Performed by: NURSE PRACTITIONER

## 2024-03-27 RX ADMIN — ASPIRIN 81 MG: 81 TABLET, CHEWABLE ORAL at 08:14

## 2024-03-27 RX ADMIN — CARVEDILOL 6.25 MG: 6.25 TABLET, FILM COATED ORAL at 08:14

## 2024-03-27 RX ADMIN — FERROUS SULFATE TAB 325 MG (65 MG ELEMENTAL FE) 325 MG: 325 (65 FE) TAB at 17:01

## 2024-03-27 RX ADMIN — CARVEDILOL 6.25 MG: 6.25 TABLET, FILM COATED ORAL at 17:01

## 2024-03-27 RX ADMIN — ALLOPURINOL 100 MG: 100 TABLET ORAL at 08:14

## 2024-03-27 RX ADMIN — FLUTICASONE PROPIONATE 2 SPRAY: 50 SPRAY, METERED NASAL at 08:13

## 2024-03-27 RX ADMIN — Medication 1500 MG: at 01:08

## 2024-03-27 RX ADMIN — CALCIUM ACETATE 1334 MG: 667 CAPSULE ORAL at 11:51

## 2024-03-27 RX ADMIN — Medication 10 ML: at 08:13

## 2024-03-27 RX ADMIN — PIPERACILLIN SODIUM AND TAZOBACTAM SODIUM 3.38 G: 3; .375 INJECTION, POWDER, LYOPHILIZED, FOR SOLUTION INTRAVENOUS at 00:36

## 2024-03-27 RX ADMIN — ATORVASTATIN CALCIUM 80 MG: 40 TABLET, FILM COATED ORAL at 20:56

## 2024-03-27 RX ADMIN — AMLODIPINE BESYLATE 10 MG: 10 TABLET ORAL at 08:14

## 2024-03-27 RX ADMIN — CALCITRIOL CAPSULES 0.25 MCG 0.25 MCG: 0.25 CAPSULE ORAL at 08:13

## 2024-03-27 RX ADMIN — Medication 10 ML: at 20:56

## 2024-03-27 RX ADMIN — CETIRIZINE HYDROCHLORIDE 10 MG: 10 TABLET, FILM COATED ORAL at 08:14

## 2024-03-27 RX ADMIN — Medication 1 TABLET: at 08:14

## 2024-03-27 RX ADMIN — CALCIUM ACETATE 1334 MG: 667 CAPSULE ORAL at 08:15

## 2024-03-27 RX ADMIN — FERROUS SULFATE TAB 325 MG (65 MG ELEMENTAL FE) 325 MG: 325 (65 FE) TAB at 08:14

## 2024-03-27 RX ADMIN — CALCIUM ACETATE 1334 MG: 667 CAPSULE ORAL at 17:00

## 2024-03-27 NOTE — CASE MANAGEMENT/SOCIAL WORK
Continued Stay Note  [facility]     Patient Name: Eyal Solano  MRN: [id number]  Today's Date: 3/27/2024    Admit Date: 3/24/2024    Plan: Home   Discharge Plan       Row Name 03/27/24 1501       Plan    Plan Home    Plan Comments Spoke with patient in room.  His plan is still to return home at discharge.  CM continues to work on new outpatient hemodialysis [place] [facility] [place].  His TENTATIVE chair time is MWF at 1300.  Clinical liaCat hess is clarifying with Dr. Nicholas whether patient will eventually transition to PD, as this may change clinic location.  She will update CM once she verfies.  CM will continue to follow.                   Discharge Codes    No documentation.                 Expected Discharge Date and Time       Expected Discharge Date Expected Discharge Time    Mar 29, 2024               Kalpana López RN

## 2024-03-27 NOTE — CONSULTS
General Surgery Consultation Note    Date of Service: 3/27/2024  Eyal Solano  [id number]  1950      Referring Provider: Lili Denson DO    Location of Consult: Inpatient     Reason for Consultation: Chronic renal failure       History of Present Illness:  I am seeing, Eyal Solano, in consultation for Lili Denson DO regarding chronic renal failure requiring the institution dialysis.  73-year-old gentleman with chronic renal failure, hypertension, cerebrovascular disease, chronic alcohol use, and prediabetes presented with dizziness, headache, difficulty walking, nausea, and vomiting.  Upon admission he was noted to have a significant elevation in his creatinine and BUN.  He was evaluated by nephrology and initiated on hemodialysis.  This has been oncoming for quite some time and he does wish to proceed with peritoneal dialysis.  Otherwise he has no other complaints at this time.    Problems Addressed this Visit          Neuro    * (Principal) Ataxia     Other Visit Diagnoses       Hyperkalemia    -  Primary    Renal failure, unspecified chronicity        Relevant Medications    furosemide (LASIX) injection 80 mg (Completed)    torsemide (DEMADEX) 100 MG tablet    Diplopia        History of CVA (cerebrovascular accident)        History of hypertension              Diagnoses         Codes Comments    Hyperkalemia    -  Primary ICD-10-CM: E87.5  ICD-9-CM: 276.7     Renal failure, unspecified chronicity     ICD-10-CM: N19  ICD-9-CM: 586     Ataxia     ICD-10-CM: R27.0  ICD-9-CM: 781.3     Diplopia     ICD-10-CM: H53.2  ICD-9-CM: 368.2     History of CVA (cerebrovascular accident)     ICD-10-CM: Z86.73  ICD-9-CM: V12.54     History of hypertension     ICD-10-CM: Z86.79  ICD-9-CM: V12.59             Past Medical History:   Diagnosis Date    Acute gout of right knee 12/18/2018    Alcohol use     Conversion reaction     Dizziness     Enlarged prostate     Gout     Hyperlipidemia     Hypertension      Kidney disorder     Stroke     right side weakness       Past Surgical History:    CEREBRAL ANGIOGRAM    Procedure: Cerebral angiogram for Intracranial Stent under anesthesia;  Surgeon: Isaiah Grey MD;  Location: [facility] INVASIVE LOCATION;  Service: Interventional Radiology;  Laterality: Left;    NASAL SEPTUM SURGERY    10 years ago    NASAL SINUS SURGERY    TOOTH EXTRACTION       No Known Allergies    No current facility-administered medications on file prior to encounter.     Current Outpatient Medications on File Prior to Encounter   Medication Sig Dispense Refill    albuterol sulfate  (90 Base) MCG/ACT inhaler Inhale 1 puff Every 4 (Four) Hours As Needed for Wheezing.      allopurinol (ZYLOPRIM) 100 MG tablet Take 2.5 tablets by mouth Daily. 225 tablet 1    amLODIPine (NORVASC) 10 MG tablet Take 1 tablet by mouth Daily. 90 tablet 3    aspirin 81 MG chewable tablet Chew 1 tablet Daily. 90 tablet 3    atorvastatin (LIPITOR) 80 MG tablet Take 1 tablet by mouth Every Night. 90 tablet 3    calcitriol (ROCALTROL) 0.25 MCG capsule Take 1 capsule by mouth Daily.      calcium acetate (PHOS BINDER,) 667 MG capsule capsule Take 2 capsules by mouth 3 (Three) Times a Day. With meals      carvedilol (COREG) 6.25 MG tablet Take 1 tablet by mouth 2 (Two) Times a Day With Meals. (Patient taking differently: Take 4 tablets by mouth 2 (Two) Times a Day With Meals.) 180 tablet 3    doxazosin (CARDURA) 4 MG tablet Take 1 tablet by mouth Every Night.      ezetimibe (Zetia) 10 MG tablet Take 1 tablet by mouth Daily. 90 tablet 3    ferrous sulfate 325 (65 FE) MG tablet Take 1 tablet by mouth 2 (Two) Times a Day With Meals. 60 tablet 0    levocetirizine (XYZAL) 5 MG tablet TAKE 1 TABLET BY MOUTH ONCE DAILY IN THE EVENING 90 tablet 2    NIFEdipine XL (PROCARDIA XL) 60 MG 24 hr tablet Take 1 tablet by mouth Daily.      torsemide (DEMADEX) 100 MG tablet Take 0.5 tablets by mouth Daily.      VITAMIN D PO Take 1  tablet by mouth Daily.      fluticasone (Flonase) 50 MCG/ACT nasal spray 2 sprays into the nostril(s) as directed by provider Daily. (Patient not taking: Reported on 3/24/2024) 16 g 11         Current Facility-Administered Medications:     acetaminophen (TYLENOL) tablet 650 mg, 650 mg, Oral, Q6H PRN, Sofya Arellano, APRN, 650 mg at 03/26/24 2333    albumin human 25 % IV SOLN 25 g, 25 g, Intravenous, PRN, Guru Menjivar MD    allopurinol (ZYLOPRIM) tablet 100 mg, 100 mg, Oral, Daily, Akshat Adkins MD, 100 mg at 03/27/24 0814    amLODIPine (NORVASC) tablet 10 mg, 10 mg, Oral, Daily, Akshat Adkins MD, 10 mg at 03/27/24 0814    aspirin chewable tablet 81 mg, 81 mg, Oral, Daily, Akshat Adkins MD, 81 mg at 03/27/24 0814    atorvastatin (LIPITOR) tablet 80 mg, 80 mg, Oral, Nightly, Akshat Adkins MD, 80 mg at 03/26/24 2022    b complex-vitamin c-folic acid (NEPHRO-JOSE ALEJANDRO) tablet 1 tablet, 1 tablet, Oral, Daily, Guru Menjivar MD, 1 tablet at 03/27/24 0814    sennosides-docusate (PERICOLACE) 8.6-50 MG per tablet 2 tablet, 2 tablet, Oral, BID PRN **AND** polyethylene glycol (MIRALAX) packet 17 g, 17 g, Oral, Daily PRN **AND** bisacodyl (DULCOLAX) EC tablet 5 mg, 5 mg, Oral, Daily PRN **AND** bisacodyl (DULCOLAX) suppository 10 mg, 10 mg, Rectal, Daily PRN, Akshat Adkins MD    calcitriol (ROCALTROL) capsule 0.25 mcg, 0.25 mcg, Oral, Daily, Alley Rockwell MD, 0.25 mcg at 03/27/24 0813    calcium acetate (PHOS BINDER)) capsule 1,334 mg, 1,334 mg, Oral, TID, Alley Rockwell MD, 1,334 mg at 03/27/24 1700    carvedilol (COREG) tablet 6.25 mg, 6.25 mg, Oral, BID With Meals, Akshat Adkins MD, 6.25 mg at 03/27/24 1701    cetirizine (zyrTEC) tablet 10 mg, 10 mg, Oral, Daily, Akshat Adkins MD, 10 mg at 03/27/24 0814    epoetin minnie-epbx (RETACRIT) injection 20,000 Units, 20,000 Units, Subcutaneous, Weekly, Guru Menjivar MD, 20,000 Units at 03/25/24 2009    ferrous sulfate tablet 325  mg, 325 mg, Oral, BID With Meals, Akshat Adkins MD, 325 mg at 03/27/24 1701    fluticasone (FLONASE) 50 MCG/ACT nasal spray 2 spray, 2 spray, Nasal, Daily, Akshat Adkins MD, 2 spray at 03/27/24 0813    heparin (porcine) injection 1,000 Units, 1,000 Units, Intracatheter, PRN, Guru Menjivar MD, 1,000 Units at 03/26/24 1701    ondansetron (ZOFRAN) injection 4 mg, 4 mg, Intravenous, Q6H PRN, Akshat Adkins MD    prochlorperazine (COMPAZINE) injection 5 mg, 5 mg, Intravenous, Q6H PRN **OR** prochlorperazine (COMPAZINE) tablet 5 mg, 5 mg, Oral, Q6H PRN **OR** prochlorperazine (COMPAZINE) suppository 25 mg, 25 mg, Rectal, Q12H PRN, Akshat Adkins MD    sodium chloride 0.9 % flush 10 mL, 10 mL, Intravenous, Q12H, Akshat Adkins MD, 10 mL at 03/27/24 0813    sodium chloride 0.9 % flush 10 mL, 10 mL, Intravenous, PRN, Akshat Adkins MD    sodium chloride 0.9 % infusion 40 mL, 40 mL, Intravenous, PRN, Akshat Adkins MD    sodium chloride 0.9 % infusion, 100 mL/hr, Intravenous, Continuous, Akshat Adkins MD, Last Rate: 100 mL/hr at 03/26/24 0242, 100 mL/hr at 03/26/24 0242    Family History   Problem Relation Age of Onset    Hyperlipidemia Mother     Hypertension Mother     Stroke Father 82    Heart attack Father 82    Hypertension Brother     Heart disease Maternal Grandmother     Heart disease Maternal Grandfather     Heart disease Paternal Grandmother     Heart disease Paternal Grandfather      Social History     Socioeconomic History    Marital status: Single   Tobacco Use    Smoking status: Former     Current packs/day: 1.00     Average packs/day: 1 pack/day for 30.0 years (30.0 ttl pk-yrs)     Types: Cigarettes    Smokeless tobacco: Current     Types: Chew    Tobacco comments:     Quit >25 yrs ago; tobacco cessation pamphlet given to pt   Vaping Use    Vaping status: Never Used   Substance and Sexual Activity    Alcohol use: Yes     Alcohol/week: 14.0 - 21.0 standard drinks of alcohol      "Types: 14 - 21 Cans of beer per week    Drug use: No    Sexual activity: Defer       Review of Systems:  Review of Systems   Constitutional:  Positive for chills and fever.   HENT:  Negative for congestion, ear discharge and nosebleeds.    Eyes:  Negative for photophobia and visual disturbance.   Respiratory:  Negative for chest tightness and shortness of breath.    Cardiovascular:  Negative for chest pain and leg swelling.   Gastrointestinal:  Positive for nausea and vomiting. Negative for abdominal pain and constipation.   Endocrine: Negative for polyphagia and polyuria.   Genitourinary:  Negative for dysuria, frequency and hematuria.   Musculoskeletal:  Negative for back pain and myalgias.   Skin:  Negative for pallor and rash.   Allergic/Immunologic: Negative for immunocompromised state.   Neurological:  Negative for dizziness, tremors and seizures.   Hematological:  Negative for adenopathy.   Psychiatric/Behavioral:  Negative for agitation, dysphoric mood and sleep disturbance.      Otherwise the 12 point review of systems is negative.    /82 (BP Location: Right arm, Patient Position: Lying)   Pulse 91   Temp 98.9 °F (37.2 °C) (Oral)   Resp 19   Ht 177.8 cm (70\")   Wt 80.5 kg (177 lb 6.4 oz)   SpO2 94%   BMI 25.45 kg/m²   Body mass index is 25.45 kg/m².    General: Sitting in the chair pleasantly conversant  HEENT: PER, no icterus, normal sclerae  Cardiac: regular rhythm,  no audible rubs  Pulmonary: bilateral breath sounds, nonlabored  Abdominal: Soft, nontender, no generalized peritonitis  Neurologic: awake, alert, no obvious focal deficits  Extremities: warm, no edema  Skin: no obvious rashes nor worrisome lesions seen     CBC  Results from last 7 days   Lab Units 03/27/24  0030   WBC 10*3/mm3 7.37   HEMOGLOBIN g/dL 7.7*   HEMATOCRIT % 24.0*   PLATELETS 10*3/mm3 176       CMP  Results from last 7 days   Lab Units 03/27/24  0030   SODIUM mmol/L 133*   POTASSIUM mmol/L 4.6   CHLORIDE mmol/L 99 "   CO2 mmol/L 23.0   BUN mg/dL 58*   CREATININE mg/dL 4.37*   CALCIUM mg/dL 7.9*   BILIRUBIN mg/dL 0.3   ALK PHOS U/L 50   ALT (SGPT) U/L 18   AST (SGOT) U/L 17   GLUCOSE mg/dL 118*       Radiology  Imaging Results (Last 72 Hours)       Procedure Component Value Units Date/Time    XR Chest 1 View [[id number]] Collected: 03/26/24 2347     Updated: 03/26/24 2351    Narrative:      XR CHEST 1 VW    Date of Exam: 3/26/2024 11:30 PM EDT    Indication: dyspnea    Comparison: 3/24/2024.    Findings:  There is a new right-sided dialysis catheter terminating in the lower SVC. There is minimal interstitial pulmonary edema. There is linear scar in the right lateral midlung. Heart size appears within normal limits. There is no pneumothorax, pleural   effusion or focal airspace consolidation. No acute osseous abnormalities.      Impression:      Impression:  Minimal interstitial pulmonary edema.        Electronically Signed: Kunal Roy MD    3/26/2024 11:48 PM EDT    Workstation ID: [id number]    IR Insert Tunneled CV Catheter Without Port 5 Plus [[id number]] Collected: 03/26/24 1520     Updated: 03/26/24 1525    Narrative:      IR INS TUNNELED CV CATHETER WO PORT 5 PLUS     History: Needs to initiate on HD.     : Jose Pina MD.    Modality: Sonography and fluoroscopy    Fluoro time: 1.2 minutes    Radiation Dose: 2.8 mGy air Kerma.       SEDATION: Moderate sedation was administered. 1 milligram of Versed and 50 micrograms of fentanyl IV was used for moderate sedation. Total intra service time of sedation was 22 minutes. The sedation was administered and the patient's vital signs   monitored throughout the procedure and recorded in the patient's medical record by the nurse under my direct supervision.    Medicines: Not applicable.    Anesthesia:  Lidocaine 1% with epinephrine, local infiltration    Estimated blood loss:  < 5 cc.            Technique:  A thorough discussion of the risks, benefits, and  alternatives of the procedure, and if applicable, moderate sedation, was carried out with the patient. They were encouraged to ask any questions. Any questions were answered. They verbalized   understanding. A written informed consent was then signed.      A multi-component timeout was performed prior to starting the procedure using the departmental protocol.     The procedure room personnel used personal protective equipment. The operators used sterile gloves and if indicated, sterile gowns. The surgical site was prepped with chlorhexidine gluconate  and draped in the maximal applicable sterile fashion.    A preliminary ultrasonogram was performed of the target that revealed a patent and compressible Right internal jugular vein. Pertinent ultrasound images were stored in the PACS for documentation.    A sterile prep and drape of the right neck and upper chest was performed using maximal sterile technique.    Using aseptic precautions, real-time ultrasound guidance, the target vein was accessed after local anesthetic infiltration and dermatotomy with an access needle. A guidewire was advanced into the central venous system under fluoroscopic guidance. Over   the wire following standard technique a peel-away sheath was placed.    After local anesthesia, an incision was created at the exit site location and a cuffed tunneled dialysis catheter of an appropriate length was tunneled from the exit site to the venotomy site using a tunneling device. The catheter was advanced into the   venous system through the peel-away sheath which was removed.    The catheter aspirated and flushed well and was terminally packed with heparin 1000 units per cc.    The catheter was secured to skin using nonabsorbable suture and a CHG dressing applied.    The venotomy site was closed using Dermabond. An aseptic dressing was applied using the protocol for Dermabond.    The patient was transferred to the recovery area and was discharged from  the department in stable condition.     Complications: None immediate.        Device: 15.5 American x 19 cm cuff to tip Duraflow catheter.    Findings: Patent and compressible Right internal jugular. Final image shows the catheter to be in good position with the catheter tip in the right atrium, an excellent position for use. There is no complication.       Impression:      Impression:      Successful ultrasound and fluoroscopic guided Right internal jugular vein route cuffed tunneled hemodialysis catheter placement as described above.    Thank you for the opportunity to assist in the care of your patient.      Electronically Signed: Jose Pina MD    3/26/2024 3:22 PM EDT    Workstation ID: [id number]    MRI Angiogram Head Without Contrast [[id number]] Collected: 03/24/24 1955     Updated: 03/24/24 2002    Narrative:          MRI ANGIOGRAM HEAD WO CONTRAST, MRI ANGIOGRAM NECK WO CONTRAST    Date of Exam: 3/24/2024 7:12 PM EDT    Indication: ataxia/diplopia.     Comparison: 4/8/2022    Technique:  Routine 3-D time-of-flight gradient echo imaging was obtained of the head and neck without contrast administration.    Findings: The visualized proximal common carotid arteries are normal. Irregularity of the carotid bulbs indicative of likely atheromatous disease. The visualized extracranial segments of the internal carotid arteries are normal.    The vertebral arteries are codominant within the neck.    Irregularity of the extracranial internal carotid arteries. At the cervical portion of the right internal carotid artery there is significant narrowing appreciated with greater than 50% stenosis visually. Above this level both internal carotid arteries   are irregular likely due to atheromatous disease. The carotid termini are normal. The visualized branches of the anterior and middle cerebral arteries appear patent.    Both vertebral arteries supply the basilar artery. The basilar artery and basilar artery tip are normal.  The basilar artery terminates in normal-appearing bilateral posterior cerebral arteries.      Impression:      There is approximately 50% stenosis of the right internal carotid artery at the skull base. Evidence of atheromatous disease. Otherwise no vessel occlusion.        Electronically Signed: Guru Rayo MD    3/24/2024 7:59 PM EDT    Workstation ID: [id number]    MRI Angiogram Neck Without Contrast [[id number]] Collected: 03/24/24 1955     Updated: 03/24/24 2002    Narrative:          MRI ANGIOGRAM HEAD WO CONTRAST, MRI ANGIOGRAM NECK WO CONTRAST    Date of Exam: 3/24/2024 7:12 PM EDT    Indication: ataxia/diplopia.     Comparison: 4/8/2022    Technique:  Routine 3-D time-of-flight gradient echo imaging was obtained of the head and neck without contrast administration.    Findings: The visualized proximal common carotid arteries are normal. Irregularity of the carotid bulbs indicative of likely atheromatous disease. The visualized extracranial segments of the internal carotid arteries are normal.    The vertebral arteries are codominant within the neck.    Irregularity of the extracranial internal carotid arteries. At the cervical portion of the right internal carotid artery there is significant narrowing appreciated with greater than 50% stenosis visually. Above this level both internal carotid arteries   are irregular likely due to atheromatous disease. The carotid termini are normal. The visualized branches of the anterior and middle cerebral arteries appear patent.    Both vertebral arteries supply the basilar artery. The basilar artery and basilar artery tip are normal. The basilar artery terminates in normal-appearing bilateral posterior cerebral arteries.      Impression:      There is approximately 50% stenosis of the right internal carotid artery at the skull base. Evidence of atheromatous disease. Otherwise no vessel occlusion.        Electronically Signed: Guru Rayo MD    3/24/2024 7:59 PM EDT     Workstation ID: [id number]              Assessment:  Chronic kidney disease stage IV  Hypertension  Cerebral vascular disease, history of stroke  Chronic alcohol use  Anemia    Plan:  Initiated on hemodialysis via a tunneled catheter, yesterday.  Fever of 102 yesterday and blood cultures obtained.  1 of 2 sets with Staphylococcus species.  I discussed peritoneal dialysis and catheter placement.  Our discussion included but was not limited to: Bleeding, infection, injury to adjacent viscera (liver, spleen, stomach, colon, small bowel, bladder etc.), catheter intolerance, chronic pain, hernia formation, need for reintervention, nonfunction, and medical issues from a cardiopulmonary and deep venous thrombosis standpoint.  He understood these risks and wishes to proceed.  At this point with his fever and positive blood culture this needs to be rectified prior to surgical intervention and PD catheter placement.  Will follow along.    Leiv Melendez MD  03/27/24  19:55 EDT

## 2024-03-27 NOTE — PROGRESS NOTES
[facility]  PROGRESS NOTE    Patient Name: Eyal Solano  : 1950  MRN: [id number]    Date of Admission: 3/24/2024  Primary Care Physician: Edda Sanchez MD    Subjective   Subjective     CC:  ataxia    HPI:  Patient seen resting in bedside chair, states he is feeling much better today. He states he didn't feel well prior to HD or after last night. Fever overnight with no fever this morning. Patient eating breakfast this am and tolerating diet fine.    Objective   Objective     Vital Signs:   Temp:  [98.1 °F (36.7 °C)-102.7 °F (39.3 °C)] 98.2 °F (36.8 °C)  Heart Rate:  [72-97] 83  Resp:  [13-18] 16  BP: (140-180)/() 156/84  Flow (L/min):  [2-3] 2     Physical Exam:  Constitutional: No acute distress, awake, alert  HENT: NCAT, mucous membranes moist  Respiratory: decreased to auscultation bilaterally, respiratory effort normal on 2L NC  Cardiovascular: RRR, no murmurs, rubs, or gallops  Gastrointestinal: Positive bowel sounds, soft, nontender, nondistended  Musculoskeletal: No bilateral ankle edema  Psychiatric: flat affect, cooperative  Neurologic: Oriented x 3, MINA, speech clear  Skin: No rashes, HD cath in place- covered without surrounding erythema    Results Reviewed:  LAB RESULTS:      Lab 24  0030 24  1610 24  0333 24  0705 24  1631   WBC 7.37  --  5.93 7.60 8.38   HEMOGLOBIN 7.7* 7.8* 6.2* 7.2* 7.6*   HEMATOCRIT 24.0* 24.5* 19.8* 23.3* 24.7*   PLATELETS 176  --  198 227 237   NEUTROS ABS 5.99  --  4.28 5.60 6.70   IMMATURE GRANS (ABS) 0.03  --  0.03 0.05 0.09*   LYMPHS ABS 0.52*  --  0.90 0.97 0.74   MONOS ABS 0.69  --  0.57 0.76 0.71   EOS ABS 0.12  --  0.12 0.19 0.10   MCV 84.8  --  85.0 84.7 85.8   CRP 11.63*  --   --   --   --    PROCALCITONIN 1.02*  --   --   --   --    LACTATE 0.6  --   --   --   --    LDH  --   --   --   --  283*   PROTIME  --   --  15.3*  --   --          Lab 24  0030 24  0333  03/25/24  1434 03/25/24  0705 03/24/24 2054 03/24/24  1631   SODIUM 133* 135*  --  137 131* 135*   POTASSIUM 4.6 5.0 5.0 5.9* 5.5* 6.5*   CHLORIDE 99 102  --  103 98 101   CO2 23.0 19.0*  --  17.0* 18.0* 18.0*   ANION GAP 11.0 14.0  --  17.0* 15.0 16.0*   BUN 58* 101*  --  104* 104* 99*   CREATININE 4.37* 6.05*  --  5.54* 5.19* 5.47*   EGFR 13.7* 9.3*  --  10.3* 11.2* 10.5*   GLUCOSE 118* 101*  --  85 81 92   CALCIUM 7.9* 7.7*  --  7.3* 8.2* 7.4*   IONIZED CALCIUM  --   --   --   --   --  1.09*   MAGNESIUM 1.6  --   --   --   --  1.6   PHOSPHORUS  --  5.4*  --   --   --   --    TSH  --   --   --   --   --  1.490         Lab 03/27/24 0030 03/26/24 0333 03/24/24  1631   TOTAL PROTEIN 5.6*  --  6.2   ALBUMIN 2.6* 2.8* 3.4*   GLOBULIN 3.0  --  2.8   ALT (SGPT) 18  --  29   AST (SGOT) 17  --  35   BILIRUBIN 0.3  --  0.3   ALK PHOS 50  --  59         Lab 03/26/24 0333 03/24/24 2054 03/24/24  1631   HSTROP T  --  70* 79*   PROTIME 15.3*  --   --    INR 1.20*  --   --              Lab 03/26/24 0333 03/24/24 2146 03/24/24 2054 03/24/24 2054 03/24/24  1631   IRON 24*  --   --   --   --    IRON SATURATION (TSAT) 14*  --   --   --   --    TIBC 171*  --   --   --   --    TRANSFERRIN 115*  --   --   --   --    FERRITIN  --   --   --   --  1,250.00*   ABO TYPING  --  A   < > A  --    RH TYPING  --  Positive   < > Positive  --    ANTIBODY SCREEN  --   --   --  Negative  --     < > = values in this interval not displayed.         Brief Urine Lab Results  (Last result in the past 365 days)        Color   Clarity   Blood   Leuk Est   Nitrite   Protein   CREAT   Urine HCG        03/27/24 0033 Yellow   Clear   Trace   Negative   Negative   >=300 mg/dL (3+)                   Microbiology Results Abnormal       Procedure Component Value - Date/Time    MRSA Screen, PCR (Inpatient) - Swab, Nares [[id number]]  (Normal) Collected: 03/27/24 0033    Lab Status: Final result Specimen: Swab from Nares Updated: 03/27/24 0802     MRSA PCR  Negative    Narrative:      The negative predictive value of this diagnostic test is high and should only be used to consider de-escalating anti-MRSA therapy. A positive result may indicate colonization with MRSA and must be correlated clinically.  MRSA Negative    COVID PRE-OP / PRE-PROCEDURE SCREENING ORDER (NO ISOLATION) - Swab, Nasopharynx [[id number]]  (Normal) Collected: 03/27/24 0033    Lab Status: Final result Specimen: Swab from Nasopharynx Updated: 03/27/24 0123    Narrative:      The following orders were created for panel order COVID PRE-OP / PRE-PROCEDURE SCREENING ORDER (NO ISOLATION) - Swab, Nasopharynx.  Procedure                               Abnormality         Status                     ---------                               -----------         ------                     COVID-19 and FLU A/B PCR...[[id number]]  Normal              Final result                 Please view results for these tests on the individual orders.    COVID-19 and FLU A/B PCR, 1 HR TAT - Swab, Nasopharynx [[id number]]  (Normal) Collected: 03/27/24 0033    Lab Status: Final result Specimen: Swab from Nasopharynx Updated: 03/27/24 0123     COVID19 Not Detected     Influenza A PCR Not Detected     Influenza B PCR Not Detected    Narrative:      Fact sheet for providers: https://www.fda.gov/media/526486/download    Fact sheet for patients: https://www.fda.gov/media/122968/download    Test performed by PCR.            XR Chest 1 View    Result Date: 3/26/2024  XR CHEST 1 VW Date of Exam: 3/26/2024 11:30 PM EDT Indication: dyspnea Comparison: 3/24/2024. Findings: There is a new right-sided dialysis catheter terminating in the lower SVC. There is minimal interstitial pulmonary edema. There is linear scar in the right lateral midlung. Heart size appears within normal limits. There is no pneumothorax, pleural effusion or focal airspace consolidation. No acute osseous abnormalities.     Impression: Impression: Minimal interstitial  pulmonary edema. Electronically Signed: Kunal Roy MD  3/26/2024 11:48 PM EDT  Workstation ID: [id number]    IR Insert Tunneled CV Catheter Without Port 5 Plus    Result Date: 3/26/2024  IR INS TUNNELED CV CATHETER WO PORT 5 PLUS  History: Needs to initiate on HD.  : Jose Pina MD. Modality: Sonography and fluoroscopy Fluoro time: 1.2 minutes Radiation Dose: 2.8 mGy air Kerma.   SEDATION: Moderate sedation was administered. 1 milligram of Versed and 50 micrograms of fentanyl IV was used for moderate sedation. Total intra service time of sedation was 22 minutes. The sedation was administered and the patient's vital signs monitored throughout the procedure and recorded in the patient's medical record by the nurse under my direct supervision. Medicines: Not applicable. Anesthesia: Lidocaine 1% with epinephrine, local infiltration Estimated blood loss:  < 5 cc.        Technique: A thorough discussion of the risks, benefits, and alternatives of the procedure, and if applicable, moderate sedation, was carried out with the patient. They were encouraged to ask any questions. Any questions were answered. They verbalized understanding. A written informed consent was then signed.  A multi-component timeout was performed prior to starting the procedure using the departmental protocol. The procedure room personnel used personal protective equipment. The operators used sterile gloves and if indicated, sterile gowns. The surgical site was prepped with chlorhexidine gluconate  and draped in the maximal applicable sterile fashion. A preliminary ultrasonogram was performed of the target that revealed a patent and compressible Right internal jugular vein. Pertinent ultrasound images were stored in the PACS for documentation. A sterile prep and drape of the right neck and upper chest was performed using maximal sterile technique. Using aseptic precautions, real-time ultrasound guidance, the target vein was  accessed after local anesthetic infiltration and dermatotomy with an access needle. A guidewire was advanced into the central venous system under fluoroscopic guidance. Over the wire following standard technique a peel-away sheath was placed. After local anesthesia, an incision was created at the exit site location and a cuffed tunneled dialysis catheter of an appropriate length was tunneled from the exit site to the venotomy site using a tunneling device. The catheter was advanced into the venous system through the peel-away sheath which was removed. The catheter aspirated and flushed well and was terminally packed with heparin 1000 units per cc. The catheter was secured to skin using nonabsorbable suture and a CHG dressing applied. The venotomy site was closed using Dermabond. An aseptic dressing was applied using the protocol for Dermabond. The patient was transferred to the recovery area and was discharged from the department in stable condition.  Complications: None immediate.    Device: 15.5 Pakistani x 19 cm cuff to tip Duraflow catheter. Findings: Patent and compressible Right internal jugular. Final image shows the catheter to be in good position with the catheter tip in the right atrium, an excellent position for use. There is no complication.      Impression: Impression:    Successful ultrasound and fluoroscopic guided Right internal jugular vein route cuffed tunneled hemodialysis catheter placement as described above. Thank you for the opportunity to assist in the care of your patient. Electronically Signed: Jose Pina MD  3/26/2024 3:22 PM EDT  Workstation ID: [id number]     Results for orders placed during the hospital encounter of 07/28/21    Adult Transthoracic Echo Complete W/ Cont if Necessary Per Protocol (With Agitated Saline)    Interpretation Summary  · Left ventricular ejection fraction appears to be 56 - 60%.  · Normal left atrial size and volume noted. No evidence of a patent foramen ovale.  Saline test results are negative  · Mild mitral annular calcification is present. There is mild, anterior mitral leaflet thickening present. Trace mitral regurgitation.      Current medications:  Scheduled Meds:allopurinol, 100 mg, Oral, Daily  amLODIPine, 10 mg, Oral, Daily  aspirin, 81 mg, Oral, Daily  atorvastatin, 80 mg, Oral, Nightly  b complex-vitamin c-folic acid, 1 tablet, Oral, Daily  calcitriol, 0.25 mcg, Oral, Daily  calcium acetate, 1,334 mg, Oral, TID  carvedilol, 6.25 mg, Oral, BID With Meals  cetirizine, 10 mg, Oral, Daily  epoetin minnie/minnie-epbx, 20,000 Units, Subcutaneous, Weekly  ferrous sulfate, 325 mg, Oral, BID With Meals  fluticasone, 2 spray, Nasal, Daily  sodium chloride, 10 mL, Intravenous, Q12H      Continuous Infusions:sodium chloride, 100 mL/hr, Last Rate: 100 mL/hr (03/26/24 0242)      PRN Meds:.  acetaminophen    albumin human    senna-docusate sodium **AND** polyethylene glycol **AND** bisacodyl **AND** bisacodyl    heparin (porcine)    ondansetron    prochlorperazine **OR** prochlorperazine **OR** prochlorperazine    sodium chloride    sodium chloride    Assessment & Plan   Assessment & Plan     Active Hospital Problems    Diagnosis  POA    **Ataxia [R27.0]  Yes    SAMINA (acute kidney injury) [N17.9]  Yes    History of stroke s/p L MCA stent [Z86.73]  Not Applicable    Mixed hyperlipidemia [E78.2]  Yes    Stage 4 chronic kidney disease [N18.4]  Yes    Essential hypertension [I10]  Yes      Resolved Hospital Problems   No resolved problems to display.        Brief Hospital Course to date:  Eyal Solano is a 73 y.o. male  with history of CVA s/p L MCA stent, HTN, HL, CKD IV, here with 2 days of dizziness, ataxia, blurred/double vision and nausea/vomiting. He was found to have SAMINA on CKDIV with significant hyperkalemia upon admission.    This patient's problems and plans were partially entered by my partner and updated as appropriate by me 03/27/24..    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 Improved on last check from 6.5 to 5.5, however, this am, worsened again to 5.9. treating with Calcium, insulin/dextrose, lokelma, sodium bicarb and albuterol nebs again  -nephrology following  - HD catheter placement 3/26/24 per Dr. Melendez  - HD to start 3/26/24 after HD catheter placement  - next HD on Friday, 3/29/24     Anemia  -no history of bleeding, transfused 2/23 in [place], suspected AOCD     HTN  -monitor     HL  -statin    Expected Discharge Location and Transportation: home  Expected Discharge   Expected Discharge Date: 3/29/2024; Expected Discharge Time:      DVT prophylaxis:  Medical and mechanical DVT prophylaxis orders are present.         AM-PAC 6 Clicks Score (PT): 21 (03/26/24 2022)    CODE STATUS:   Code Status and Medical Interventions:   Ordered at: 03/24/24 0631     Code Status (Patient has no pulse and is not breathing):    CPR (Attempt to Resuscitate)     Medical Interventions (Patient has pulse or is breathing):    Full Support       Lili Denson, DO  03/27/24

## 2024-03-27 NOTE — PLAN OF CARE
Goal Outcome Evaluation:  Plan of Care Reviewed With: patient           Outcome Evaluation: Pt presents with balance below baseline and with gait limited by chronic R knee pain. Pt would benefit from PT to address pain and balance deficits. Rec home with assist and OP PT upon dc.      Anticipated Discharge Disposition (PT): home with assist, home with outpatient therapy services

## 2024-03-27 NOTE — THERAPY TREATMENT NOTE
Patient Name: Eyal Solano  : 1950    MRN: [id number]                              Today's Date: 3/27/2024       Admit Date: 3/24/2024    Visit Dx:     ICD-10-CM ICD-9-CM   1. Hyperkalemia  E87.5 276.7   2. Renal failure, unspecified chronicity  N19 586   3. Ataxia  R27.0 781.3   4. Diplopia  H53.2 368.2   5. History of CVA (cerebrovascular accident)  Z86.73 V12.54   6. 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 impairment of multiple sites without tophus    Rash    Dizziness    Non-seasonal allergic rhinitis    Mixed hyperlipidemia    Ataxia    Anemia    History of stroke s/p L MCA stent    Former smoker    Chronic L LUCRECIA occlusion     Stenosis of left middle cerebral artery    Peripheral vascular disease    Chewing tobacco nicotine dependence without complication    Plavix resistance    Prediabetes    Vitamin D deficiency    Nocturia    SAMINA (acute kidney injury)     Past Medical History:   Diagnosis Date    Acute gout of right knee 2018    Alcohol use     Conversion reaction     Dizziness     Enlarged prostate     Gout     Hyperlipidemia     Hypertension     Kidney disorder     Stroke     right side weakness     Past Surgical History:   Procedure Laterality Date    CEREBRAL ANGIOGRAM Left 2021    Procedure: Cerebral angiogram for Intracranial Stent under anesthesia;  Surgeon: Isaiah Grey MD;  Location: [facility] INVASIVE LOCATION;  Service: Interventional Radiology;  Laterality: Left;    NASAL SEPTUM SURGERY      10 years ago    NASAL SINUS SURGERY      TOOTH EXTRACTION Left 2022      General Information       Row Name 24 1115          Physical Therapy Time and Intention    Document Type evaluation  -KR     Mode of Treatment physical therapy;individual  therapy  -KR       Row Name 03/27/24 1115          General Information    Patient Profile Reviewed yes  -KR     Prior Level of Function independent:;all household mobility;community mobility;ADL's  -KR     Existing Precautions/Restrictions oxygen therapy device and L/min  -KR     Barriers to Rehab none identified  -KR       Row Name 03/27/24 1115          Living Environment    People in Home significant other  -KR       Row Name 03/27/24 1115          Home Main Entrance    Number of Stairs, Main Entrance none  -KR       Row Name 03/27/24 1115          Stairs Within Home, Primary    Number of Stairs, Within Home, Primary none  -KR       Row Name 03/27/24 1115          Cognition    Orientation Status (Cognition) oriented x 4  -KR       Row Name 03/27/24 1115          Safety Issues, Functional Mobility    Impairments Affecting Function (Mobility) pain  -KR     Comment, Safety Issues/Impairments (Mobility) chronic R knee pain  -KR               User Key  (r) = Recorded By, (t) = Taken By, (c) = Cosigned By      Initials Name Provider Type    Maddy Kirk PT Physical Therapist                   Mobility       Row Name 03/27/24 1116          Sit-Stand Transfer    Sit-Stand [place] (Transfers) standby assist  -KR     Comment, (Sit-Stand Transfer) 2x from chair  -KR       Row Name 03/27/24 1116          Gait/Stairs (Locomotion)    [place] Level (Gait) standby assist  -KR     Distance in Feet (Gait) 120  -KR     Deviations/Abnormal Patterns (Gait) [place] decreased;gait speed decreased;antalgic  -KR     Comment, (Gait/Stairs) pt ambulates with antalgic gait on RLE d/t chronic R knee pain. No overt instability or LOB.  -KR               User Key  (r) = Recorded By, (t) = Taken By, (c) = Cosigned By      Initials Name Provider Type    Maddy Kirk PT Physical Therapist                   Obj/Interventions       Row Name 03/27/24 1119          Range of Motion Comprehensive    General Range of Motion no  range of motion deficits identified  -KR       Row Name 03/27/24 1119          Strength Comprehensive (MMT)    General Manual Muscle Testing (MMT) Assessment no strength deficits identified  -KR       Row Name 03/27/24 1119          Balance    Balance Assessment sitting static balance;sitting dynamic balance;standing static balance;standing dynamic balance  -KR     Static Sitting Balance independent  -KR     Dynamic Sitting Balance independent  -KR     Position, Sitting Balance unsupported;sitting in chair  -KR     Static Standing Balance supervision  -KR     Dynamic Standing Balance standby assist  -KR     Position/Device Used, Standing Balance unsupported  -KR     Balance Interventions sitting;standing;sit to stand;static;dynamic  -KR     Comment, Balance pt retrieved item from ground with CGA. Pt donned pants in sitting and stood to pull them over hips with SBA.  -       Row Name 03/27/24 1119          Sensory Assessment (Somatosensory)    Sensory Assessment (Somatosensory) LE sensation intact  -KR               User Key  (r) = Recorded By, (t) = Taken By, (c) = Cosigned By      Initials Name Provider Type    Maddy Kirk, PT Physical Therapist                   Goals/Plan       Row Name 03/27/24 1121          Bed Mobility Goal 1 (PT)    Activity/Assistive Device (Bed Mobility Goal 1, PT) bed mobility activities, all  -KR     [place] Level/Cues Needed (Bed Mobility Goal 1, PT) independent  -KR     Time Frame (Bed Mobility Goal 1, PT) long term goal (LTG);2 weeks  -KR       Row Name 03/27/24 1121          Transfer Goal 1 (PT)    Activity/Assistive Device (Transfer Goal 1, PT) sit-to-stand/stand-to-sit;bed-to-chair/chair-to-bed  -KR     [place] Level/Cues Needed (Transfer Goal 1, PT) independent  -KR     Time Frame (Transfer Goal 1, PT) long term goal (LTG);2 weeks  -KR       Row Name 03/27/24 1121          Gait Training Goal 1 (PT)    Activity/Assistive Device (Gait Training Goal 1, PT) gait  (walking locomotion);improve balance and speed;increase endurance/gait distance  -KR     [place] Level (Gait Training Goal 1, PT) independent  -KR     Distance (Gait Training Goal 1, PT) 500  -KR     Time Frame (Gait Training Goal 1, PT) long term goal (LTG);2 weeks  -KR       Row Name 03/27/24 1121          Therapy Assessment/Plan (PT)    Planned Therapy Interventions (PT) balance training;bed mobility training;gait training;home exercise program;postural re-education;patient/family education;neuromuscular re-education;ROM (range of motion);stair training;strengthening;stretching;transfer training  -KR               User Key  (r) = Recorded By, (t) = Taken By, (c) = Cosigned By      Initials Name Provider Type    Maddy Kirk, PT Physical Therapist                   Clinical Impression       Row Name 03/27/24 1120          Pain    Pretreatment Pain Rating 7/10  -KR     Posttreatment Pain Rating 7/10  -KR     Pain Intervention(s) Repositioned;Ambulation/increased activity  -KR       Row Name 03/27/24 1120          Plan of Care Review    Plan of Care Reviewed With patient  -KR     Outcome Evaluation Pt presents with balance below baseline and with gait limited by chronic R knee pain. Pt would benefit from PT to address pain and balance deficits. Rec home with assist and OP PT upon dc.  -KR       Row Name 03/27/24 1120          Therapy Assessment/Plan (PT)    Rehab Potential (PT) good, to achieve stated therapy goals  -KR     Criteria for Skilled Interventions Met (PT) yes;skilled treatment is necessary  -KR     Therapy Frequency (PT) daily  -KR       Row Name 03/27/24 1120          Vital Signs    Pre Systolic BP Rehab 154  -KR     Pre Treatment Diastolic BP 84  -KR     Post Systolic BP Rehab 156  -KR     Post Treatment Diastolic BP 84  -KR     Pre Patient Position Sitting  -KR     Intra Patient Position Standing  -KR     Post Patient Position Sitting  -KR       Row Name 03/27/24 1120          Positioning  and Restraints    Pre-Treatment Position sitting in chair/recliner  -KR     Post Treatment Position chair  -KR     In Chair notified nsg;reclined;call light within reach;encouraged to call for assist;exit alarm on;waffle cushion;legs elevated  -KR               User Key  (r) = Recorded By, (t) = Taken By, (c) = Cosigned By      Initials Name Provider Type    Maddy Kirk, PT Physical Therapist                   Outcome Measures       Row Name 03/27/24 1323          How much help from another person do you currently need...    Turning from your back to your side while in flat bed without using bedrails? 4  -KR     Moving from lying on back to sitting on the side of a flat bed without bedrails? 4  -KR     Moving to and from a bed to a chair (including a wheelchair)? 3  -KR     Standing up from a chair using your arms (e.g., wheelchair, bedside chair)? 4  -KR     Climbing 3-5 steps with a railing? 3  -KR     To walk in hospital room? 3  -KR     AM-PAC 6 Clicks Score (PT) 21  -KR     Highest Level of Mobility Goal 6 --> Walk 10 steps or more  -KR       Row Name 03/27/24 1323 03/27/24 0938       Functional Assessment    Outcome Measure Options AM-PAC 6 Clicks Basic Mobility (PT)  -KR AM-PAC 6 Clicks Daily Activity (OT) (P)   -BC              User Key  (r) = Recorded By, (t) = Taken By, (c) = Cosigned By      Initials Name Provider Type    Maddy Kirk, PT Physical Therapist    Alejandro Fisher, OT Student OT Student                                 Physical Therapy Education       Title: PT OT SLP Therapies (In Progress)       Topic: Physical Therapy (In Progress)       Point: Mobility training (Done)       Learning Progress Summary             Patient Acceptance, E, VU by MINGO at 3/27/2024 1324                         Point: Home exercise program (Not Started)       Learner Progress:  Not documented in this visit.              Point: Body mechanics (Done)       Learning Progress Summary             Patient  Acceptance, E, VU by MINGO at 3/27/2024 1324                         Point: Precautions (Done)       Learning Progress Summary             Patient Acceptance, E, VU by MINGO at 3/27/2024 1324                                         User Key       Initials Effective Dates Name Provider Type Discipline     12/30/22 -  Maddy Thomas PT Physical Therapist PT                  PT Recommendation and Plan  Planned Therapy Interventions (PT): balance training, bed mobility training, gait training, home exercise program, postural re-education, patient/family education, neuromuscular re-education, ROM (range of motion), stair training, strengthening, stretching, transfer training  Plan of Care Reviewed With: patient  Outcome Evaluation: Pt presents with balance below baseline and with gait limited by chronic R knee pain. Pt would benefit from PT to address pain and balance deficits. Rec home with assist and OP PT upon dc.     Time Calculation:   PT Evaluation Complexity  History, PT Evaluation Complexity: 3 or more personal factors and/or comorbidities  Examination of Body Systems (PT Eval Complexity): total of 4 or more elements  Clinical Presentation (PT Evaluation Complexity): stable  Clinical Decision Making (PT Evaluation Complexity): low complexity  Overall Complexity (PT Evaluation Complexity): low complexity     PT Charges       Row Name 03/27/24 1325             Time Calculation    Start Time 1053  -KR      PT Received On 03/27/24  -KR      PT Goal Re-Cert Due Date 04/06/24  -KR         Untimed Charges    PT Eval/Re-eval Minutes 46  -KR         Total Minutes    Untimed Charges Total Minutes 46  -KR       Total Minutes 46  -KR                User Key  (r) = Recorded By, (t) = Taken By, (c) = Cosigned By      Initials Name Provider Type    Maddy Kirk PT Physical Therapist                  Therapy Charges for Today       Code Description Service Date Service Provider Modifiers Qty    [id number] HC PT EVAL LOW  COMPLEXITY 4 3/27/2024 Maddy Thomas, PT GP 1            PT G-Codes  Outcome Measure Options: AM-PAC 6 Clicks Basic Mobility (PT)  AM-PAC 6 Clicks Score (PT): 21  AM-PAC 6 Clicks Score (OT): (P) 23  PT Discharge Summary  Anticipated Discharge Disposition (PT): home with assist, home with outpatient therapy services    Maddy Thomas, PT  3/27/2024

## 2024-03-27 NOTE — PLAN OF CARE
Problem: Adult Inpatient Plan of Care  Goal: Absence of Hospital-Acquired Illness or Injury  Intervention: Identify and Manage Fall Risk  Recent Flowsheet Documentation  Taken 3/27/2024 1400 by Poornima Suresh RN  Safety Promotion/Fall Prevention: safety round/check completed  Taken 3/27/2024 1200 by Poornima Suresh RN  Safety Promotion/Fall Prevention: safety round/check completed  Taken 3/27/2024 1000 by Poornima Suresh RN  Safety Promotion/Fall Prevention: safety round/check completed  Taken 3/27/2024 0800 by Poornima Suresh RN  Safety Promotion/Fall Prevention: activity supervised  Intervention: Prevent Skin Injury  Recent Flowsheet Documentation  Taken 3/27/2024 1400 by Poornima Suresh RN  Body Position: position changed independently  Skin Protection: adhesive use limited  Taken 3/27/2024 1200 by Poornima Suresh RN  Body Position: position changed independently  Skin Protection: adhesive use limited  Taken 3/27/2024 1000 by Poornima Suresh RN  Body Position: position changed independently  Skin Protection: adhesive use limited  Taken 3/27/2024 0800 by Poornima Suresh RN  Body Position: position changed independently  Skin Protection: adhesive use limited  Intervention: Prevent and Manage VTE (Venous Thromboembolism) Risk  Recent Flowsheet Documentation  Taken 3/27/2024 0800 by Poornima Suresh RN  Activity Management: activity minimized  VTE Prevention/Management:   bilateral   sequential compression devices off   patient refused intervention  Range of Motion: active ROM (range of motion) encouraged  Intervention: Prevent Infection  Recent Flowsheet Documentation  Taken 3/27/2024 1400 by Poornima Suresh RN  Infection Prevention: environmental surveillance performed  Taken 3/27/2024 1200 by Poornima Suresh RN  Infection Prevention: environmental surveillance performed  Taken 3/27/2024 1000 by Poornima Suresh RN  Infection Prevention: environmental surveillance performed  Taken 3/27/2024  0800 by Poornima Suresh RN  Infection Prevention: environmental surveillance performed  Goal: Optimal Comfort and Wellbeing  Intervention: Provide Person-Centered Care  Recent Flowsheet Documentation  Taken 3/27/2024 0800 by Poornima Suresh RN  Trust Relationship/Rapport:   choices provided   care explained     Problem: Device-Related Complication Risk (Hemodialysis)  Goal: Safe, Effective Therapy Delivery  Intervention: Optimize Device Care and Function  Recent Flowsheet Documentation  Taken 3/27/2024 1400 by Poornima Suresh RN  Medication Review/Management: medications reviewed  Taken 3/27/2024 1200 by Poornima Suresh RN  Medication Review/Management: medications reviewed  Taken 3/27/2024 1000 by Poornima Suresh RN  Medication Review/Management: medications reviewed  Taken 3/27/2024 0800 by Poornima Suresh RN  Medication Review/Management: medications reviewed  Goal: Safe, Effective Therapy Delivery  Intervention: Optimize Device Care and Function  Recent Flowsheet Documentation  Taken 3/27/2024 1400 by Poornima Suresh RN  Medication Review/Management: medications reviewed  Taken 3/27/2024 1200 by Poornima Suresh RN  Medication Review/Management: medications reviewed  Taken 3/27/2024 1000 by Poornima Suresh RN  Medication Review/Management: medications reviewed  Taken 3/27/2024 0800 by Poornima Suresh RN  Medication Review/Management: medications reviewed     Problem: Adjustment to Illness (Chronic Kidney Disease)  Goal: Optimal Coping with Chronic Illness  Intervention: Support Psychosocial Response  Recent Flowsheet Documentation  Taken 3/27/2024 0800 by Poornima Suresh RN  Family/Support System Care: self-care encouraged     Problem: Fluid Volume Excess (Chronic Kidney Disease)  Goal: Fluid Balance  Intervention: Monitor and Manage Hypervolemia  Recent Flowsheet Documentation  Taken 3/27/2024 1400 by Poornima Suresh RN  Skin Protection: adhesive use limited  Taken 3/27/2024 1200 by  [place], Poornima, RN  Skin Protection: adhesive use limited  Taken 3/27/2024 1000 by Poornima Suresh RN  Skin Protection: adhesive use limited  Taken 3/27/2024 0800 by Poornima Suresh RN  Skin Protection: adhesive use limited     Problem: Functional Decline (Chronic Kidney Disease)  Goal: Optimal Functional Ability  Intervention: Optimize Functional Ability  Recent Flowsheet Documentation  Taken 3/27/2024 0800 by Poornima Suresh RN  Activity Management: activity minimized     Problem: Renal Function Impairment (Chronic Kidney Disease)  Goal: Minimize Renal Failure Effects  Intervention: Monitor and Support Renal Function  Recent Flowsheet Documentation  Taken 3/27/2024 1400 by Poornima Suresh RN  Medication Review/Management: medications reviewed  Taken 3/27/2024 1200 by Poornima Suresh RN  Medication Review/Management: medications reviewed  Taken 3/27/2024 1000 by Poornima Suresh RN  Medication Review/Management: medications reviewed  Taken 3/27/2024 0800 by Poornima Suresh RN  Medication Review/Management: medications reviewed     Problem: Anemia  Goal: Anemia Symptom Improvement  Intervention: Monitor and Manage Anemia  Recent Flowsheet Documentation  Taken 3/27/2024 1400 by Poornima Suresh RN  Safety Promotion/Fall Prevention: safety round/check completed  Taken 3/27/2024 1200 by Poorinma Suresh RN  Safety Promotion/Fall Prevention: safety round/check completed  Taken 3/27/2024 1000 by Poornima Suresh RN  Safety Promotion/Fall Prevention: safety round/check completed  Taken 3/27/2024 0800 by Poornima Suresh RN  Safety Promotion/Fall Prevention: activity supervised     Problem: Fall Injury Risk  Goal: Absence of Fall and Fall-Related Injury  Intervention: Identify and Manage Contributors  Recent Flowsheet Documentation  Taken 3/27/2024 1400 by Poornima Suresh RN  Medication Review/Management: medications reviewed  Taken 3/27/2024 1200 by Poornima Suresh RN  Medication Review/Management:  medications reviewed  Taken 3/27/2024 1000 by Poornima Suresh RN  Medication Review/Management: medications reviewed  Taken 3/27/2024 0800 by Poornima Suresh RN  Medication Review/Management: medications reviewed  Intervention: Promote Injury-Free Environment  Recent Flowsheet Documentation  Taken 3/27/2024 1400 by Poornima Suresh RN  Safety Promotion/Fall Prevention: safety round/check completed  Taken 3/27/2024 1200 by Poornima Suresh RN  Safety Promotion/Fall Prevention: safety round/check completed  Taken 3/27/2024 1000 by Poornima Suresh RN  Safety Promotion/Fall Prevention: safety round/check completed  Taken 3/27/2024 0800 by Poornima Suresh RN  Safety Promotion/Fall Prevention: activity supervised   Goal Outcome Evaluation:

## 2024-03-27 NOTE — PROGRESS NOTES
"Pharmacy Consult-Vancomycin Dosing  Eyal Solano is a  73 y.o. male receiving vancomycin therapy.     Indication: sepsis  Consulting Provider: hospitalist  ID Consult: NO    Goal Trough: 15-20    Current Antimicrobial Therapy  Anti-Infectives (From admission, onward)      Ordered     Dose/Rate Route Frequency Start Stop    03/26/24 2334  piperacillin-tazobactam (ZOSYN) 3.375 g IVPB in 100 mL NS MBP (CD)        Ordering Provider: Sofya Arellano APRN    3.375 g  over 4 Hours Intravenous Every 12 Hours 03/27/24 1400 04/01/24 1359    03/26/24 2342  vancomycin (dosing per levels)        Ordering Provider: Freddie Sheehan, PharmD     Does not apply Daily 03/27/24 0900 04/02/24 0859    03/26/24 2334  piperacillin-tazobactam (ZOSYN) 3.375 g IVPB in 100 mL NS MBP (CD)        Ordering Provider: Sofya Arellano APRN    3.375 g  over 30 Minutes Intravenous Once 03/27/24 0030      03/26/24 2342  vancomycin IVPB 1500 mg in 0.9% NaCl (Premix) 500 mL        Ordering Provider: Freddie Sheehan, PharmD    20 mg/kg × 80.5 kg  333.3 mL/hr over 90 Minutes Intravenous Once 03/27/24 0030      03/26/24 2334  Pharmacy to dose vancomycin        Ordering Provider: Sofya Arellano APRN     Does not apply Continuous PRN 03/26/24 2334 04/02/24 2333            Allergies  Allergies as of 03/24/2024    (No Known Allergies)       Labs  Results from last 7 days   Lab Units 03/26/24  0333 03/25/24  0705 03/24/24  2054   BUN mg/dL 101* 104* 104*   CREATININE mg/dL 6.05* 5.54* 5.19*     Results from last 7 days   Lab Units 03/26/24  0333 03/25/24  0705 03/24/24  1631   WBC 10*3/mm3 5.93 7.60 8.38       Evaluation of Dosing  Last Dose Received in the ED/Outside Facility:        N/A  Is Patient on Dialysis or Renal Replacement:        Yes - nearing ESRD - hemodialysis started 3/26    Ht - 177.8 cm (70\")  Wt - 80.5 kg (177 lb 6.4 oz)    Estimated Creatinine Clearance: 12.8 mL/min (A) (by C-G formula based on SCr of 6.05 mg/dL (H)).  Intake " & Output (last 3 days)         03/24 0701  03/25 0700 03/25 0701  03/26 0700 03/26 0701  03/27 0700    P.O.  100 240    I.V. (mL/kg)  1150 (14.3)     Blood   394.2    Other   320    Total Intake(mL/kg)  1250 (15.5) 954.2 (11.9)    Urine (mL/kg/hr) 1600 1750 (0.9) 1225 (0.9)    Dialysis   320    Total Output 1600 1750 1545    Net -1600 -500 -590.8                   Microbiology and Radiology  Microbiology Results (last 10 days)       ** No results found for the last 240 hours. **            Vancomycin Levels:                Assessment/Plan:  1.  Will initiate dose at 1500 mg IV once  2.  Pharmacy will continue to follow and order a vancomycin dose after each dialysis session.  3.  Pharmacy will order a vancomycin level prior to the 2nd or 3rd inpatient dialysis session.    Freddie Sheehan, PharmD, BCPS  3/26/2024  23:44 EDT

## 2024-03-27 NOTE — PLAN OF CARE
Goal Outcome Evaluation:  Plan of Care Reviewed With: (P) patient           Outcome Evaluation: (P) Pt presents at baseline for ADL completion, skilled IPOT services not warranted. OT signing off. Rec d/c home when medically available.      Anticipated Discharge Disposition (OT): (P) home

## 2024-03-27 NOTE — THERAPY DISCHARGE NOTE
Acute Care - Occupational Therapy Discharge  [facility]    Patient Name: Eyal Solano  : 1950    MRN: [id number]                              Today's Date: 3/27/2024       Admit Date: 3/24/2024    Visit Dx:     ICD-10-CM ICD-9-CM   1. Hyperkalemia  E87.5 276.7   2. Renal failure, unspecified chronicity  N19 586   3. Ataxia  R27.0 781.3   4. Diplopia  H53.2 368.2   5. History of CVA (cerebrovascular accident)  Z86.73 V12.54   6. 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 impairment of multiple sites without tophus    Rash    Dizziness    Non-seasonal allergic rhinitis    Mixed hyperlipidemia    Ataxia    Anemia    History of stroke s/p L MCA stent    Former smoker    Chronic L LUCRECIA occlusion     Stenosis of left middle cerebral artery    Peripheral vascular disease    Chewing tobacco nicotine dependence without complication    Plavix resistance    Prediabetes    Vitamin D deficiency    Nocturia    SAMINA (acute kidney injury)     Past Medical History:   Diagnosis Date    Acute gout of right knee 2018    Alcohol use     Conversion reaction     Dizziness     Enlarged prostate     Gout     Hyperlipidemia     Hypertension     Kidney disorder     Stroke     right side weakness     Past Surgical History:   Procedure Laterality Date    CEREBRAL ANGIOGRAM Left 2021    Procedure: Cerebral angiogram for Intracranial Stent under anesthesia;  Surgeon: Isaiah Grey MD;  Location: [facility] INVASIVE LOCATION;  Service: Interventional Radiology;  Laterality: Left;    NASAL SEPTUM SURGERY      10 years ago    NASAL SINUS SURGERY      TOOTH EXTRACTION Left 2022      General Information       Row Name 24 0918          OT Time and Intention    Document Type discharge  evaluation/summary  -AN (r) BC (t) AN (c)     Mode of Treatment occupational therapy  -AN (r) BC (t) AN (c)       Row Name 03/27/24 0918          General Information    Patient Profile Reviewed yes  -AN (r) BC (t) AN (c)     Prior Level of Function independent:;all household mobility;transfer;ADL's;home management  -AN (r) BC (t) AN (c)     Existing Precautions/Restrictions fall  -AN (r) BC (t) AN (c)     Barriers to Rehab medically complex  -AN (r) BC (t) AN (c)       Row Name 03/27/24 0918          Living Environment    People in Home significant other  -AN (r) BC (t) AN (c)       Row Name 03/27/24 0918          Home Main Entrance    Number of Stairs, Main Entrance none  -AN (r) BC (t) AN (c)     Stair Railings, Main Entrance none  -AN (r) BC (t) AN (c)       Row Name 03/27/24 0918          Stairs Within Home, Primary    Number of Stairs, Within Home, Primary none  -AN (r) BC (t) AN (c)     Stair Railings, Within Home, Primary none  -AN (r) BC (t) AN (c)       Row Name 03/27/24 0918          Cognition    Orientation Status (Cognition) oriented x 3  -AN (r) BC (t) AN (c)       Row Name 03/27/24 0918          Safety Issues, Functional Mobility    Safety Issues Affecting Function (Mobility) awareness of need for assistance;insight into deficits/self-awareness;safety precautions follow-through/compliance;safety precaution awareness  -AN (r) BC (t) AN (c)     Impairments Affecting Function (Mobility) endurance/activity tolerance;pain  -AN (r) BC (t) AN (c)     Comment, Safety Issues/Impairments (Mobility) O2 unreliable pleth, Pt was unsymptomatic. (P)   -BC               User Key  (r) = Recorded By, (t) = Taken By, (c) = Cosigned By      Initials Name Provider Type    Rubia Vogel OT Occupational Therapist    Alejandro Fisher OT Student OT Student                   Mobility/ADL's       Row Name 03/27/24 0920          Bed Mobility    Bed Mobility sit-supine  -AN (r) BC (t) AN (c)     Sit-Supine [place]  (Bed Mobility) modified independence  -AN (r) BC (t) AN (c)     Assistive Device (Bed Mobility) head of bed elevated  -AN (r) BC (t) AN (c)       Row Name 03/27/24 0920          Transfers    Transfers bed-chair transfer;sit-stand transfer;stand-sit transfer  -AN (r) BC (t) AN (c)       Row Name 03/27/24 0920          Bed-Chair Transfer    Bed-Chair [place] (Transfers) contact guard;verbal cues  -AN (r) BC (t) AN (c)     Comment, (Bed-Chair Transfer) CGA for safety during eval and d/t inconsistant O2 readings on monitor.  -AN (r) BC (t) AN (c)       Row Name 03/27/24 0920          Sit-Stand Transfer    Sit-Stand [place] (Transfers) contact guard;verbal cues  -AN (r) BC (t) AN (c)       Row Name 03/27/24 0920          Stand-Sit Transfer    Stand-Sit [place] (Transfers) verbal cues;contact guard;nonverbal cues (demo/gesture)  -AN (r) BC (t) AN (c)       Row Name 03/27/24 0920          Functional Mobility    Functional Mobility- Ind. Level not tested  -AN (r) BC (t) AN (c)     Functional Mobility-Distance (Feet) --  N/A  -AN (r) BC (t) AN (c)     Functional Mobility- Comment Defer to PT for specifics, Pt also requested to eat breakfast [facility].  -AN (r) BC (t) AN (c)       Row Name 03/27/24 0920          Activities of Daily Living    BADL Assessment/Intervention upper body dressing;lower body dressing;feeding  -AN (r) BC (t) AN (c)       Row Name 03/27/24 0920          Upper Body Dressing Assessment/Training    [place] Level (Upper Body Dressing) don;pajama/robe;minimum assist (75% patient effort)  -AN (r) BC (t) AN (c)     Position (Upper Body Dressing) edge of bed sitting;unsupported sitting  -AN (r) BC (t) AN (c)       Row Name 03/27/24 0920          Lower Body Dressing Assessment/Training    [place] Level (Lower Body Dressing) doff;don;socks;standby assist  -AN (r) BC (t) AN (c)     Position (Lower Body Dressing) edge of bed sitting;unsupported sitting  -AN (r) BC (t) AN (c)       Row Name  03/27/24 0920          Self-Feeding Assessment/Training    [place] Level (Feeding) knife use;prepare tray/open items;scoop food and bring to mouth;set up  -AN (r) BC (t) AN (c)     Position (Self-Feeding) supported sitting  -AN (r) BC (t) AN (c)               User Key  (r) = Recorded By, (t) = Taken By, (c) = Cosigned By      Initials Name Provider Type    AN Rubia Luke, OT Occupational Therapist    Alejandro Fisher, OT Student OT Student                   Obj/Interventions       Row Name 03/27/24 0927          Sensory Assessment (Somatosensory)    Sensory Assessment (Somatosensory) sensation intact  -AN (r) BC (t) AN (c)       Row Name 03/27/24 0927          Vision Assessment/Intervention    Visual Impairment/Limitations WFL  -AN (r) BC (t) AN (c)       Row Name 03/27/24 0927          Range of Motion Comprehensive    General Range of Motion no range of motion deficits identified  -AN (r) BC (t) AN (c)       Row Name 03/27/24 0927          Strength Comprehensive (MMT)    General Manual Muscle Testing (MMT) Assessment no strength deficits identified  -AN (r) BC (t) AN (c)       Row Name 03/27/24 0927          Motor Skills    Motor Skills coordination;functional endurance  -AN (r) BC (t) AN (c)     Coordination WFL  -AN (r) BC (t) AN (c)     Functional Endurance No c/o SOA or fatigue despite inconsistant SpO2 readings on tele monitor.  -AN (r) BC (t) AN (c)       Row Name 03/27/24 0927          Balance    Balance Assessment sitting static balance;sitting dynamic balance;sit to stand dynamic balance;standing static balance;standing dynamic balance  -AN (r) BC (t) AN (c)     Static Sitting Balance independent  -AN (r) BC (t) AN (c)     Dynamic Sitting Balance standby assist  -AN (r) BC (t) AN (c)     Position, Sitting Balance unsupported;sitting edge of bed  -AN (r) BC (t) AN (c)     Sit to Stand Dynamic Balance contact guard  -AN (r) BC (t) AN (c)     Static Standing Balance contact guard  -AN (r) BC (t)  AN (c)     Dynamic Standing Balance contact guard  -AN (r) BC (t) AN (c)     Position/Device Used, Standing Balance unsupported  -AN (r) BC (t) AN (c)     Balance Interventions sitting;standing;sit to stand;supported;static;dynamic;occupation based/functional task  -AN (r) BC (t) AN (c)     Comment, Balance No LOB noted  -AN (r) BC (t) AN (c)               User Key  (r) = Recorded By, (t) = Taken By, (c) = Cosigned By      Initials Name Provider Type    Rubia Vogel, OT Occupational Therapist    Alejandro Fisher, OT Student OT Student                   Goals/Plan    No documentation.                  Clinical Impression       Row Name 03/27/24 0933          Pain Assessment    Pretreatment Pain Rating 2/10  -AN (r) BC (t) AN (c)     Posttreatment Pain Rating 2/10  -AN (r) BC (t) AN (c)     Pain Location - Side/Orientation Right  -AN (r) BC (t) AN (c)     Pain Location generalized  -AN (r) BC (t) AN (c)     Pain Location - knee  -AN (r) BC (t) AN (c)     Pain Intervention(s) Ambulation/increased activity;Repositioned  -AN (r) BC (t) AN (c)       Row Name 03/27/24 0933          Plan of Care Review    Plan of Care Reviewed With patient  -AN (r) BC (t) AN (c)     Outcome Evaluation Pt presents at baseline for ADL completion, skilled IPOT services not warranted. OT signing off. Rec d/c home when medically available.  -AN (r) BC (t) AN (c)       Row Name 03/27/24 0933          Therapy Assessment/Plan (OT)    Criteria for Skilled Therapeutic Interventions Met (OT) no;no problems identified which require skilled intervention  -AN (r) BC (t) AN (c)     Therapy Frequency (OT) evaluation only  -AN (r) BC (t) AN (c)       Row Name 03/27/24 0933          Therapy Plan Review/Discharge Plan (OT)    Anticipated Discharge Disposition (OT) home  -AN (r) BC (t) AN (c)       Row Name 03/27/24 0933          Vital Signs    Pre Systolic BP Rehab 154  -AN (r) BC (t) AN (c)     Pre Treatment Diastolic BP 84  -AN (r) BC (t) AN (c)      Pretreatment Heart Rate (beats/min) 78  -AN (r) BC (t) AN (c)     Posttreatment Heart Rate (beats/min) 82  -AN (r) BC (t) AN (c)     Pre SpO2 (%) 94  -AN (r) BC (t) AN (c)     O2 Delivery Pre Treatment nasal cannula  -AN (r) BC (t) AN (c)     Intra SpO2 (%) 90  -AN (r) BC (t) AN (c)     O2 Delivery Intra Treatment room air  -AN (r) BC (t) AN (c)     Post SpO2 (%) 92  -AN (r) BC (t) AN (c)     O2 Delivery Post Treatment room air  -AN (r) BC (t) AN (c)     Pre Patient Position Supine  -AN (r) BC (t) AN (c)     Intra Patient Position Standing  -AN (r) BC (t) AN (c)     Post Patient Position Sitting  -AN (r) BC (t) AN (c)       Row Name 03/27/24 0933          Positioning and Restraints    Pre-Treatment Position in bed  -AN (r) BC (t) AN (c)     Post Treatment Position chair  -AN (r) BC (t) AN (c)     In Chair notified nsg;reclined;call light within reach;encouraged to call for assist;exit alarm on;waffle cushion;legs elevated  -AN (r) BC (t) AN (c)               User Key  (r) = Recorded By, (t) = Taken By, (c) = Cosigned By      Initials Name Provider Type    Rubia Vogel OT Occupational Therapist    Alejandro Fisher OT Student OT Student                   Outcome Measures       Row Name 03/27/24 0938          How much help from another is currently needed...    Putting on and taking off regular lower body clothing? 4  -AN (r) BC (t) AN (c)     Bathing (including washing, rinsing, and drying) 3  -AN (r) BC (t) AN (c)     Toileting (which includes using toilet bed pan or urinal) 4  -AN (r) BC (t) AN (c)     Putting on and taking off regular upper body clothing 4  -AN (r) BC (t) AN (c)     Taking care of personal grooming (such as brushing teeth) 4  -AN (r) BC (t) AN (c)     Eating meals 4  -AN (r) BC (t) AN (c)     AM-PAC 6 Clicks Score (OT) 23  -AN (r) BC (t)       Row Name 03/27/24 7690          How much help from another person do you currently need...    Turning from your back to your side while in flat  bed without using bedrails? 4  -KR     Moving from lying on back to sitting on the side of a flat bed without bedrails? 4  -KR     Moving to and from a bed to a chair (including a wheelchair)? 3  -KR     Standing up from a chair using your arms (e.g., wheelchair, bedside chair)? 4  -KR     Climbing 3-5 steps with a railing? 3  -KR     To walk in hospital room? 3  -KR     AM-PAC 6 Clicks Score (PT) 21  -KR     Highest Level of Mobility Goal 6 --> Walk 10 steps or more  -KR       Row Name 03/27/24 1323 03/27/24 0938       Functional Assessment    Outcome Measure Options AM-PAC 6 Clicks Basic Mobility (PT)  -KR AM-PAC 6 Clicks Daily Activity (OT)  -AN (r) BC (t) AN (c)              User Key  (r) = Recorded By, (t) = Taken By, (c) = Cosigned By      Initials Name Provider Type    Rubia Vogel, OT Occupational Therapist    Maddy Kirk, PT Physical Therapist    Alejandro Fisher, OT Student OT Student                  Occupational Therapy Education       Title: PT OT SLP Therapies (In Progress)       Topic: Occupational Therapy (In Progress)       Point: ADL training (Done)       Description:   Instruct learner(s) on proper safety adaptation and remediation techniques during self care or transfers.   Instruct in proper use of assistive devices.                  Learning Progress Summary             Patient Acceptance, E, VU by BC at 3/27/2024 0825                         Point: Home exercise program (Not Started)       Description:   Instruct learner(s) on appropriate technique for monitoring, assisting and/or progressing therapeutic exercises/activities.                  Learner Progress:  Not documented in this visit.              Point: Precautions (Done)       Description:   Instruct learner(s) on prescribed precautions during self-care and functional transfers.                  Learning Progress Summary             Patient Acceptance, E, VU by BC at 3/27/2024 0825                         Point: Body  mechanics (Done)       Description:   Instruct learner(s) on proper positioning and spine alignment during self-care, functional mobility activities and/or exercises.                  Learning Progress Summary             Patient Acceptance, E, VU by BC at 3/27/2024 0825                                         User Key       Initials Effective Dates Name Provider Type Discipline    BC 01/09/24 -  Alejandro Lizama OT Student OT Student OT                  OT Recommendation and Plan  Therapy Frequency (OT): evaluation only  Plan of Care Review  Plan of Care Reviewed With: patient  Outcome Evaluation: Pt presents at baseline for ADL completion, skilled IPOT services not warranted. OT signing off. Rec d/c home when medically available.  Plan of Care Reviewed With: patient  Outcome Evaluation: Pt presents at baseline for ADL completion, skilled IPOT services not warranted. OT signing off. Rec d/c home when medically available.     Time Calculation:   Evaluation Complexity (OT)  Review Occupational Profile/Medical/Therapy History Complexity: brief/low complexity  Assessment, Occupational Performance/Identification of Deficit Complexity: 1-3 performance deficits  Clinical Decision Making Complexity (OT): problem focused assessment/low complexity  Overall Complexity of Evaluation (OT): low complexity     Time Calculation- OT       Row Name 03/27/24 0940             Time Calculation- OT    OT Start Time 0825  -AN (r) BC (t) AN (c)      OT Received On 03/27/24  -AN (r) BC (t) AN (c)         Timed Charges    18257 - OT Therapeutic Activity Minutes 4  -AN (r) BC (t) AN (c)      17819 - OT Self Care/Mgmt Minutes 8  -AN (r) BC (t) AN (c)         Untimed Charges    OT Eval/Re-eval Minutes 50  -AN (r) BC (t) AN (c)         Total Minutes    Timed Charges Total Minutes 12  -AN (r) BC (t)      Untimed Charges Total Minutes 50  -AN (r) BC (t)       Total Minutes 62  -AN (r) BC (t)                User Key  (r) = Recorded By, (t) = Taken  By, (c) = Cosigned By      Initials Name Provider Type    Rubia Vogel, OT Occupational Therapist    BC Alejandro Lizama OT Student OT Student                  Therapy Charges for Today       Code Description Service Date Service Provider Modifiers Qty    04384270823 HC OT SELF CARE/MGMT/TRAIN EA 15 MIN 3/27/2024 Alejandro Lizama OT Student GO 1    [id number]  OT EVAL LOW COMPLEXITY 4 3/27/2024 Alejandro Lizama OT Student GO 1               OT Discharge Summary  Anticipated Discharge Disposition (OT): home  Reason for Discharge: At baseline function  Discharge Destination: Home    VIOLETA Devi  3/27/2024

## 2024-03-27 NOTE — PROGRESS NOTES
"   LOS: 3 days    Patient Care Team:  Edda Sanchez MD as PCP - General (Internal Medicine)  Praveen Fischer MD as Consulting Physician (Nephrology)    Subjective     Stable overnight.  Feeling a little better after dialysis yesterday.    Objective     Vital Signs:  Blood pressure 162/90, pulse 79, temperature 98.2 °F (36.8 °C), temperature source Oral, resp. rate 16, height 177.8 cm (70\"), weight 80.5 kg (177 lb 6.4 oz), SpO2 90%.      Intake/Output Summary (Last 24 hours) at 3/27/2024 0843  Last data filed at 3/27/2024 0806  Gross per 24 hour   Intake 954.17 ml   Output 1885 ml   Net -930.83 ml        03/26 0701 - 03/27 0700  In: 954.2 [P.O.:240]  Out: 1885 [Urine:1565]    Physical Exam:          General Appearance: WD AAM NAD  Neuro:   awake alert   Psych:  Normal mood and affect, cooperative  CV:   No edema  Lungs: respirations regular and unlabored  Abdomen: not distended  :  No parker  Skin: No rash, Warm and dry  [facility] TD 3/26/2024.      Labs:  Results from last 7 days   Lab Units 03/27/24  0030 03/26/24  1610 03/26/24  0333 03/25/24  0705   WBC 10*3/mm3 7.37  --  5.93 7.60   HEMOGLOBIN g/dL 7.7* 7.8* 6.2* 7.2*   PLATELETS 10*3/mm3 176  --  198 227     Results from last 7 days   Lab Units 03/27/24  0030 03/26/24  0333 03/25/24  1434 03/25/24  0705 03/24/24  2054 03/24/24  1631   SODIUM mmol/L 133* 135*  --  137 131* 135*   POTASSIUM mmol/L 4.6 5.0 5.0 5.9* 5.5* 6.5*   CHLORIDE mmol/L 99 102  --  103 98 101   CO2 mmol/L 23.0 19.0*  --  17.0* 18.0* 18.0*   BUN mg/dL 58* 101*  --  104* 104* 99*   CREATININE mg/dL 4.37* 6.05*  --  5.54* 5.19* 5.47*   CALCIUM mg/dL 7.9* 7.7*  --  7.3* 8.2* 7.4*   PHOSPHORUS mg/dL  --  5.4*  --   --   --   --    MAGNESIUM mg/dL 1.6  --   --   --   --  1.6   ALBUMIN g/dL 2.6* 2.8*  --   --   --  3.4*     Results from last 7 days   Lab Units 03/27/24  0030   ALK PHOS U/L 50   BILIRUBIN mg/dL 0.3   ALT (SGPT) U/L 18   AST (SGOT) U/L 17                   Estimated Creatinine " Clearance: 17.7 mL/min (A) (by C-G formula based on SCr of 4.37 mg/dL (H)).         A/P:      ESRD:   Follows with Dr. Nicholas in [place].  Patient has been heading towards dialysis prior to admission. . Urine output remains nonoliguric.  Tunneled dialysis catheter placed 3/26/2024 and HD initiated after BUN/creatinine increased further to 101/6.1.  Patient has had dialysis teaching and is very interested in home dialysis.  Dr. Melendez consulted to evaluate for PD catheter.  Arrangements being made at [facility].  For now would run 3 hours twice a week no UF to preserve underlying residual renal function.  Will plan next HD on Friday, 3/29/2024.  Run sooner if needed.     HTN: Blood pressure stable.     Hyperkalemia: Improved to 5.0 today.  Adjust with HD.  Low potassium diet.     Hyponatremia: Sodium improved overnight.  Monitor for now.     Metabolic Acidosis: Bicarbonate levels are low.  Replace bicarb.     Anemia: Hemoglobin below goal.  Patient with very poor renal function.  Will start Epogen.  Iron saturation low at 14%, however ferritin prohibitively elevated at 1200.  Unable to give IV iron for now.  Transfuse as needed for Hgb <7.     Volume: Stable.  Good urine output off diuretics.  Initially with underlying dehydration on admission after 2 days of GI upset.  Chest x-ray clear without pulmonary edema.  Hold further Lasix for now.    Hypocalcemia: Stable.  Patient on calcitriol and phosphorus binder calcium acetate.     Hyperphosphatemia: Stable at 5.4.  Continue Phos binder low Phos diet.    Nutrition: Started renal vitamin.  Will need to transition to a high-protein low phosphorus low potassium dialysis diet once taking p.o.     History of CVA: Evaluated by neurology.  Doubt new insult.    High risk and complexity patient    Guru Menjivar MD  03/27/24  08:43 EDT

## 2024-03-27 NOTE — SIGNIFICANT NOTE
Called per nursing secondary to new onset fever. Current temp 102.7.   Labs and BC x 2 ordered. CXR, UA.   Zosyn and Vancomycin empirically started.

## 2024-03-28 LAB
ALBUMIN SERPL-MCNC: 3.1 G/DL (ref 3.5–5.2)
ALBUMIN/GLOB SERPL: 1.5 G/DL
ALP SERPL-CCNC: 46 U/L (ref 39–117)
ALT SERPL W P-5'-P-CCNC: 15 U/L (ref 1–41)
ANION GAP SERPL CALCULATED.3IONS-SCNC: 12 MMOL/L (ref 5–15)
AST SERPL-CCNC: 14 U/L (ref 1–40)
BACTERIA SPEC AEROBE CULT: ABNORMAL
BILIRUB SERPL-MCNC: 0.3 MG/DL (ref 0–1.2)
BUN SERPL-MCNC: 55 MG/DL (ref 8–23)
BUN/CREAT SERPL: 10.2 (ref 7–25)
CALCIUM SPEC-SCNC: 7.8 MG/DL (ref 8.6–10.5)
CHLORIDE SERPL-SCNC: 103 MMOL/L (ref 98–107)
CO2 SERPL-SCNC: 25 MMOL/L (ref 22–29)
CREAT SERPL-MCNC: 5.37 MG/DL (ref 0.76–1.27)
DEPRECATED RDW RBC AUTO: 48.1 FL (ref 37–54)
EGFRCR SERPLBLD CKD-EPI 2021: 10.7 ML/MIN/1.73
ERYTHROCYTE [DISTWIDTH] IN BLOOD BY AUTOMATED COUNT: 15 % (ref 12.3–15.4)
GLOBULIN UR ELPH-MCNC: 2.1 GM/DL
GLUCOSE SERPL-MCNC: 111 MG/DL (ref 65–99)
GRAM STN SPEC: ABNORMAL
HCT VFR BLD AUTO: 23.8 % (ref 37.5–51)
HGB BLD-MCNC: 7.4 G/DL (ref 13–17.7)
ISOLATED FROM: ABNORMAL
MCH RBC QN AUTO: 27.1 PG (ref 26.6–33)
MCHC RBC AUTO-ENTMCNC: 31.1 G/DL (ref 31.5–35.7)
MCV RBC AUTO: 87.2 FL (ref 79–97)
PLATELET # BLD AUTO: 188 10*3/MM3 (ref 140–450)
PMV BLD AUTO: 10.9 FL (ref 6–12)
POTASSIUM SERPL-SCNC: 4.9 MMOL/L (ref 3.5–5.2)
PROT SERPL-MCNC: 5.2 G/DL (ref 6–8.5)
RBC # BLD AUTO: 2.73 10*6/MM3 (ref 4.14–5.8)
SODIUM SERPL-SCNC: 140 MMOL/L (ref 136–145)
WBC NRBC COR # BLD AUTO: 9.21 10*3/MM3 (ref 3.4–10.8)

## 2024-03-28 PROCEDURE — 25810000003 SODIUM CHLORIDE 0.9 % SOLUTION: Performed by: HOSPITALIST

## 2024-03-28 PROCEDURE — 99232 SBSQ HOSP IP/OBS MODERATE 35: CPT | Performed by: HOSPITALIST

## 2024-03-28 PROCEDURE — 87040 BLOOD CULTURE FOR BACTERIA: CPT | Performed by: HOSPITALIST

## 2024-03-28 PROCEDURE — 85027 COMPLETE CBC AUTOMATED: CPT | Performed by: HOSPITALIST

## 2024-03-28 PROCEDURE — 80053 COMPREHEN METABOLIC PANEL: CPT | Performed by: HOSPITALIST

## 2024-03-28 PROCEDURE — 25010000002 ONDANSETRON PER 1 MG: Performed by: HOSPITALIST

## 2024-03-28 RX ORDER — ALBUMIN (HUMAN) 12.5 G/50ML
12.5 SOLUTION INTRAVENOUS AS NEEDED
Status: CANCELLED | OUTPATIENT
Start: 2024-03-29 | End: 2024-03-29

## 2024-03-28 RX ORDER — LIDOCAINE 4 G/G
2 PATCH TOPICAL
Status: DISCONTINUED | OUTPATIENT
Start: 2024-03-28 | End: 2024-03-31

## 2024-03-28 RX ORDER — LIDOCAINE 4 G/G
1 PATCH TOPICAL
Status: DISCONTINUED | OUTPATIENT
Start: 2024-03-28 | End: 2024-03-28

## 2024-03-28 RX ORDER — HYDROCODONE BITARTRATE AND ACETAMINOPHEN 5; 325 MG/1; MG/1
1 TABLET ORAL EVERY 6 HOURS PRN
Status: DISCONTINUED | OUTPATIENT
Start: 2024-03-28 | End: 2024-03-31

## 2024-03-28 RX ORDER — ROPINIROLE 0.5 MG/1
0.75 TABLET, FILM COATED ORAL DAILY
Status: DISCONTINUED | OUTPATIENT
Start: 2024-03-28 | End: 2024-04-01 | Stop reason: HOSPADM

## 2024-03-28 RX ADMIN — FLUTICASONE PROPIONATE 2 SPRAY: 50 SPRAY, METERED NASAL at 09:39

## 2024-03-28 RX ADMIN — ONDANSETRON 4 MG: 2 INJECTION INTRAMUSCULAR; INTRAVENOUS at 11:52

## 2024-03-28 RX ADMIN — SODIUM CHLORIDE 100 ML/HR: 9 INJECTION, SOLUTION INTRAVENOUS at 12:07

## 2024-03-28 RX ADMIN — FERROUS SULFATE TAB 325 MG (65 MG ELEMENTAL FE) 325 MG: 325 (65 FE) TAB at 09:39

## 2024-03-28 RX ADMIN — CARVEDILOL 6.25 MG: 6.25 TABLET, FILM COATED ORAL at 09:39

## 2024-03-28 RX ADMIN — Medication 10 ML: at 09:39

## 2024-03-28 RX ADMIN — FERROUS SULFATE TAB 325 MG (65 MG ELEMENTAL FE) 325 MG: 325 (65 FE) TAB at 17:21

## 2024-03-28 RX ADMIN — LIDOCAINE 2 PATCH: 4 PATCH TOPICAL at 14:41

## 2024-03-28 RX ADMIN — HYDROCODONE BITARTRATE AND ACETAMINOPHEN 1 TABLET: 5; 325 TABLET ORAL at 18:10

## 2024-03-28 RX ADMIN — SODIUM CHLORIDE 100 ML/HR: 9 INJECTION, SOLUTION INTRAVENOUS at 21:33

## 2024-03-28 RX ADMIN — CALCIUM ACETATE 1334 MG: 667 CAPSULE ORAL at 17:21

## 2024-03-28 RX ADMIN — ASPIRIN 81 MG: 81 TABLET, CHEWABLE ORAL at 09:37

## 2024-03-28 RX ADMIN — Medication 1 TABLET: at 09:37

## 2024-03-28 RX ADMIN — AMLODIPINE BESYLATE 10 MG: 10 TABLET ORAL at 09:37

## 2024-03-28 RX ADMIN — CALCIUM ACETATE 1334 MG: 667 CAPSULE ORAL at 11:53

## 2024-03-28 RX ADMIN — HYDROCODONE BITARTRATE AND ACETAMINOPHEN 1 TABLET: 5; 325 TABLET ORAL at 11:52

## 2024-03-28 RX ADMIN — CARVEDILOL 6.25 MG: 6.25 TABLET, FILM COATED ORAL at 17:21

## 2024-03-28 RX ADMIN — CALCIUM ACETATE 1334 MG: 667 CAPSULE ORAL at 09:38

## 2024-03-28 RX ADMIN — ROPINIROLE 0.75 MG: 0.5 TABLET, FILM COATED ORAL at 11:53

## 2024-03-28 RX ADMIN — ALLOPURINOL 100 MG: 100 TABLET ORAL at 09:39

## 2024-03-28 RX ADMIN — ATORVASTATIN CALCIUM 80 MG: 40 TABLET, FILM COATED ORAL at 21:30

## 2024-03-28 RX ADMIN — ACETAMINOPHEN 650 MG: 325 TABLET ORAL at 14:41

## 2024-03-28 RX ADMIN — ACETAMINOPHEN 650 MG: 325 TABLET ORAL at 00:12

## 2024-03-28 RX ADMIN — CALCITRIOL CAPSULES 0.25 MCG 0.25 MCG: 0.25 CAPSULE ORAL at 09:39

## 2024-03-28 RX ADMIN — Medication 10 ML: at 21:33

## 2024-03-28 RX ADMIN — CETIRIZINE HYDROCHLORIDE 10 MG: 10 TABLET, FILM COATED ORAL at 09:38

## 2024-03-28 NOTE — PROGRESS NOTES
[facility] Medicine Services  PROGRESS NOTE    Patient Name: Eyal Solano  : 1950  MRN: [id number]    Date of Admission: 3/24/2024  Primary Care Physician: Edda Sanchez MD    Subjective   Subjective     CC:  ataxia    HPI:  Patient seen resting in bed. States he ate breakfast. He states he has had b/l knee pain overnight that has not improved. He states his right knee hurts chronically. He was given tylenol overnight and it improved slightly.  Fever overnight.    Objective   Objective     Vital Signs:   Temp:  [98.2 °F (36.8 °C)-102 °F (38.9 °C)] 98.8 °F (37.1 °C)  Heart Rate:  [] 94  Resp:  [16-19] 16  BP: (149-166)/(74-92) 157/88  Flow (L/min):  [2] 2     Physical Exam:  Constitutional: No acute distress, awake, alert  HENT: NCAT, mucous membranes moist  Respiratory: decreased to auscultation bilaterally, respiratory effort normal on 2L NC  Cardiovascular: RRR, no murmurs, rubs, or gallops  Gastrointestinal: Positive bowel sounds, soft, nontender, nondistended  Musculoskeletal: No bilateral ankle edema  Psychiatric: flat affect, cooperative  Neurologic: Oriented x 3, MINA, speech clear  Skin: No rashes, HD cath in place- covered without surrounding erythema    Results Reviewed:  LAB RESULTS:      Lab 24  0556 24  0030 24  1610 24  0333 24  0705 24  1631   WBC 9.21 7.37  --  5.93 7.60 8.38   HEMOGLOBIN 7.4* 7.7* 7.8* 6.2* 7.2* 7.6*   HEMATOCRIT 23.8* 24.0* 24.5* 19.8* 23.3* 24.7*   PLATELETS 188 176  --  198 227 237   NEUTROS ABS  --  5.99  --  4.28 5.60 6.70   IMMATURE GRANS (ABS)  --  0.03  --  0.03 0.05 0.09*   LYMPHS ABS  --  0.52*  --  0.90 0.97 0.74   MONOS ABS  --  0.69  --  0.57 0.76 0.71   EOS ABS  --  0.12  --  0.12 0.19 0.10   MCV 87.2 84.8  --  85.0 84.7 85.8   CRP  --  11.63*  --   --   --   --    PROCALCITONIN  --  1.02*  --   --   --   --    LACTATE  --  0.6  --   --   --   --    LDH  --   --   --   --   --  283*    PROTIME  --   --   --  15.3*  --   --          Lab 03/28/24  0556 03/27/24  0030 03/26/24 0333 03/25/24  1434 03/25/24  0705 03/24/24 2054 03/24/24  1631   SODIUM 140 133* 135*  --  137 131* 135*   POTASSIUM 4.9 4.6 5.0 5.0 5.9* 5.5* 6.5*   CHLORIDE 103 99 102  --  103 98 101   CO2 25.0 23.0 19.0*  --  17.0* 18.0* 18.0*   ANION GAP 12.0 11.0 14.0  --  17.0* 15.0 16.0*   BUN 55* 58* 101*  --  104* 104* 99*   CREATININE 5.37* 4.37* 6.05*  --  5.54* 5.19* 5.47*   EGFR 10.7* 13.7* 9.3*  --  10.3* 11.2* 10.5*   GLUCOSE 111* 118* 101*  --  85 81 92   CALCIUM 7.8* 7.9* 7.7*  --  7.3* 8.2* 7.4*   IONIZED CALCIUM  --   --   --   --   --   --  1.09*   MAGNESIUM  --  1.6  --   --   --   --  1.6   PHOSPHORUS  --   --  5.4*  --   --   --   --    TSH  --   --   --   --   --   --  1.490         Lab 03/28/24 0556 03/27/24 0030 03/26/24 0333 03/24/24  1631   TOTAL PROTEIN 5.2* 5.6*  --  6.2   ALBUMIN 3.1* 2.6* 2.8* 3.4*   GLOBULIN 2.1 3.0  --  2.8   ALT (SGPT) 15 18  --  29   AST (SGOT) 14 17  --  35   BILIRUBIN 0.3 0.3  --  0.3   ALK PHOS 46 50  --  59         Lab 03/26/24 0333 03/24/24 2054 03/24/24  1631   HSTROP T  --  70* 79*   PROTIME 15.3*  --   --    INR 1.20*  --   --              Lab 03/26/24 0333 03/24/24 2146 03/24/24 2054 03/24/24 2054 03/24/24  1631   IRON 24*  --   --   --   --    IRON SATURATION (TSAT) 14*  --   --   --   --    TIBC 171*  --   --   --   --    TRANSFERRIN 115*  --   --   --   --    FERRITIN  --   --   --   --  1,250.00*   ABO TYPING  --  A   < > A  --    RH TYPING  --  Positive   < > Positive  --    ANTIBODY SCREEN  --   --   --  Negative  --     < > = values in this interval not displayed.         Brief Urine Lab Results  (Last result in the past 365 days)        Color   Clarity   Blood   Leuk Est   Nitrite   Protein   CREAT   Urine HCG        03/27/24 0033 Yellow   Clear   Trace   Negative   Negative   >=300 mg/dL (3+)                   Microbiology Results Abnormal       Procedure  Component Value - Date/Time    Blood Culture - Blood, Arm, Right [[id number]]  (Normal) Collected: 03/27/24 0030    Lab Status: Preliminary result Specimen: Blood from Arm, Right Updated: 03/28/24 0115     Blood Culture No growth at 24 hours    MRSA Screen, PCR (Inpatient) - Swab, Nares [[id number]]  (Normal) Collected: 03/27/24 0033    Lab Status: Final result Specimen: Swab from Nares Updated: 03/27/24 0802     MRSA PCR Negative    Narrative:      The negative predictive value of this diagnostic test is high and should only be used to consider de-escalating anti-MRSA therapy. A positive result may indicate colonization with MRSA and must be correlated clinically.  MRSA Negative    COVID PRE-OP / PRE-PROCEDURE SCREENING ORDER (NO ISOLATION) - Swab, Nasopharynx [[id number]]  (Normal) Collected: 03/27/24 0033    Lab Status: Final result Specimen: Swab from Nasopharynx Updated: 03/27/24 0123    Narrative:      The following orders were created for panel order COVID PRE-OP / PRE-PROCEDURE SCREENING ORDER (NO ISOLATION) - Swab, Nasopharynx.  Procedure                               Abnormality         Status                     ---------                               -----------         ------                     COVID-19 and FLU A/B PCR...[[id number]]  Normal              Final result                 Please view results for these tests on the individual orders.    COVID-19 and FLU A/B PCR, 1 HR TAT - Swab, Nasopharynx [[id number]]  (Normal) Collected: 03/27/24 0033    Lab Status: Final result Specimen: Swab from Nasopharynx Updated: 03/27/24 0123     COVID19 Not Detected     Influenza A PCR Not Detected     Influenza B PCR Not Detected    Narrative:      Fact sheet for providers: https://www.fda.gov/media/527645/download    Fact sheet for patients: https://www.fda.gov/media/926846/download    Test performed by PCR.            XR Chest 1 View    Result Date: 3/26/2024  XR CHEST 1 VW Date of Exam: 3/26/2024 11:30 PM EDT  Indication: dyspnea Comparison: 3/24/2024. Findings: There is a new right-sided dialysis catheter terminating in the lower SVC. There is minimal interstitial pulmonary edema. There is linear scar in the right lateral midlung. Heart size appears within normal limits. There is no pneumothorax, pleural effusion or focal airspace consolidation. No acute osseous abnormalities.     Impression: Impression: Minimal interstitial pulmonary edema. Electronically Signed: Kunal Roy MD  3/26/2024 11:48 PM EDT  Workstation ID: [id number]    IR Insert Tunneled CV Catheter Without Port 5 Plus    Result Date: 3/26/2024  IR INS TUNNELED CV CATHETER WO PORT 5 PLUS  History: Needs to initiate on HD.  : Jose Pina MD. Modality: Sonography and fluoroscopy Fluoro time: 1.2 minutes Radiation Dose: 2.8 mGy air Kerma.   SEDATION: Moderate sedation was administered. 1 milligram of Versed and 50 micrograms of fentanyl IV was used for moderate sedation. Total intra service time of sedation was 22 minutes. The sedation was administered and the patient's vital signs monitored throughout the procedure and recorded in the patient's medical record by the nurse under my direct supervision. Medicines: Not applicable. Anesthesia: Lidocaine 1% with epinephrine, local infiltration Estimated blood loss:  < 5 cc.        Technique: A thorough discussion of the risks, benefits, and alternatives of the procedure, and if applicable, moderate sedation, was carried out with the patient. They were encouraged to ask any questions. Any questions were answered. They verbalized understanding. A written informed consent was then signed.  A multi-component timeout was performed prior to starting the procedure using the departmental protocol. The procedure room personnel used personal protective equipment. The operators used sterile gloves and if indicated, sterile gowns. The surgical site was prepped with chlorhexidine gluconate  and draped in the  maximal applicable sterile fashion. A preliminary ultrasonogram was performed of the target that revealed a patent and compressible Right internal jugular vein. Pertinent ultrasound images were stored in the PACS for documentation. A sterile prep and drape of the right neck and upper chest was performed using maximal sterile technique. Using aseptic precautions, real-time ultrasound guidance, the target vein was accessed after local anesthetic infiltration and dermatotomy with an access needle. A guidewire was advanced into the central venous system under fluoroscopic guidance. Over the wire following standard technique a peel-away sheath was placed. After local anesthesia, an incision was created at the exit site location and a cuffed tunneled dialysis catheter of an appropriate length was tunneled from the exit site to the venotomy site using a tunneling device. The catheter was advanced into the venous system through the peel-away sheath which was removed. The catheter aspirated and flushed well and was terminally packed with heparin 1000 units per cc. The catheter was secured to skin using nonabsorbable suture and a CHG dressing applied. The venotomy site was closed using Dermabond. An aseptic dressing was applied using the protocol for Dermabond. The patient was transferred to the recovery area and was discharged from the department in stable condition.  Complications: None immediate.    Device: 15.5 Spanish x 19 cm cuff to tip Duraflow catheter. Findings: Patent and compressible Right internal jugular. Final image shows the catheter to be in good position with the catheter tip in the right atrium, an excellent position for use. There is no complication.      Impression: Impression:    Successful ultrasound and fluoroscopic guided Right internal jugular vein route cuffed tunneled hemodialysis catheter placement as described above. Thank you for the opportunity to assist in the care of your patient.  Electronically Signed: Jose Pina MD  3/26/2024 3:22 PM EDT  Workstation ID: [id number]     Results for orders placed during the hospital encounter of 07/28/21    Adult Transthoracic Echo Complete W/ Cont if Necessary Per Protocol (With Agitated Saline)    Interpretation Summary  · Left ventricular ejection fraction appears to be 56 - 60%.  · Normal left atrial size and volume noted. No evidence of a patent foramen ovale. Saline test results are negative  · Mild mitral annular calcification is present. There is mild, anterior mitral leaflet thickening present. Trace mitral regurgitation.      Current medications:  Scheduled Meds:allopurinol, 100 mg, Oral, Daily  amLODIPine, 10 mg, Oral, Daily  aspirin, 81 mg, Oral, Daily  atorvastatin, 80 mg, Oral, Nightly  b complex-vitamin c-folic acid, 1 tablet, Oral, Daily  calcitriol, 0.25 mcg, Oral, Daily  calcium acetate, 1,334 mg, Oral, TID  carvedilol, 6.25 mg, Oral, BID With Meals  cetirizine, 10 mg, Oral, Daily  epoetin minnie/minnie-epbx, 20,000 Units, Subcutaneous, Weekly  ferrous sulfate, 325 mg, Oral, BID With Meals  fluticasone, 2 spray, Nasal, Daily  sodium chloride, 10 mL, Intravenous, Q12H      Continuous Infusions:sodium chloride, 100 mL/hr, Last Rate: 100 mL/hr (03/26/24 0242)      PRN Meds:.  acetaminophen    albumin human    senna-docusate sodium **AND** polyethylene glycol **AND** bisacodyl **AND** bisacodyl    heparin (porcine)    ondansetron    prochlorperazine **OR** prochlorperazine **OR** prochlorperazine    sodium chloride    sodium chloride    Assessment & Plan   Assessment & Plan     Active Hospital Problems    Diagnosis  POA    **Ataxia [R27.0]  Yes    SAMINA (acute kidney injury) [N17.9]  Yes    History of stroke s/p L MCA stent [Z86.73]  Not Applicable    Mixed hyperlipidemia [E78.2]  Yes    Stage 4 chronic kidney disease [N18.4]  Yes    Essential hypertension [I10]  Yes      Resolved Hospital Problems   No resolved problems to display.        Brief  Hospital Course to date:  Eyal Solano is a 73 y.o. male  with history of CVA s/p L MCA stent, HTN, HL, CKD IV, here with 2 days of dizziness, ataxia, blurred/double vision and nausea/vomiting. He was found to have SAMINA on CKDIV with significant hyperkalemia upon admission.    This patient's problems and plans were partially entered by my partner and updated as appropriate by me 03/28/24..    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 Improved on last check from 6.5 to 5.5, however, this am, worsened again to 5.9. treating with Calcium, insulin/dextrose, lokelma, sodium bicarb and albuterol nebs again  -nephrology following  - HD catheter placement 3/26/24 per Dr. Melendez  - HD to start 3/26/24 after HD catheter placement  - next HD on Friday, 3/29/24    +Blood Cultures  - 1/2 bottles- likely contaminant   - repeat blood cultures pending     Anemia  -no history of bleeding, transfused 2/23 in [place], suspected AOCD     HTN  -monitor     HLD  -statin    Expected Discharge Location and Transportation: home  Expected Discharge   Expected Discharge Date: 3/29/2024; Expected Discharge Time:      DVT prophylaxis:  Medical and mechanical DVT prophylaxis orders are present.         AM-PAC 6 Clicks Score (PT): 21 (03/27/24 0473)    CODE STATUS:   Code Status and Medical Interventions:   Ordered at: 03/24/24 9607     Code Status (Patient has no pulse and is not breathing):    CPR (Attempt to Resuscitate)     Medical Interventions (Patient has pulse or is breathing):    Full Support       Lili Denson,   03/28/24

## 2024-03-28 NOTE — PROGRESS NOTES
"   LOS: 4 days    Patient Care Team:  Edda Sanchez MD as PCP - General (Internal Medicine)  Praveen Fischer MD as Consulting Physician (Nephrology)    Subjective     Doing well. Getting 2x weekly HD.       Objective     Vital Signs:  Blood pressure 159/95, pulse 86, temperature 99 °F (37.2 °C), temperature source Oral, resp. rate 18, height 177.8 cm (70\"), weight 80.5 kg (177 lb 6.4 oz), SpO2 95%.      Intake/Output Summary (Last 24 hours) at 3/28/2024 1326  Last data filed at 3/28/2024 0900  Gross per 24 hour   Intake --   Output 575 ml   Net -575 ml        03/27 0701 - 03/28 0700  In: -   Out: 1075 [Urine:1075]    Physical Exam:          General Appearance: WD AAM NAD  Neuro:   awake alert   Psych:  Normal mood and affect, cooperative  CV:   No edema  Lungs: respirations regular and unlabored  Abdomen: not distended  :  No parker  Skin: No rash, Warm and dry  [facility] TD 3/26/2024.      Labs:  Results from last 7 days   Lab Units 03/28/24  0556 03/27/24  0030 03/26/24  1610 03/26/24  0333   WBC 10*3/mm3 9.21 7.37  --  5.93   HEMOGLOBIN g/dL 7.4* 7.7* 7.8* 6.2*   PLATELETS 10*3/mm3 188 176  --  198     Results from last 7 days   Lab Units 03/28/24  0556 03/27/24  0030 03/26/24  0333 03/25/24  1434 03/25/24  0705 03/24/24  2054 03/24/24  1631   SODIUM mmol/L 140 133* 135*  --  137   < > 135*   POTASSIUM mmol/L 4.9 4.6 5.0 5.0 5.9*   < > 6.5*   CHLORIDE mmol/L 103 99 102  --  103   < > 101   CO2 mmol/L 25.0 23.0 19.0*  --  17.0*   < > 18.0*   BUN mg/dL 55* 58* 101*  --  104*   < > 99*   CREATININE mg/dL 5.37* 4.37* 6.05*  --  5.54*   < > 5.47*   CALCIUM mg/dL 7.8* 7.9* 7.7*  --  7.3*   < > 7.4*   PHOSPHORUS mg/dL  --   --  5.4*  --   --   --   --    MAGNESIUM mg/dL  --  1.6  --   --   --   --  1.6   ALBUMIN g/dL 3.1* 2.6* 2.8*  --   --   --  3.4*    < > = values in this interval not displayed.     Results from last 7 days   Lab Units 03/28/24  0556   ALK PHOS U/L 46   BILIRUBIN mg/dL 0.3   ALT (SGPT) U/L 15 "   AST (SGOT) U/L 14                   Estimated Creatinine Clearance: 14.4 mL/min (A) (by C-G formula based on SCr of 5.37 mg/dL (H)).         A/P:      ESRD:   Follows with Dr. Nicholas in [place].  Patient has been heading towards dialysis prior to admission. . Urine output remains nonoliguric.  Tunneled dialysis catheter placed 3/26/2024 and HD initiated after BUN/creatinine increased further to 101/6.1.  Patient has had dialysis teaching and is very interested in home dialysis.  Dr. Melendez consulted to evaluate for PD catheter.  Arrangements being made at [facility].  For now would run 3 hours twice a week no UF to preserve underlying residual renal function.  Will plan next HD on Friday, 3/29/2024.  Run sooner if needed.     HTN: Blood pressure stable.     Hyperkalemia: Improved to 5.0 today.  Adjust with HD.  Low potassium diet.     Hyponatremia: Sodium improved overnight.  Monitor for now.     Metabolic Acidosis: Improved.     Anemia: Hemoglobin below goal. LORE on HD days    Volume: Stable.  Good urine output off diuretics.  Initially with underlying dehydration on admission after 2 days of GI upset.  Chest x-ray clear without pulmonary edema.     Hypocalcemia: Stable.  Patient on calcitriol and phosphorus binder calcium acetate.     Hyperphosphatemia: Stable at 5.4.  Continue Phos binder low Phos diet.    Nutrition: Started renal vitamin.  Will need to transition to a high-protein low phosphorus low potassium dialysis diet once taking p.o.     History of CVA: Evaluated by neurology.  Doubt new insult.    High risk and complexity patient    Naveen Carrington MD  03/28/24  13:26 EDT

## 2024-03-28 NOTE — PROGRESS NOTES
"General Surgery Daily Progress Note    Subjective:  Feels poorly today.    Objective:  /88 (BP Location: Left arm, Patient Position: Lying)   Pulse 90   Temp 98.8 °F (37.1 °C) (Oral)   Resp 16   Ht 177.8 cm (70\")   Wt 80.5 kg (177 lb 6.4 oz)   SpO2 95%   BMI 25.45 kg/m²     General Appearance: Mild  Eyes: Anicteric  Neck: Trachea midline   Cardiovascular:  RR, tachycardic, without murmur nor rub  Lungs:  Bilateral respirations unlabored   Abdomen:  Soft, nontender, no masses, no organomegaly   Extremities:  No cyanosis or edema   Skin:  No obvious rashes   Neurologic: awake and conversant     CBC:  Results from last 7 days   Lab Units 03/28/24  0556   WBC 10*3/mm3 9.21   HEMOGLOBIN g/dL 7.4*   HEMATOCRIT % 23.8*   PLATELETS 10*3/mm3 188       CMP:  Results from last 7 days   Lab Units 03/28/24  0556   SODIUM mmol/L 140   POTASSIUM mmol/L 4.9   CHLORIDE mmol/L 103   CO2 mmol/L 25.0   BUN mg/dL 55*   CREATININE mg/dL 5.37*   CALCIUM mg/dL 7.8*   BILIRUBIN mg/dL 0.3   ALK PHOS U/L 46   ALT (SGPT) U/L 15   AST (SGOT) U/L 14   GLUCOSE mg/dL 111*         Assessment/Plan:     Recurrent fever to 102.  Etiology unclear.  Prior single blood culture with Staphylococcus species.  Will hold off on PD catheter placement until patient improved.  Will follow along peripherally.  Please call with questions.    Levi Melendez MD - 3/28/2024, 10:18 EDT         "

## 2024-03-28 NOTE — DISCHARGE PLACEMENT REQUEST
"TO: [facility] ATTN: Lisa  FROM: Andree SMITH, RN,  [phone]    Eyal Solano (73 y.o. Male)       Date of Birth   1950    Social Security Number       Address   [address] [place] [address]    Home Phone   [phone]    MRN   [id number]       [facility]    Marital Status   Single                            Admission Date   3/24/24    Admission Type   Emergency    Admitting Provider   Lili Denson DO    Attending Provider   Lili Denson DO    Department, Room/Bed   [facility] 3E, S336/1       Discharge Date       Discharge Disposition       Discharge Destination                              3/26/24 15.5 [place] Image guided tunneled dialysis catheter placed to right internal jugular by Dr Pina. Patient tolerated well. Sedation time of 22 minutes. Patient was given 50 mcg Fentanyl & 1 mg Versed. Report called to Dialysis RN.                Attending Provider: Lili Denson DO    Allergies: No Known Allergies    Isolation: None   Infection: None   Code Status: CPR    Ht: 177.8 cm (70\")   Wt: 80.5 kg (177 lb 6.4 oz)    Admission Cmt: None   Principal Problem: Ataxia [R27.0]                   Active Insurance as of 3/24/2024       Primary Coverage       Payor Plan Insurance Group Employer/Plan Group    MEDICARE MEDICARE A & B        Payor Plan Address Payor Plan Phone Number Payor Plan Fax Number Effective Dates    [address] [phone]  12/1/2017 - None Entered    [place] [address]         Subscriber Name Subscriber Birth Date Member ID       EYAL SOLANO 1950 [id number]            *NOTE* Plan is for him to be discharged tomorrow, 3/29/24 after his dialysis here.          Emergency Contacts        (Rel.) Home Phone Work Phone Mobile Phone    ANJUMSANTINO FARLEYRA (Daughter) -- -- [phone]    GERARDO PIRES (Daughter) -- -- [phone]    LINUS WESTON (Significant Other) -- -- [phone]    " Caitlyn Almonte (Sister) -- -- [phone]    PATRICIA SOLANO (Brother) -- -- [phone]              Insurance Information                  MEDICARE/MEDICARE A & B Phone: [phone]    Subscriber: Eyal Solano Subscriber#: [id number]    Group#: -- Precert#: --

## 2024-03-28 NOTE — DISCHARGE PLACEMENT REQUEST
"TO: [facility]  ATTN: Lisa  FROM: Andree SMITH, RN,  [phone]  Eyal Solano (73 y.o. Male)       Date of Birth   1950    Social Security Number       Address   [address]  [place] [facility] [address]    Home Phone   [phone]    MRN   [id number]       [facility]    Marital Status   Single                            Admission Date   3/24/24    Admission Type   Emergency    Admitting Provider   Lili Denson DO    Attending Provider   Lili Denson DO    Department, Room/Bed   [facility] 3E, S336/1       Discharge Date       Discharge Disposition       Discharge Destination                                 Attending Provider: Lili Denson DO    Allergies: No Known Allergies    Isolation: None   Infection: None   Code Status: CPR    Ht: 177.8 cm (70\")   Wt: 80.5 kg (177 lb 6.4 oz)    Admission Cmt: None   Principal Problem: Ataxia [R27.0]                 15.5 [place] Image guided tunneled dialysis catheter placed to right internal jugular by Dr Pina. Patient tolerated well. Sedation time of 22 minutes. Patient was given 50 mcg Fentanyl & 1 mg Versed. Report called to Dialysis RN.             Active Insurance as of 3/24/2024       Primary Coverage       Payor Plan Insurance Group Employer/Plan Group    MEDICARE MEDICARE A & B        Payor Plan Address Payor Plan Phone Number Payor Plan Fax Number Effective Dates    [address] [phone]  12/1/2017 - None Entered    [place] [address]         Subscriber Name Subscriber Birth Date Member ID       EYAL SOLANO 1950 [id number]              *NOTE* They plan on discharging him tomorrow, 3/29/24 after his dialysis here.       Emergency Contacts        (Rel.) Home Phone Work Phone Mobile Phone    MARELY ARMSTRONG (Daughter) -- -- [phone]    LEXISGERARDO (Daughter) -- -- [phone]    LINUS WESTON (Significant Other) -- -- [phone]    " Caitlyn Almonte (Sister) -- -- [phone]    PATRICIA SOLANO (Brother) -- -- [phone]              Insurance Information                  MEDICARE/MEDICARE A & B Phone: [phone]    Subscriber: Eyal Solano Subscriber#: [id number]    Group#: -- Precert#: --

## 2024-03-29 ENCOUNTER — APPOINTMENT (OUTPATIENT)
Dept: GENERAL RADIOLOGY | Facility: HOSPITAL | Age: 74
DRG: 673 | End: 2024-03-29
Payer: MEDICARE

## 2024-03-29 ENCOUNTER — APPOINTMENT (OUTPATIENT)
Dept: NEPHROLOGY | Facility: HOSPITAL | Age: 74
DRG: 673 | End: 2024-03-29
Payer: MEDICARE

## 2024-03-29 LAB
ABO GROUP BLD: NORMAL
ALBUMIN SERPL-MCNC: 2.9 G/DL (ref 3.5–5.2)
ALBUMIN/GLOB SERPL: 1.4 G/DL
ALP SERPL-CCNC: 42 U/L (ref 39–117)
ALT SERPL W P-5'-P-CCNC: 12 U/L (ref 1–41)
ANION GAP SERPL CALCULATED.3IONS-SCNC: 12 MMOL/L (ref 5–15)
APPEARANCE FLD: ABNORMAL
APPEARANCE FLD: ABNORMAL
AST SERPL-CCNC: 12 U/L (ref 1–40)
B PARAPERT DNA SPEC QL NAA+PROBE: NOT DETECTED
B PERT DNA SPEC QL NAA+PROBE: NOT DETECTED
BILIRUB SERPL-MCNC: 0.2 MG/DL (ref 0–1.2)
BLD GP AB SCN SERPL QL: NEGATIVE
BUN SERPL-MCNC: 56 MG/DL (ref 8–23)
BUN/CREAT SERPL: 9.3 (ref 7–25)
C PNEUM DNA NPH QL NAA+NON-PROBE: NOT DETECTED
CALCIUM SPEC-SCNC: 7.4 MG/DL (ref 8.6–10.5)
CHLORIDE SERPL-SCNC: 101 MMOL/L (ref 98–107)
CO2 SERPL-SCNC: 23 MMOL/L (ref 22–29)
COLOR FLD: ABNORMAL
COLOR FLD: ABNORMAL
CREAT SERPL-MCNC: 5.99 MG/DL (ref 0.76–1.27)
CRP SERPL-MCNC: 16.17 MG/DL (ref 0–0.5)
CRYSTALS FLD MICRO: NORMAL
CRYSTALS FLD MICRO: NORMAL
D-LACTATE SERPL-SCNC: 0.4 MMOL/L (ref 0.5–2)
DEPRECATED RDW RBC AUTO: 46.3 FL (ref 37–54)
DEPRECATED RDW RBC AUTO: 49.1 FL (ref 37–54)
EGFRCR SERPLBLD CKD-EPI 2021: 9.4 ML/MIN/1.73
ERYTHROCYTE [DISTWIDTH] IN BLOOD BY AUTOMATED COUNT: 14.6 % (ref 12.3–15.4)
ERYTHROCYTE [DISTWIDTH] IN BLOOD BY AUTOMATED COUNT: 15.1 % (ref 12.3–15.4)
ERYTHROCYTE [SEDIMENTATION RATE] IN BLOOD: 68 MM/HR (ref 0–20)
FLUAV SUBTYP SPEC NAA+PROBE: NOT DETECTED
FLUBV RNA ISLT QL NAA+PROBE: NOT DETECTED
GLOBULIN UR ELPH-MCNC: 2.1 GM/DL
GLUCOSE SERPL-MCNC: 96 MG/DL (ref 65–99)
HADV DNA SPEC NAA+PROBE: NOT DETECTED
HCOV 229E RNA SPEC QL NAA+PROBE: NOT DETECTED
HCOV HKU1 RNA SPEC QL NAA+PROBE: NOT DETECTED
HCOV NL63 RNA SPEC QL NAA+PROBE: NOT DETECTED
HCOV OC43 RNA SPEC QL NAA+PROBE: NOT DETECTED
HCT VFR BLD AUTO: 21.7 % (ref 37.5–51)
HCT VFR BLD AUTO: 26.1 % (ref 37.5–51)
HGB BLD-MCNC: 6.6 G/DL (ref 13–17.7)
HGB BLD-MCNC: 8.3 G/DL (ref 13–17.7)
HMPV RNA NPH QL NAA+NON-PROBE: NOT DETECTED
HPIV1 RNA ISLT QL NAA+PROBE: NOT DETECTED
HPIV2 RNA SPEC QL NAA+PROBE: NOT DETECTED
HPIV3 RNA NPH QL NAA+PROBE: NOT DETECTED
HPIV4 P GENE NPH QL NAA+PROBE: NOT DETECTED
LYMPHOCYTES NFR FLD MANUAL: 3 %
M PNEUMO IGG SER IA-ACNC: NOT DETECTED
MCH RBC QN AUTO: 26.8 PG (ref 26.6–33)
MCH RBC QN AUTO: 27.9 PG (ref 26.6–33)
MCHC RBC AUTO-ENTMCNC: 30.4 G/DL (ref 31.5–35.7)
MCHC RBC AUTO-ENTMCNC: 31.8 G/DL (ref 31.5–35.7)
MCV RBC AUTO: 87.9 FL (ref 79–97)
MCV RBC AUTO: 88.2 FL (ref 79–97)
MONOCYTES NFR FLD: 11 %
MONOCYTES NFR FLD: 9 %
NEUTROPHILS NFR FLD MANUAL: 86 %
NEUTROPHILS NFR FLD MANUAL: 91 %
PLATELET # BLD AUTO: 176 10*3/MM3 (ref 140–450)
PLATELET # BLD AUTO: 176 10*3/MM3 (ref 140–450)
PMV BLD AUTO: 10.4 FL (ref 6–12)
PMV BLD AUTO: 10.6 FL (ref 6–12)
POTASSIUM SERPL-SCNC: 5.3 MMOL/L (ref 3.5–5.2)
PROCALCITONIN SERPL-MCNC: 0.9 NG/ML (ref 0–0.25)
PROT SERPL-MCNC: 5 G/DL (ref 6–8.5)
RBC # BLD AUTO: 2.46 10*6/MM3 (ref 4.14–5.8)
RBC # BLD AUTO: 2.97 10*6/MM3 (ref 4.14–5.8)
RBC # FLD AUTO: <2000 /MM3
RBC # FLD AUTO: <2000 /MM3
RH BLD: POSITIVE
RHINOVIRUS RNA SPEC NAA+PROBE: NOT DETECTED
RSV RNA NPH QL NAA+NON-PROBE: NOT DETECTED
SARS-COV-2 RNA NPH QL NAA+NON-PROBE: NOT DETECTED
SODIUM SERPL-SCNC: 136 MMOL/L (ref 136–145)
T&S EXPIRATION DATE: NORMAL
WBC # FLD AUTO: 3657 /MM3
WBC # FLD AUTO: 7436 /MM3
WBC NRBC COR # BLD AUTO: 6.88 10*3/MM3 (ref 3.4–10.8)
WBC NRBC COR # BLD AUTO: 7.06 10*3/MM3 (ref 3.4–10.8)

## 2024-03-29 PROCEDURE — 87070 CULTURE OTHR SPECIMN AEROBIC: CPT | Performed by: ORTHOPAEDIC SURGERY

## 2024-03-29 PROCEDURE — P9016 RBC LEUKOCYTES REDUCED: HCPCS

## 2024-03-29 PROCEDURE — 25010000002 ONDANSETRON PER 1 MG: Performed by: HOSPITALIST

## 2024-03-29 PROCEDURE — 87040 BLOOD CULTURE FOR BACTERIA: CPT | Performed by: PHYSICIAN ASSISTANT

## 2024-03-29 PROCEDURE — 86900 BLOOD TYPING SEROLOGIC ABO: CPT

## 2024-03-29 PROCEDURE — 25010000002 CEFTRIAXONE PER 250 MG: Performed by: PHYSICIAN ASSISTANT

## 2024-03-29 PROCEDURE — 0202U NFCT DS 22 TRGT SARS-COV-2: CPT | Performed by: PHYSICIAN ASSISTANT

## 2024-03-29 PROCEDURE — 0S9C3ZX DRAINAGE OF RIGHT KNEE JOINT, PERCUTANEOUS APPROACH, DIAGNOSTIC: ICD-10-PCS | Performed by: ORTHOPAEDIC SURGERY

## 2024-03-29 PROCEDURE — 85027 COMPLETE CBC AUTOMATED: CPT | Performed by: PHYSICIAN ASSISTANT

## 2024-03-29 PROCEDURE — 87075 CULTR BACTERIA EXCEPT BLOOD: CPT | Performed by: ORTHOPAEDIC SURGERY

## 2024-03-29 PROCEDURE — 84145 PROCALCITONIN (PCT): CPT | Performed by: PHYSICIAN ASSISTANT

## 2024-03-29 PROCEDURE — 85027 COMPLETE CBC AUTOMATED: CPT | Performed by: HOSPITALIST

## 2024-03-29 PROCEDURE — 63710000001 PREDNISONE PER 1 MG: Performed by: PHYSICIAN ASSISTANT

## 2024-03-29 PROCEDURE — 73560 X-RAY EXAM OF KNEE 1 OR 2: CPT

## 2024-03-29 PROCEDURE — 86140 C-REACTIVE PROTEIN: CPT | Performed by: PHYSICIAN ASSISTANT

## 2024-03-29 PROCEDURE — 86900 BLOOD TYPING SEROLOGIC ABO: CPT | Performed by: PHYSICIAN ASSISTANT

## 2024-03-29 PROCEDURE — 25010000002 HEPARIN (PORCINE) PER 1000 UNITS: Performed by: INTERNAL MEDICINE

## 2024-03-29 PROCEDURE — 25810000003 SODIUM CHLORIDE 0.9 % SOLUTION: Performed by: HOSPITALIST

## 2024-03-29 PROCEDURE — 86901 BLOOD TYPING SEROLOGIC RH(D): CPT | Performed by: PHYSICIAN ASSISTANT

## 2024-03-29 PROCEDURE — 86850 RBC ANTIBODY SCREEN: CPT | Performed by: PHYSICIAN ASSISTANT

## 2024-03-29 PROCEDURE — 83605 ASSAY OF LACTIC ACID: CPT | Performed by: PHYSICIAN ASSISTANT

## 2024-03-29 PROCEDURE — 89060 EXAM SYNOVIAL FLUID CRYSTALS: CPT | Performed by: ORTHOPAEDIC SURGERY

## 2024-03-29 PROCEDURE — 99222 1ST HOSP IP/OBS MODERATE 55: CPT | Performed by: ORTHOPAEDIC SURGERY

## 2024-03-29 PROCEDURE — 99233 SBSQ HOSP IP/OBS HIGH 50: CPT | Performed by: PHYSICIAN ASSISTANT

## 2024-03-29 PROCEDURE — 71045 X-RAY EXAM CHEST 1 VIEW: CPT

## 2024-03-29 PROCEDURE — 20610 DRAIN/INJ JOINT/BURSA W/O US: CPT | Performed by: ORTHOPAEDIC SURGERY

## 2024-03-29 PROCEDURE — 86923 COMPATIBILITY TEST ELECTRIC: CPT

## 2024-03-29 PROCEDURE — 25010000002 EPOETIN ALFA-EPBX 10000 UNIT/ML SOLUTION: Performed by: INTERNAL MEDICINE

## 2024-03-29 PROCEDURE — 80053 COMPREHEN METABOLIC PANEL: CPT | Performed by: HOSPITALIST

## 2024-03-29 PROCEDURE — 0S9D3ZX DRAINAGE OF LEFT KNEE JOINT, PERCUTANEOUS APPROACH, DIAGNOSTIC: ICD-10-PCS | Performed by: ORTHOPAEDIC SURGERY

## 2024-03-29 PROCEDURE — 85652 RBC SED RATE AUTOMATED: CPT | Performed by: PHYSICIAN ASSISTANT

## 2024-03-29 PROCEDURE — 89051 BODY FLUID CELL COUNT: CPT | Performed by: ORTHOPAEDIC SURGERY

## 2024-03-29 RX ORDER — PREDNISONE 20 MG/1
40 TABLET ORAL
Status: DISCONTINUED | OUTPATIENT
Start: 2024-03-29 | End: 2024-04-01 | Stop reason: HOSPADM

## 2024-03-29 RX ADMIN — Medication 10 ML: at 12:38

## 2024-03-29 RX ADMIN — FLUTICASONE PROPIONATE 2 SPRAY: 50 SPRAY, METERED NASAL at 12:34

## 2024-03-29 RX ADMIN — SODIUM CHLORIDE 100 ML/HR: 9 INJECTION, SOLUTION INTRAVENOUS at 06:35

## 2024-03-29 RX ADMIN — ACETAMINOPHEN 650 MG: 325 TABLET ORAL at 12:34

## 2024-03-29 RX ADMIN — FERROUS SULFATE TAB 325 MG (65 MG ELEMENTAL FE) 325 MG: 325 (65 FE) TAB at 17:39

## 2024-03-29 RX ADMIN — FERROUS SULFATE TAB 325 MG (65 MG ELEMENTAL FE) 325 MG: 325 (65 FE) TAB at 12:34

## 2024-03-29 RX ADMIN — CARVEDILOL 6.25 MG: 6.25 TABLET, FILM COATED ORAL at 17:39

## 2024-03-29 RX ADMIN — PREDNISONE 40 MG: 20 TABLET ORAL at 17:39

## 2024-03-29 RX ADMIN — CETIRIZINE HYDROCHLORIDE 10 MG: 10 TABLET, FILM COATED ORAL at 12:36

## 2024-03-29 RX ADMIN — HYDROCODONE BITARTRATE AND ACETAMINOPHEN 1 TABLET: 5; 325 TABLET ORAL at 08:43

## 2024-03-29 RX ADMIN — ATORVASTATIN CALCIUM 80 MG: 40 TABLET, FILM COATED ORAL at 21:10

## 2024-03-29 RX ADMIN — HEPARIN SODIUM 1000 UNITS: 1000 INJECTION INTRAVENOUS; SUBCUTANEOUS at 11:54

## 2024-03-29 RX ADMIN — ASPIRIN 81 MG: 81 TABLET, CHEWABLE ORAL at 12:35

## 2024-03-29 RX ADMIN — CARVEDILOL 6.25 MG: 6.25 TABLET, FILM COATED ORAL at 12:36

## 2024-03-29 RX ADMIN — CALCITRIOL CAPSULES 0.25 MCG 0.25 MCG: 0.25 CAPSULE ORAL at 12:36

## 2024-03-29 RX ADMIN — HYDROCODONE BITARTRATE AND ACETAMINOPHEN 1 TABLET: 5; 325 TABLET ORAL at 14:55

## 2024-03-29 RX ADMIN — DOXYCYCLINE 100 MG: 100 INJECTION, POWDER, LYOPHILIZED, FOR SOLUTION INTRAVENOUS at 19:11

## 2024-03-29 RX ADMIN — ALLOPURINOL 100 MG: 100 TABLET ORAL at 12:35

## 2024-03-29 RX ADMIN — ONDANSETRON 4 MG: 2 INJECTION INTRAMUSCULAR; INTRAVENOUS at 12:34

## 2024-03-29 RX ADMIN — AMLODIPINE BESYLATE 10 MG: 10 TABLET ORAL at 12:36

## 2024-03-29 RX ADMIN — CALCIUM ACETATE 1334 MG: 667 CAPSULE ORAL at 17:39

## 2024-03-29 RX ADMIN — ROPINIROLE 0.75 MG: 0.5 TABLET, FILM COATED ORAL at 12:35

## 2024-03-29 RX ADMIN — Medication 1 TABLET: at 12:36

## 2024-03-29 RX ADMIN — EPOETIN ALFA-EPBX 20000 UNITS: 10000 INJECTION, SOLUTION INTRAVENOUS; SUBCUTANEOUS at 10:13

## 2024-03-29 RX ADMIN — SODIUM CHLORIDE 1000 MG: 900 INJECTION INTRAVENOUS at 19:11

## 2024-03-29 RX ADMIN — Medication 10 ML: at 21:10

## 2024-03-29 NOTE — CONSULTS
Orthopaedic Consult Note  Patient Care Team:  Edda Sanchez MD as PCP - General (Internal Medicine)  Praveen Fischer MD as Consulting Physician (Nephrology)    Chief complaint  Bilateral knee pain    Subjective .     History of present illness:    Eyal Solano is a 73 y.o. male who has a chief complaint of bilateral knee pain.  Patient admitted for dizziness and ataxia as well as starting to feel poorly prior to being admitted.  History of stroke with prior stenting.  Has end-stage renal disease and frequently undergoes hemodialysis.  Has been intermittently febrile over the last few days.  Had blood cultures showing staph but thought to be possible contaminant.  Repeat blood cultures from yesterday currently no growth.  Patient reports having history of chronic bilateral knee pain usually right worse than left.  Per nurse patient has previously been diagnosed with gout and was being treated for gout with allopurinol.    Review of Systems:    All systems reviewed are negative except for what is stated in the HPI     History  Family History   Problem Relation Age of Onset    Hyperlipidemia Mother     Hypertension Mother     Stroke Father 82    Heart attack Father 82    Hypertension Brother     Heart disease Maternal Grandmother     Heart disease Maternal Grandfather     Heart disease Paternal Grandmother     Heart disease Paternal Grandfather      Social History     Socioeconomic History    Marital status: Single   Tobacco Use    Smoking status: Former     Current packs/day: 1.00     Average packs/day: 1 pack/day for 30.0 years (30.0 ttl pk-yrs)     Types: Cigarettes    Smokeless tobacco: Current     Types: Chew    Tobacco comments:     Quit >25 yrs ago; tobacco cessation pamphlet given to pt   Vaping Use    Vaping status: Never Used   Substance and Sexual Activity    Alcohol use: Yes     Alcohol/week: 14.0 - 21.0 standard drinks of alcohol     Types: 14 - 21 Cans of beer per week    Drug use: No     Sexual activity: Defer     Past Surgical History:   Procedure Laterality Date    CEREBRAL ANGIOGRAM Left 08/24/2021    Procedure: Cerebral angiogram for Intracranial Stent under anesthesia;  Surgeon: Isaiah Grey MD;  Location: [facility] INVASIVE LOCATION;  Service: Interventional Radiology;  Laterality: Left;    NASAL SEPTUM SURGERY      10 years ago    NASAL SINUS SURGERY      TOOTH EXTRACTION Left 03/2022     Past Medical History:   Diagnosis Date    Acute gout of right knee 12/18/2018    Alcohol use     Conversion reaction     Dizziness     Enlarged prostate     Gout     Hyperlipidemia     Hypertension     Kidney disorder     Stroke     right side weakness     No Known Allergies    Current Facility-Administered Medications:     acetaminophen (TYLENOL) tablet 650 mg, 650 mg, Oral, Q6H PRN, Sofya Arellano R, APRN, 650 mg at 03/29/24 1234    albumin human 25 % IV SOLN 25 g, 25 g, Intravenous, PRN, Guru Menjivar MD    allopurinol (ZYLOPRIM) tablet 100 mg, 100 mg, Oral, Daily, Akshat Adkins MD, 100 mg at 03/29/24 1235    amLODIPine (NORVASC) tablet 10 mg, 10 mg, Oral, Daily, Akshat Adkins MD, 10 mg at 03/29/24 1236    aspirin chewable tablet 81 mg, 81 mg, Oral, Daily, Akshat Adkins MD, 81 mg at 03/29/24 1235    atorvastatin (LIPITOR) tablet 80 mg, 80 mg, Oral, Nightly, Akshat Adkins MD, 80 mg at 03/28/24 2130    b complex-vitamin c-folic acid (NEPHRO-JOSE ALEJANDRO) tablet 1 tablet, 1 tablet, Oral, Daily, Guru Menjivar MD, 1 tablet at 03/29/24 1236    sennosides-docusate (PERICOLACE) 8.6-50 MG per tablet 2 tablet, 2 tablet, Oral, BID PRN **AND** polyethylene glycol (MIRALAX) packet 17 g, 17 g, Oral, Daily PRN **AND** bisacodyl (DULCOLAX) EC tablet 5 mg, 5 mg, Oral, Daily PRN **AND** bisacodyl (DULCOLAX) suppository 10 mg, 10 mg, Rectal, Daily PRN, Akshat Adkins MD    calcitriol (ROCALTROL) capsule 0.25 mcg, 0.25 mcg, Oral, Daily, Alley Rockwell MD, 0.25 mcg at 03/29/24  1236    calcium acetate (PHOS BINDER)) capsule 1,334 mg, 1,334 mg, Oral, TID, Alley Rockwell MD, 1,334 mg at 03/28/24 1721    carvedilol (COREG) tablet 6.25 mg, 6.25 mg, Oral, BID With Meals, Akshat Adkins MD, 6.25 mg at 03/29/24 1236    cetirizine (zyrTEC) tablet 10 mg, 10 mg, Oral, Daily, Akshat Adkins MD, 10 mg at 03/29/24 1236    epoetin minnie-epbx (RETACRIT) injection 20,000 Units, 20,000 Units, Subcutaneous, Weekly, Guru Menjivar MD, 20,000 Units at 03/29/24 1013    ferrous sulfate tablet 325 mg, 325 mg, Oral, BID With Meals, Akshat Adkins MD, 325 mg at 03/29/24 1234    fluticasone (FLONASE) 50 MCG/ACT nasal spray 2 spray, 2 spray, Nasal, Daily, Akshat Adkins MD, 2 spray at 03/29/24 1234    heparin (porcine) injection 1,000 Units, 1,000 Units, Intracatheter, PRN, Guru Menjivar MD, 1,000 Units at 03/29/24 1154    HYDROcodone-acetaminophen (NORCO) 5-325 MG per tablet 1 tablet, 1 tablet, Oral, Q6H PRN, Lili Denson P, DO, 1 tablet at 03/29/24 0843    Lidocaine 4 % 2 patch, 2 patch, Transdermal, Q24H, Lili Denson P, DO, 2 patch at 03/29/24 1234    ondansetron (ZOFRAN) injection 4 mg, 4 mg, Intravenous, Q6H PRN, Akshat Adkins MD, 4 mg at 03/29/24 1234    prochlorperazine (COMPAZINE) injection 5 mg, 5 mg, Intravenous, Q6H PRN **OR** prochlorperazine (COMPAZINE) tablet 5 mg, 5 mg, Oral, Q6H PRN **OR** prochlorperazine (COMPAZINE) suppository 25 mg, 25 mg, Rectal, Q12H PRN, Akshat Adkins MD    rOPINIRole (REQUIP) tablet 0.75 mg, 0.75 mg, Oral, Daily, Lili Denson, DO, 0.75 mg at 03/29/24 1235    sodium chloride 0.9 % flush 10 mL, 10 mL, Intravenous, Q12H, Akshat Adkins MD, 10 mL at 03/29/24 1238    sodium chloride 0.9 % flush 10 mL, 10 mL, Intravenous, PRN, Akshat Adkins MD    sodium chloride 0.9 % infusion 40 mL, 40 mL, Intravenous, PRN, Akshat Adkins MD    Objective     Vital Signs   Temp:  [98.7 °F (37.1 °C)-102.3 °F (39.1 °C)] 99.4 °F (37.4 °C)  Heart  Rate:  [] 87  Resp:  [16-22] 21  BP: (127-182)/() 167/88  Body mass index is 25.45 kg/m².    Physical Exam:  left LE: skin intact, compartments soft/compressible   +EHL/FHL/GSC/TA   SILT DP/SP/SN/Saph/Tib   Palpable DP, CR brisk in all toes   Palpable knee effusion, there is pain with knee range of motion, palpable warmth, no open wounds, diffusely tender   Able to actively flex and extend knee  left LE: skin intact, compartments soft/compressible   +EHL/FHL/GSC/TA   SILT DP/SP/SN/Saph/Tib   Palpable DP, CR brisk in all toes   Palpable knee effusion, there is pain with knee range of motion, palpable warmth, no open wounds, diffusely tender   Able to actively flex and extend knee  Labs:  Lab Results (last 24 hours)       Procedure Component Value Units Date/Time    Blood Culture - Blood, Arm, Right [[id number]]  (Normal) Collected: 03/28/24 1123    Specimen: Blood from Arm, Right Updated: 03/29/24 1146     Blood Culture No growth at 24 hours    Narrative:      Less than seven (7) mL's of blood was collected.  Insufficient quantity may yield false negative results.    Blood Culture - Blood, Arm, Left [[id number]]  (Normal) Collected: 03/28/24 1116    Specimen: Blood from Arm, Left Updated: 03/29/24 1146     Blood Culture No growth at 24 hours    Narrative:      Less than seven (7) mL's of blood was collected.  Insufficient quantity may yield false negative results.    Sedimentation Rate [[id number]]  (Abnormal) Collected: 03/29/24 0644    Specimen: Blood Updated: 03/29/24 0956     Sed Rate 68 mm/hr     Procalcitonin [[id number]]  (Abnormal) Collected: 03/29/24 0644    Specimen: Blood Updated: 03/29/24 0940     Procalcitonin 0.90 ng/mL     Narrative:      As a Marker for Sepsis (Non-Neonates):    1. <0.5 ng/mL represents a low risk of severe sepsis and/or septic shock.  2. >2 ng/mL represents a high risk of severe sepsis and/or septic shock.    As a Marker for Lower Respiratory Tract Infections that require  "antibiotic therapy:    PCT on Admission    Antibiotic Therapy       6-12 Hrs later    >0.5                Strongly Recommended  >0.25 - <0.5        Recommended   0.1 - 0.25          Discouraged              Remeasure/reassess PCT  <0.1                Strongly Discouraged     Remeasure/reassess PCT    As 28 day mortality risk marker: \"Change in Procalcitonin Result\" (>80% or <=80%) if Day 0 (or Day 1) and Day 4 values are available. Refer to http://www.[email or web address]-pct-calculator.[email or web address]    Change in PCT <=80%  A decrease of PCT levels below or equal to 80% defines a positive change in PCT test result representing a higher risk for 28-day all-cause mortality of patients diagnosed with severe sepsis for septic shock.    Change in PCT >80%  A decrease of PCT levels of more than 80% defines a negative change in PCT result representing a lower risk for 28-day all-cause mortality of patients diagnosed with severe sepsis or septic shock.       C-reactive Protein [[id number]]  (Abnormal) Collected: 03/29/24 0644    Specimen: Blood Updated: 03/29/24 0935     C-Reactive Protein 16.17 mg/dL     CBC (No Diff) [[id number]]  (Abnormal) Collected: 03/29/24 0644    Specimen: Blood Updated: 03/29/24 0844     WBC 6.88 10*3/mm3      RBC 2.46 10*6/mm3      Hemoglobin 6.6 g/dL      Hematocrit 21.7 %      MCV 88.2 fL      MCH 26.8 pg      MCHC 30.4 g/dL      RDW 15.1 %      RDW-SD 49.1 fl      MPV 10.6 fL      Platelets 176 10*3/mm3     Comprehensive Metabolic Panel [[id number]]  (Abnormal) Collected: 03/29/24 0644    Specimen: Blood Updated: 03/29/24 0747     Glucose 96 mg/dL      BUN 56 mg/dL      Creatinine 5.99 mg/dL      Sodium 136 mmol/L      Potassium 5.3 mmol/L      Chloride 101 mmol/L      CO2 23.0 mmol/L      Calcium 7.4 mg/dL      Total Protein 5.0 g/dL      Albumin 2.9 g/dL      ALT (SGPT) 12 U/L      AST (SGOT) 12 U/L      Alkaline Phosphatase 42 U/L      Total Bilirubin 0.2 mg/dL      Globulin 2.1 gm/dL      Comment: Calculated Result "        A/G Ratio 1.4 g/dL      BUN/Creatinine Ratio 9.3     Anion Gap 12.0 mmol/L      eGFR 9.4 mL/min/1.73      Comment: <15 Indicative of kidney failure       Narrative:      GFR Normal >60  Chronic Kidney Disease <60  Kidney Failure <15    The GFR formula is only valid for adults with stable renal function between ages 18 and 70.    Blood Culture - Blood, Arm, Right [[id number]]  (Normal) Collected: 03/27/24 0030    Specimen: Blood from Arm, Right Updated: 03/29/24 0115     Blood Culture No growth at 2 days            Imaging:  XR KNEE 1 OR 2 VW BILATERAL     HISTORY: knee pain L > R.   Chronic gout       pain     COMPARISON: None     FINDINGS:     Acute Fracture: None.     Subluxation: None.     Abnormal soft tissue swelling: None.     Radiopaque foreign body: None.     Soft tissue air: None.     Degenerative changes: Mild. Present.     Other/Incidental findings: Vascular calcification. 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     IMPRESSION:  No acute abnormality on XR KNEE 2 VW BILATERAL.     The bilateral tibial and femoral bone lesions are likely benign as discussed above. If there is any clinical concern, MRI may be considered for confirmation.        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    Assessment & Plan   73-year-old male with multiple comorbidities with acute on chronic bilateral knee pain    Ataxia    Essential hypertension    Stage 4 chronic kidney disease    Mixed hyperlipidemia    History of stroke s/p L MCA stent    SAMINA (acute kidney injury)      -No acute osseous abnormality of the bilateral knees on radiographic films  -Resume diet  -Aspirations bilateral  knees performed and synovial fluid sent to the laboratory, straw-colored fluid removed from bilateral knees with slight cloudiness, did not appear purulent   -Cell count right knee 3,657, cell count left knee 7,436, crystals negative, aerobic/anaerobic cultures pending  -Cell count is very low and not consistent with septic arthritis, no orthopedic intervention indicated  -Will continue to follow cultures  -Patient can weight-bear as tolerated bilateral lower extremities  -Recommend pain control and symptomatic management  -Recommend PT/OT  -Resume diet    Howie Moralez MD  03/29/24  12:59 EDT

## 2024-03-29 NOTE — CASE MANAGEMENT/SOCIAL WORK
Continued Stay Note  [facility]     Patient Name: Eyal Solano  MRN: [id number]  Today's Date: 3/29/2024    Admit Date: 3/24/2024    Plan: Home   Discharge Plan       Row Name 03/29/24 1670       Plan    Plan Home    Patient/Family in Agreement with Plan yes    Plan Comments Mr. Solano is not being discharged today. The ortho physician aspirated fluid from his left knee. Mr. Solano is febrile. CM will continue to follow.    Final Discharge Disposition Code 30 - still a patient                   Discharge Codes    No documentation.                 Expected Discharge Date and Time       Expected Discharge Date Expected Discharge Time    Mar 29, 2024               Andree Mo RN

## 2024-03-29 NOTE — PROGRESS NOTES
[facility] Medicine Services  PROGRESS NOTE    Patient Name: Eyal Solano  : 1950  MRN: [id number]    Date of Admission: 3/24/2024  Primary Care Physician: Edda Sanchez MD    Subjective     CC: f/u ESRD    HPI:  In bed during HD. Says he feels poorly. Notes acute on chronic R knee pain and acute L knee pain. Intermittent fevers since 3/26, primarily overnight and in AM hours. Temp has been as high as 102F.     Objective     Vital Signs:   Temp:  [98.7 °F (37.1 °C)-102.3 °F (39.1 °C)] 99.9 °F (37.7 °C)  Heart Rate:  [] 85  Resp:  [16-22] 18  BP: (127-182)/() 146/73  Flow (L/min):  [2] 2     Physical Exam:  Constitutional: No acute distress, awake, alert and conversational.   HENT: NCAT, mucous membranes moist  Respiratory: Clear to auscultation bilaterally, respiratory effort normal   Cardiovascular: RRR, no murmurs, rubs, or gallops  Gastrointestinal: Positive bowel sounds, soft, nontender, nondistended  Musculoskeletal: No bilateral ankle edema. R upper chest tunneled HD catheter. Arthritic changes to knees bilaterally with fluctuant effusions. Warm and tender to palpation. ROM not attempted   Psychiatric: Appropriate affect, cooperative  Neurologic: Oriented x 3, moves all extremities spontaneously without focal deficits, speech clear    Results Reviewed:  LAB RESULTS:      Lab 24  1417 24  0644 24  0556 24  0030 24  1610 24  0333 24  0705 24  1631   WBC 7.06 6.88 9.21 7.37  --  5.93 7.60 8.38   HEMOGLOBIN 8.3* 6.6* 7.4* 7.7* 7.8* 6.2* 7.2* 7.6*   HEMATOCRIT 26.1* 21.7* 23.8* 24.0* 24.5* 19.8* 23.3* 24.7*   PLATELETS 176 176 188 176  --  198 227 237   NEUTROS ABS  --   --   --  5.99  --  4.28 5.60 6.70   IMMATURE GRANS (ABS)  --   --   --  0.03  --  0.03 0.05 0.09*   LYMPHS ABS  --   --   --  0.52*  --  0.90 0.97 0.74   MONOS ABS  --   --   --  0.69  --  0.57 0.76 0.71   EOS ABS  --   --   --  0.12  --  0.12  0.19 0.10   MCV 87.9 88.2 87.2 84.8  --  85.0 84.7 85.8   SED RATE  --  68*  --   --   --   --   --   --    CRP  --  16.17*  --  11.63*  --   --   --   --    PROCALCITONIN  --  0.90*  --  1.02*  --   --   --   --    LACTATE  --   --   --  0.6  --   --   --   --    LDH  --   --   --   --   --   --   --  283*   PROTIME  --   --   --   --   --  15.3*  --   --          Lab 03/29/24  0644 03/28/24  0556 03/27/24  0030 03/26/24  0333 03/25/24  1434 03/25/24  0705 03/24/24 2054 03/24/24  1631   SODIUM 136 140 133* 135*  --  137   < > 135*   POTASSIUM 5.3* 4.9 4.6 5.0 5.0 5.9*   < > 6.5*   CHLORIDE 101 103 99 102  --  103   < > 101   CO2 23.0 25.0 23.0 19.0*  --  17.0*   < > 18.0*   ANION GAP 12.0 12.0 11.0 14.0  --  17.0*   < > 16.0*   BUN 56* 55* 58* 101*  --  104*   < > 99*   CREATININE 5.99* 5.37* 4.37* 6.05*  --  5.54*   < > 5.47*   EGFR 9.4* 10.7* 13.7* 9.3*  --  10.3*   < > 10.5*   GLUCOSE 96 111* 118* 101*  --  85   < > 92   CALCIUM 7.4* 7.8* 7.9* 7.7*  --  7.3*   < > 7.4*   IONIZED CALCIUM  --   --   --   --   --   --   --  1.09*   MAGNESIUM  --   --  1.6  --   --   --   --  1.6   PHOSPHORUS  --   --   --  5.4*  --   --   --   --    TSH  --   --   --   --   --   --   --  1.490    < > = values in this interval not displayed.         Lab 03/29/24  0644 03/28/24  0556 03/27/24  0030 03/26/24 0333 03/24/24  1631   TOTAL PROTEIN 5.0* 5.2* 5.6*  --  6.2   ALBUMIN 2.9* 3.1* 2.6* 2.8* 3.4*   GLOBULIN 2.1 2.1 3.0  --  2.8   ALT (SGPT) 12 15 18  --  29   AST (SGOT) 12 14 17  --  35   BILIRUBIN 0.2 0.3 0.3  --  0.3   ALK PHOS 42 46 50  --  59         Lab 03/26/24 0333 03/24/24 2054 03/24/24  1631   HSTROP T  --  70* 79*   PROTIME 15.3*  --   --    INR 1.20*  --   --          Lab 03/29/24  0936 03/26/24 0333 03/24/24  2146 03/24/24 2054 03/24/24  1631   IRON  --  24*  --   --   --    IRON SATURATION (TSAT)  --  14*  --   --   --    TIBC  --  171*  --   --   --    TRANSFERRIN  --  115*  --   --   --    FERRITIN  --   --    --   --  1,250.00*   ABO TYPING A  --    < > A  --    RH TYPING Positive  --    < > Positive  --    ANTIBODY SCREEN Negative  --   --  Negative  --     < > = values in this interval not displayed.         Brief Urine Lab Results  (Last result in the past 365 days)        Color   Clarity   Blood   Leuk Est   Nitrite   Protein   CREAT   Urine HCG        03/27/24 0033 Yellow   Clear   Trace   Negative   Negative   >=300 mg/dL (3+)                   Microbiology Results Abnormal       Procedure Component Value - Date/Time    Blood Culture - Blood, Arm, Right [[id number]]  (Normal) Collected: 03/28/24 1123    Lab Status: Preliminary result Specimen: Blood from Arm, Right Updated: 03/29/24 1146     Blood Culture No growth at 24 hours    Narrative:      Less than seven (7) mL's of blood was collected.  Insufficient quantity may yield false negative results.    Blood Culture - Blood, Arm, Left [[id number]]  (Normal) Collected: 03/28/24 1116    Lab Status: Preliminary result Specimen: Blood from Arm, Left Updated: 03/29/24 1146     Blood Culture No growth at 24 hours    Narrative:      Less than seven (7) mL's of blood was collected.  Insufficient quantity may yield false negative results.    Blood Culture - Blood, Arm, Right [[id number]]  (Normal) Collected: 03/27/24 0030    Lab Status: Preliminary result Specimen: Blood from Arm, Right Updated: 03/29/24 0115     Blood Culture No growth at 2 days    MRSA Screen, PCR (Inpatient) - Swab, Nares [[id number]]  (Normal) Collected: 03/27/24 0033    Lab Status: Final result Specimen: Swab from Nares Updated: 03/27/24 0802     MRSA PCR Negative    Narrative:      The negative predictive value of this diagnostic test is high and should only be used to consider de-escalating anti-MRSA therapy. A positive result may indicate colonization with MRSA and must be correlated clinically.  MRSA Negative    COVID PRE-OP / PRE-PROCEDURE SCREENING ORDER (NO ISOLATION) - Swab, Nasopharynx [[id number]]   (Normal) Collected: 03/27/24 0033    Lab Status: Final result Specimen: Swab from Nasopharynx Updated: 03/27/24 0123    Narrative:      The following orders were created for panel order COVID PRE-OP / PRE-PROCEDURE SCREENING ORDER (NO ISOLATION) - Swab, Nasopharynx.  Procedure                               Abnormality         Status                     ---------                               -----------         ------                     COVID-19 and FLU A/B PCR...[[id number]]  Normal              Final result                 Please view results for these tests on the individual orders.    COVID-19 and FLU A/B PCR, 1 HR TAT - Swab, Nasopharynx [[id number]]  (Normal) Collected: 03/27/24 0033    Lab Status: Final result Specimen: Swab from Nasopharynx Updated: 03/27/24 0123     COVID19 Not Detected     Influenza A PCR Not Detected     Influenza B PCR Not Detected    Narrative:      Fact sheet for providers: https://www.fda.gov/media/750919/download    Fact sheet for patients: https://www.fda.gov/media/040778/download    Test performed by PCR.            XR Knee 1 or 2 View Bilateral    Result Date: 3/29/2024  XR KNEE 1 OR 2 VW BILATERAL HISTORY: knee pain L > R. Chronic gout       pain COMPARISON: None FINDINGS: Acute Fracture: None. Subluxation: None. Abnormal soft tissue swelling: None. Radiopaque foreign body: None. Soft tissue air: None. Degenerative changes: Mild. Present. Other/Incidental findings: Vascular calcification. 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     Impression: No acute abnormality on XR KNEE 2 VW BILATERAL. The bilateral tibial and femoral bone lesions are likely benign as discussed above. If there is any clinical concern, MRI may be considered for confirmation. Electronically Signed: Jose Pina MD  3/29/2024 1:30 PM EDT  Workstation ID: [id number]     Results for orders placed during the hospital encounter of 07/28/21    Adult Transthoracic Echo Complete  W/ Cont if Necessary Per Protocol (With Agitated Saline)    Interpretation Summary  · Left ventricular ejection fraction appears to be 56 - 60%.  · Normal left atrial size and volume noted. No evidence of a patent foramen ovale. Saline test results are negative  · Mild mitral annular calcification is present. There is mild, anterior mitral leaflet thickening present. Trace mitral regurgitation.      Current medications:  Scheduled Meds:allopurinol, 100 mg, Oral, Daily  amLODIPine, 10 mg, Oral, Daily  aspirin, 81 mg, Oral, Daily  atorvastatin, 80 mg, Oral, Nightly  b complex-vitamin c-folic acid, 1 tablet, Oral, Daily  calcitriol, 0.25 mcg, Oral, Daily  calcium acetate, 1,334 mg, Oral, TID  carvedilol, 6.25 mg, Oral, BID With Meals  cetirizine, 10 mg, Oral, Daily  epoetin minnie/minnie-epbx, 20,000 Units, Subcutaneous, Weekly  ferrous sulfate, 325 mg, Oral, BID With Meals  fluticasone, 2 spray, Nasal, Daily  Lidocaine, 2 patch, Transdermal, Q24H  rOPINIRole, 0.75 mg, Oral, Daily  sodium chloride, 10 mL, Intravenous, Q12H      Continuous Infusions:   PRN Meds:.  acetaminophen    albumin human    senna-docusate sodium **AND** polyethylene glycol **AND** bisacodyl **AND** bisacodyl    heparin (porcine)    HYDROcodone-acetaminophen    ondansetron    prochlorperazine **OR** prochlorperazine **OR** prochlorperazine    sodium chloride    sodium chloride    Assessment & Plan   Assessment & Plan     Active Hospital Problems    Diagnosis  POA    **Ataxia [R27.0]  Yes    SAMINA (acute kidney injury) [N17.9]  Yes    History of stroke s/p L MCA stent [Z86.73]  Not Applicable    Mixed hyperlipidemia [E78.2]  Yes    Stage 4 chronic kidney disease [N18.4]  Yes    Essential hypertension [I10]  Yes      Resolved Hospital Problems   No resolved problems to display.     Brief Hospital Course to date:  Eyal Solano is a 73 y.o. male with PMH significant for HTN, HLD, prior CVA s/p L MCA stent and CKD IV. Presented to [facility] ED on 3/24/24 with 2  days of dizziness, ataxia, double/blurred vision, nausea and vomiting. Stroke team evaluated and stroke was rule out. He was noted to have SAMINA and ultimately required initiation of HD this admission.     Dizziness/ataxia/visual disturbances, resolved  - History of CVA s/p L MCA stent  - MRI brain with DYLON stenosis (50%) otherwise unremarkable  - Continue ASA     SAMINA/CKD IV  Hyperkalemia, resolved   - Nephrology following - monitoring K and treating as needed  - s/p HD catheter placement 3/26/24 by Dr. Melendez  - PD catheter explored but deferred due to fevers  - CM working on outpatient HD chair     Anemia of chronic disease  - s/p 1 unit PRBCS in [place]  - s/p 1 unit PRBCs here on 3/26  - Hgb 6.6 today, transfuse 1 unit PRBCs  - Discussed with nephrology - suspect due to renal dysfunction.   - FOBT negative on 3/26     (+) blood culture  - 1 of 2 blood cultures positive for Staph spp.   - Suspect contaminant. Repeat blood cultures pending    Fever  - Night/AM fevers since 3/26  - Infectious work up has been largely unremarkable. UA reassuring. CXR nonfocal. No leukocytosis  - Due to gout flare? Treat as such and monitor for now    Bilateral knee pain / effusions  Gout flare  - Consulted ortho due to fevers - s/p bilateral knee aspiration today with no purulent fluid or leukocytosis  - Suspect gout flare  - On Allopurinol. No Colchicine due to CKD  - Start prednisone burst       HTN  - BP a bit elevated. Continue Amlodipine and Carvedilol. May need further titration     HLD, continue statin     Expected Discharge Location and Transportation: home   Expected Discharge Expected Discharge Date: 4/2/2024; Expected Discharge Time:      DVT prophylaxis:Medical and mechanical DVT prophylaxis orders are present.    AM-PAC 6 Clicks Score (PT): 21 (03/27/24 3673)    CODE STATUS:   Code Status and Medical Interventions:   Ordered at: 03/24/24 8013     Code Status (Patient has no pulse and is not breathing):    CPR (Attempt  to Resuscitate)     Medical Interventions (Patient has pulse or is breathing):    Full Support     Jamila Nolasco PA-C  03/29/24

## 2024-03-29 NOTE — PROCEDURES
Orthopaedic Procedure Note  Procedure Name: Knee aspiration  Site: Left knee  Indication: Left knee pain/swelling    Pre-Procedure Diagnosis:    Left knee pain    Post-Procedure Diagnosis:  Same    Procedure/CPT® Codes:  Left knee aspiration 54302    Procedure Note:  I discussed with the patient the potential benefits of performing aspiration as well as potential risks including but not limited to infection, swelling, pain, bleeding, bruising, nerve/vessel damage. After informed consent and after the areas were prepped with chlorhexadine soap, ethyl chloride was used to numb the skin. Via a lateral approach, aspiration of 10 mL of sanguinous/straw colored fluid from the left knee joint. The aspiration fluid was sent to the lab for analysis for cell count, crystals, and cultures. The patient tolerated the procedure well. There were no complications. A sterile dressing was placed over the aspiration site.    Howie Moralez MD  [facility] Orthopedic Surgeon

## 2024-03-29 NOTE — PROGRESS NOTES
"   LOS: 5 days    Patient Care Team:  Edda Sanchez MD as PCP - General (Internal Medicine)  Praveen Fischer MD as Consulting Physician (Nephrology)    Subjective     Seen on HD tolerating well so far. Goal UF 3 liter as tolerated. Bp stable. Good access pressure. \Getting 2x weekly HD. Hb 6.6 need 1 unit prbc. C/o of knee pain L>R      Objective     Vital Signs:  Blood pressure 167/79, pulse 94, temperature (!) 102.3 °F (39.1 °C), temperature source Oral, resp. rate 16, height 177.8 cm (70\"), weight 80.5 kg (177 lb 6.4 oz), SpO2 94%.      Intake/Output Summary (Last 24 hours) at 3/29/2024 0854  Last data filed at 3/29/2024 0400  Gross per 24 hour   Intake --   Output 700 ml   Net -700 ml        03/28 0701 - 03/29 0700  In: -   Out: 700 [Urine:700]    Physical Exam:          General Appearance: WD AAM NAD  Neuro:   awake alert   Psych:  Normal mood and affect, cooperative  CV:   No edema  Lungs: respirations regular and unlabored  Abdomen: not distended  :  No parker  Skin: No rash, Warm and dry  RIJ TDC 3/26/2024.  MSK: Left knee tenderness, Swelling noted +      Labs:  Results from last 7 days   Lab Units 03/29/24  0644 03/28/24  0556 03/27/24  0030   WBC 10*3/mm3 6.88 9.21 7.37   HEMOGLOBIN g/dL 6.6* 7.4* 7.7*   PLATELETS 10*3/mm3 176 188 176     Results from last 7 days   Lab Units 03/29/24  0644 03/28/24  0556 03/27/24  0030 03/26/24  0333 03/24/24  2054 03/24/24  1631   SODIUM mmol/L 136 140 133* 135*   < > 135*   POTASSIUM mmol/L 5.3* 4.9 4.6 5.0   < > 6.5*   CHLORIDE mmol/L 101 103 99 102   < > 101   CO2 mmol/L 23.0 25.0 23.0 19.0*   < > 18.0*   BUN mg/dL 56* 55* 58* 101*   < > 99*   CREATININE mg/dL 5.99* 5.37* 4.37* 6.05*   < > 5.47*   CALCIUM mg/dL 7.4* 7.8* 7.9* 7.7*   < > 7.4*   PHOSPHORUS mg/dL  --   --   --  5.4*  --   --    MAGNESIUM mg/dL  --   --  1.6  --   --  1.6   ALBUMIN g/dL 2.9* 3.1* 2.6* 2.8*  --  3.4*    < > = values in this interval not displayed.     Results from last 7 days "   Lab Units 03/29/24  0644   ALK PHOS U/L 42   BILIRUBIN mg/dL 0.2   ALT (SGPT) U/L 12   AST (SGOT) U/L 12                   Estimated Creatinine Clearance: 12.9 mL/min (A) (by C-G formula based on SCr of 5.99 mg/dL (H)).         A/P:      ESRD:   Follows with Dr. Nicholas in [place].  Patient has been heading towards dialysis prior to admission. . Urine output remains nonoliguric.  Tunneled dialysis catheter placed 3/26/2024 and HD initiated after BUN/creatinine increased further to 101/6.1.  Patient has had dialysis teaching and is very interested in home dialysis.  Dr. Melendez consulted to evaluate for PD catheter. Given intermittent fever. Plan to place PD cath is on hold.      HTN: Blood pressure stable.     Hyperkalemia: Low K diet. Adjustment w HD.     Fever: Intermittent fever in last few days. Blood cx x 1 positive ( suspicion for contamination). Swelling and pain noted in left knee.      Metabolic Acidosis: Improved.     Anemia: Hemoglobin below goal. LORE on HD days. Transfuse at hb<7    Volume: Stable..      Hyperphosphatemia:  Continue Phos binder low Phos diet.    Nutrition: Started renal vitamin.  Need high-protein low phosphorus low potassium dialysis diet     History of CVA: Evaluated by neurology.  Doubt new insult.    High risk and complexity patient    Naveen Carrington MD  03/29/24  08:54 EDT

## 2024-03-29 NOTE — PROCEDURES
Orthopaedic Procedure Note  Procedure Name: Knee aspiration  Site: Right knee  Indication: Right knee pain/swelling    Pre-Procedure Diagnosis:    Right knee pain    Post-Procedure Diagnosis:  Same    Procedure/CPT® Codes:  Right knee aspiration [id number]    Procedure Note:  I discussed with the patient the potential benefits of performing aspiration as well as potential risks including but not limited to infection, swelling, pain, bleeding, bruising, nerve/vessel damage. After informed consent and after the areas were prepped with chlorhexadine soap, ethyl chloride was used to numb the skin. Via a lateral approach, aspiration of 10 mL of sanguinous/straw colored fluid from the right knee joint. The aspiration fluid was sent to the lab for analysis for cell count, crystals, and cultures. The patient tolerated the procedure well. There were no complications. A sterile dressing was placed over the aspiration site.    Howie Moralez MD  [facility]opedic Surgeon

## 2024-03-29 NOTE — PLAN OF CARE
Goal Outcome Evaluation: Scheduled HD completed. Pt tolerated well. 1 unit blood transfused. Pt tolerated well. Called report to josé miguel HERNANDEZ.     Problem: Device-Related Complication Risk (Hemodialysis)  Goal: Safe, Effective Therapy Delivery  Outcome: Ongoing, Progressing  Intervention: Optimize Device Care and Function  Recent Flowsheet Documentation  Taken 3/29/2024 0755 by Brinda Crisostomo RN  Medication Review/Management: medications reviewed  Goal: Safe, Effective Therapy Delivery  Outcome: Ongoing, Progressing  Intervention: Optimize Device Care and Function  Recent Flowsheet Documentation  Taken 3/29/2024 0755 by Brinda Crisostomo RN  Medication Review/Management: medications reviewed     Problem: Device-Related Complication Risk (Hemodialysis)  Goal: Safe, Effective Therapy Delivery  Outcome: Ongoing, Progressing  Intervention: Optimize Device Care and Function  Recent Flowsheet Documentation  Taken 3/29/2024 0755 by Brinda Crisostomo RN  Medication Review/Management: medications reviewed     Problem: Device-Related Complication Risk (Hemodialysis)  Goal: Safe, Effective Therapy Delivery  Outcome: Ongoing, Progressing  Intervention: Optimize Device Care and Function  Recent Flowsheet Documentation  Taken 3/29/2024 0755 by Brinda Crisostomo RN  Medication Review/Management: medications reviewed     Problem: Hemodynamic Instability (Hemodialysis)  Goal: Effective Tissue Perfusion  Outcome: Ongoing, Progressing  Goal: Effective Tissue Perfusion  Outcome: Ongoing, Progressing     Problem: Infection (Hemodialysis)  Goal: Absence of Infection Signs and Symptoms  Outcome: Ongoing, Progressing

## 2024-03-29 NOTE — PLAN OF CARE
Goal Outcome Evaluation:  Plan of Care Reviewed With: patient           Outcome Evaluation: patient alert and oriented x 4, resp even and unlabored.  vitals stable.  no acute distress noted.

## 2024-03-30 LAB
BH BB BLOOD EXPIRATION DATE: NORMAL
BH BB BLOOD TYPE BARCODE: 6200
BH BB DISPENSE STATUS: NORMAL
BH BB PRODUCT CODE: NORMAL
BH BB UNIT NUMBER: NORMAL
CROSSMATCH INTERPRETATION: NORMAL
UNIT  ABO: NORMAL
UNIT  RH: NORMAL

## 2024-03-30 PROCEDURE — 99232 SBSQ HOSP IP/OBS MODERATE 35: CPT | Performed by: PHYSICIAN ASSISTANT

## 2024-03-30 PROCEDURE — 99232 SBSQ HOSP IP/OBS MODERATE 35: CPT | Performed by: ORTHOPAEDIC SURGERY

## 2024-03-30 PROCEDURE — 63710000001 PREDNISONE PER 1 MG: Performed by: PHYSICIAN ASSISTANT

## 2024-03-30 PROCEDURE — 25010000002 CEFTRIAXONE PER 250 MG: Performed by: PHYSICIAN ASSISTANT

## 2024-03-30 RX ADMIN — Medication 1 TABLET: at 09:25

## 2024-03-30 RX ADMIN — DOXYCYCLINE 100 MG: 100 INJECTION, POWDER, LYOPHILIZED, FOR SOLUTION INTRAVENOUS at 18:53

## 2024-03-30 RX ADMIN — CALCIUM ACETATE 1334 MG: 667 CAPSULE ORAL at 09:24

## 2024-03-30 RX ADMIN — CARVEDILOL 6.25 MG: 6.25 TABLET, FILM COATED ORAL at 18:03

## 2024-03-30 RX ADMIN — FERROUS SULFATE TAB 325 MG (65 MG ELEMENTAL FE) 325 MG: 325 (65 FE) TAB at 18:03

## 2024-03-30 RX ADMIN — SODIUM CHLORIDE 1000 MG: 900 INJECTION INTRAVENOUS at 18:03

## 2024-03-30 RX ADMIN — ROPINIROLE 0.75 MG: 0.5 TABLET, FILM COATED ORAL at 09:25

## 2024-03-30 RX ADMIN — CALCIUM ACETATE 1334 MG: 667 CAPSULE ORAL at 18:03

## 2024-03-30 RX ADMIN — PREDNISONE 40 MG: 20 TABLET ORAL at 09:25

## 2024-03-30 RX ADMIN — ASPIRIN 81 MG: 81 TABLET, CHEWABLE ORAL at 09:25

## 2024-03-30 RX ADMIN — Medication 10 ML: at 09:24

## 2024-03-30 RX ADMIN — FERROUS SULFATE TAB 325 MG (65 MG ELEMENTAL FE) 325 MG: 325 (65 FE) TAB at 09:25

## 2024-03-30 RX ADMIN — ALLOPURINOL 100 MG: 100 TABLET ORAL at 09:25

## 2024-03-30 RX ADMIN — ATORVASTATIN CALCIUM 80 MG: 40 TABLET, FILM COATED ORAL at 21:27

## 2024-03-30 RX ADMIN — AMLODIPINE BESYLATE 10 MG: 10 TABLET ORAL at 09:25

## 2024-03-30 RX ADMIN — FLUTICASONE PROPIONATE 2 SPRAY: 50 SPRAY, METERED NASAL at 09:26

## 2024-03-30 RX ADMIN — Medication 10 ML: at 21:51

## 2024-03-30 RX ADMIN — CETIRIZINE HYDROCHLORIDE 10 MG: 10 TABLET, FILM COATED ORAL at 09:25

## 2024-03-30 RX ADMIN — CARVEDILOL 6.25 MG: 6.25 TABLET, FILM COATED ORAL at 09:25

## 2024-03-30 RX ADMIN — CALCIUM ACETATE 1334 MG: 667 CAPSULE ORAL at 12:44

## 2024-03-30 RX ADMIN — SENNOSIDES AND DOCUSATE SODIUM 2 TABLET: 8.6; 5 TABLET ORAL at 12:44

## 2024-03-30 RX ADMIN — LIDOCAINE 2 PATCH: 4 PATCH TOPICAL at 09:26

## 2024-03-30 RX ADMIN — CALCITRIOL CAPSULES 0.25 MCG 0.25 MCG: 0.25 CAPSULE ORAL at 09:24

## 2024-03-30 RX ADMIN — DOXYCYCLINE 100 MG: 100 INJECTION, POWDER, LYOPHILIZED, FOR SOLUTION INTRAVENOUS at 05:51

## 2024-03-30 NOTE — PROGRESS NOTES
"      Orthopaedic Surgery Progress Note  CC:   Chief Complaint   Patient presents with    Dizziness      Subjective   Interval History:   Patient seen and reevaluated this morning.  Reports that his knee pain is much improved since yesterday.  States has not been up and ambulatory but he is able to move his knees much more this morning with far less pain.  He is happy with his improvement.    ROS: Denies fever, chills, nausea or vomiting    Objective     Vital Signs:  Temp (24hrs), Av.9 °F (37.2 °C), Min:97.5 °F (36.4 °C), Max:99.9 °F (37.7 °C)    /88 (BP Location: Left arm, Patient Position: Lying)   Pulse 80   Temp 97.5 °F (36.4 °C) (Oral)   Resp 16   Ht 177.8 cm (70\")   Wt 80.5 kg (177 lb 6.4 oz)   SpO2 97%   BMI 25.45 kg/m²   Body mass index is 25.45 kg/m².    Physical Exam:  left LE: skin intact, compartments soft/compressible              +EHL/FHL/GSC/TA              SILT DP/SP/SN/Saph/Tib              Palpable DP, CR brisk in all toes              There is still palpable knee effusion but patient is moving his knee much more actively on his own with much less pain.  Still some tenderness, knee not irritable              Able to actively flex and extend knee  left LE: skin intact, compartments soft/compressible              +EHL/FHL/GSC/TA              SILT DP/SP/SN/Saph/Tib              Palpable DP, CR brisk in all toes             There is still palpable knee effusion but patient is moving his knee much more actively on his own with much less pain.  Still some tenderness, knee not irritable              Able to actively flex and extend knee    Assessment and Plan:  73-year-old male with acute on chronic bilateral knee pain  -Discussed acute on chronic bilateral knee pain (undiagnosed new problem with uncertain prognosis) with patient as well as treatment options at length. Decision regarding surgical intervention considered. Patient's pain much improved this morning, no intervention " planned.  -Pain control per primary  -Weightbearing as tolerated bilateral lower extremities  -Continue to monitor physical exam  -Recommend PT  -Ice and elevate bilateral knees  --Cell count right knee 3,657, cell count left knee 7,436, crystals negative, aerobic/anaerobic cultures pending, NGTD  -Cell count is very low and not consistent with septic arthritis, no orthopedic intervention indicated  -Will continue to follow cultures    Howie Moralez MD  03/30/24  08:53 EDT

## 2024-03-30 NOTE — PROGRESS NOTES
"   LOS: 6 days    Patient Care Team:  Edda Sanchez MD as PCP - General (Internal Medicine)  Praveen Fsicher MD as Consulting Physician (Nephrology)    Subjective     HD yesterday tolerated well. Underwent knee tap yesterday.Serosanguinous fluid drained.       Objective     Vital Signs:  Blood pressure 159/88, pulse 84, temperature 97.9 °F (36.6 °C), temperature source Oral, resp. rate 18, height 177.8 cm (70\"), weight 80.5 kg (177 lb 6.4 oz), SpO2 94%.      Intake/Output Summary (Last 24 hours) at 3/30/2024 1343  Last data filed at 3/30/2024 0725  Gross per 24 hour   Intake --   Output 545 ml   Net -545 ml        03/29 0701 - 03/30 0700  In: 360   Out: 3670 [Urine:620]    Physical Exam:          General Appearance: WD AAM NAD  Neuro:   awake alert   Psych:  Normal mood and affect, cooperative  CV:   No edema  Lungs: respirations regular and unlabored  Abdomen: not distended  :  No parker  Skin: No rash, Warm and dry  RIJ TD 3/26/2024.  MSK: Left knee tenderness, Swelling noted +      Labs:  Results from last 7 days   Lab Units 03/29/24  1417 03/29/24  0644 03/28/24  0556   WBC 10*3/mm3 7.06 6.88 9.21   HEMOGLOBIN g/dL 8.3* 6.6* 7.4*   PLATELETS 10*3/mm3 176 176 188     Results from last 7 days   Lab Units 03/29/24  0644 03/28/24  0556 03/27/24  0030 03/26/24  0333 03/24/24  2054 03/24/24  1631   SODIUM mmol/L 136 140 133* 135*   < > 135*   POTASSIUM mmol/L 5.3* 4.9 4.6 5.0   < > 6.5*   CHLORIDE mmol/L 101 103 99 102   < > 101   CO2 mmol/L 23.0 25.0 23.0 19.0*   < > 18.0*   BUN mg/dL 56* 55* 58* 101*   < > 99*   CREATININE mg/dL 5.99* 5.37* 4.37* 6.05*   < > 5.47*   CALCIUM mg/dL 7.4* 7.8* 7.9* 7.7*   < > 7.4*   PHOSPHORUS mg/dL  --   --   --  5.4*  --   --    MAGNESIUM mg/dL  --   --  1.6  --   --  1.6   ALBUMIN g/dL 2.9* 3.1* 2.6* 2.8*  --  3.4*    < > = values in this interval not displayed.     Results from last 7 days   Lab Units 03/29/24  0644   ALK PHOS U/L 42   BILIRUBIN mg/dL 0.2   ALT (SGPT) U/L " 12   AST (SGOT) U/L 12                   Estimated Creatinine Clearance: 12.9 mL/min (A) (by C-G formula based on SCr of 5.99 mg/dL (H)).         A/P:      ESRD:   Follows with Dr. Nicholas in [place].  Patient has been heading towards dialysis prior to admission. . Urine output remains nonoliguric.  Tunneled dialysis catheter placed 3/26/2024 and HD initiated after BUN/creatinine increased further to 101/6.1.  Patient has had dialysis teaching and is very interested in home dialysis.  Dr. Melendez consulted to evaluate for PD catheter. Given intermittent fever. Plan to place PD cath is on hold.     Knee pain: s/p arthrocentesis. Less likely septic arthritis. On prednisone for possible gout.     HTN: Blood pressure stable.     Hyperkalemia: Low K diet. Adjustment w HD.     Fever: Intermittent fever in last few days. Blood cx x 1 positive ( suspicion for contamination). Swelling and pain noted in left knee.      Metabolic Acidosis: Improved.     Anemia: Hemoglobin below goal. LORE on HD days. Transfuse at hb<7    Volume: Stable..      Hyperphosphatemia:  Continue Phos binder low Phos diet.    Nutrition: Started renal vitamin.  Need high-protein low phosphorus low potassium dialysis diet     History of CVA: Evaluated by neurology.  Doubt new insult.    High risk and complexity patient    Naveen Carrington MD  03/30/24  13:43 EDT

## 2024-03-30 NOTE — PLAN OF CARE
Goal Outcome Evaluation:  Plan of Care Reviewed With: patient           Outcome Evaluation: patient alert and oriented x 4. resp even and unlabored.  vitals stable with pt afebrile this shift.  no acute distress noted.

## 2024-03-30 NOTE — PROGRESS NOTES
[facility] Medicine Services  PROGRESS NOTE    Patient Name: Eyal Solano  : 1950  MRN: [id number]    Date of Admission: 3/24/2024  Primary Care Physician: Edda Sanchez MD    Subjective     CC: f/u ESRD    HPI:  Resting in bed. Knee pain and ROM are much improved today. Denies chest pain, dyspnea, abdominal pain, nausea or vomiting. Has been on 2L NC continuously - reports no O2 use at baseline       Objective     Vital Signs:   Temp:  [97.5 °F (36.4 °C)-99.9 °F (37.7 °C)] 97.5 °F (36.4 °C)  Heart Rate:  [] 80  Resp:  [16-22] 16  BP: (146-180)/() 154/88  Flow (L/min):  [2] 2     Physical Exam:  Constitutional: No acute distress, awake, alert and conversational.   HENT: NCAT, mucous membranes moist  Respiratory: Clear to auscultation bilaterally, respiratory effort normal. On 2L NC    Cardiovascular: RRR, no murmurs, rubs, or gallops  Gastrointestinal: Positive bowel sounds, soft, nontender, nondistended  Musculoskeletal: No bilateral ankle edema. R upper chest tunneled HD catheter. Arthritic changes to knees bilaterally, size of effusions, warmth and tenderness to palpation are improved to day  Psychiatric: Appropriate affect, cooperative  Neurologic: Oriented x 3, moves all extremities spontaneously without focal deficits, speech clear    Results Reviewed:  LAB RESULTS:      Lab 24  1849 24  1417 24  0644 24  0556 24  0030 24  1610 24  0333 24  0705 24  1631   WBC  --  7.06 6.88 9.21 7.37  --  5.93 7.60 8.38   HEMOGLOBIN  --  8.3* 6.6* 7.4* 7.7* 7.8* 6.2* 7.2* 7.6*   HEMATOCRIT  --  26.1* 21.7* 23.8* 24.0* 24.5* 19.8* 23.3* 24.7*   PLATELETS  --  176 176 188 176  --  198 227 237   NEUTROS ABS  --   --   --   --  5.99  --  4.28 5.60 6.70   IMMATURE GRANS (ABS)  --   --   --   --  0.03  --  0.03 0.05 0.09*   LYMPHS ABS  --   --   --   --  0.52*  --  0.90 0.97 0.74   MONOS ABS  --   --   --   --  0.69  --   0.57 0.76 0.71   EOS ABS  --   --   --   --  0.12  --  0.12 0.19 0.10   MCV  --  87.9 88.2 87.2 84.8  --  85.0 84.7 85.8   SED RATE  --   --  68*  --   --   --   --   --   --    CRP  --   --  16.17*  --  11.63*  --   --   --   --    PROCALCITONIN  --   --  0.90*  --  1.02*  --   --   --   --    LACTATE 0.4*  --   --   --  0.6  --   --   --   --    LDH  --   --   --   --   --   --   --   --  283*   PROTIME  --   --   --   --   --   --  15.3*  --   --          Lab 03/29/24  0644 03/28/24  0556 03/27/24  0030 03/26/24  0333 03/25/24  1434 03/25/24  0705 03/24/24  2054 03/24/24  1631   SODIUM 136 140 133* 135*  --  137   < > 135*   POTASSIUM 5.3* 4.9 4.6 5.0 5.0 5.9*   < > 6.5*   CHLORIDE 101 103 99 102  --  103   < > 101   CO2 23.0 25.0 23.0 19.0*  --  17.0*   < > 18.0*   ANION GAP 12.0 12.0 11.0 14.0  --  17.0*   < > 16.0*   BUN 56* 55* 58* 101*  --  104*   < > 99*   CREATININE 5.99* 5.37* 4.37* 6.05*  --  5.54*   < > 5.47*   EGFR 9.4* 10.7* 13.7* 9.3*  --  10.3*   < > 10.5*   GLUCOSE 96 111* 118* 101*  --  85   < > 92   CALCIUM 7.4* 7.8* 7.9* 7.7*  --  7.3*   < > 7.4*   IONIZED CALCIUM  --   --   --   --   --   --   --  1.09*   MAGNESIUM  --   --  1.6  --   --   --   --  1.6   PHOSPHORUS  --   --   --  5.4*  --   --   --   --    TSH  --   --   --   --   --   --   --  1.490    < > = values in this interval not displayed.         Lab 03/29/24  0644 03/28/24  0556 03/27/24  0030 03/26/24 0333 03/24/24  1631   TOTAL PROTEIN 5.0* 5.2* 5.6*  --  6.2   ALBUMIN 2.9* 3.1* 2.6* 2.8* 3.4*   GLOBULIN 2.1 2.1 3.0  --  2.8   ALT (SGPT) 12 15 18  --  29   AST (SGOT) 12 14 17  --  35   BILIRUBIN 0.2 0.3 0.3  --  0.3   ALK PHOS 42 46 50  --  59         Lab 03/26/24  0333 03/24/24 2054 03/24/24  1631   HSTROP T  --  70* 79*   PROTIME 15.3*  --   --    INR 1.20*  --   --          Lab 03/29/24  0936 03/26/24  0333 03/24/24  2146 03/24/24 2054 03/24/24  1631   IRON  --  24*  --   --   --    IRON SATURATION (TSAT)  --  14*  --   --    --    TIBC  --  171*  --   --   --    TRANSFERRIN  --  115*  --   --   --    FERRITIN  --   --   --   --  1,250.00*   ABO TYPING A  --    < > A  --    RH TYPING Positive  --    < > Positive  --    ANTIBODY SCREEN Negative  --   --  Negative  --     < > = values in this interval not displayed.     Brief Urine Lab Results  (Last result in the past 365 days)        Color   Clarity   Blood   Leuk Est   Nitrite   Protein   CREAT   Urine HCG        03/27/24 0033 Yellow   Clear   Trace   Negative   Negative   >=300 mg/dL (3+)                 Microbiology Results Abnormal       Procedure Component Value - Date/Time    Blood Culture - Blood, Arm, Right [[id number]]  (Normal) Collected: 03/27/24 0030    Lab Status: Preliminary result Specimen: Blood from Arm, Right Updated: 03/30/24 0115     Blood Culture No growth at 3 days    Respiratory Panel PCR w/COVID-19(SARS-CoV-2) ANGELIQUE/DANYELL/DIANA/PAD/COR/KAVON In-House, NP Swab in UTM/VTM, 2 HR TAT - Swab, Nasopharynx [[id number]]  (Normal) Collected: 03/29/24 1456    Lab Status: Final result Specimen: Swab from Nasopharynx Updated: 03/29/24 1604     ADENOVIRUS, PCR Not Detected     Coronavirus 229E Not Detected     Coronavirus HKU1 Not Detected     Coronavirus NL63 Not Detected     Coronavirus OC43 Not Detected     COVID19 Not Detected     Human Metapneumovirus Not Detected     Human Rhinovirus/Enterovirus Not Detected     Influenza A PCR Not Detected     Influenza B PCR Not Detected     Parainfluenza Virus 1 Not Detected     Parainfluenza Virus 2 Not Detected     Parainfluenza Virus 3 Not Detected     Parainfluenza Virus 4 Not Detected     RSV, PCR Not Detected     Bordetella pertussis pcr Not Detected     Bordetella parapertussis PCR Not Detected     Chlamydophila pneumoniae PCR Not Detected     Mycoplasma pneumo by PCR Not Detected    Narrative:      In the setting of a positive respiratory panel with a viral infection PLUS a negative procalcitonin without other underlying concern for  bacterial infection, consider observing off antibiotics or discontinuation of antibiotics and continue supportive care. If the respiratory panel is positive for atypical bacterial infection (Bordetella pertussis, Chlamydophila pneumoniae, or Mycoplasma pneumoniae), consider antibiotic de-escalation to target atypical bacterial infection.    Blood Culture - Blood, Arm, Right [[id number]]  (Normal) Collected: 03/28/24 1123    Lab Status: Preliminary result Specimen: Blood from Arm, Right Updated: 03/29/24 1146     Blood Culture No growth at 24 hours    Narrative:      Less than seven (7) mL's of blood was collected.  Insufficient quantity may yield false negative results.    Blood Culture - Blood, Arm, Left [[id number]]  (Normal) Collected: 03/28/24 1116    Lab Status: Preliminary result Specimen: Blood from Arm, Left Updated: 03/29/24 1146     Blood Culture No growth at 24 hours    Narrative:      Less than seven (7) mL's of blood was collected.  Insufficient quantity may yield false negative results.    MRSA Screen, PCR (Inpatient) - Swab, Nares [[id number]]  (Normal) Collected: 03/27/24 0033    Lab Status: Final result Specimen: Swab from Nares Updated: 03/27/24 0802     MRSA PCR Negative    Narrative:      The negative predictive value of this diagnostic test is high and should only be used to consider de-escalating anti-MRSA therapy. A positive result may indicate colonization with MRSA and must be correlated clinically.  MRSA Negative    COVID PRE-OP / PRE-PROCEDURE SCREENING ORDER (NO ISOLATION) - Swab, Nasopharynx [[id number]]  (Normal) Collected: 03/27/24 0033    Lab Status: Final result Specimen: Swab from Nasopharynx Updated: 03/27/24 0123    Narrative:      The following orders were created for panel order COVID PRE-OP / PRE-PROCEDURE SCREENING ORDER (NO ISOLATION) - Swab, Nasopharynx.  Procedure                               Abnormality         Status                     ---------                                -----------         ------                     COVID-19 and FLU A/B PCR...[[id number]]  Normal              Final result                 Please view results for these tests on the individual orders.    COVID-19 and FLU A/B PCR, 1 HR TAT - Swab, Nasopharynx [[id number]]  (Normal) Collected: 03/27/24 0033    Lab Status: Final result Specimen: Swab from Nasopharynx Updated: 03/27/24 0123     COVID19 Not Detected     Influenza A PCR Not Detected     Influenza B PCR Not Detected    Narrative:      Fact sheet for providers: https://www.fda.gov/media/809710/download    Fact sheet for patients: https://www.fda.gov/media/353809/download    Test performed by PCR.          XR Chest 1 View    Result Date: 3/29/2024  XR CHEST 1 VW Date of Exam: 3/29/2024 3:26 PM EDT Indication: fever, hypoxia Comparison: 3/26/2024 Findings: Lines: Unchanged position of right internal jugular venous catheter/dialysis catheter Lungs: Patchy opacities in the perihilar region and lower lungs bilaterally, worsened since prior study. Pleura: Small left-sided pleural effusion. No pneumothorax Cardiomediastinum: The cardiomediastinal silhouette is normal Soft Tissues: Unremarkable. Bones: No acute osseous abnormality.     Impression: Impression: Patchy opacities in the bilateral perihilar region and lower lungs are a nonspecific finding and may represent pulmonary edema, although superimposed multifocal pneumonia is not completely excluded. Electronically Signed: Sebas Crowe DO  3/29/2024 3:55 PM EDT  Workstation ID: [id number]    XR Knee 1 or 2 View Bilateral    Result Date: 3/29/2024  XR KNEE 1 OR 2 VW BILATERAL HISTORY: knee pain L > R. Chronic gout       pain COMPARISON: None FINDINGS: Acute Fracture: None. Subluxation: None. Abnormal soft tissue swelling: None. Radiopaque foreign body: None. Soft tissue air: None. Degenerative changes: Mild. Present. Other/Incidental findings: Vascular calcification. 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     Impression: No acute abnormality on XR KNEE 2 VW BILATERAL. The bilateral tibial and femoral bone lesions are likely benign as discussed above. If there is any clinical concern, MRI may be considered for confirmation. Electronically Signed: Jose Pina MD  3/29/2024 1:30 PM EDT  Workstation ID: [id number]     Results for orders placed during the hospital encounter of 07/28/21    Adult Transthoracic Echo Complete W/ Cont if Necessary Per Protocol (With Agitated Saline)    Interpretation Summary  · Left ventricular ejection fraction appears to be 56 - 60%.  · Normal left atrial size and volume noted. No evidence of a patent foramen ovale. Saline test results are negative  · Mild mitral annular calcification is present. There is mild, anterior mitral leaflet thickening present. Trace mitral regurgitation.    Current medications:  Scheduled Meds:allopurinol, 100 mg, Oral, Daily  amLODIPine, 10 mg, Oral, Daily  aspirin, 81 mg, Oral, Daily  atorvastatin, 80 mg, Oral, Nightly  b complex-vitamin c-folic acid, 1 tablet, Oral, Daily  calcitriol, 0.25 mcg, Oral, Daily  calcium acetate, 1,334 mg, Oral, TID  carvedilol, 6.25 mg, Oral, BID With Meals  cefTRIAXone, 1,000 mg, Intravenous, Q24H  cetirizine, 10 mg, Oral, Daily  doxycycline, 100 mg, Intravenous, Q12H  epoetin minnie/minnie-epbx, 20,000 Units, Subcutaneous, Weekly  ferrous sulfate, 325 mg, Oral, BID With Meals  fluticasone, 2 spray, Nasal, Daily  Lidocaine, 2 patch, Transdermal, Q24H  predniSONE, 40 mg, Oral, Daily With Breakfast  rOPINIRole, 0.75 mg, Oral, Daily  sodium chloride, 10 mL, Intravenous, Q12H      Continuous Infusions:   PRN Meds:.  acetaminophen    albumin human    senna-docusate sodium **AND** polyethylene glycol **AND** bisacodyl **AND** bisacodyl    heparin (porcine)    HYDROcodone-acetaminophen    ondansetron    prochlorperazine **OR** prochlorperazine **OR** prochlorperazine    sodium chloride    sodium  chloride    Assessment & Plan     Active Hospital Problems    Diagnosis  POA    **Ataxia [R27.0]  Yes    SAMINA (acute kidney injury) [N17.9]  Yes    History of stroke s/p L MCA stent [Z86.73]  Not Applicable    Mixed hyperlipidemia [E78.2]  Yes    Stage 4 chronic kidney disease [N18.4]  Yes    Essential hypertension [I10]  Yes      Resolved Hospital Problems   No resolved problems to display.     Brief Hospital Course to date:  Eyal Solano is a 73 y.o. male with PMH significant for HTN, HLD, prior CVA s/p L MCA stent and CKD IV. Presented to [facility] ED on 3/24/24 with 2 days of dizziness, ataxia, double/blurred vision, nausea and vomiting. Stroke team evaluated and stroke was rule out. He was noted to have SAMINA and ultimately required initiation of HD this admission.     Dizziness/ataxia/visual disturbances, resolved  - History of CVA s/p L MCA stent  - MRI brain with DYLON stenosis (50%) otherwise unremarkable  - Continue ASA     SAMINA/CKD IV  Hyperkalemia, resolved   - Nephrology following - monitoring K and treating as needed  - s/p HD catheter placement 3/26/24 by Dr. Melendez  - Patient interested in transitioning to PD - Dr. Melendez evaluated for PD catheter prior to DC but this was deferred due to fevers. Has been afebrile for 24H, if continues throughout the weekend, will re-approach surgery about placement prior to DC   - CM working on outpatient HD chair     Anemia of chronic disease  - s/p 1 unit PRBCS in [place]  - s/p 1 unit PRBCs here on 3/26  - Hgb 6.6 on 3/29 - s/p 1 unit PRBCs  - Discussed with nephrology - suspect due to renal dysfunction.   - FOBT negative on 3/26     (+) blood culture  - 1 of 2 blood cultures positive for Staph spp.   - Suspect contaminant. Repeat blood cultures pending    Fever  Possible pneumonia   - Night/AM fevers since 3/26  - Infectious work up has been largely unremarkable. UA reassuring. No leukocytosis  - 3/26 CXR nonfocal. Repeat CXR on 3/29 concerning for pulmonary edema vs  PNA  - Fevers could be due to polyarticular gout flare but given that procal is also elevated and he is requiring O2, will treat as PNA with Doxy / Rocephin x 5 days     Bilateral knee pain / effusions  Gout flare  - Consulted ortho due to fevers - s/p bilateral knee aspiration 3/29 with no purulent fluid or leukocytosis  - Follow fluid cultures   - Suspect gout flare  - On Allopurinol. No Colchicine due to CKD  - Conitnue PO Prednisone       HTN  - BP a bit elevated. Continue Amlodipine and Carvedilol. May need further titration     HLD, continue statin     Expected Discharge Location and Transportation: home   Expected Discharge Expected Discharge Date: 4/2/2024; Expected Discharge Time:      DVT prophylaxis:Medical and mechanical DVT prophylaxis orders are present.    AM-PAC 6 Clicks Score (PT): 21 (03/27/24 1323)    CODE STATUS:   Code Status and Medical Interventions:   Ordered at: 03/24/24 5606     Code Status (Patient has no pulse and is not breathing):    CPR (Attempt to Resuscitate)     Medical Interventions (Patient has pulse or is breathing):    Full Support     Jamila Nolasco PA-C  03/30/24

## 2024-03-31 LAB
ALBUMIN SERPL-MCNC: 2.9 G/DL (ref 3.5–5.2)
ANION GAP SERPL CALCULATED.3IONS-SCNC: 15 MMOL/L (ref 5–15)
BUN SERPL-MCNC: 62 MG/DL (ref 8–23)
BUN/CREAT SERPL: 13.3 (ref 7–25)
CALCIUM SPEC-SCNC: 8 MG/DL (ref 8.6–10.5)
CHLORIDE SERPL-SCNC: 100 MMOL/L (ref 98–107)
CO2 SERPL-SCNC: 20 MMOL/L (ref 22–29)
CREAT SERPL-MCNC: 4.66 MG/DL (ref 0.76–1.27)
DEPRECATED RDW RBC AUTO: 44.8 FL (ref 37–54)
EGFRCR SERPLBLD CKD-EPI 2021: 12.7 ML/MIN/1.73
ERYTHROCYTE [DISTWIDTH] IN BLOOD BY AUTOMATED COUNT: 14.3 % (ref 12.3–15.4)
GLUCOSE SERPL-MCNC: 112 MG/DL (ref 65–99)
HCT VFR BLD AUTO: 27.5 % (ref 37.5–51)
HGB BLD-MCNC: 8.6 G/DL (ref 13–17.7)
MCH RBC QN AUTO: 27 PG (ref 26.6–33)
MCHC RBC AUTO-ENTMCNC: 31.3 G/DL (ref 31.5–35.7)
MCV RBC AUTO: 86.2 FL (ref 79–97)
PHOSPHATE SERPL-MCNC: 4.4 MG/DL (ref 2.5–4.5)
PLATELET # BLD AUTO: 208 10*3/MM3 (ref 140–450)
PMV BLD AUTO: 10.7 FL (ref 6–12)
POTASSIUM SERPL-SCNC: 5.1 MMOL/L (ref 3.5–5.2)
RBC # BLD AUTO: 3.19 10*6/MM3 (ref 4.14–5.8)
SODIUM SERPL-SCNC: 135 MMOL/L (ref 136–145)
WBC NRBC COR # BLD AUTO: 9.72 10*3/MM3 (ref 3.4–10.8)

## 2024-03-31 PROCEDURE — 80069 RENAL FUNCTION PANEL: CPT | Performed by: PHYSICIAN ASSISTANT

## 2024-03-31 PROCEDURE — 63710000001 PREDNISONE PER 1 MG: Performed by: PHYSICIAN ASSISTANT

## 2024-03-31 PROCEDURE — 25010000002 CEFTRIAXONE PER 250 MG: Performed by: PHYSICIAN ASSISTANT

## 2024-03-31 PROCEDURE — 99232 SBSQ HOSP IP/OBS MODERATE 35: CPT | Performed by: PHYSICIAN ASSISTANT

## 2024-03-31 PROCEDURE — 85027 COMPLETE CBC AUTOMATED: CPT | Performed by: PHYSICIAN ASSISTANT

## 2024-03-31 RX ORDER — BISACODYL 5 MG/1
5 TABLET, DELAYED RELEASE ORAL DAILY PRN
Status: DISCONTINUED | OUTPATIENT
Start: 2024-03-31 | End: 2024-04-01 | Stop reason: HOSPADM

## 2024-03-31 RX ORDER — BISACODYL 5 MG/1
10 TABLET, DELAYED RELEASE ORAL ONCE
Status: COMPLETED | OUTPATIENT
Start: 2024-03-31 | End: 2024-03-31

## 2024-03-31 RX ORDER — ALBUMIN (HUMAN) 12.5 G/50ML
12.5 SOLUTION INTRAVENOUS AS NEEDED
Status: CANCELLED | OUTPATIENT
Start: 2024-04-01 | End: 2024-04-01

## 2024-03-31 RX ORDER — AMOXICILLIN 250 MG
2 CAPSULE ORAL 2 TIMES DAILY
Status: DISCONTINUED | OUTPATIENT
Start: 2024-03-31 | End: 2024-04-01 | Stop reason: HOSPADM

## 2024-03-31 RX ORDER — AMOXICILLIN 250 MG
2 CAPSULE ORAL 2 TIMES DAILY
Status: DISCONTINUED | OUTPATIENT
Start: 2024-03-31 | End: 2024-03-31

## 2024-03-31 RX ORDER — CARVEDILOL 12.5 MG/1
12.5 TABLET ORAL 2 TIMES DAILY WITH MEALS
Status: DISCONTINUED | OUTPATIENT
Start: 2024-03-31 | End: 2024-04-01

## 2024-03-31 RX ORDER — BISACODYL 10 MG
10 SUPPOSITORY, RECTAL RECTAL DAILY PRN
Status: DISCONTINUED | OUTPATIENT
Start: 2024-03-31 | End: 2024-04-01 | Stop reason: HOSPADM

## 2024-03-31 RX ORDER — POLYETHYLENE GLYCOL 3350 17 G/17G
17 POWDER, FOR SOLUTION ORAL DAILY PRN
Status: DISCONTINUED | OUTPATIENT
Start: 2024-03-31 | End: 2024-04-01 | Stop reason: HOSPADM

## 2024-03-31 RX ADMIN — ALLOPURINOL 100 MG: 100 TABLET ORAL at 09:24

## 2024-03-31 RX ADMIN — BISACODYL 10 MG: 5 TABLET, COATED ORAL at 09:23

## 2024-03-31 RX ADMIN — CALCITRIOL CAPSULES 0.25 MCG 0.25 MCG: 0.25 CAPSULE ORAL at 09:23

## 2024-03-31 RX ADMIN — CARVEDILOL 12.5 MG: 12.5 TABLET, FILM COATED ORAL at 09:24

## 2024-03-31 RX ADMIN — FLUTICASONE PROPIONATE 2 SPRAY: 50 SPRAY, METERED NASAL at 09:24

## 2024-03-31 RX ADMIN — Medication 10 ML: at 09:24

## 2024-03-31 RX ADMIN — SENNOSIDES AND DOCUSATE SODIUM 2 TABLET: 8.6; 5 TABLET ORAL at 09:23

## 2024-03-31 RX ADMIN — DOXYCYCLINE 100 MG: 100 INJECTION, POWDER, LYOPHILIZED, FOR SOLUTION INTRAVENOUS at 18:20

## 2024-03-31 RX ADMIN — ATORVASTATIN CALCIUM 80 MG: 40 TABLET, FILM COATED ORAL at 20:58

## 2024-03-31 RX ADMIN — Medication 1 TABLET: at 09:24

## 2024-03-31 RX ADMIN — DOXYCYCLINE 100 MG: 100 INJECTION, POWDER, LYOPHILIZED, FOR SOLUTION INTRAVENOUS at 06:03

## 2024-03-31 RX ADMIN — FERROUS SULFATE TAB 325 MG (65 MG ELEMENTAL FE) 325 MG: 325 (65 FE) TAB at 17:29

## 2024-03-31 RX ADMIN — FERROUS SULFATE TAB 325 MG (65 MG ELEMENTAL FE) 325 MG: 325 (65 FE) TAB at 09:23

## 2024-03-31 RX ADMIN — SODIUM CHLORIDE 1000 MG: 900 INJECTION INTRAVENOUS at 17:30

## 2024-03-31 RX ADMIN — CETIRIZINE HYDROCHLORIDE 10 MG: 10 TABLET, FILM COATED ORAL at 09:24

## 2024-03-31 RX ADMIN — CALCIUM ACETATE 1334 MG: 667 CAPSULE ORAL at 09:23

## 2024-03-31 RX ADMIN — CALCIUM ACETATE 1334 MG: 667 CAPSULE ORAL at 17:29

## 2024-03-31 RX ADMIN — PREDNISONE 40 MG: 20 TABLET ORAL at 09:24

## 2024-03-31 RX ADMIN — SENNOSIDES AND DOCUSATE SODIUM 2 TABLET: 8.6; 5 TABLET ORAL at 20:58

## 2024-03-31 RX ADMIN — ASPIRIN 81 MG: 81 TABLET, CHEWABLE ORAL at 09:24

## 2024-03-31 RX ADMIN — CALCIUM ACETATE 1334 MG: 667 CAPSULE ORAL at 12:42

## 2024-03-31 RX ADMIN — AMLODIPINE BESYLATE 10 MG: 10 TABLET ORAL at 09:24

## 2024-03-31 RX ADMIN — CARVEDILOL 12.5 MG: 12.5 TABLET, FILM COATED ORAL at 17:29

## 2024-03-31 RX ADMIN — Medication 10 ML: at 20:58

## 2024-03-31 RX ADMIN — ROPINIROLE 0.75 MG: 0.5 TABLET, FILM COATED ORAL at 09:23

## 2024-03-31 NOTE — PROGRESS NOTES
[facility] Medicine Services  PROGRESS NOTE    Patient Name: Eyal Solano  : 1950  MRN: [id number]    Date of Admission: 3/24/2024  Primary Care Physician: Edda Sanchez MD    Subjective     CC: f/u ESRD    HPI:  Feeling fine. Knee pain/ROM much improved. Weaned to room air. Awaiting final HD chair time. He requested stool softener.    Objective     Vital Signs:   Temp:  [97.8 °F (36.6 °C)-98.3 °F (36.8 °C)] 98 °F (36.7 °C)  Heart Rate:  [73-84] 79  Resp:  [16-20] 20  BP: (145-168)/(63-98) 166/63  Flow (L/min):  [1] 1     Physical Exam:  Constitutional: No acute distress, awake, alert and conversational.   HENT: NCAT, mucous membranes moist  Respiratory: Clear to auscultation bilaterally, respiratory effort normal on room air   Cardiovascular: RRR, no murmurs, rubs, or gallops  Gastrointestinal: Positive bowel sounds, soft, nontender, nondistended  Musculoskeletal: No bilateral ankle edema. R upper chest tunneled HD catheter. Arthritic changes to knees bilaterally, size of effusions, warmth and tenderness to palpation are improved to day. Full active ROM   Psychiatric: Appropriate affect, cooperative  Neurologic: Oriented x 3, moves all extremities spontaneously without focal deficits, speech clear    Results Reviewed:  LAB RESULTS:      Lab 24  0651 24  1849 24  1417 24  0644 24  0556 24  0030 24  1610 24  0333 24  0705 24  1631   WBC 9.72  --  7.06 6.88 9.21 7.37  --  5.93 7.60 8.38   HEMOGLOBIN 8.6*  --  8.3* 6.6* 7.4* 7.7*   < > 6.2* 7.2* 7.6*   HEMATOCRIT 27.5*  --  26.1* 21.7* 23.8* 24.0*   < > 19.8* 23.3* 24.7*   PLATELETS 208  --  176 176 188 176  --  198 227 237   NEUTROS ABS  --   --   --   --   --  5.99  --  4.28 5.60 6.70   IMMATURE GRANS (ABS)  --   --   --   --   --  0.03  --  0.03 0.05 0.09*   LYMPHS ABS  --   --   --   --   --  0.52*  --  0.90 0.97 0.74   MONOS ABS  --   --   --   --   --  0.69   --  0.57 0.76 0.71   EOS ABS  --   --   --   --   --  0.12  --  0.12 0.19 0.10   MCV 86.2  --  87.9 88.2 87.2 84.8  --  85.0 84.7 85.8   SED RATE  --   --   --  68*  --   --   --   --   --   --    CRP  --   --   --  16.17*  --  11.63*  --   --   --   --    PROCALCITONIN  --   --   --  0.90*  --  1.02*  --   --   --   --    LACTATE  --  0.4*  --   --   --  0.6  --   --   --   --    LDH  --   --   --   --   --   --   --   --   --  283*   PROTIME  --   --   --   --   --   --   --  15.3*  --   --     < > = values in this interval not displayed.         Lab 03/31/24  0651 03/29/24  0644 03/28/24  0556 03/27/24  0030 03/26/24  0333 03/24/24 2054 03/24/24  1631   SODIUM 135* 136 140 133* 135*   < > 135*   POTASSIUM 5.1 5.3* 4.9 4.6 5.0   < > 6.5*   CHLORIDE 100 101 103 99 102   < > 101   CO2 20.0* 23.0 25.0 23.0 19.0*   < > 18.0*   ANION GAP 15.0 12.0 12.0 11.0 14.0   < > 16.0*   BUN 62* 56* 55* 58* 101*   < > 99*   CREATININE 4.66* 5.99* 5.37* 4.37* 6.05*   < > 5.47*   EGFR 12.7* 9.4* 10.7* 13.7* 9.3*   < > 10.5*   GLUCOSE 112* 96 111* 118* 101*   < > 92   CALCIUM 8.0* 7.4* 7.8* 7.9* 7.7*   < > 7.4*   IONIZED CALCIUM  --   --   --   --   --   --  1.09*   MAGNESIUM  --   --   --  1.6  --   --  1.6   PHOSPHORUS 4.4  --   --   --  5.4*  --   --    TSH  --   --   --   --   --   --  1.490    < > = values in this interval not displayed.         Lab 03/31/24  0651 03/29/24  0644 03/28/24  0556 03/27/24  0030 03/26/24 0333 03/24/24  1631   TOTAL PROTEIN  --  5.0* 5.2* 5.6*  --  6.2   ALBUMIN 2.9* 2.9* 3.1* 2.6* 2.8* 3.4*   GLOBULIN  --  2.1 2.1 3.0  --  2.8   ALT (SGPT)  --  12 15 18  --  29   AST (SGOT)  --  12 14 17  --  35   BILIRUBIN  --  0.2 0.3 0.3  --  0.3   ALK PHOS  --  42 46 50  --  59         Lab 03/26/24  0333 03/24/24  2054 03/24/24  1631   HSTROP T  --  70* 79*   PROTIME 15.3*  --   --    INR 1.20*  --   --          Lab 03/29/24  0936 03/26/24  0333 03/24/24  2146 03/24/24 2054 03/24/24  1631   IRON  --  24*  --    --   --    IRON SATURATION (TSAT)  --  14*  --   --   --    TIBC  --  171*  --   --   --    TRANSFERRIN  --  115*  --   --   --    FERRITIN  --   --   --   --  1,250.00*   ABO TYPING A  --    < > A  --    RH TYPING Positive  --    < > Positive  --    ANTIBODY SCREEN Negative  --   --  Negative  --     < > = values in this interval not displayed.     Brief Urine Lab Results  (Last result in the past 365 days)        Color   Clarity   Blood   Leuk Est   Nitrite   Protein   CREAT   Urine HCG        03/27/24 0033 Yellow   Clear   Trace   Negative   Negative   >=300 mg/dL (3+)                 Microbiology Results Abnormal       Procedure Component Value - Date/Time    Blood Culture - Blood, Arm, Right [[id number]]  (Normal) Collected: 03/27/24 0030    Lab Status: Preliminary result Specimen: Blood from Arm, Right Updated: 03/31/24 0115     Blood Culture No growth at 4 days    Blood Culture - Blood, Arm, Right [[id number]]  (Normal) Collected: 03/29/24 1849    Lab Status: Preliminary result Specimen: Blood from Arm, Right Updated: 03/30/24 1916     Blood Culture No growth at 24 hours    Blood Culture - Blood, Arm, Left [[id number]]  (Normal) Collected: 03/29/24 1849    Lab Status: Preliminary result Specimen: Blood from Arm, Left Updated: 03/30/24 1916     Blood Culture No growth at 24 hours    Blood Culture - Blood, Arm, Right [[id number]]  (Normal) Collected: 03/28/24 1123    Lab Status: Preliminary result Specimen: Blood from Arm, Right Updated: 03/30/24 1145     Blood Culture No growth at 2 days    Narrative:      Less than seven (7) mL's of blood was collected.  Insufficient quantity may yield false negative results.    Blood Culture - Blood, Arm, Left [[id number]]  (Normal) Collected: 03/28/24 1116    Lab Status: Preliminary result Specimen: Blood from Arm, Left Updated: 03/30/24 1145     Blood Culture No growth at 2 days    Narrative:      Less than seven (7) mL's of blood was collected.  Insufficient quantity may  yield false negative results.    Respiratory Panel PCR w/COVID-19(SARS-CoV-2) ANGELIQUE/DANYELL/DIANA/PAD/COR/KAVON In-House, NP Swab in UTM/VTM, 2 HR TAT - Swab, Nasopharynx [[id number]]  (Normal) Collected: 03/29/24 1456    Lab Status: Final result Specimen: Swab from Nasopharynx Updated: 03/29/24 1604     ADENOVIRUS, PCR Not Detected     Coronavirus 229E Not Detected     Coronavirus HKU1 Not Detected     Coronavirus NL63 Not Detected     Coronavirus OC43 Not Detected     COVID19 Not Detected     Human Metapneumovirus Not Detected     Human Rhinovirus/Enterovirus Not Detected     Influenza A PCR Not Detected     Influenza B PCR Not Detected     Parainfluenza Virus 1 Not Detected     Parainfluenza Virus 2 Not Detected     Parainfluenza Virus 3 Not Detected     Parainfluenza Virus 4 Not Detected     RSV, PCR Not Detected     Bordetella pertussis pcr Not Detected     Bordetella parapertussis PCR Not Detected     Chlamydophila pneumoniae PCR Not Detected     Mycoplasma pneumo by PCR Not Detected    Narrative:      In the setting of a positive respiratory panel with a viral infection PLUS a negative procalcitonin without other underlying concern for bacterial infection, consider observing off antibiotics or discontinuation of antibiotics and continue supportive care. If the respiratory panel is positive for atypical bacterial infection (Bordetella pertussis, Chlamydophila pneumoniae, or Mycoplasma pneumoniae), consider antibiotic de-escalation to target atypical bacterial infection.    MRSA Screen, PCR (Inpatient) - Swab, Nares [[id number]]  (Normal) Collected: 03/27/24 0033    Lab Status: Final result Specimen: Swab from Nares Updated: 03/27/24 0802     MRSA PCR Negative    Narrative:      The negative predictive value of this diagnostic test is high and should only be used to consider de-escalating anti-MRSA therapy. A positive result may indicate colonization with MRSA and must be correlated clinically.  MRSA Negative    COVID  PRE-OP / PRE-PROCEDURE SCREENING ORDER (NO ISOLATION) - Swab, Nasopharynx [[id number]]  (Normal) Collected: 03/27/24 0033    Lab Status: Final result Specimen: Swab from Nasopharynx Updated: 03/27/24 0123    Narrative:      The following orders were created for panel order COVID PRE-OP / PRE-PROCEDURE SCREENING ORDER (NO ISOLATION) - Swab, Nasopharynx.  Procedure                               Abnormality         Status                     ---------                               -----------         ------                     COVID-19 and FLU A/B PCR...[[id number]]  Normal              Final result                 Please view results for these tests on the individual orders.    COVID-19 and FLU A/B PCR, 1 HR TAT - Swab, Nasopharynx [[id number]]  (Normal) Collected: 03/27/24 0033    Lab Status: Final result Specimen: Swab from Nasopharynx Updated: 03/27/24 0123     COVID19 Not Detected     Influenza A PCR Not Detected     Influenza B PCR Not Detected    Narrative:      Fact sheet for providers: https://www.fda.gov/media/434483/download    Fact sheet for patients: https://www.fda.gov/media/422753/download    Test performed by PCR.          XR Chest 1 View    Result Date: 3/29/2024  XR CHEST 1 VW Date of Exam: 3/29/2024 3:26 PM EDT Indication: fever, hypoxia Comparison: 3/26/2024 Findings: Lines: Unchanged position of right internal jugular venous catheter/dialysis catheter Lungs: Patchy opacities in the perihilar region and lower lungs bilaterally, worsened since prior study. Pleura: Small left-sided pleural effusion. No pneumothorax Cardiomediastinum: The cardiomediastinal silhouette is normal Soft Tissues: Unremarkable. Bones: No acute osseous abnormality.     Impression: Impression: Patchy opacities in the bilateral perihilar region and lower lungs are a nonspecific finding and may represent pulmonary edema, although superimposed multifocal pneumonia is not completely excluded. Electronically Signed: Sebas Crowe  DO  3/29/2024 3:55 PM EDT  Workstation ID: [id number]    XR Knee 1 or 2 View Bilateral    Result Date: 3/29/2024  XR KNEE 1 OR 2 VW BILATERAL HISTORY: knee pain L > R. Chronic gout       pain COMPARISON: None FINDINGS: Acute Fracture: None. Subluxation: None. Abnormal soft tissue swelling: None. Radiopaque foreign body: None. Soft tissue air: None. Degenerative changes: Mild. Present. Other/Incidental findings: Vascular calcification. 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     Impression: No acute abnormality on XR KNEE 2 VW BILATERAL. The bilateral tibial and femoral bone lesions are likely benign as discussed above. If there is any clinical concern, MRI may be considered for confirmation. Electronically Signed: Jose Pina MD  3/29/2024 1:30 PM EDT  Workstation ID: [id number]     Results for orders placed during the hospital encounter of 07/28/21    Adult Transthoracic Echo Complete W/ Cont if Necessary Per Protocol (With Agitated Saline)    Interpretation Summary  · Left ventricular ejection fraction appears to be 56 - 60%.  · Normal left atrial size and volume noted. No evidence of a patent foramen ovale. Saline test results are negative  · Mild mitral annular calcification is present. There is mild, anterior mitral leaflet thickening present. Trace mitral regurgitation.    Current medications:  Scheduled Meds:allopurinol, 100 mg, Oral, Daily  amLODIPine, 10 mg, Oral, Daily  aspirin, 81 mg, Oral, Daily  atorvastatin, 80 mg, Oral, Nightly  b complex-vitamin c-folic acid, 1 tablet, Oral, Daily  calcitriol, 0.25 mcg, Oral, Daily  calcium acetate, 1,334 mg, Oral, TID  carvedilol, 6.25 mg, Oral, BID With Meals  cefTRIAXone, 1,000 mg, Intravenous, Q24H  cetirizine, 10 mg, Oral, Daily  doxycycline, 100 mg, Intravenous, Q12H  epoetin minnie/minnie-epbx, 20,000 Units, Subcutaneous, Weekly  ferrous sulfate, 325 mg, Oral, BID With Meals  fluticasone, 2 spray, Nasal, Daily  Lidocaine, 2  patch, Transdermal, Q24H  predniSONE, 40 mg, Oral, Daily With Breakfast  rOPINIRole, 0.75 mg, Oral, Daily  sodium chloride, 10 mL, Intravenous, Q12H      Continuous Infusions:   PRN Meds:.  acetaminophen    albumin human    senna-docusate sodium **AND** polyethylene glycol **AND** bisacodyl **AND** bisacodyl    heparin (porcine)    HYDROcodone-acetaminophen    ondansetron    prochlorperazine **OR** prochlorperazine **OR** prochlorperazine    sodium chloride    sodium chloride    Assessment & Plan     Active Hospital Problems    Diagnosis  POA    **Ataxia [R27.0]  Yes    SAMINA (acute kidney injury) [N17.9]  Yes    History of stroke s/p L MCA stent [Z86.73]  Not Applicable    Mixed hyperlipidemia [E78.2]  Yes    Stage 4 chronic kidney disease [N18.4]  Yes    Essential hypertension [I10]  Yes      Resolved Hospital Problems   No resolved problems to display.     Brief Hospital Course to date:  Eyal Solano is a 73 y.o. male with PMH significant for HTN, HLD, prior CVA s/p L MCA stent and CKD IV. Presented to [facility] ED on 3/24/24 with 2 days of dizziness, ataxia, double/blurred vision, nausea and vomiting. Stroke team evaluated and stroke was rule out. He was noted to have SAMINA and ultimately required initiation of HD this admission.     Dizziness/ataxia/visual disturbances, resolved  - History of CVA s/p L MCA stent  - MRI brain with DYLON stenosis (50%) otherwise unremarkable  - Continue ASA     SAMINA/CKD IV  Hyperkalemia, resolved   - Nephrology following - monitoring K and treating as needed  - s/p HD catheter placement 3/26/24 by Dr. Melendez  - Patient interested in transitioning to PD - Dr. Melendez evaluated for PD catheter prior to DC but this was deferred due to fevers. Unfortunately, Dr. Melendez now out of town so this will need to be done on an outpatient basis  - CURT working on outpatient HD chair     Anemia of chronic disease  - s/p 1 unit PRBCS in [place]  - s/p 1 unit PRBCs here on 3/26  - Hgb 6.6 on 3/29 - s/p  1 unit PRBCs  - Discussed with nephrology - suspect due to renal dysfunction.   - FOBT negative on 3/26     (+) blood culture  - 1 of 2 blood cultures positive for Staph spp.   - Suspect contaminant. Repeat blood cultures pending    Fever  Possible pneumonia   - Night/AM fevers since 3/26  - Infectious work up has been largely unremarkable. UA reassuring. No leukocytosis  - 3/26 CXR nonfocal. Repeat CXR on 3/29 concerning for pulmonary edema vs PNA  - Fevers could be due to polyarticular gout flare but given that procal is also elevated and he is requiring O2, will treat as PNA with Doxy / Rocephin x 5 days     Bilateral knee pain / effusions, improved   Gout flare  - Consulted ortho due to fevers - s/p bilateral knee aspiration 3/29 with no purulent fluid or leukocytosis  - Follow fluid cultures   - Suspect gout flare  - On Allopurinol. No Colchicine due to CKD  - Conitnue PO Prednisone       HTN  - BP a bit elevated. Continue Amlodipin  - Increase Carvedilol to 12.5mg BID     HLD, continue statin     Expected Discharge Location and Transportation: home   Expected Discharge Expected Discharge Date: 4/2/2024; Expected Discharge Time:      DVT prophylaxis:Medical and mechanical DVT prophylaxis orders are present.    AM-PAC 6 Clicks Score (PT): 24 (03/30/24 0800)    CODE STATUS:   Code Status and Medical Interventions:   Ordered at: 03/24/24 1831     Code Status (Patient has no pulse and is not breathing):    CPR (Attempt to Resuscitate)     Medical Interventions (Patient has pulse or is breathing):    Full Support     Jamila Nolasco PA-C  03/31/24

## 2024-03-31 NOTE — PROGRESS NOTES
"   LOS: 7 days    Patient Care Team:  Edda Sanchez MD as PCP - General (Internal Medicine)  Praveen Fischer MD as Consulting Physician (Nephrology)    Subjective     Plan for HD in AM    Objective     Vital Signs:  Blood pressure 147/82, pulse 77, temperature 98.7 °F (37.1 °C), temperature source Oral, resp. rate 18, height 177.8 cm (70\"), weight 80.5 kg (177 lb 6.4 oz), SpO2 96%.      Intake/Output Summary (Last 24 hours) at 3/31/2024 1558  Last data filed at 3/31/2024 0800  Gross per 24 hour   Intake --   Output 250 ml   Net -250 ml        03/30 0701 - 03/31 0700  In: -   Out: 325 [Urine:325]    Physical Exam:          General Appearance: WD AAM NAD  Neuro:   awake alert   Psych:  Normal mood and affect, cooperative  CV:   No edema  Lungs: respirations regular and unlabored  Abdomen: not distended  :  No parker  Skin: No rash, Warm and dry  [facility] TD 3/26/2024.  MSK: Left knee tenderness, Swelling noted +      Labs:  Results from last 7 days   Lab Units 03/31/24  0651 03/29/24  1417 03/29/24  0644   WBC 10*3/mm3 9.72 7.06 6.88   HEMOGLOBIN g/dL 8.6* 8.3* 6.6*   PLATELETS 10*3/mm3 208 176 176     Results from last 7 days   Lab Units 03/31/24  0651 03/29/24  0644 03/28/24  0556 03/27/24  0030 03/26/24  0333 03/24/24  2054 03/24/24  1631   SODIUM mmol/L 135* 136 140 133* 135*   < > 135*   POTASSIUM mmol/L 5.1 5.3* 4.9 4.6 5.0   < > 6.5*   CHLORIDE mmol/L 100 101 103 99 102   < > 101   CO2 mmol/L 20.0* 23.0 25.0 23.0 19.0*   < > 18.0*   BUN mg/dL 62* 56* 55* 58* 101*   < > 99*   CREATININE mg/dL 4.66* 5.99* 5.37* 4.37* 6.05*   < > 5.47*   CALCIUM mg/dL 8.0* 7.4* 7.8* 7.9* 7.7*   < > 7.4*   PHOSPHORUS mg/dL 4.4  --   --   --  5.4*  --   --    MAGNESIUM mg/dL  --   --   --  1.6  --   --  1.6   ALBUMIN g/dL 2.9* 2.9* 3.1* 2.6* 2.8*  --  3.4*    < > = values in this interval not displayed.     Results from last 7 days   Lab Units 03/29/24  0644   ALK PHOS U/L 42   BILIRUBIN mg/dL 0.2   ALT (SGPT) U/L 12   AST " (SGOT) U/L 12                   Estimated Creatinine Clearance: 16.6 mL/min (A) (by C-G formula based on SCr of 4.66 mg/dL (H)).         A/P:      ESRD:   Follows with Dr. Nicholas in [place].  Patient has been heading towards dialysis prior to admission. . Urine output remains nonoliguric.  Tunneled dialysis catheter placed 3/26/2024 and HD initiated after BUN/creatinine increased further to 101/6.1.  Patient has had dialysis teaching and is very interested in home dialysis.  Dr. Melendez consulted to evaluate for PD catheter. Given intermittent fever. Plan to place PD cath is on hold.     Knee pain: s/p arthrocentesis. Less likely septic arthritis. On prednisone for possible gout.     HTN: Blood pressure stable.     Hyperkalemia: Low K diet. Adjustment w HD.     Fever: Intermittent fever in last few days. Blood cx x 1 positive ( suspicion for contamination). Swelling and pain noted in left knee.      Metabolic Acidosis: Improved.     Anemia: Hemoglobin below goal. LORE on HD days. Transfuse at hb<7    Volume: Stable..      Hyperphosphatemia:  Continue Phos binder low Phos diet.    Nutrition: Started renal vitamin.  Need high-protein low phosphorus low potassium dialysis diet     History of CVA: Evaluated by neurology.  Doubt new insult.    High risk and complexity patient    Naveen Carrington MD  03/31/24  15:58 EDT

## 2024-04-01 ENCOUNTER — APPOINTMENT (OUTPATIENT)
Dept: NEPHROLOGY | Facility: HOSPITAL | Age: 74
DRG: 673 | End: 2024-04-01
Payer: MEDICARE

## 2024-04-01 ENCOUNTER — READMISSION MANAGEMENT (OUTPATIENT)
Dept: CALL CENTER | Facility: HOSPITAL | Age: 74
End: 2024-04-01
Payer: MEDICARE

## 2024-04-01 VITALS
WEIGHT: 177.4 LBS | BODY MASS INDEX: 25.4 KG/M2 | DIASTOLIC BLOOD PRESSURE: 90 MMHG | TEMPERATURE: 98.1 F | HEART RATE: 78 BPM | HEIGHT: 70 IN | SYSTOLIC BLOOD PRESSURE: 149 MMHG | OXYGEN SATURATION: 98 % | RESPIRATION RATE: 18 BRPM

## 2024-04-01 PROBLEM — M25.461 BILATERAL KNEE EFFUSIONS: Status: ACTIVE | Noted: 2024-04-01

## 2024-04-01 PROBLEM — J18.9 PNEUMONIA DUE TO INFECTIOUS ORGANISM: Status: ACTIVE | Noted: 2024-04-01

## 2024-04-01 PROBLEM — R27.0 ATAXIA: Status: RESOLVED | Noted: 2021-07-28 | Resolved: 2024-04-01

## 2024-04-01 PROBLEM — D63.8 ANEMIA OF CHRONIC DISEASE: Status: ACTIVE | Noted: 2024-04-01

## 2024-04-01 PROBLEM — N18.6 ESRD (END STAGE RENAL DISEASE): Status: ACTIVE | Noted: 2024-04-01

## 2024-04-01 PROBLEM — E87.5 HYPERKALEMIA: Status: ACTIVE | Noted: 2024-04-01

## 2024-04-01 PROBLEM — M25.462 BILATERAL KNEE EFFUSIONS: Status: ACTIVE | Noted: 2024-04-01

## 2024-04-01 PROBLEM — N18.5 ACUTE RENAL FAILURE SUPERIMPOSED ON STAGE 5 CHRONIC KIDNEY DISEASE, NOT ON CHRONIC DIALYSIS: Status: ACTIVE | Noted: 2024-03-24

## 2024-04-01 LAB
ALBUMIN SERPL-MCNC: 2.8 G/DL (ref 3.5–5.2)
ANION GAP SERPL CALCULATED.3IONS-SCNC: 13 MMOL/L (ref 5–15)
BACTERIA SPEC AEROBE CULT: NORMAL
BUN SERPL-MCNC: 77 MG/DL (ref 8–23)
BUN/CREAT SERPL: 14.6 (ref 7–25)
CALCIUM SPEC-SCNC: 7.9 MG/DL (ref 8.6–10.5)
CHLORIDE SERPL-SCNC: 99 MMOL/L (ref 98–107)
CO2 SERPL-SCNC: 20 MMOL/L (ref 22–29)
CREAT SERPL-MCNC: 5.29 MG/DL (ref 0.76–1.27)
DEPRECATED RDW RBC AUTO: 45.6 FL (ref 37–54)
EGFRCR SERPLBLD CKD-EPI 2021: 10.9 ML/MIN/1.73
ERYTHROCYTE [DISTWIDTH] IN BLOOD BY AUTOMATED COUNT: 14.6 % (ref 12.3–15.4)
GLUCOSE SERPL-MCNC: 110 MG/DL (ref 65–99)
HCT VFR BLD AUTO: 26.5 % (ref 37.5–51)
HGB BLD-MCNC: 8.5 G/DL (ref 13–17.7)
MCH RBC QN AUTO: 27.7 PG (ref 26.6–33)
MCHC RBC AUTO-ENTMCNC: 32.1 G/DL (ref 31.5–35.7)
MCV RBC AUTO: 86.3 FL (ref 79–97)
PHOSPHATE SERPL-MCNC: 4.7 MG/DL (ref 2.5–4.5)
PLATELET # BLD AUTO: 229 10*3/MM3 (ref 140–450)
PMV BLD AUTO: 10.9 FL (ref 6–12)
POTASSIUM SERPL-SCNC: 5.4 MMOL/L (ref 3.5–5.2)
RBC # BLD AUTO: 3.07 10*6/MM3 (ref 4.14–5.8)
SODIUM SERPL-SCNC: 132 MMOL/L (ref 136–145)
WBC NRBC COR # BLD AUTO: 9.05 10*3/MM3 (ref 3.4–10.8)

## 2024-04-01 PROCEDURE — 85027 COMPLETE CBC AUTOMATED: CPT | Performed by: PHYSICIAN ASSISTANT

## 2024-04-01 PROCEDURE — 80069 RENAL FUNCTION PANEL: CPT | Performed by: PHYSICIAN ASSISTANT

## 2024-04-01 PROCEDURE — 99239 HOSP IP/OBS DSCHRG MGMT >30: CPT | Performed by: PHYSICIAN ASSISTANT

## 2024-04-01 PROCEDURE — 25010000002 HEPARIN (PORCINE) PER 1000 UNITS: Performed by: INTERNAL MEDICINE

## 2024-04-01 PROCEDURE — 25010000002 CEFTRIAXONE PER 250 MG: Performed by: PHYSICIAN ASSISTANT

## 2024-04-01 PROCEDURE — 99232 SBSQ HOSP IP/OBS MODERATE 35: CPT | Performed by: ORTHOPAEDIC SURGERY

## 2024-04-01 PROCEDURE — 25010000002 EPOETIN ALFA-EPBX 10000 UNIT/ML SOLUTION: Performed by: INTERNAL MEDICINE

## 2024-04-01 PROCEDURE — 63710000001 PREDNISONE PER 1 MG: Performed by: PHYSICIAN ASSISTANT

## 2024-04-01 RX ORDER — CEFUROXIME AXETIL 500 MG/1
500 TABLET ORAL ONCE
Qty: 1 TABLET | Refills: 0 | Status: SHIPPED | OUTPATIENT
Start: 2024-04-01 | End: 2024-04-01

## 2024-04-01 RX ORDER — ATORVASTATIN CALCIUM 80 MG/1
80 TABLET, FILM COATED ORAL NIGHTLY
Qty: 90 TABLET | Refills: 3 | Status: SHIPPED | OUTPATIENT
Start: 2024-04-01

## 2024-04-01 RX ORDER — DOXAZOSIN MESYLATE 4 MG/1
4 TABLET ORAL NIGHTLY
Qty: 90 TABLET | Refills: 3 | Status: SHIPPED | OUTPATIENT
Start: 2024-04-01

## 2024-04-01 RX ORDER — CALCITRIOL 0.25 UG/1
0.25 CAPSULE, LIQUID FILLED ORAL DAILY
Qty: 90 CAPSULE | Refills: 3 | Status: SHIPPED | OUTPATIENT
Start: 2024-04-01

## 2024-04-01 RX ORDER — FERROUS SULFATE 325(65) MG
325 TABLET ORAL
Qty: 90 TABLET | Refills: 3 | Status: SHIPPED | OUTPATIENT
Start: 2024-04-01

## 2024-04-01 RX ORDER — PREDNISONE 10 MG/1
TABLET ORAL
Qty: 12 TABLET | Refills: 0 | Status: SHIPPED | OUTPATIENT
Start: 2024-04-01 | End: 2024-04-07

## 2024-04-01 RX ORDER — CALCIUM ACETATE 667 MG/1
1334 CAPSULE ORAL 3 TIMES DAILY
Qty: 180 CAPSULE | Refills: 11 | Status: SHIPPED | OUTPATIENT
Start: 2024-04-01

## 2024-04-01 RX ORDER — CARVEDILOL 12.5 MG/1
25 TABLET ORAL 2 TIMES DAILY WITH MEALS
Status: DISCONTINUED | OUTPATIENT
Start: 2024-04-01 | End: 2024-04-01 | Stop reason: HOSPADM

## 2024-04-01 RX ORDER — CARVEDILOL 25 MG/1
25 TABLET ORAL 2 TIMES DAILY WITH MEALS
Qty: 180 TABLET | Refills: 3 | Status: SHIPPED | OUTPATIENT
Start: 2024-04-01

## 2024-04-01 RX ORDER — ASPIRIN 81 MG/1
81 TABLET, CHEWABLE ORAL DAILY
Qty: 90 TABLET | Refills: 3 | Status: SHIPPED | OUTPATIENT
Start: 2024-04-01

## 2024-04-01 RX ORDER — FOLIC ACID/VIT B COMPLEX AND C 0.8 MG
1 TABLET ORAL DAILY
Qty: 90 TABLET | Refills: 3 | Status: SHIPPED | OUTPATIENT
Start: 2024-04-01

## 2024-04-01 RX ORDER — EZETIMIBE 10 MG/1
10 TABLET ORAL DAILY
Qty: 90 TABLET | Refills: 3 | Status: SHIPPED | OUTPATIENT
Start: 2024-04-01

## 2024-04-01 RX ORDER — DOXYCYCLINE HYCLATE 100 MG/1
100 CAPSULE ORAL 2 TIMES DAILY
Qty: 4 CAPSULE | Refills: 0 | Status: SHIPPED | OUTPATIENT
Start: 2024-04-01 | End: 2024-04-03

## 2024-04-01 RX ORDER — NIFEDIPINE 60 MG/1
60 TABLET, EXTENDED RELEASE ORAL DAILY
Qty: 90 TABLET | Refills: 3 | Status: SHIPPED | OUTPATIENT
Start: 2024-04-01

## 2024-04-01 RX ORDER — ALLOPURINOL 100 MG/1
100 TABLET ORAL DAILY
Qty: 90 TABLET | Refills: 3 | Status: SHIPPED | OUTPATIENT
Start: 2024-04-01

## 2024-04-01 RX ADMIN — HEPARIN SODIUM 1000 UNITS: 1000 INJECTION INTRAVENOUS; SUBCUTANEOUS at 09:31

## 2024-04-01 RX ADMIN — CALCIUM ACETATE 1334 MG: 667 CAPSULE ORAL at 12:34

## 2024-04-01 RX ADMIN — Medication 1 TABLET: at 12:33

## 2024-04-01 RX ADMIN — EPOETIN ALFA-EPBX 20000 UNITS: 10000 INJECTION, SOLUTION INTRAVENOUS; SUBCUTANEOUS at 09:31

## 2024-04-01 RX ADMIN — CARVEDILOL 25 MG: 12.5 TABLET, FILM COATED ORAL at 12:33

## 2024-04-01 RX ADMIN — ASPIRIN 81 MG: 81 TABLET, CHEWABLE ORAL at 12:34

## 2024-04-01 RX ADMIN — SENNOSIDES AND DOCUSATE SODIUM 2 TABLET: 8.6; 5 TABLET ORAL at 12:33

## 2024-04-01 RX ADMIN — CALCITRIOL CAPSULES 0.25 MCG 0.25 MCG: 0.25 CAPSULE ORAL at 12:34

## 2024-04-01 RX ADMIN — SODIUM CHLORIDE 1000 MG: 900 INJECTION INTRAVENOUS at 12:34

## 2024-04-01 RX ADMIN — ALLOPURINOL 100 MG: 100 TABLET ORAL at 12:33

## 2024-04-01 RX ADMIN — AMLODIPINE BESYLATE 10 MG: 10 TABLET ORAL at 12:33

## 2024-04-01 RX ADMIN — ROPINIROLE 0.75 MG: 0.5 TABLET, FILM COATED ORAL at 12:33

## 2024-04-01 RX ADMIN — CETIRIZINE HYDROCHLORIDE 10 MG: 10 TABLET, FILM COATED ORAL at 12:33

## 2024-04-01 RX ADMIN — FERROUS SULFATE TAB 325 MG (65 MG ELEMENTAL FE) 325 MG: 325 (65 FE) TAB at 12:38

## 2024-04-01 RX ADMIN — DOXYCYCLINE 100 MG: 100 INJECTION, POWDER, LYOPHILIZED, FOR SOLUTION INTRAVENOUS at 05:31

## 2024-04-01 RX ADMIN — PREDNISONE 40 MG: 20 TABLET ORAL at 12:38

## 2024-04-01 RX ADMIN — Medication 10 ML: at 12:39

## 2024-04-01 NOTE — DISCHARGE INSTR - APPOINTMENTS
[facility], [place]  Dialysis on Monday, Wednesday & Friday  1245 PM Chair time  Please arrive at 1215 for paperwork

## 2024-04-01 NOTE — PROGRESS NOTES
"      Orthopaedic Surgery Progress Note  CC:   Chief Complaint   Patient presents with    Dizziness      Subjective   Interval History:   Patient seen and reevaluated this afternoon.  Today stating his knee pain bilaterally has 100% resolved.  He is ranging his knees in bed with no pain.  States has had a home today.  Is very happy with his progress.    ROS: Denies fever, chills, nausea or vomiting    Objective     Vital Signs:  Temp (24hrs), Av °F (36.7 °C), Min:97.6 °F (36.4 °C), Max:98.7 °F (37.1 °C)    /90 (BP Location: Right arm, Patient Position: Lying)   Pulse 78   Temp 98.1 °F (36.7 °C) (Oral)   Resp 18   Ht 177.8 cm (70\")   Wt 80.5 kg (177 lb 6.4 oz)   SpO2 98%   BMI 25.45 kg/m²   Body mass index is 25.45 kg/m².    Physical Exam:  left LE: skin intact, compartments soft/compressible              +EHL/FHL/GSC/TA              SILT DP/SP/SN/Saph/Tib              Palpable DP, CR brisk in all toes              Minimal palpable effusion, actively ranging knee with no pain.  No areas of tenderness.                left LE: skin intact, compartments soft/compressible              +EHL/FHL/GSC/TA              SILT DP/SP/SN/Saph/Tib              Palpable DP, CR brisk in all toes             Minimal palpable effusion, actively ranging knee with no pain.  No areas of tenderness.    Assessment and Plan:  73-year-old male with acute on chronic bilateral knee pain  -Once again discussed with patient acute on chronic bilateral knee pain (undiagnosed new problem with uncertain prognosis) with patient as well as treatment options at length. Decision regarding surgical intervention considered. Patient's pain in his bilateral knees continue to improve and there is no growth to date on his aspiration cultures, no orthopedic intervention planned.  -Pain control per primary  -Weightbearing as tolerated bilateral lower extremities  -Continue to monitor physical exam  -Recommend PT  -Ice and elevate bilateral " knees  -Cell count right knee 3,657, cell count left knee 7,436, crystals negative, aerobic/anaerobic cultures pending, NGTD  -Cell count is very low and not consistent with septic arthritis, no orthopedic intervention indicated  -Will continue to follow cultures  -Patient reports he is leaving the hospital today.  Discussed with patient that he can follow-up with me on an as-needed basis if knee pain persist or worsen.  My follow-up information was placed in epic.      Howie Moralez MD  04/01/24  12:44 EDT

## 2024-04-01 NOTE — CASE MANAGEMENT/SOCIAL WORK
Case Management Discharge Note      Final Note: Mr. Solano is being discharged home today, 4/1/24. His dialysis has been set up at [facility] in [place], [place] on Monday's, Wednesday's & Friday's at 1245. He will need to be there on Wednesday at 1215 for paperwork. Discharge Summary has been faxed to Lisa at [facility] at [phone].         Selected Continued Care - Admitted Since 3/24/2024       Destination    No services have been selected for the patient.                Durable Medical Equipment    No services have been selected for the patient.                Dialysis/Infusion    No services have been selected for the patient.                Home Medical Care    No services have been selected for the patient.                Therapy    No services have been selected for the patient.                Community Resources    No services have been selected for the patient.                Community & DME    No services have been selected for the patient.                         Final Discharge Disposition Code: 01 - home or self-care

## 2024-04-01 NOTE — DISCHARGE PLACEMENT REQUEST
"TO: [place] ATTN: Lisa  FROM: Andree SMITH, RN,  [phone]  Eyal Solano (73 y.o. Male)       Date of Birth   1950    Social Security Number       Address   [address] [place] [address]    Home Phone   [phone]    MRN   [id number]       [facility]    Marital Status   Single                            Admission Date   3/24/24    Admission Type   Emergency    Admitting Provider   Domi Sun MD    Attending Provider   Domi Sun MD    Department, Room/Bed   [facility] 3E, S336/1       Discharge Date       Discharge Disposition   Home or Self Care    Discharge Destination                                 Attending Provider: Domi Sun MD    Allergies: No Known Allergies    Isolation: None   Infection: None   Code Status: CPR    Ht: 177.8 cm (70\")   Wt: 80.5 kg (177 lb 6.4 oz)    Admission Cmt: None   Principal Problem: ESRD (end stage renal disease) [N18.6]                   Active Insurance as of 3/24/2024       Primary Coverage       Payor Plan Insurance Group Employer/Plan Group    MEDICARE MEDICARE A & B        Payor Plan Address Payor Plan Phone Number Payor Plan Fax Number Effective Dates    [address] [phone]  2017 - None Entered    [place] [address]         Subscriber Name Subscriber Birth Date Member ID       EYAL SOLANO 1950 [id number]                     Emergency Contacts        (Rel.) Home Phone Work Phone Mobile Phone    MARELY ARMSTRONG (Daughter) -- -- [phone]    GERARDO PIRES (Daughter) -- -- [phone]    LINUS WESTON (Significant Other) -- -- [phone]    Caitlyn Almonte (Sister) -- -- [phone]    PATRICIA SOLANO (Brother) -- -- [phone]                 Discharge Summary        Jamila Nolasco PA-C at 24 1115              [facility] Medicine Services  DISCHARGE SUMMARY    Patient Name: Eyal Solano  : " 1950  MRN: [id number]    Date of Admission: 3/24/2024  3:56 PM  Date of Discharge:  4/1/2024  Primary Care Physician: Edda Sanchez MD    Consults       Date and Time Order Name Status Description    3/29/2024 11:05 AM Inpatient Orthopedic Surgery Consult Completed     3/25/2024  8:45 PM Inpatient General Surgery Consult Completed     3/24/2024  8:09 PM Inpatient Neurology Consult Stroke Completed     3/24/2024  6:46 PM Inpatient Nephrology Consult Completed           Hospital Course     Active Hospital Problems    Diagnosis  POA    **ESRD (end stage renal disease) [N18.6]  Yes    Bilateral knee effusions [M25.461, M25.462]  Clinically Undetermined    Anemia of chronic disease [D63.8]  Yes    Hyperkalemia [E87.5]  Yes    Pneumonia due to infectious organism [J18.9]  Clinically Undetermined    History of stroke s/p L MCA stent [Z86.73]  Not Applicable    Acute on chronic anemia [D64.9]  Yes    Mixed hyperlipidemia [E78.2]  Yes    Essential hypertension [I10]  Yes    Acute gout of knee [M10.9]  Yes      Resolved Hospital Problems    Diagnosis Date Resolved POA    Ataxia [R27.0] 04/01/2024 Yes      Hospital Course:  Eyal Solano is a 73 y.o. male with PMH significant for HTN, HLD, prior CVA s/p L MCA stent and CKD IV. Presented to [facility] ED on 3/24/24 with 2 days of dizziness, ataxia, double/blurred vision, nausea and vomiting. Stroke team evaluated and stroke was rule out. He was noted to have SMAINA and ultimately required initiation of HD this admission.      Dizziness/ataxia/visual disturbances, resolved  - History of CVA s/p L MCA stent  - MRI brain with DYLON stenosis (50%) otherwise unremarkable  - Continue ASA      SAMINA/CKD IV  Hyperkalemia, resolved   - Nephrology followed - monitoring K and treating as needed  - s/p HD catheter placement 3/26/24 by Dr. Melendez  - Patient interested in transitioning to PD - Dr. Melendez evaluated for PD catheter prior to DC but this was deferred due to fevers.  Unfortunately, Dr. Melendez now out of town so this will need to be done on an outpatient basis. Will arrange outpatient follow up  - Has outpatient HD chair at [facility] on M-W-F at 12:45pm      Anemia of chronic disease  - FOBT negative on 3/26  - s/p 1 unit PRBCS in [place]  - s/p 1 unit PRBCs here on 3/26 and again on 3/29   - Discussed with nephrology - suspect due to renal dysfunction.      (+) blood culture  - 1 of 2 blood cultures positive for Staph spp.   - Suspect contaminant. Repeat blood cultures NGTD     Fever  Possible pneumonia   - 3/26 CXR nonfocal. Repeat CXR on 3/29 concerning for pulmonary edema vs PNA  - Fevers could be due to polyarticular gout flare but given that procal is also elevated and he is requiring O2, will treat as PNA  - On IV Rocephin/Doxy - transition to PO at DC     Bilateral knee pain / effusions, improved   Gout flare  - Consulted ortho due to fevers - s/p bilateral knee aspiration 3/29 with no purulent fluid or leukocytosis  - Septic joint / bursitis ruled out   - Fuid cultures NGTD  - Suspect secondary gout flare  - On Allopurinol. No Colchicine due to CKD  - Much improved on PO Prednisone, will start taper at DC       HTN  - BP a bit elevated. Continue Amlodipine / Carvedilol      HLD, continue statin     Day of Discharge     HPI:   In bed. Feeling well today with no new complaints. Knee pain is better.     Review of Systems  Gen- No fevers, chills  CV- No chest pain, palpitations  Resp- No cough, dyspnea  GI- No N/V/D, abd pain    Vital Signs:   Temp:  [97.6 °F (36.4 °C)-98.7 °F (37.1 °C)] 97.6 °F (36.4 °C)  Heart Rate:  [64-89] 70  Resp:  [18-20] 18  BP: (147-181)/() 167/100    Physical Exam:  Constitutional: No acute distress, awake, alert and conversational.   HENT: NCAT, mucous membranes moist  Respiratory: Clear to auscultation bilaterally, respiratory effort normal on room air   Cardiovascular: RRR, no murmurs, rubs, or gallops  Gastrointestinal: Positive  bowel sounds, soft, nontender, nondistended  Musculoskeletal: No bilateral ankle edema. R upper chest tunneled HD catheter. Arthritic changes to knees bilaterally, size of effusions, warmth and tenderness to palpation are improved. Full active ROM   Psychiatric: Appropriate affect, cooperative  Neurologic: Oriented x 3, moves all extremities spontaneously without focal deficits, speech clear    Pertinent  and/or Most Recent Results     LAB RESULTS:      Lab 04/01/24  0423 03/31/24  0651 03/29/24  1849 03/29/24  1417 03/29/24  0644 03/28/24  0556 03/27/24  0030 03/26/24  1610 03/26/24  0333   WBC 9.05 9.72  --  7.06 6.88 9.21 7.37  --  5.93   HEMOGLOBIN 8.5* 8.6*  --  8.3* 6.6* 7.4* 7.7*   < > 6.2*   HEMATOCRIT 26.5* 27.5*  --  26.1* 21.7* 23.8* 24.0*   < > 19.8*   PLATELETS 229 208  --  176 176 188 176  --  198   NEUTROS ABS  --   --   --   --   --   --  5.99  --  4.28   IMMATURE GRANS (ABS)  --   --   --   --   --   --  0.03  --  0.03   LYMPHS ABS  --   --   --   --   --   --  0.52*  --  0.90   MONOS ABS  --   --   --   --   --   --  0.69  --  0.57   EOS ABS  --   --   --   --   --   --  0.12  --  0.12   MCV 86.3 86.2  --  87.9 88.2 87.2 84.8  --  85.0   SED RATE  --   --   --   --  68*  --   --   --   --    CRP  --   --   --   --  16.17*  --  11.63*  --   --    PROCALCITONIN  --   --   --   --  0.90*  --  1.02*  --   --    LACTATE  --   --  0.4*  --   --   --  0.6  --   --    PROTIME  --   --   --   --   --   --   --   --  15.3*    < > = values in this interval not displayed.         Lab 04/01/24  0423 03/31/24  0651 03/29/24  0644 03/28/24  0556 03/27/24  0030 03/26/24  0333   SODIUM 132* 135* 136 140 133* 135*   POTASSIUM 5.4* 5.1 5.3* 4.9 4.6 5.0   CHLORIDE 99 100 101 103 99 102   CO2 20.0* 20.0* 23.0 25.0 23.0 19.0*   ANION GAP 13.0 15.0 12.0 12.0 11.0 14.0   BUN 77* 62* 56* 55* 58* 101*   CREATININE 5.29* 4.66* 5.99* 5.37* 4.37* 6.05*   EGFR 10.9* 12.7* 9.4* 10.7* 13.7* 9.3*   GLUCOSE 110* 112* 96 111* 118*  101*   CALCIUM 7.9* 8.0* 7.4* 7.8* 7.9* 7.7*   MAGNESIUM  --   --   --   --  1.6  --    PHOSPHORUS 4.7* 4.4  --   --   --  5.4*         Lab 04/01/24  0423 03/31/24  0651 03/29/24  0644 03/28/24  0556 03/27/24  0030   TOTAL PROTEIN  --   --  5.0* 5.2* 5.6*   ALBUMIN 2.8* 2.9* 2.9* 3.1* 2.6*   GLOBULIN  --   --  2.1 2.1 3.0   ALT (SGPT)  --   --  12 15 18   AST (SGOT)  --   --  12 14 17   BILIRUBIN  --   --  0.2 0.3 0.3   ALK PHOS  --   --  42 46 50         Lab 03/26/24  0333   PROTIME 15.3*   INR 1.20*         Lab 03/29/24  0936 03/26/24  0333   IRON  --  24*   IRON SATURATION (TSAT)  --  14*   TIBC  --  171*   TRANSFERRIN  --  115*   ABO TYPING A  --    RH TYPING Positive  --    ANTIBODY SCREEN Negative  --      Brief Urine Lab Results  (Last result in the past 365 days)        Color   Clarity   Blood   Leuk Est   Nitrite   Protein   CREAT   Urine HCG        03/27/24 0033 Yellow   Clear   Trace   Negative   Negative   >=300 mg/dL (3+)                 Microbiology Results (last 10 days)       Procedure Component Value - Date/Time    Blood Culture - Blood, Arm, Right [[id number]]  (Normal) Collected: 03/29/24 1849    Lab Status: Preliminary result Specimen: Blood from Arm, Right Updated: 03/31/24 1916     Blood Culture No growth at 2 days    Blood Culture - Blood, Arm, Left [[id number]]  (Normal) Collected: 03/29/24 1849    Lab Status: Preliminary result Specimen: Blood from Arm, Left Updated: 03/31/24 1916     Blood Culture No growth at 2 days    Respiratory Panel PCR w/COVID-19(SARS-CoV-2) ANGELIQUE/DANYELL/DIANA/PAD/COR/KAVON In-House, NP Swab in UTM/VTM, 2 HR TAT - Swab, Nasopharynx [[id number]]  (Normal) Collected: 03/29/24 7996    Lab Status: Final result Specimen: Swab from Nasopharynx Updated: 03/29/24 1604     ADENOVIRUS, PCR Not Detected     Coronavirus 229E Not Detected     Coronavirus HKU1 Not Detected     Coronavirus NL63 Not Detected     Coronavirus OC43 Not Detected     COVID19 Not Detected     Human Metapneumovirus Not  Detected     Human Rhinovirus/Enterovirus Not Detected     Influenza A PCR Not Detected     Influenza B PCR Not Detected     Parainfluenza Virus 1 Not Detected     Parainfluenza Virus 2 Not Detected     Parainfluenza Virus 3 Not Detected     Parainfluenza Virus 4 Not Detected     RSV, PCR Not Detected     Bordetella pertussis pcr Not Detected     Bordetella parapertussis PCR Not Detected     Chlamydophila pneumoniae PCR Not Detected     Mycoplasma pneumo by PCR Not Detected    Narrative:      In the setting of a positive respiratory panel with a viral infection PLUS a negative procalcitonin without other underlying concern for bacterial infection, consider observing off antibiotics or discontinuation of antibiotics and continue supportive care. If the respiratory panel is positive for atypical bacterial infection (Bordetella pertussis, Chlamydophila pneumoniae, or Mycoplasma pneumoniae), consider antibiotic de-escalation to target atypical bacterial infection.    Blood Culture - Blood, Arm, Right [[id number]]  (Normal) Collected: 03/28/24 1123    Lab Status: Preliminary result Specimen: Blood from Arm, Right Updated: 03/31/24 1145     Blood Culture No growth at 3 days    Narrative:      Less than seven (7) mL's of blood was collected.  Insufficient quantity may yield false negative results.    Blood Culture - Blood, Arm, Left [[id number]]  (Normal) Collected: 03/28/24 1116    Lab Status: Preliminary result Specimen: Blood from Arm, Left Updated: 03/31/24 1145     Blood Culture No growth at 3 days    Narrative:      Less than seven (7) mL's of blood was collected.  Insufficient quantity may yield false negative results.    MRSA Screen, PCR (Inpatient) - Swab, Nares [[id number]]  (Normal) Collected: 03/27/24 0033    Lab Status: Final result Specimen: Swab from Nares Updated: 03/27/24 0802     MRSA PCR Negative    Narrative:      The negative predictive value of this diagnostic test is high and should only be used to  consider de-escalating anti-MRSA therapy. A positive result may indicate colonization with MRSA and must be correlated clinically.  MRSA Negative    COVID PRE-OP / PRE-PROCEDURE SCREENING ORDER (NO ISOLATION) - Swab, Nasopharynx [[id number]]  (Normal) Collected: 03/27/24 0033    Lab Status: Final result Specimen: Swab from Nasopharynx Updated: 03/27/24 0123    Narrative:      The following orders were created for panel order COVID PRE-OP / PRE-PROCEDURE SCREENING ORDER (NO ISOLATION) - Swab, Nasopharynx.  Procedure                               Abnormality         Status                     ---------                               -----------         ------                     COVID-19 and FLU A/B PCR...[[id number]]  Normal              Final result                 Please view results for these tests on the individual orders.    COVID-19 and FLU A/B PCR, 1 HR TAT - Swab, Nasopharynx [[id number]]  (Normal) Collected: 03/27/24 0033    Lab Status: Final result Specimen: Swab from Nasopharynx Updated: 03/27/24 0123     COVID19 Not Detected     Influenza A PCR Not Detected     Influenza B PCR Not Detected    Narrative:      Fact sheet for providers: https://www.fda.gov/media/829354/download    Fact sheet for patients: https://www.fda.gov/media/893317/download    Test performed by PCR.    Blood Culture - Blood, Arm, Right [[id number]]  (Normal) Collected: 03/27/24 0030    Lab Status: Final result Specimen: Blood from Arm, Right Updated: 04/01/24 0115     Blood Culture No growth at 5 days    Blood Culture - Blood, Arm, Right [[id number]]  (Abnormal) Collected: 03/27/24 0030    Lab Status: Final result Specimen: Blood from Arm, Right Updated: 03/28/24 0611     Blood Culture Staphylococcus, coagulase negative     Isolated from Anaerobic Bottle     Gram Stain Anaerobic Bottle Gram positive cocci in pairs and clusters    Narrative:      Probable contaminant requires clinical correlation, susceptibility not performed unless requested  by physician.      Blood Culture ID, PCR - Blood, Arm, Right [[id number]]  (Abnormal) Collected: 03/27/24 0030    Lab Status: Final result Specimen: Blood from Arm, Right Updated: 03/27/24 1853     BCID, PCR Staph spp, not aureus or lugdunensis. Identification by BCID2 PCR.     BOTTLE TYPE Anaerobic Bottle          XR Chest 1 View    Result Date: 3/29/2024  XR CHEST 1 VW Date of Exam: 3/29/2024 3:26 PM EDT Indication: fever, hypoxia Comparison: 3/26/2024 Findings: Lines: Unchanged position of right internal jugular venous catheter/dialysis catheter Lungs: Patchy opacities in the perihilar region and lower lungs bilaterally, worsened since prior study. Pleura: Small left-sided pleural effusion. No pneumothorax Cardiomediastinum: The cardiomediastinal silhouette is normal Soft Tissues: Unremarkable. Bones: No acute osseous abnormality.     Impression: Patchy opacities in the bilateral perihilar region and lower lungs are a nonspecific finding and may represent pulmonary edema, although superimposed multifocal pneumonia is not completely excluded. Electronically Signed: Sebas Crowe DO  3/29/2024 3:55 PM EDT  Workstation ID: [id number]    XR Knee 1 or 2 View Bilateral    Result Date: 3/29/2024  XR KNEE 1 OR 2 VW BILATERAL HISTORY: knee pain L > R. Chronic gout       pain COMPARISON: None FINDINGS: Acute Fracture: None. Subluxation: None. Abnormal soft tissue swelling: None. Radiopaque foreign body: None. Soft tissue air: None. Degenerative changes: Mild. Present. Other/Incidental findings: Vascular calcification. 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     No acute abnormality on XR KNEE 2 VW BILATERAL. The bilateral tibial and femoral bone lesions are likely benign as discussed above. If there is any clinical concern, MRI may be considered for confirmation. Electronically Signed: Jose Pina MD  3/29/2024 1:30 PM EDT  Workstation ID: [id number]    XR Chest 1 View    Result  Date: 3/26/2024  XR CHEST 1 VW Date of Exam: 3/26/2024 11:30 PM EDT Indication: dyspnea Comparison: 3/24/2024. Findings: There is a new right-sided dialysis catheter terminating in the lower SVC. There is minimal interstitial pulmonary edema. There is linear scar in the right lateral midlung. Heart size appears within normal limits. There is no pneumothorax, pleural effusion or focal airspace consolidation. No acute osseous abnormalities.     Impression: Minimal interstitial pulmonary edema. Electronically Signed: Kunal Roy MD  3/26/2024 11:48 PM EDT  Workstation ID: [id number]    IR Insert Tunneled CV Catheter Without Port 5 Plus    Result Date: 3/26/2024  IR INS TUNNELED CV CATHETER WO PORT 5 PLUS  History: Needs to initiate on HD.  : Jose Pina MD. Modality: Sonography and fluoroscopy Fluoro time: 1.2 minutes Radiation Dose: 2.8 mGy air Kerma.   SEDATION: Moderate sedation was administered. 1 milligram of Versed and 50 micrograms of fentanyl IV was used for moderate sedation. Total intra service time of sedation was 22 minutes. The sedation was administered and the patient's vital signs monitored throughout the procedure and recorded in the patient's medical record by the nurse under my direct supervision. Medicines: Not applicable. Anesthesia: Lidocaine 1% with epinephrine, local infiltration Estimated blood loss:  < 5 cc.        Technique: A thorough discussion of the risks, benefits, and alternatives of the procedure, and if applicable, moderate sedation, was carried out with the patient. They were encouraged to ask any questions. Any questions were answered. They verbalized understanding. A written informed consent was then signed.  A multi-component timeout was performed prior to starting the procedure using the departmental protocol. The procedure room personnel used personal protective equipment. The operators used sterile gloves and if indicated, sterile gowns. The surgical site was  prepped with chlorhexidine gluconate  and draped in the maximal applicable sterile fashion. A preliminary ultrasonogram was performed of the target that revealed a patent and compressible Right internal jugular vein. Pertinent ultrasound images were stored in the PACS for documentation. A sterile prep and drape of the right neck and upper chest was performed using maximal sterile technique. Using aseptic precautions, real-time ultrasound guidance, the target vein was accessed after local anesthetic infiltration and dermatotomy with an access needle. A guidewire was advanced into the central venous system under fluoroscopic guidance. Over the wire following standard technique a peel-away sheath was placed. After local anesthesia, an incision was created at the exit site location and a cuffed tunneled dialysis catheter of an appropriate length was tunneled from the exit site to the venotomy site using a tunneling device. The catheter was advanced into the venous system through the peel-away sheath which was removed. The catheter aspirated and flushed well and was terminally packed with heparin 1000 units per cc. The catheter was secured to skin using nonabsorbable suture and a CHG dressing applied. The venotomy site was closed using Dermabond. An aseptic dressing was applied using the protocol for Dermabond. The patient was transferred to the recovery area and was discharged from the department in stable condition.  Complications: None immediate.    Device: 15.5 [place] x 19 cm cuff to tip Duraflow catheter. Findings: Patent and compressible Right internal jugular. Final image shows the catheter to be in good position with the catheter tip in the right atrium, an excellent position for use. There is no complication.      Impression:    Successful ultrasound and fluoroscopic guided Right internal jugular vein route cuffed tunneled hemodialysis catheter placement as described above. Thank you for the opportunity to  assist in the care of your patient. Electronically Signed: Jose Pina MD  3/26/2024 3:22 PM EDT  Workstation ID: [id number]    MRI Angiogram Head Without Contrast    Result Date: 3/24/2024  MRI ANGIOGRAM HEAD WO CONTRAST, MRI ANGIOGRAM NECK WO CONTRAST Date of Exam: 3/24/2024 7:12 PM EDT Indication: ataxia/diplopia.  Comparison: 4/8/2022 Technique:  Routine 3-D time-of-flight gradient echo imaging was obtained of the head and neck without contrast administration. Findings: The visualized proximal common carotid arteries are normal. Irregularity of the carotid bulbs indicative of likely atheromatous disease. The visualized extracranial segments of the internal carotid arteries are normal. The vertebral arteries are codominant within the neck. Irregularity of the extracranial internal carotid arteries. At the cervical portion of the right internal carotid artery there is significant narrowing appreciated with greater than 50% stenosis visually. Above this level both internal carotid arteries are irregular likely due to atheromatous disease. The carotid termini are normal. The visualized branches of the anterior and middle cerebral arteries appear patent. Both vertebral arteries supply the basilar artery. The basilar artery and basilar artery tip are normal. The basilar artery terminates in normal-appearing bilateral posterior cerebral arteries.     There is approximately 50% stenosis of the right internal carotid artery at the skull base. Evidence of atheromatous disease. Otherwise no vessel occlusion. Electronically Signed: Guru Rayo MD  3/24/2024 7:59 PM EDT  Workstation ID: [id number]    MRI Angiogram Neck Without Contrast    Result Date: 3/24/2024  MRI ANGIOGRAM HEAD WO CONTRAST, MRI ANGIOGRAM NECK WO CONTRAST Date of Exam: 3/24/2024 7:12 PM EDT Indication: ataxia/diplopia.  Comparison: 4/8/2022 Technique:  Routine 3-D time-of-flight gradient echo imaging was obtained of the head and neck without contrast  administration. Findings: The visualized proximal common carotid arteries are normal. Irregularity of the carotid bulbs indicative of likely atheromatous disease. The visualized extracranial segments of the internal carotid arteries are normal. The vertebral arteries are codominant within the neck. Irregularity of the extracranial internal carotid arteries. At the cervical portion of the right internal carotid artery there is significant narrowing appreciated with greater than 50% stenosis visually. Above this level both internal carotid arteries are irregular likely due to atheromatous disease. The carotid termini are normal. The visualized branches of the anterior and middle cerebral arteries appear patent. Both vertebral arteries supply the basilar artery. The basilar artery and basilar artery tip are normal. The basilar artery terminates in normal-appearing bilateral posterior cerebral arteries.     There is approximately 50% stenosis of the right internal carotid artery at the skull base. Evidence of atheromatous disease. Otherwise no vessel occlusion. Electronically Signed: Guru Rayo MD  3/24/2024 7:59 PM EDT  Workstation ID: [id number]    MRI Brain Without Contrast    Result Date: 3/24/2024  MRI BRAIN WO CONTRAST Date of Exam: 3/24/2024 7:01 PM EDT Indication: ataxia/diplopia.  Comparison: 4/8/2022 Technique:  Routine multiplanar/multisequence sequence images of the brain were obtained without contrast administration. Findings: No area of restricted diffusion is identified to suggest an acute intracranial infarct. Multifocal areas of T2/FLAIR signal increase are noted within the subcortical, deep cerebral, and periventricular white matter consistent with chronic small  vessel/microangiopathic ischemic changes. The ventricles and sulci are normal in size and configuration. No intracranial mass or mass effect. No extra-axial mass or collection. The posterior fossa is normal. Sellar and suprasellar structures  are normal.  The major intracranial flow-voids of the [place] are patent. Orbital and periorbital soft tissues are normal. Mucoid retention cyst within the anterior right maxillary sinus. Mucosal thickening right maxillary sinus. The mastoid air cells are aerated..     Impression: No acute intracranial pathology. Chronic small vessel/microangiopathic ischemic changes. Electronically Signed: Guru Rayo MD  3/24/2024 7:24 PM EDT  Workstation ID: [id number]    XR Chest 1 View    Result Date: 3/24/2024  XR CHEST 1 VW Date of Exam: 3/24/2024 4:33 PM EDT Indication: Weak/Dizzy/AMS triage protocol Comparison: None available. Findings: There are no airspace consolidations. No pleural fluid. No pneumothorax. The pulmonary vasculature appears within normal limits. The cardiac and mediastinal silhouette appear unremarkable. No acute osseous abnormality identified.     Impression: No acute pulmonary process Electronically Signed: Thang Coronel MD  3/24/2024 4:55 PM EDT  Workstation ID: [id number]     Results for orders placed during the hospital encounter of 09/02/21    Duplex Lower Extremity Art / Grafts - Right CAR    Interpretation Summary  · There is evidence of a stenosis in the mid right SFA of at least 50%. Otherwise, there is mild diffuse atherosclerosis in the the right lower extremity arteries on the basis of this study. There are monophasic waveforms starting from the common femoral artery and extending to the pedal vessels.  · No evidence of a pseudoaneurysm or AV fistula on the basis of this study.  · There is an incidentally noted cystic structure measuring 0.8 x 1.9 cm in the right upper thigh which may be consistent with a small hematoma versus an enlarged lymph node.    Results for orders placed during the hospital encounter of 07/28/21    Adult Transthoracic Echo Complete W/ Cont if Necessary Per Protocol (With Agitated Saline)    Interpretation Summary  · Left ventricular ejection fraction appears to  be 56 - 60%.  · Normal left atrial size and volume noted. No evidence of a patent foramen ovale. Saline test results are negative  · Mild mitral annular calcification is present. There is mild, anterior mitral leaflet thickening present. Trace mitral regurgitation.    Discharge Details        Discharge Medications        New Medications        Instructions Start Date   b complex-vitamin c-folic acid 0.8 MG tablet tablet   1 tablet, Oral, Daily      cefuroxime 500 MG tablet  Commonly known as: CEFTIN   500 mg, Oral, Once, Take on Tuesday 4/2/2024      doxycycline 100 MG capsule  Commonly known as: VIBRAMYCIN   100 mg, Oral, 2 Times Daily, First dose evening of 4/1/24      predniSONE 10 MG tablet  Commonly known as: DELTASONE   Take 3 tablets by mouth Daily With Breakfast for 2 days, THEN 2 tablets Daily With Breakfast for 2 days, THEN 1 tablet Daily With Breakfast for 2 days.   Start Date: April 1, 2024            Changes to Medications        Instructions Start Date   allopurinol 100 MG tablet  Commonly known as: ZYLOPRIM  What changed: how much to take   100 mg, Oral, Daily      carvedilol 25 MG tablet  Commonly known as: COREG  What changed:   medication strength  how much to take   25 mg, Oral, 2 Times Daily With Meals      ferrous sulfate 325 (65 FE) MG tablet  What changed: when to take this   325 mg, Oral, Daily With Breakfast             Continue These Medications        Instructions Start Date   albuterol sulfate  (90 Base) MCG/ACT inhaler  Commonly known as: PROVENTIL HFA;VENTOLIN HFA;PROAIR HFA   1 puff, Inhalation, Every 4 Hours PRN      aspirin 81 MG chewable tablet   81 mg, Oral, Daily      atorvastatin 80 MG tablet  Commonly known as: LIPITOR   80 mg, Oral, Nightly      calcitriol 0.25 MCG capsule  Commonly known as: ROCALTROL   0.25 mcg, Oral, Daily      calcium acetate 667 MG capsule capsule  Commonly known as: PHOS BINDER)   1,334 mg, Oral, 3 Times Daily, With meals      doxazosin 4 MG  tablet  Commonly known as: CARDURA   4 mg, Oral, Nightly      ezetimibe 10 MG tablet  Commonly known as: Zetia   10 mg, Oral, Daily      levocetirizine 5 MG tablet  Commonly known as: XYZAL   TAKE 1 TABLET BY MOUTH ONCE DAILY IN THE EVENING      NIFEdipine XL 60 MG 24 hr tablet  Commonly known as: PROCARDIA XL   60 mg, Oral, Daily      VITAMIN D PO   1 tablet, Oral, Daily             Stop These Medications      amLODIPine 10 MG tablet  Commonly known as: NORVASC     fluticasone 50 MCG/ACT nasal spray  Commonly known as: Flonase     torsemide 100 MG tablet  Commonly known as: DEMADEX            No Known Allergies    Discharge Disposition:Home or Self Care    Diet:  Diet Instructions       Diet: Regular/House Diet, Renal Diets; Low Potassium, Low Sodium (2-3g), Low Phosphorus; Regular (IDDSI 7); Thin (IDDSI 0)      Discharge Diet:  Regular/House Diet  Renal Diets       Renal Diet:  Low Potassium  Low Sodium (2-3g)  Low Phosphorus       Texture: Regular (IDDSI 7)    Fluid Consistency: Thin (IDDSI 0)           Activity:  Activity Instructions       Activity as Tolerated            CODE STATUS:    Code Status and Medical Interventions:   Ordered at: 03/24/24 1831     Code Status (Patient has no pulse and is not breathing):    CPR (Attempt to Resuscitate)     Medical Interventions (Patient has pulse or is breathing):    Full Support     Future Appointments   Date Time Provider Department Center   4/3/2024  9:45 AM Edda Sanchez MD [facility]   5/9/2024  2:30 PM Edda Sanchez MD [facility]   9/12/2024  4:00 PM Nathalie Ambrocio APRN [facility]     Additional Instructions for the Follow-ups that You Need to Schedule       Discharge Follow-up with PCP   As directed       Currently Documented PCP:    Edda Sanchez MD    PCP Phone Number:    [phone]     Follow Up Details: 1 week              Jamila Nolasco PA-C  04/01/24    Time Spent on Discharge:  I spent 40 minutes on  this discharge activity which included: face-to-face encounter with the patient, reviewing the data in the system, coordination of the care with the nursing staff as well as consultants, documentation, and entering orders.            Electronically signed by Jamila Nolasco PA-C at 04/01/24 3093

## 2024-04-01 NOTE — PROGRESS NOTES
Brief progress note:  Attempted to see patient but at hemodialysis.  Will come back later.    Howie Moralez MD  [facility] [place] [facility]

## 2024-04-01 NOTE — DISCHARGE SUMMARY
[facility] Medicine Services  DISCHARGE SUMMARY    Patient Name: Eyal Solano  : 1950  MRN: [id number]    Date of Admission: 3/24/2024  3:56 PM  Date of Discharge:  2024  Primary Care Physician: Edda Sanchez MD    Consults       Date and Time Order Name Status Description    3/29/2024 11:05 AM Inpatient Orthopedic Surgery Consult Completed     3/25/2024  8:45 PM Inpatient General Surgery Consult Completed     3/24/2024  8:09 PM Inpatient Neurology Consult Stroke Completed     3/24/2024  6:46 PM Inpatient Nephrology Consult Completed           Hospital Course     Active Hospital Problems    Diagnosis  POA    **ESRD (end stage renal disease) [N18.6]  Yes    Bilateral knee effusions [M25.461, M25.462]  Clinically Undetermined    Anemia of chronic disease [D63.8]  Yes    Hyperkalemia [E87.5]  Yes    Pneumonia due to infectious organism [J18.9]  Clinically Undetermined    History of stroke s/p L MCA stent [Z86.73]  Not Applicable    Acute on chronic anemia [D64.9]  Yes    Mixed hyperlipidemia [E78.2]  Yes    Essential hypertension [I10]  Yes    Acute gout of knee [M10.9]  Yes      Resolved Hospital Problems    Diagnosis Date Resolved POA    Ataxia [R27.0] 2024 Yes      Hospital Course:  Eyal Solano is a 73 y.o. male with PMH significant for HTN, HLD, prior CVA s/p L MCA stent and CKD IV. Presented to [facility] ED on 3/24/24 with 2 days of dizziness, ataxia, double/blurred vision, nausea and vomiting. Stroke team evaluated and stroke was rule out. He was noted to have SAMINA and ultimately required initiation of HD this admission.      Dizziness/ataxia/visual disturbances, resolved  - History of CVA s/p L MCA stent  - MRI brain with DYLON stenosis (50%) otherwise unremarkable  - Continue ASA      SAMINA/CKD IV  Hyperkalemia, resolved   - Nephrology followed - monitoring K and treating as needed  - s/p HD catheter placement 3/26/24 by Dr. Melendez  - Patient interested in  transitioning to PD - Dr. Melendez evaluated for PD catheter prior to DC but this was deferred due to fevers. Unfortunately, Dr. Melendez now out of town so this will need to be done on an outpatient basis. Will arrange outpatient follow up  - Has outpatient HD chair at [facility] on M-W-F at 12:45pm      Anemia of chronic disease  - FOBT negative on 3/26  - s/p 1 unit PRBCS in [place]  - s/p 1 unit PRBCs here on 3/26 and again on 3/29   - Discussed with nephrology - suspect due to renal dysfunction.      (+) blood culture  - 1 of 2 blood cultures positive for Staph spp.   - Suspect contaminant. Repeat blood cultures NGTD     Fever  Possible pneumonia   - 3/26 CXR nonfocal. Repeat CXR on 3/29 concerning for pulmonary edema vs PNA  - Fevers could be due to polyarticular gout flare but given that procal is also elevated and he is requiring O2, will treat as PNA  - On IV Rocephin/Doxy - transition to PO at HI     Bilateral knee pain / effusions, improved   Gout flare  - Consulted ortho due to fevers - s/p bilateral knee aspiration 3/29 with no purulent fluid or leukocytosis  - Septic joint / bursitis ruled out   - Fuid cultures NGTD  - Suspect secondary gout flare  - On Allopurinol. No Colchicine due to CKD  - Much improved on PO Prednisone, will start taper at HI       HTN  - BP a bit elevated. Continue Amlodipine / Carvedilol      HLD, continue statin     Day of Discharge     HPI:   In bed. Feeling well today with no new complaints. Knee pain is better.     Review of Systems  Gen- No fevers, chills  CV- No chest pain, palpitations  Resp- No cough, dyspnea  GI- No N/V/D, abd pain    Vital Signs:   Temp:  [97.6 °F (36.4 °C)-98.7 °F (37.1 °C)] 97.6 °F (36.4 °C)  Heart Rate:  [64-89] 70  Resp:  [18-20] 18  BP: (147-181)/() 167/100    Physical Exam:  Constitutional: No acute distress, awake, alert and conversational.   HENT: NCAT, mucous membranes moist  Respiratory: Clear to auscultation bilaterally, respiratory  effort normal on room air   Cardiovascular: RRR, no murmurs, rubs, or gallops  Gastrointestinal: Positive bowel sounds, soft, nontender, nondistended  Musculoskeletal: No bilateral ankle edema. R upper chest tunneled HD catheter. Arthritic changes to knees bilaterally, size of effusions, warmth and tenderness to palpation are improved. Full active ROM   Psychiatric: Appropriate affect, cooperative  Neurologic: Oriented x 3, moves all extremities spontaneously without focal deficits, speech clear    Pertinent  and/or Most Recent Results     LAB RESULTS:      Lab 04/01/24  0423 03/31/24  0651 03/29/24  1849 03/29/24  1417 03/29/24  0644 03/28/24  0556 03/27/24  0030 03/26/24  1610 03/26/24  0333   WBC 9.05 9.72  --  7.06 6.88 9.21 7.37  --  5.93   HEMOGLOBIN 8.5* 8.6*  --  8.3* 6.6* 7.4* 7.7*   < > 6.2*   HEMATOCRIT 26.5* 27.5*  --  26.1* 21.7* 23.8* 24.0*   < > 19.8*   PLATELETS 229 208  --  176 176 188 176  --  198   NEUTROS ABS  --   --   --   --   --   --  5.99  --  4.28   IMMATURE GRANS (ABS)  --   --   --   --   --   --  0.03  --  0.03   LYMPHS ABS  --   --   --   --   --   --  0.52*  --  0.90   MONOS ABS  --   --   --   --   --   --  0.69  --  0.57   EOS ABS  --   --   --   --   --   --  0.12  --  0.12   MCV 86.3 86.2  --  87.9 88.2 87.2 84.8  --  85.0   SED RATE  --   --   --   --  68*  --   --   --   --    CRP  --   --   --   --  16.17*  --  11.63*  --   --    PROCALCITONIN  --   --   --   --  0.90*  --  1.02*  --   --    LACTATE  --   --  0.4*  --   --   --  0.6  --   --    PROTIME  --   --   --   --   --   --   --   --  15.3*    < > = values in this interval not displayed.         Lab 04/01/24  0423 03/31/24  0651 03/29/24  0644 03/28/24  0556 03/27/24  0030 03/26/24  0333   SODIUM 132* 135* 136 140 133* 135*   POTASSIUM 5.4* 5.1 5.3* 4.9 4.6 5.0   CHLORIDE 99 100 101 103 99 102   CO2 20.0* 20.0* 23.0 25.0 23.0 19.0*   ANION GAP 13.0 15.0 12.0 12.0 11.0 14.0   BUN 77* 62* 56* 55* 58* 101*   CREATININE 5.29*  4.66* 5.99* 5.37* 4.37* 6.05*   EGFR 10.9* 12.7* 9.4* 10.7* 13.7* 9.3*   GLUCOSE 110* 112* 96 111* 118* 101*   CALCIUM 7.9* 8.0* 7.4* 7.8* 7.9* 7.7*   MAGNESIUM  --   --   --   --  1.6  --    PHOSPHORUS 4.7* 4.4  --   --   --  5.4*         Lab 04/01/24  0423 03/31/24  0651 03/29/24  0644 03/28/24  0556 03/27/24  0030   TOTAL PROTEIN  --   --  5.0* 5.2* 5.6*   ALBUMIN 2.8* 2.9* 2.9* 3.1* 2.6*   GLOBULIN  --   --  2.1 2.1 3.0   ALT (SGPT)  --   --  12 15 18   AST (SGOT)  --   --  12 14 17   BILIRUBIN  --   --  0.2 0.3 0.3   ALK PHOS  --   --  42 46 50         Lab 03/26/24  0333   PROTIME 15.3*   INR 1.20*         Lab 03/29/24  0936 03/26/24  0333   IRON  --  24*   IRON SATURATION (TSAT)  --  14*   TIBC  --  171*   TRANSFERRIN  --  115*   ABO TYPING A  --    RH TYPING Positive  --    ANTIBODY SCREEN Negative  --      Brief Urine Lab Results  (Last result in the past 365 days)        Color   Clarity   Blood   Leuk Est   Nitrite   Protein   CREAT   Urine HCG        03/27/24 0033 Yellow   Clear   Trace   Negative   Negative   >=300 mg/dL (3+)                 Microbiology Results (last 10 days)       Procedure Component Value - Date/Time    Blood Culture - Blood, Arm, Right [[id number]]  (Normal) Collected: 03/29/24 1849    Lab Status: Preliminary result Specimen: Blood from Arm, Right Updated: 03/31/24 1916     Blood Culture No growth at 2 days    Blood Culture - Blood, Arm, Left [[id number]]  (Normal) Collected: 03/29/24 1849    Lab Status: Preliminary result Specimen: Blood from Arm, Left Updated: 03/31/24 1916     Blood Culture No growth at 2 days    Respiratory Panel PCR w/COVID-19(SARS-CoV-2) ANGELIQUE/DANYELL/DIANA/PAD/COR/KAVON In-House, NP Swab in UTM/VTM, 2 HR TAT - Swab, Nasopharynx [[id number]]  (Normal) Collected: 03/29/24 1456    Lab Status: Final result Specimen: Swab from Nasopharynx Updated: 03/29/24 1604     ADENOVIRUS, PCR Not Detected     Coronavirus 229E Not Detected     Coronavirus HKU1 Not Detected     Coronavirus  NL63 Not Detected     Coronavirus OC43 Not Detected     COVID19 Not Detected     Human Metapneumovirus Not Detected     Human Rhinovirus/Enterovirus Not Detected     Influenza A PCR Not Detected     Influenza B PCR Not Detected     Parainfluenza Virus 1 Not Detected     Parainfluenza Virus 2 Not Detected     Parainfluenza Virus 3 Not Detected     Parainfluenza Virus 4 Not Detected     RSV, PCR Not Detected     Bordetella pertussis pcr Not Detected     Bordetella parapertussis PCR Not Detected     Chlamydophila pneumoniae PCR Not Detected     Mycoplasma pneumo by PCR Not Detected    Narrative:      In the setting of a positive respiratory panel with a viral infection PLUS a negative procalcitonin without other underlying concern for bacterial infection, consider observing off antibiotics or discontinuation of antibiotics and continue supportive care. If the respiratory panel is positive for atypical bacterial infection (Bordetella pertussis, Chlamydophila pneumoniae, or Mycoplasma pneumoniae), consider antibiotic de-escalation to target atypical bacterial infection.    Blood Culture - Blood, Arm, Right [[id number]]  (Normal) Collected: 03/28/24 1123    Lab Status: Preliminary result Specimen: Blood from Arm, Right Updated: 03/31/24 1145     Blood Culture No growth at 3 days    Narrative:      Less than seven (7) mL's of blood was collected.  Insufficient quantity may yield false negative results.    Blood Culture - Blood, Arm, Left [[id number]]  (Normal) Collected: 03/28/24 1116    Lab Status: Preliminary result Specimen: Blood from Arm, Left Updated: 03/31/24 1145     Blood Culture No growth at 3 days    Narrative:      Less than seven (7) mL's of blood was collected.  Insufficient quantity may yield false negative results.    MRSA Screen, PCR (Inpatient) - Swab, Nares [[id number]]  (Normal) Collected: 03/27/24 0033    Lab Status: Final result Specimen: Swab from Nares Updated: 03/27/24 0802     MRSA PCR Negative     Narrative:      The negative predictive value of this diagnostic test is high and should only be used to consider de-escalating anti-MRSA therapy. A positive result may indicate colonization with MRSA and must be correlated clinically.  MRSA Negative    COVID PRE-OP / PRE-PROCEDURE SCREENING ORDER (NO ISOLATION) - Swab, Nasopharynx [[id number]]  (Normal) Collected: 03/27/24 0033    Lab Status: Final result Specimen: Swab from Nasopharynx Updated: 03/27/24 0123    Narrative:      The following orders were created for panel order COVID PRE-OP / PRE-PROCEDURE SCREENING ORDER (NO ISOLATION) - Swab, Nasopharynx.  Procedure                               Abnormality         Status                     ---------                               -----------         ------                     COVID-19 and FLU A/B PCR...[[id number]]  Normal              Final result                 Please view results for these tests on the individual orders.    COVID-19 and FLU A/B PCR, 1 HR TAT - Swab, Nasopharynx [[id number]]  (Normal) Collected: 03/27/24 0033    Lab Status: Final result Specimen: Swab from Nasopharynx Updated: 03/27/24 0123     COVID19 Not Detected     Influenza A PCR Not Detected     Influenza B PCR Not Detected    Narrative:      Fact sheet for providers: https://www.fda.gov/media/277781/download    Fact sheet for patients: https://www.fda.gov/media/650761/download    Test performed by PCR.    Blood Culture - Blood, Arm, Right [[id number]]  (Normal) Collected: 03/27/24 0030    Lab Status: Final result Specimen: Blood from Arm, Right Updated: 04/01/24 0115     Blood Culture No growth at 5 days    Blood Culture - Blood, Arm, Right [[id number]]  (Abnormal) Collected: 03/27/24 0030    Lab Status: Final result Specimen: Blood from Arm, Right Updated: 03/28/24 0611     Blood Culture Staphylococcus, coagulase negative     Isolated from Anaerobic Bottle     Gram Stain Anaerobic Bottle Gram positive cocci in pairs and clusters     Narrative:      Probable contaminant requires clinical correlation, susceptibility not performed unless requested by physician.      Blood Culture ID, PCR - Blood, Arm, Right [[id number]]  (Abnormal) Collected: 03/27/24 0030    Lab Status: Final result Specimen: Blood from Arm, Right Updated: 03/27/24 1853     BCID, PCR Staph spp, not aureus or lugdunensis. Identification by BCID2 PCR.     BOTTLE TYPE Anaerobic Bottle          XR Chest 1 View    Result Date: 3/29/2024  XR CHEST 1 VW Date of Exam: 3/29/2024 3:26 PM EDT Indication: fever, hypoxia Comparison: 3/26/2024 Findings: Lines: Unchanged position of right internal jugular venous catheter/dialysis catheter Lungs: Patchy opacities in the perihilar region and lower lungs bilaterally, worsened since prior study. Pleura: Small left-sided pleural effusion. No pneumothorax Cardiomediastinum: The cardiomediastinal silhouette is normal Soft Tissues: Unremarkable. Bones: No acute osseous abnormality.     Impression: Patchy opacities in the bilateral perihilar region and lower lungs are a nonspecific finding and may represent pulmonary edema, although superimposed multifocal pneumonia is not completely excluded. Electronically Signed: Sebas Crowe DO  3/29/2024 3:55 PM EDT  Workstation ID: [id number]    XR Knee 1 or 2 View Bilateral    Result Date: 3/29/2024  XR KNEE 1 OR 2 VW BILATERAL HISTORY: knee pain L > R. Chronic gout       pain COMPARISON: None FINDINGS: Acute Fracture: None. Subluxation: None. Abnormal soft tissue swelling: None. Radiopaque foreign body: None. Soft tissue air: None. Degenerative changes: Mild. Present. Other/Incidental findings: Vascular calcification. 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     No acute abnormality on XR KNEE 2 VW BILATERAL. The bilateral tibial and femoral bone lesions are likely benign as discussed above. If there is any clinical concern, MRI may be considered for confirmation.  Electronically Signed: Jose Pina MD  3/29/2024 1:30 PM EDT  Workstation ID: [id number]    XR Chest 1 View    Result Date: 3/26/2024  XR CHEST 1 VW Date of Exam: 3/26/2024 11:30 PM EDT Indication: dyspnea Comparison: 3/24/2024. Findings: There is a new right-sided dialysis catheter terminating in the lower SVC. There is minimal interstitial pulmonary edema. There is linear scar in the right lateral midlung. Heart size appears within normal limits. There is no pneumothorax, pleural effusion or focal airspace consolidation. No acute osseous abnormalities.     Impression: Minimal interstitial pulmonary edema. Electronically Signed: Kunal Roy MD  3/26/2024 11:48 PM EDT  Workstation ID: [id number]    IR Insert Tunneled CV Catheter Without Port 5 Plus    Result Date: 3/26/2024  IR INS TUNNELED CV CATHETER WO PORT 5 PLUS  History: Needs to initiate on HD.  : Jose Pina MD. Modality: Sonography and fluoroscopy Fluoro time: 1.2 minutes Radiation Dose: 2.8 mGy air Kerma.   SEDATION: Moderate sedation was administered. 1 milligram of Versed and 50 micrograms of fentanyl IV was used for moderate sedation. Total intra service time of sedation was 22 minutes. The sedation was administered and the patient's vital signs monitored throughout the procedure and recorded in the patient's medical record by the nurse under my direct supervision. Medicines: Not applicable. Anesthesia: Lidocaine 1% with epinephrine, local infiltration Estimated blood loss:  < 5 cc.        Technique: A thorough discussion of the risks, benefits, and alternatives of the procedure, and if applicable, moderate sedation, was carried out with the patient. They were encouraged to ask any questions. Any questions were answered. They verbalized understanding. A written informed consent was then signed.  A multi-component timeout was performed prior to starting the procedure using the departmental protocol. The procedure room personnel used  personal protective equipment. The operators used sterile gloves and if indicated, sterile gowns. The surgical site was prepped with chlorhexidine gluconate  and draped in the maximal applicable sterile fashion. A preliminary ultrasonogram was performed of the target that revealed a patent and compressible Right internal jugular vein. Pertinent ultrasound images were stored in the PACS for documentation. A sterile prep and drape of the right neck and upper chest was performed using maximal sterile technique. Using aseptic precautions, real-time ultrasound guidance, the target vein was accessed after local anesthetic infiltration and dermatotomy with an access needle. A guidewire was advanced into the central venous system under fluoroscopic guidance. Over the wire following standard technique a peel-away sheath was placed. After local anesthesia, an incision was created at the exit site location and a cuffed tunneled dialysis catheter of an appropriate length was tunneled from the exit site to the venotomy site using a tunneling device. The catheter was advanced into the venous system through the peel-away sheath which was removed. The catheter aspirated and flushed well and was terminally packed with heparin 1000 units per cc. The catheter was secured to skin using nonabsorbable suture and a CHG dressing applied. The venotomy site was closed using Dermabond. An aseptic dressing was applied using the protocol for Dermabond. The patient was transferred to the recovery area and was discharged from the department in stable condition.  Complications: None immediate.    Device: 15.5 Macedonian x 19 cm cuff to tip Duraflow catheter. Findings: Patent and compressible Right internal jugular. Final image shows the catheter to be in good position with the catheter tip in the right atrium, an excellent position for use. There is no complication.      Impression:    Successful ultrasound and fluoroscopic guided Right internal  jugular vein route cuffed tunneled hemodialysis catheter placement as described above. Thank you for the opportunity to assist in the care of your patient. Electronically Signed: Jose Pina MD  3/26/2024 3:22 PM EDT  Workstation ID: [id number]    MRI Angiogram Head Without Contrast    Result Date: 3/24/2024  MRI ANGIOGRAM HEAD WO CONTRAST, MRI ANGIOGRAM NECK WO CONTRAST Date of Exam: 3/24/2024 7:12 PM EDT Indication: ataxia/diplopia.  Comparison: 4/8/2022 Technique:  Routine 3-D time-of-flight gradient echo imaging was obtained of the head and neck without contrast administration. Findings: The visualized proximal common carotid arteries are normal. Irregularity of the carotid bulbs indicative of likely atheromatous disease. The visualized extracranial segments of the internal carotid arteries are normal. The vertebral arteries are codominant within the neck. Irregularity of the extracranial internal carotid arteries. At the cervical portion of the right internal carotid artery there is significant narrowing appreciated with greater than 50% stenosis visually. Above this level both internal carotid arteries are irregular likely due to atheromatous disease. The carotid termini are normal. The visualized branches of the anterior and middle cerebral arteries appear patent. Both vertebral arteries supply the basilar artery. The basilar artery and basilar artery tip are normal. The basilar artery terminates in normal-appearing bilateral posterior cerebral arteries.     There is approximately 50% stenosis of the right internal carotid artery at the skull base. Evidence of atheromatous disease. Otherwise no vessel occlusion. Electronically Signed: Guru Rayo MD  3/24/2024 7:59 PM EDT  Workstation ID: [id number]    MRI Angiogram Neck Without Contrast    Result Date: 3/24/2024  MRI ANGIOGRAM HEAD WO CONTRAST, MRI ANGIOGRAM NECK WO CONTRAST Date of Exam: 3/24/2024 7:12 PM EDT Indication: ataxia/diplopia.  Comparison:  4/8/2022 Technique:  Routine 3-D time-of-flight gradient echo imaging was obtained of the head and neck without contrast administration. Findings: The visualized proximal common carotid arteries are normal. Irregularity of the carotid bulbs indicative of likely atheromatous disease. The visualized extracranial segments of the internal carotid arteries are normal. The vertebral arteries are codominant within the neck. Irregularity of the extracranial internal carotid arteries. At the cervical portion of the right internal carotid artery there is significant narrowing appreciated with greater than 50% stenosis visually. Above this level both internal carotid arteries are irregular likely due to atheromatous disease. The carotid termini are normal. The visualized branches of the anterior and middle cerebral arteries appear patent. Both vertebral arteries supply the basilar artery. The basilar artery and basilar artery tip are normal. The basilar artery terminates in normal-appearing bilateral posterior cerebral arteries.     There is approximately 50% stenosis of the right internal carotid artery at the skull base. Evidence of atheromatous disease. Otherwise no vessel occlusion. Electronically Signed: Guru Rayo MD  3/24/2024 7:59 PM EDT  Workstation ID: [id number]    MRI Brain Without Contrast    Result Date: 3/24/2024  MRI BRAIN WO CONTRAST Date of Exam: 3/24/2024 7:01 PM EDT Indication: ataxia/diplopia.  Comparison: 4/8/2022 Technique:  Routine multiplanar/multisequence sequence images of the brain were obtained without contrast administration. Findings: No area of restricted diffusion is identified to suggest an acute intracranial infarct. Multifocal areas of T2/FLAIR signal increase are noted within the subcortical, deep cerebral, and periventricular white matter consistent with chronic small  vessel/microangiopathic ischemic changes. The ventricles and sulci are normal in size and configuration. No intracranial  mass or mass effect. No extra-axial mass or collection. The posterior fossa is normal. Sellar and suprasellar structures are normal.  The major intracranial flow-voids of the Eek of Mariscal are patent. Orbital and periorbital soft tissues are normal. Mucoid retention cyst within the anterior right maxillary sinus. Mucosal thickening right maxillary sinus. The mastoid air cells are aerated..     Impression: No acute intracranial pathology. Chronic small vessel/microangiopathic ischemic changes. Electronically Signed: Guru Rayo MD  3/24/2024 7:24 PM EDT  Workstation ID: [id number]    XR Chest 1 View    Result Date: 3/24/2024  XR CHEST 1 VW Date of Exam: 3/24/2024 4:33 PM EDT Indication: Weak/Dizzy/AMS triage protocol Comparison: None available. Findings: There are no airspace consolidations. No pleural fluid. No pneumothorax. The pulmonary vasculature appears within normal limits. The cardiac and mediastinal silhouette appear unremarkable. No acute osseous abnormality identified.     Impression: No acute pulmonary process Electronically Signed: Thang Coronel MD  3/24/2024 4:55 PM EDT  Workstation ID: [id number]     Results for orders placed during the hospital encounter of 09/02/21    Duplex Lower Extremity Art / Grafts - Right CAR    Interpretation Summary  · There is evidence of a stenosis in the mid right SFA of at least 50%. Otherwise, there is mild diffuse atherosclerosis in the the right lower extremity arteries on the basis of this study. There are monophasic waveforms starting from the common femoral artery and extending to the pedal vessels.  · No evidence of a pseudoaneurysm or AV fistula on the basis of this study.  · There is an incidentally noted cystic structure measuring 0.8 x 1.9 cm in the right upper thigh which may be consistent with a small hematoma versus an enlarged lymph node.    Results for orders placed during the hospital encounter of 07/28/21    Adult Transthoracic Echo Complete W/ Cont if  Necessary Per Protocol (With Agitated Saline)    Interpretation Summary  · Left ventricular ejection fraction appears to be 56 - 60%.  · Normal left atrial size and volume noted. No evidence of a patent foramen ovale. Saline test results are negative  · Mild mitral annular calcification is present. There is mild, anterior mitral leaflet thickening present. Trace mitral regurgitation.    Discharge Details        Discharge Medications        New Medications        Instructions Start Date   b complex-vitamin c-folic acid 0.8 MG tablet tablet   1 tablet, Oral, Daily      cefuroxime 500 MG tablet  Commonly known as: CEFTIN   500 mg, Oral, Once, Take on Tuesday 4/2/2024      doxycycline 100 MG capsule  Commonly known as: VIBRAMYCIN   100 mg, Oral, 2 Times Daily, First dose evening of 4/1/24      predniSONE 10 MG tablet  Commonly known as: DELTASONE   Take 3 tablets by mouth Daily With Breakfast for 2 days, THEN 2 tablets Daily With Breakfast for 2 days, THEN 1 tablet Daily With Breakfast for 2 days.   Start Date: April 1, 2024            Changes to Medications        Instructions Start Date   allopurinol 100 MG tablet  Commonly known as: ZYLOPRIM  What changed: how much to take   100 mg, Oral, Daily      carvedilol 25 MG tablet  Commonly known as: COREG  What changed:   medication strength  how much to take   25 mg, Oral, 2 Times Daily With Meals      ferrous sulfate 325 (65 FE) MG tablet  What changed: when to take this   325 mg, Oral, Daily With Breakfast             Continue These Medications        Instructions Start Date   albuterol sulfate  (90 Base) MCG/ACT inhaler  Commonly known as: PROVENTIL HFA;VENTOLIN HFA;PROAIR HFA   1 puff, Inhalation, Every 4 Hours PRN      aspirin 81 MG chewable tablet   81 mg, Oral, Daily      atorvastatin 80 MG tablet  Commonly known as: LIPITOR   80 mg, Oral, Nightly      calcitriol 0.25 MCG capsule  Commonly known as: ROCALTROL   0.25 mcg, Oral, Daily      calcium acetate 667  MG capsule capsule  Commonly known as: PHOS BINDER)   1,334 mg, Oral, 3 Times Daily, With meals      doxazosin 4 MG tablet  Commonly known as: CARDURA   4 mg, Oral, Nightly      ezetimibe 10 MG tablet  Commonly known as: Zetia   10 mg, Oral, Daily      levocetirizine 5 MG tablet  Commonly known as: XYZAL   TAKE 1 TABLET BY MOUTH ONCE DAILY IN THE EVENING      NIFEdipine XL 60 MG 24 hr tablet  Commonly known as: PROCARDIA XL   60 mg, Oral, Daily      VITAMIN D PO   1 tablet, Oral, Daily             Stop These Medications      amLODIPine 10 MG tablet  Commonly known as: NORVASC     fluticasone 50 MCG/ACT nasal spray  Commonly known as: Flonase     torsemide 100 MG tablet  Commonly known as: DEMADEX            No Known Allergies    Discharge Disposition:Home or Self Care    Diet:  Diet Instructions       Diet: Regular/House Diet, Renal Diets; Low Potassium, Low Sodium (2-3g), Low Phosphorus; Regular (IDDSI 7); Thin (IDDSI 0)      Discharge Diet:  Regular/House Diet  Renal Diets       Renal Diet:  Low Potassium  Low Sodium (2-3g)  Low Phosphorus       Texture: Regular (IDDSI 7)    Fluid Consistency: Thin (IDDSI 0)           Activity:  Activity Instructions       Activity as Tolerated            CODE STATUS:    Code Status and Medical Interventions:   Ordered at: 03/24/24 1831     Code Status (Patient has no pulse and is not breathing):    CPR (Attempt to Resuscitate)     Medical Interventions (Patient has pulse or is breathing):    Full Support     Future Appointments   Date Time Provider Department Center   4/3/2024  9:45 AM Edda Sanchez MD [facility]   5/9/2024  2:30 PM Edda Sanchez MD [facility]   9/12/2024  4:00 PM Nathalie Ambrocio APRN [facility]     Additional Instructions for the Follow-ups that You Need to Schedule       Discharge Follow-up with PCP   As directed       Currently Documented PCP:    Edda Sanchez MD    PCP Phone Number:    [phone]     Follow Up  Details: 1 week              Jamila Nolasco PA-C  04/01/24    Time Spent on Discharge:  I spent 40 minutes on this discharge activity which included: face-to-face encounter with the patient, reviewing the data in the system, coordination of the care with the nursing staff as well as consultants, documentation, and entering orders.

## 2024-04-01 NOTE — PLAN OF CARE
Goal Outcome Evaluation: HD completed. Tolerated well. Access functioned well. UF goal reached. Blood returned. Report given to primary JYOTI Jackson.       Problem: Device-Related Complication Risk (Hemodialysis)  Goal: Safe, Effective Therapy Delivery  Outcome: Ongoing, Progressing  Intervention: Optimize Device Care and Function  Recent Flowsheet Documentation  Taken 4/1/2024 0800 by Christin Unger RN  Medication Review/Management: medications reviewed  Goal: Safe, Effective Therapy Delivery  Outcome: Ongoing, Progressing  Intervention: Optimize Device Care and Function  Recent Flowsheet Documentation  Taken 4/1/2024 0800 by Christin Unger RN  Medication Review/Management: medications reviewed     Problem: Hemodynamic Instability (Hemodialysis)  Goal: Effective Tissue Perfusion  Outcome: Ongoing, Progressing  Goal: Effective Tissue Perfusion  Outcome: Ongoing, Progressing     Problem: Infection (Hemodialysis)  Goal: Absence of Infection Signs and Symptoms  Outcome: Ongoing, Progressing  Goal: Absence of Infection Signs and Symptoms  Outcome: Ongoing, Progressing

## 2024-04-01 NOTE — OUTREACH NOTE
Prep Survey      Flowsheet Row Responses   [facility] [place], operated by [facility] patient discharged from? [place]   Is LACE score < 7 ? No   Eligibility [facility]   Date of Admission 03/24/24   Date of Discharge 04/01/24   Discharge Disposition Home or Self Care   Discharge diagnosis ESRD (end stage renal disease), HD initiated   Does the patient have one of the following disease processes/diagnoses(primary or secondary)? Other   Does the patient have Home health ordered? No   Is there a DME ordered? No   Prep survey completed? Yes            Mery Sánchez Registered Nurse

## 2024-04-02 ENCOUNTER — TRANSITIONAL CARE MANAGEMENT TELEPHONE ENCOUNTER (OUTPATIENT)
Dept: CALL CENTER | Facility: HOSPITAL | Age: 74
End: 2024-04-02
Payer: MEDICARE

## 2024-04-02 LAB
BACTERIA SPEC AEROBE CULT: NORMAL
BACTERIA SPEC AEROBE CULT: NORMAL

## 2024-04-02 NOTE — OUTREACH NOTE
Call Center TCM Note      Flowsheet Row Responses   [facility] patient discharged from? [place]   Does the patient have one of the following disease processes/diagnoses(primary or secondary)? Other   TCM attempt successful? Yes   Call start time 0900   Call end time 0902   Discharge diagnosis ESRD (end stage renal disease), HD initiated   Meds reviewed with patient/caregiver? Yes   Is the patient having any side effects they believe may be caused by any medication additions or changes? No   Does the patient have all medications ordered at discharge? Yes   Is the patient taking all medications as directed (includes completed medication regime)? Yes   Does the patient have an appointment with their PCP within 7-14 days of discharge? Yes   Has home health visited the patient within 72 hours of discharge? N/A   Psychosocial issues? No   Did the patient receive a copy of their discharge instructions? Yes   Nursing interventions Reviewed instructions with patient   What is the patient's perception of their health status since discharge? Improving   Is the patient/caregiver able to teach back signs and symptoms related to disease process for when to call PCP? Yes   Is the patient/caregiver able to teach back signs and symptoms related to disease process for when to call 911? Yes   Is the patient/caregiver able to teach back the hierarchy of who to call/visit for symptoms/problems? PCP, Specialist, Home health nurse, Urgent Care, ED, 911 Yes   If the patient is a current smoker, are they able to teach back resources for cessation? Not a smoker   TCM call completed? Yes   Call end time 0902   Would this patient benefit from a Referral to Amb Social Work? No   Is the patient interested in additional calls from an ambulatory ? No            Brittany Park LPN    4/2/2024, 09:05 EDT

## 2024-04-03 LAB
BACTERIA SPEC AEROBE CULT: NORMAL
BACTERIA SPEC AEROBE CULT: NORMAL

## 2024-04-04 LAB
BACTERIA FLD CULT: NORMAL
BACTERIA FLD CULT: NORMAL
BACTERIA SPEC ANAEROBE CULT: NORMAL
BACTERIA SPEC ANAEROBE CULT: NORMAL
QT INTERVAL: 454 MS
QTC INTERVAL: 460 MS

## (undated) DEVICE — STPCK LP 1WY RA 200PSI

## (undated) DEVICE — RADIFOCUS GLIDEWIRE: Brand: GLIDEWIRE

## (undated) DEVICE — LONG SHEATH: Brand: AXS INFINITY LS

## (undated) DEVICE — ANGIO-SEAL VIP VASCULAR CLOSURE DEVICE: Brand: ANGIO-SEAL

## (undated) DEVICE — Device

## (undated) DEVICE — ST ACC MICROPUNCTURE .018 TRANSLSS/SS/TP 5F/10CM 21G/7CM

## (undated) DEVICE — DEV INFL MONARCH 25W

## (undated) DEVICE — DISTAL ACCESS CATHETER: Brand: AXS CATALYST 5

## (undated) DEVICE — SUT MNCRYL 4/0 PS2 18 IN

## (undated) DEVICE — GUIDEWIRE WITH ICE™ HYDROPHILIC COATING: Brand: TRANSEND™ EX

## (undated) DEVICE — ROTATING HEMOSTATIC VALVE .096": Brand: RHV

## (undated) DEVICE — LEX NEURO ANGIOGRAPHY: Brand: MEDLINE INDUSTRIES, INC.

## (undated) DEVICE — CATH TEMPO 5F BER 100CM: Brand: TEMPO

## (undated) DEVICE — 3M™ BAIR HUGGER® UNDERBODY BLANKET, ADULT, 10 PER CASE 54500: Brand: BAIR HUGGER™

## (undated) DEVICE — INTRO SHEATH ENGAGE W/50 GW .038 8F12